# Patient Record
Sex: FEMALE | Race: WHITE | Employment: UNEMPLOYED | URBAN - METROPOLITAN AREA
[De-identification: names, ages, dates, MRNs, and addresses within clinical notes are randomized per-mention and may not be internally consistent; named-entity substitution may affect disease eponyms.]

---

## 2017-04-11 ENCOUNTER — HOSPITAL ENCOUNTER (EMERGENCY)
Age: 42
Discharge: HOME | End: 2017-04-11
Attending: FAMILY MEDICINE

## 2017-04-11 ENCOUNTER — APPOINTMENT (OUTPATIENT)
Dept: GENERAL RADIOLOGY | Age: 42
End: 2017-04-11
Attending: FAMILY MEDICINE

## 2017-04-11 VITALS
WEIGHT: 167 LBS | SYSTOLIC BLOOD PRESSURE: 169 MMHG | BODY MASS INDEX: 26.21 KG/M2 | OXYGEN SATURATION: 97 % | TEMPERATURE: 98.6 F | DIASTOLIC BLOOD PRESSURE: 87 MMHG | RESPIRATION RATE: 18 BRPM | HEART RATE: 100 BPM | HEIGHT: 67 IN

## 2017-04-11 DIAGNOSIS — J20.9 ACUTE BRONCHITIS, UNSPECIFIED ORGANISM: Primary | ICD-10-CM

## 2017-04-11 DIAGNOSIS — H66.91 ACUTE RIGHT OTITIS MEDIA: ICD-10-CM

## 2017-04-11 RX ORDER — OMEPRAZOLE 40 MG/1
40 CAPSULE, DELAYED RELEASE ORAL DAILY
COMMUNITY

## 2017-04-11 RX ORDER — PREDNISONE 10 MG/1
TABLET ORAL
Qty: 21 TAB | Refills: 0 | Status: ON HOLD | OUTPATIENT
Start: 2017-04-11 | End: 2020-09-25

## 2017-04-11 RX ORDER — AMOXICILLIN AND CLAVULANATE POTASSIUM 875; 125 MG/1; MG/1
1 TABLET, FILM COATED ORAL EVERY 12 HOURS
Qty: 20 TAB | Refills: 0 | Status: SHIPPED | OUTPATIENT
Start: 2017-04-11 | End: 2017-04-21

## 2017-04-11 RX ORDER — TIZANIDINE HYDROCHLORIDE 4 MG/1
4 CAPSULE, GELATIN COATED ORAL
COMMUNITY
End: 2020-10-02

## 2017-04-11 RX ORDER — CITALOPRAM 40 MG/1
40 TABLET, FILM COATED ORAL DAILY
Status: ON HOLD | COMMUNITY
End: 2020-09-25

## 2017-04-11 RX ORDER — ALBUTEROL SULFATE 90 UG/1
1 AEROSOL, METERED RESPIRATORY (INHALATION)
Status: ON HOLD | COMMUNITY
End: 2020-09-25

## 2017-04-11 RX ORDER — PREGABALIN 200 MG/1
200 CAPSULE ORAL 4 TIMES DAILY
COMMUNITY
End: 2020-10-02

## 2017-04-11 RX ORDER — BUSPIRONE HYDROCHLORIDE 15 MG/1
15 TABLET ORAL 3 TIMES DAILY
Status: ON HOLD | COMMUNITY
End: 2020-09-25

## 2017-04-11 RX ORDER — CODEINE PHOSPHATE AND GUAIFENESIN 10; 100 MG/5ML; MG/5ML
5 SOLUTION ORAL
Qty: 118 ML | Refills: 0 | Status: ON HOLD | OUTPATIENT
Start: 2017-04-11 | End: 2020-09-25

## 2017-04-11 RX ORDER — INSULIN LISPRO 100 [IU]/ML
INJECTION, SOLUTION INTRAVENOUS; SUBCUTANEOUS
Status: ON HOLD | COMMUNITY
End: 2020-09-25

## 2017-04-11 RX ORDER — ALBUTEROL SULFATE 90 UG/1
2 AEROSOL, METERED RESPIRATORY (INHALATION)
Qty: 1 INHALER | Refills: 0 | Status: SHIPPED | OUTPATIENT
Start: 2017-04-11

## 2017-04-11 NOTE — UC PROVIDER NOTE
Patient is a 38 y.o. female presenting with cough. The history is provided by the patient. Cough   This is a new problem. Episode onset: 2 weeks ago. The problem occurs every few minutes. The problem has been gradually worsening. The cough is productive of sputum. There has been no fever. Associated symptoms include ear congestion (bilateral), ear pain (bilateral), rhinorrhea, shortness of breath and wheezing. Pertinent negatives include no chest pain, no chills, no sweats, no headaches, no sore throat, no myalgias, no nausea and no vomiting. She has tried inhalers and cough syrup for the symptoms. The treatment provided no relief. Her past medical history does not include asthma. Past Medical History:   Diagnosis Date    Arthritis     Diabetes ([address]     Endocrine disease     hypothyroid    Gastrointestinal disorder     pancreatitis    Hypertension         Past Surgical History:   Procedure Laterality Date    HX GI      colonoscpy         Family History   Problem Relation Age of Onset    Cancer Mother      colon        Social History     Social History    Marital status: SINGLE     Spouse name: N/A    Number of children: N/A    Years of education: N/A     Occupational History    Not on file. Social History Main Topics    Smoking status: Current Some Day Smoker     Packs/day: 0.25    Smokeless tobacco: Never Used    Alcohol use Yes      Comment: quit about a month ago - drank only on the weekends    Drug use: No    Sexual activity: Yes     Partners: Male     Other Topics Concern    Not on file     Social History Narrative                ALLERGIES: Review of patient's allergies indicates no known allergies. Review of Systems   Constitutional: Negative for chills and fever. HENT: Positive for congestion, ear pain (bilateral) and rhinorrhea. Negative for sore throat. Respiratory: Positive for cough, shortness of breath and wheezing.     Cardiovascular: Negative for chest pain and palpitations. Gastrointestinal: Negative for nausea and vomiting. Musculoskeletal: Negative for myalgias. Skin: Negative for rash. Neurological: Negative for headaches. Vitals:    04/11/17 1903   BP: 169/87   Pulse: 100   Resp: 18   Temp: 98.6 °F (37 °C)   SpO2: 97%   Weight: 75.8 kg (167 lb)   Height: 5' 7\" (1.702 m)       Physical Exam   Constitutional: She appears well-developed and well-nourished. No distress. HENT:   Right Ear: External ear and ear canal normal. Tympanic membrane is erythematous and bulging. A middle ear effusion is present. Left Ear: External ear and ear canal normal. A middle ear effusion is present. Nose: Mucosal edema and rhinorrhea present. Right sinus exhibits no maxillary sinus tenderness and no frontal sinus tenderness. Left sinus exhibits no maxillary sinus tenderness and no frontal sinus tenderness. Mouth/Throat: Mucous membranes are normal. Posterior oropharyngeal erythema present. No oropharyngeal exudate, posterior oropharyngeal edema or tonsillar abscesses. Cardiovascular: Normal rate, regular rhythm and normal heart sounds. Pulmonary/Chest: Effort normal. No respiratory distress. She has wheezes in the right upper field, the right lower field, the left upper field and the left lower field. She has no rhonchi. She has no rales. Lymphadenopathy:     She has cervical adenopathy. Neurological: She is alert. Skin: She is not diaphoretic. Psychiatric: She has a normal mood and affect. Her behavior is normal. Judgment and thought content normal.   Nursing note and vitals reviewed. CXR Results  (Last 48 hours)               04/11/17 1919  XR CHEST PA LAT Final result    Impression:  IMPRESSION: Normal chest.           Narrative:  INDICATION: Cough for 2 weeks       FINDINGS: PA and lateral views of the chest demonstrate a normal   cardiomediastinal silhouette and clear lungs bilaterally. The visualized osseous   structures are unremarkable. MDM     Differential Diagnosis; Clinical Impression; Plan:     CLINICAL IMPRESSION:  Acute bronchitis, unspecified organism  (primary encounter diagnosis)  Acute right otitis media    Plan:  1. Augmentin, pred yared  2. Robitussin AC, Albuterol prn  3. F/u with pcp  Amount and/or Complexity of Data Reviewed:   Tests in the radiology section of CPT®:  Ordered and reviewed  Risk of Significant Complications, Morbidity, and/or Mortality:   Presenting problems: Moderate  Diagnostic procedures: Moderate  Management options:   Moderate  Progress:   Patient progress:  Stable      Procedures

## 2017-04-11 NOTE — DISCHARGE INSTRUCTIONS
Bronchitis: Care Instructions  Your Care Instructions    Bronchitis is inflammation of the bronchial tubes, which carry air to the lungs. The tubes swell and produce mucus, or phlegm. The mucus and inflamed bronchial tubes make you cough. You may have trouble breathing. Most cases of bronchitis are caused by viruses like those that cause colds. Antibiotics usually do not help and they may be harmful. Bronchitis usually develops rapidly and lasts about 2 to 3 weeks in otherwise healthy people. Follow-up care is a key part of your treatment and safety. Be sure to make and go to all appointments, and call your doctor if you are having problems. It's also a good idea to know your test results and keep a list of the medicines you take. How can you care for yourself at home? · Take all medicines exactly as prescribed. Call your doctor if you think you are having a problem with your medicine. · Get some extra rest.  · Take an over-the-counter pain medicine, such as acetaminophen (Tylenol), ibuprofen (Advil, Motrin), or naproxen (Aleve) to reduce fever and relieve body aches. Read and follow all instructions on the label. · Do not take two or more pain medicines at the same time unless the doctor told you to. Many pain medicines have acetaminophen, which is Tylenol. Too much acetaminophen (Tylenol) can be harmful. · Take an over-the-counter cough medicine that contains dextromethorphan to help quiet a dry, hacking cough so that you can sleep. Avoid cough medicines that have more than one active ingredient. Read and follow all instructions on the label. · Breathe moist air from a humidifier, hot shower, or sink filled with hot water. The heat and moisture will thin mucus so you can cough it out. · Do not smoke. Smoking can make bronchitis worse. If you need help quitting, talk to your doctor about stop-smoking programs and medicines. These can increase your chances of quitting for good.   When should you call for help? Call 911 anytime you think you may need emergency care. For example, call if:  · You have severe trouble breathing. Call your doctor now or seek immediate medical care if:  · You have new or worse trouble breathing. · You cough up dark brown or bloody mucus (sputum). · You have a new or higher fever. · You have a new rash. Watch closely for changes in your health, and be sure to contact your doctor if:  · You cough more deeply or more often, especially if you notice more mucus or a change in the color of your mucus. · You are not getting better as expected. Where can you learn more? Go to http://shani-juan manuel.info/. Enter H333 in the search box to learn more about \"Bronchitis: Care Instructions. \"  Current as of: May 23, 2016  Content Version: 11.2  © 4984-6713 [employer]. Care instructions adapted under license by [employer] (which disclaims liability or warranty for this information). If you have questions about a medical condition or this instruction, always ask your healthcare professional. [place] [address] any warranty or liability for your use of this information. Ear Infection (Otitis Media): Care Instructions  Your Care Instructions    An ear infection may start with a cold and affect the middle ear (otitis media). It can hurt a lot. Most ear infections clear up on their own in a couple of days. Most often you will not need antibiotics. This is because many ear infections are caused by a virus. Antibiotics don't work against a virus. Regular doses of pain medicines are the best way to reduce your fever and help you feel better. Follow-up care is a key part of your treatment and safety. Be sure to make and go to all appointments, and call your doctor if you are having problems. It's also a good idea to know your test results and keep a list of the medicines you take. How can you care for yourself at home?   · Take pain medicines exactly as directed. ¨ If the doctor gave you a prescription medicine for pain, take it as prescribed. ¨ If you are not taking a prescription pain medicine, take an over-the-counter medicine, such as acetaminophen (Tylenol), ibuprofen (Advil, Motrin), or naproxen (Aleve). Read and follow all instructions on the label. ¨ Do not take two or more pain medicines at the same time unless the doctor told you to. Many pain medicines have acetaminophen, which is Tylenol. Too much acetaminophen (Tylenol) can be harmful. · Plan to take a full dose of pain reliever before bedtime. Getting enough sleep will help you get better. · Try a warm, moist washcloth on the ear. It may help relieve pain. · If your doctor prescribed antibiotics, take them as directed. Do not stop taking them just because you feel better. You need to take the full course of antibiotics. When should you call for help? Call your doctor now or seek immediate medical care if:  · You have new or increasing ear pain. · You have new or increasing pus or blood draining from your ear. · You have a fever with a stiff neck or a severe headache. Watch closely for changes in your health, and be sure to contact your doctor if:  · You have new or worse symptoms. · You are not getting better after taking an antibiotic for 2 days. Where can you learn more? Go to http://shani-juan manuel.info/. Enter O213 in the search box to learn more about \"Ear Infection (Otitis Media): Care Instructions. \"  Current as of: July 29, 2016  Content Version: 11.2  © 1671-6653 [employer]. Care instructions adapted under license by [employer] (which disclaims liability or warranty for this information). If you have questions about a medical condition or this instruction, always ask your healthcare professional. [address] any warranty or liability for your use of this information.

## 2019-08-02 ENCOUNTER — HOSPITAL ENCOUNTER (OUTPATIENT)
Dept: LAB | Age: 44
Discharge: HOME | End: 2019-08-02

## 2019-08-02 ENCOUNTER — HOSPITAL ENCOUNTER (OUTPATIENT)
Dept: LAB | Age: 44
Discharge: HOME | End: 2019-08-02
Payer: COMMERCIAL

## 2019-08-02 LAB
GRAM STN SPEC: NORMAL
GRAM STN SPEC: NORMAL
SERVICE CMNT-IMP: NORMAL

## 2019-08-02 PROCEDURE — 87205 SMEAR GRAM STAIN: CPT

## 2019-08-02 PROCEDURE — 87102 FUNGUS ISOLATION CULTURE: CPT

## 2019-08-02 PROCEDURE — 87070 CULTURE OTHR SPECIMN AEROBIC: CPT

## 2019-08-05 LAB
BACTERIA SPEC CULT: ABNORMAL
BACTERIA SPEC CULT: ABNORMAL
SERVICE CMNT-IMP: ABNORMAL

## 2019-09-01 LAB
BACTERIA SPEC CULT: NORMAL
SERVICE CMNT-IMP: NORMAL

## 2020-09-24 ENCOUNTER — APPOINTMENT (OUTPATIENT)
Dept: VASCULAR SURGERY | Age: 45
DRG: 364 | End: 2020-09-24
Attending: FAMILY MEDICINE
Payer: COMMERCIAL

## 2020-09-24 ENCOUNTER — HOSPITAL ENCOUNTER (INPATIENT)
Age: 45
LOS: 8 days | Discharge: HOME HEALTH | DRG: 364 | End: 2020-10-02
Attending: STUDENT IN AN ORGANIZED HEALTH CARE EDUCATION/TRAINING PROGRAM | Admitting: FAMILY MEDICINE
Payer: COMMERCIAL

## 2020-09-24 DIAGNOSIS — M86.9 OSTEOMYELITIS OF RIGHT FOOT, UNSPECIFIED TYPE (HCC): ICD-10-CM

## 2020-09-24 DIAGNOSIS — I21.21 ST ELEVATION MYOCARDIAL INFARCTION INVOLVING LEFT CIRCUMFLEX CORONARY ARTERY (HCC): ICD-10-CM

## 2020-09-24 DIAGNOSIS — I21.4 NSTEMI (NON-ST ELEVATED MYOCARDIAL INFARCTION) (HCC): ICD-10-CM

## 2020-09-24 DIAGNOSIS — I46.9 PEA (PULSELESS ELECTRICAL ACTIVITY) (HCC): ICD-10-CM

## 2020-09-24 DIAGNOSIS — I21.3 ST ELEVATION (STEMI) MYOCARDIAL INFARCTION (HCC): ICD-10-CM

## 2020-09-24 DIAGNOSIS — L03.115 CELLULITIS OF RIGHT LOWER EXTREMITY: Primary | ICD-10-CM

## 2020-09-24 DIAGNOSIS — E87.20 METABOLIC ACIDOSIS: ICD-10-CM

## 2020-09-24 DIAGNOSIS — E11.65 TYPE 2 DIABETES MELLITUS WITH HYPERGLYCEMIA, WITH LONG-TERM CURRENT USE OF INSULIN (HCC): ICD-10-CM

## 2020-09-24 DIAGNOSIS — Z79.4 TYPE 2 DIABETES MELLITUS WITH HYPERGLYCEMIA, WITH LONG-TERM CURRENT USE OF INSULIN (HCC): ICD-10-CM

## 2020-09-24 LAB
ALBUMIN SERPL-MCNC: 3 G/DL (ref 3.5–5)
ALBUMIN/GLOB SERPL: 0.7 {RATIO} (ref 1.1–2.2)
ALP SERPL-CCNC: 75 U/L (ref 45–117)
ALT SERPL-CCNC: 31 U/L (ref 12–78)
ANION GAP SERPL CALC-SCNC: 7 MMOL/L (ref 5–15)
AST SERPL-CCNC: 56 U/L (ref 15–37)
BASOPHILS # BLD: 0.1 K/UL (ref 0–0.1)
BASOPHILS NFR BLD: 1 % (ref 0–1)
BILIRUB SERPL-MCNC: 0.2 MG/DL (ref 0.2–1)
BUN SERPL-MCNC: 42 MG/DL (ref 6–20)
BUN/CREAT SERPL: 20 (ref 12–20)
CALCIUM SERPL-MCNC: 9.4 MG/DL (ref 8.5–10.1)
CHLORIDE SERPL-SCNC: 98 MMOL/L (ref 97–108)
CO2 SERPL-SCNC: 27 MMOL/L (ref 21–32)
COMMENT, HOLDF: NORMAL
CREAT SERPL-MCNC: 2.06 MG/DL (ref 0.55–1.02)
DIFFERENTIAL METHOD BLD: ABNORMAL
EOSINOPHIL # BLD: 0.2 K/UL (ref 0–0.4)
EOSINOPHIL NFR BLD: 2 % (ref 0–7)
ERYTHROCYTE [DISTWIDTH] IN BLOOD BY AUTOMATED COUNT: 18 % (ref 11.5–14.5)
EST. AVERAGE GLUCOSE BLD GHB EST-MCNC: 235 MG/DL
GLOBULIN SER CALC-MCNC: 4.5 G/DL (ref 2–4)
GLUCOSE BLD STRIP.AUTO-MCNC: 330 MG/DL (ref 65–100)
GLUCOSE BLD STRIP.AUTO-MCNC: 357 MG/DL (ref 65–100)
GLUCOSE BLD STRIP.AUTO-MCNC: 360 MG/DL (ref 65–100)
GLUCOSE BLD STRIP.AUTO-MCNC: 390 MG/DL (ref 65–100)
GLUCOSE BLD STRIP.AUTO-MCNC: 426 MG/DL (ref 65–100)
GLUCOSE SERPL-MCNC: 516 MG/DL (ref 65–100)
HBA1C MFR BLD: 9.8 % (ref 4–5.6)
HCT VFR BLD AUTO: 33 % (ref 35–47)
HGB BLD-MCNC: 10.2 G/DL (ref 11.5–16)
IMM GRANULOCYTES # BLD AUTO: 0.2 K/UL (ref 0–0.04)
IMM GRANULOCYTES NFR BLD AUTO: 1 % (ref 0–0.5)
LACTATE SERPL-SCNC: 1.3 MMOL/L (ref 0.4–2)
LYMPHOCYTES # BLD: 2.6 K/UL (ref 0.8–3.5)
LYMPHOCYTES NFR BLD: 20 % (ref 12–49)
MCH RBC QN AUTO: 26.2 PG (ref 26–34)
MCHC RBC AUTO-ENTMCNC: 30.9 G/DL (ref 30–36.5)
MCV RBC AUTO: 84.6 FL (ref 80–99)
MONOCYTES # BLD: 0.8 K/UL (ref 0–1)
MONOCYTES NFR BLD: 6 % (ref 5–13)
NEUTS SEG # BLD: 9.3 K/UL (ref 1.8–8)
NEUTS SEG NFR BLD: 70 % (ref 32–75)
NRBC # BLD: 0 K/UL (ref 0–0.01)
NRBC BLD-RTO: 0 PER 100 WBC
PLATELET # BLD AUTO: 246 K/UL (ref 150–400)
PMV BLD AUTO: 11.6 FL (ref 8.9–12.9)
POTASSIUM SERPL-SCNC: 5 MMOL/L (ref 3.5–5.1)
PROT SERPL-MCNC: 7.5 G/DL (ref 6.4–8.2)
RBC # BLD AUTO: 3.9 M/UL (ref 3.8–5.2)
SAMPLES BEING HELD,HOLD: NORMAL
SERVICE CMNT-IMP: ABNORMAL
SODIUM SERPL-SCNC: 132 MMOL/L (ref 136–145)
WBC # BLD AUTO: 13.2 K/UL (ref 3.6–11)

## 2020-09-24 PROCEDURE — 65270000029 HC RM PRIVATE

## 2020-09-24 PROCEDURE — 74011636637 HC RX REV CODE- 636/637: Performed by: NURSE PRACTITIONER

## 2020-09-24 PROCEDURE — 83605 ASSAY OF LACTIC ACID: CPT

## 2020-09-24 PROCEDURE — 87040 BLOOD CULTURE FOR BACTERIA: CPT

## 2020-09-24 PROCEDURE — 80053 COMPREHEN METABOLIC PANEL: CPT

## 2020-09-24 PROCEDURE — 74011250637 HC RX REV CODE- 250/637: Performed by: FAMILY MEDICINE

## 2020-09-24 PROCEDURE — 83036 HEMOGLOBIN GLYCOSYLATED A1C: CPT

## 2020-09-24 PROCEDURE — 74011000258 HC RX REV CODE- 258: Performed by: FAMILY MEDICINE

## 2020-09-24 PROCEDURE — 36415 COLL VENOUS BLD VENIPUNCTURE: CPT

## 2020-09-24 PROCEDURE — 85025 COMPLETE CBC W/AUTO DIFF WBC: CPT

## 2020-09-24 PROCEDURE — 82962 GLUCOSE BLOOD TEST: CPT

## 2020-09-24 PROCEDURE — 74011636637 HC RX REV CODE- 636/637: Performed by: FAMILY MEDICINE

## 2020-09-24 PROCEDURE — 99283 EMERGENCY DEPT VISIT LOW MDM: CPT

## 2020-09-24 PROCEDURE — 74011250636 HC RX REV CODE- 250/636: Performed by: FAMILY MEDICINE

## 2020-09-24 PROCEDURE — 93971 EXTREMITY STUDY: CPT

## 2020-09-24 RX ORDER — METHADONE HYDROCHLORIDE 10 MG/1
160 TABLET ORAL DAILY
COMMUNITY

## 2020-09-24 RX ORDER — SODIUM CHLORIDE 0.9 % (FLUSH) 0.9 %
5-40 SYRINGE (ML) INJECTION AS NEEDED
Status: DISCONTINUED | OUTPATIENT
Start: 2020-09-24 | End: 2020-10-02 | Stop reason: HOSPADM

## 2020-09-24 RX ORDER — INSULIN LISPRO 100 [IU]/ML
4 INJECTION, SOLUTION INTRAVENOUS; SUBCUTANEOUS ONCE
Status: COMPLETED | OUTPATIENT
Start: 2020-09-24 | End: 2020-09-24

## 2020-09-24 RX ORDER — DEXTROSE MONOHYDRATE 100 MG/ML
0-250 INJECTION, SOLUTION INTRAVENOUS AS NEEDED
Status: DISCONTINUED | OUTPATIENT
Start: 2020-09-24 | End: 2020-10-02 | Stop reason: HOSPADM

## 2020-09-24 RX ORDER — ATORVASTATIN CALCIUM 20 MG/1
20 TABLET, FILM COATED ORAL DAILY
Status: DISCONTINUED | OUTPATIENT
Start: 2020-09-25 | End: 2020-09-24

## 2020-09-24 RX ORDER — INSULIN LISPRO 100 [IU]/ML
8 INJECTION, SOLUTION INTRAVENOUS; SUBCUTANEOUS ONCE
Status: COMPLETED | OUTPATIENT
Start: 2020-09-24 | End: 2020-09-24

## 2020-09-24 RX ORDER — VANCOMYCIN HYDROCHLORIDE
1250 EVERY 24 HOURS
Status: DISCONTINUED | OUTPATIENT
Start: 2020-09-25 | End: 2020-09-25

## 2020-09-24 RX ORDER — ACETAMINOPHEN 325 MG/1
650 TABLET ORAL
Status: DISCONTINUED | OUTPATIENT
Start: 2020-09-24 | End: 2020-10-02 | Stop reason: HOSPADM

## 2020-09-24 RX ORDER — CLONAZEPAM 1 MG/1
1 TABLET ORAL 3 TIMES DAILY
COMMUNITY

## 2020-09-24 RX ORDER — INSULIN LISPRO 100 [IU]/ML
6 INJECTION, SOLUTION INTRAVENOUS; SUBCUTANEOUS ONCE
Status: COMPLETED | OUTPATIENT
Start: 2020-09-24 | End: 2020-09-24

## 2020-09-24 RX ORDER — INSULIN LISPRO 100 [IU]/ML
INJECTION, SOLUTION INTRAVENOUS; SUBCUTANEOUS EVERY 6 HOURS
Status: DISCONTINUED | OUTPATIENT
Start: 2020-09-24 | End: 2020-09-27

## 2020-09-24 RX ORDER — SODIUM CHLORIDE 0.9 % (FLUSH) 0.9 %
5-40 SYRINGE (ML) INJECTION EVERY 8 HOURS
Status: DISCONTINUED | OUTPATIENT
Start: 2020-09-24 | End: 2020-10-02 | Stop reason: HOSPADM

## 2020-09-24 RX ORDER — INSULIN HUMAN 500 [IU]/ML
80 INJECTION, SOLUTION SUBCUTANEOUS
Status: ON HOLD | COMMUNITY
End: 2020-10-02 | Stop reason: SDUPTHER

## 2020-09-24 RX ORDER — FENOFIBRATE 145 MG/1
145 TABLET, COATED ORAL DAILY
Status: DISCONTINUED | OUTPATIENT
Start: 2020-09-25 | End: 2020-10-02 | Stop reason: HOSPADM

## 2020-09-24 RX ORDER — ZOLPIDEM TARTRATE 5 MG/1
5 TABLET ORAL
Status: DISCONTINUED | OUTPATIENT
Start: 2020-09-24 | End: 2020-10-02 | Stop reason: HOSPADM

## 2020-09-24 RX ORDER — CITALOPRAM 20 MG/1
40 TABLET, FILM COATED ORAL DAILY
Status: DISCONTINUED | OUTPATIENT
Start: 2020-09-25 | End: 2020-09-24

## 2020-09-24 RX ORDER — MAGNESIUM SULFATE 100 %
4 CRYSTALS MISCELLANEOUS AS NEEDED
Status: DISCONTINUED | OUTPATIENT
Start: 2020-09-24 | End: 2020-10-02 | Stop reason: HOSPADM

## 2020-09-24 RX ORDER — HEPARIN SODIUM 5000 [USP'U]/ML
5000 INJECTION, SOLUTION INTRAVENOUS; SUBCUTANEOUS EVERY 8 HOURS
Status: DISCONTINUED | OUTPATIENT
Start: 2020-09-24 | End: 2020-09-26 | Stop reason: ALTCHOICE

## 2020-09-24 RX ORDER — VANCOMYCIN 2 GRAM/500 ML IN 0.9 % SODIUM CHLORIDE INTRAVENOUS
2000 ONCE
Status: COMPLETED | OUTPATIENT
Start: 2020-09-24 | End: 2020-09-24

## 2020-09-24 RX ORDER — ALPRAZOLAM 1 MG/1
1 TABLET ORAL
Status: DISCONTINUED | OUTPATIENT
Start: 2020-09-24 | End: 2020-09-25

## 2020-09-24 RX ORDER — SERTRALINE HYDROCHLORIDE 50 MG/1
50 TABLET, FILM COATED ORAL 2 TIMES DAILY
COMMUNITY
End: 2022-08-31

## 2020-09-24 RX ORDER — SODIUM CHLORIDE 9 MG/ML
75 INJECTION, SOLUTION INTRAVENOUS CONTINUOUS
Status: DISCONTINUED | OUTPATIENT
Start: 2020-09-24 | End: 2020-09-27

## 2020-09-24 RX ORDER — PANTOPRAZOLE SODIUM 40 MG/1
40 TABLET, DELAYED RELEASE ORAL
Status: DISCONTINUED | OUTPATIENT
Start: 2020-09-25 | End: 2020-10-02 | Stop reason: HOSPADM

## 2020-09-24 RX ORDER — PREGABALIN 75 MG/1
150 CAPSULE ORAL 2 TIMES DAILY
Status: DISCONTINUED | OUTPATIENT
Start: 2020-09-24 | End: 2020-10-02 | Stop reason: HOSPADM

## 2020-09-24 RX ORDER — LISINOPRIL AND HYDROCHLOROTHIAZIDE 10; 12.5 MG/1; MG/1
1 TABLET ORAL DAILY
COMMUNITY
End: 2020-10-20

## 2020-09-24 RX ORDER — LEVOTHYROXINE SODIUM 100 UG/1
200 TABLET ORAL
Status: DISCONTINUED | OUTPATIENT
Start: 2020-09-25 | End: 2020-10-02 | Stop reason: HOSPADM

## 2020-09-24 RX ADMIN — CEFEPIME HYDROCHLORIDE 2 G: 2 INJECTION, POWDER, FOR SOLUTION INTRAVENOUS at 16:46

## 2020-09-24 RX ADMIN — INSULIN LISPRO 4 UNITS: 100 INJECTION, SOLUTION INTRAVENOUS; SUBCUTANEOUS at 23:33

## 2020-09-24 RX ADMIN — ACETAMINOPHEN 650 MG: 325 TABLET ORAL at 18:25

## 2020-09-24 RX ADMIN — INSULIN LISPRO 8 UNITS: 100 INJECTION, SOLUTION INTRAVENOUS; SUBCUTANEOUS at 14:36

## 2020-09-24 RX ADMIN — SODIUM CHLORIDE 1000 ML: 900 INJECTION, SOLUTION INTRAVENOUS at 14:09

## 2020-09-24 RX ADMIN — VANCOMYCIN HYDROCHLORIDE 2000 MG: 10 INJECTION, POWDER, LYOPHILIZED, FOR SOLUTION INTRAVENOUS at 14:36

## 2020-09-24 RX ADMIN — PREGABALIN 150 MG: 75 CAPSULE ORAL at 23:33

## 2020-09-24 RX ADMIN — INSULIN LISPRO 6 UNITS: 100 INJECTION, SOLUTION INTRAVENOUS; SUBCUTANEOUS at 22:13

## 2020-09-24 RX ADMIN — INSULIN LISPRO 4 UNITS: 100 INJECTION, SOLUTION INTRAVENOUS; SUBCUTANEOUS at 18:00

## 2020-09-24 RX ADMIN — Medication 10 ML: at 16:47

## 2020-09-24 RX ADMIN — HEPARIN SODIUM 5000 UNITS: 5000 INJECTION INTRAVENOUS; SUBCUTANEOUS at 16:45

## 2020-09-24 RX ADMIN — INSULIN LISPRO 7 UNITS: 100 INJECTION, SOLUTION INTRAVENOUS; SUBCUTANEOUS at 18:00

## 2020-09-24 RX ADMIN — INSULIN LISPRO 4 UNITS: 100 INJECTION, SOLUTION INTRAVENOUS; SUBCUTANEOUS at 19:47

## 2020-09-24 NOTE — Clinical Note
Lesion located in the Mid Cx. Balloon inserted. Balloon inflated using multiple inflations inflation technique. Lesion #1: Pressure = 20 daniel; Duration = 10 sec. Inflation 2: Pressure = 20 daniel; Duration = 11 sec. Inflation 3: Pressure = 20 daniel; Duration = 7 sec.

## 2020-09-24 NOTE — Clinical Note
Lesion located in the Mid LAD. Balloon inserted. Balloon inflated using multiple inflations inflation technique. Lesion #1: Pressure = 12 daniel; Duration = 12 sec. Inflation 2: Pressure = 12 daniel; Duration = 6 sec. Inflation 3: Pressure = 20 daniel; Duration = 16 sec.

## 2020-09-24 NOTE — Clinical Note
Lesion located in the Mid LAD. Balloon inserted. Balloon inflated using multiple inflations inflation technique. Lesion #1: Pressure = 12 daniel; Duration = 20 sec. Inflation 2: Pressure = 16 daniel; Duration = 14 sec.

## 2020-09-24 NOTE — ED TRIAGE NOTES
Arrives ambulatory with walking boot to right foot as referred by Dr. Jett Montague for admission r/t scheduled procedure tomorrow of right foot ulcer with possible amputation. Denies fevers.

## 2020-09-24 NOTE — PROGRESS NOTES
Primary Nurse Masha Kaplan, SVITLANA and Ifeanyi Omer RN performed a dual skin assessment on this patient Impairment noted- see wound doc flow sheet  Brendon score is 22      Right foot wound      Bedside and Verbal shift change report given to Ifeanyi Omer (oncoming nurse) by [place] (offgoing nurse). Report included the following information SBAR.

## 2020-09-24 NOTE — Clinical Note
Lesion located in the Mid LAD. Balloon inserted. Balloon inflated using multiple inflations inflation technique. Lesion #1: Pressure = 12 daniel; Duration = 20 sec. Inflation 2: Pressure = 16 daniel; Duration = 13 sec.

## 2020-09-24 NOTE — PROGRESS NOTES
Pharmacist Note - Vancomycin Dosing    Consult provided for this 50 y.o. female for indication of osteomyelitis of the right foot  Antibiotic regimen(s): vancomycin, zosyn  Patient on vancomycin PTA? NO     Recent Labs     20  1128   WBC 13.2*   CREA 2.06*   BUN 42*     Frequency of BMP: daily  Height: 170.2 cm  Weight: 81.6 kg  Est CrCl: ~37 ml/min  Temp (24hrs), Av.9 °F (36.6 °C), Min:97.7 °F (36.5 °C), Max:98.1 °F (36.7 °C)      Goal trough = 15 - 20 mcg/mL    Therapy will be initiated with a loading dose of 2000 mg IV x 1 to be followed by a maintenance dose of 1250 mg IV every 24 hours. Pharmacy to follow patient daily and order levels / make dose adjustments as appropriate.

## 2020-09-24 NOTE — Clinical Note
Lesion located in the Proximal Cx. Balloon inserted. Balloon inflated using multiple inflations inflation technique. Lesion #1: Pressure = 18 daniel; Duration = 18 sec. Inflation 2: Pressure = 16 daniel; Duration = 13 sec. Inflation 3: Pressure = 18 daniel; Duration = 13 sec.

## 2020-09-24 NOTE — Clinical Note
Lesion: Located in the Mid Cx. Stent inserted. Stent deployed. Single technique and multiple inflations used. First inflation pressure = 16 daniel; inflation time: 56 sec. Second inflation pressure: 20 daniel; inflation time: 7 sec.

## 2020-09-24 NOTE — Clinical Note
Lesion located in the Mid LAD. Balloon inserted. Balloon inflated using multiple inflations inflation technique. Lesion #1: Pressure = 20 daniel; Duration = 13 sec. Inflation 2: Pressure = 20 daniel; Duration = 14 sec. Inflation 3: Pressure = 20 daniel; Duration = 10 sec.

## 2020-09-24 NOTE — Clinical Note
Lesion: Located in the Mid LAD. Stent inserted. Stent deployed. Single technique used. First inflation pressure = 18 daniel; inflation time: 28 sec.

## 2020-09-24 NOTE — Clinical Note
Lesion: Located in the Proximal Cx. Stent inserted. Stent deployed. Single technique used. First inflation pressure = 20 daniel; inflation time: 60 sec.

## 2020-09-24 NOTE — Clinical Note
Lesion located in the Mid Cx. Balloon inserted. Balloon inflated using multiple inflations inflation technique. Lesion #1: Pressure = 10 daniel; Duration = 8 sec. Inflation 2: Pressure = 12 daniel; Duration = 30 sec.

## 2020-09-24 NOTE — Clinical Note
Lesion located in the Proximal Cx. Balloon inserted. Balloon inflated using multiple inflations inflation technique. Lesion #1: Pressure = 20 daniel; Duration = 14 sec. Inflation 2: Pressure = 20 daniel; Duration = 18 sec.

## 2020-09-24 NOTE — Clinical Note
Right groin and right radial prepped with ChloraPrep and draped. Wet prep solution applied at: 525. Wet prep solution dried at: 530. Wet prep elapsed drying time: 5 mins.

## 2020-09-24 NOTE — PROGRESS NOTES
TRANSFER - OUT REPORT:    Verbal report given to Rolf Damon (name) on Kassi Garcia  being transferred to [facility] (unit) for routine progression of care       Report consisted of patients Situation, Background, Assessment and   Recommendations(SBAR). Information from the following report(s) SBAR, Kardex and ED Summary was reviewed with the receiving nurse. Lines:   Peripheral IV 09/24/20 Left Antecubital (Active)   Site Assessment Clean, dry, & intact 09/24/20 1351   Phlebitis Assessment 0 09/24/20 1351   Infiltration Assessment 0 09/24/20 1351   Dressing Status Clean, dry, & intact 09/24/20 1351   Dressing Type Transparent 09/24/20 1351   Hub Color/Line Status Pink 09/24/20 1351   Action Taken Open ports on tubing capped 09/24/20 1351   Alcohol Cap Used Yes 09/24/20 1351       Peripheral IV 09/24/20 Right Arm (Active)   Site Assessment Clean, dry, & intact 09/24/20 1351   Phlebitis Assessment 0 09/24/20 1351   Infiltration Assessment 0 09/24/20 1351   Dressing Status Clean, dry, & intact 09/24/20 1351   Dressing Type Transparent 09/24/20 1351   Hub Color/Line Status Pink; Infusing 09/24/20 1351   Action Taken Open ports on tubing capped 09/24/20 1351   Alcohol Cap Used Yes 09/24/20 1351        Opportunity for questions and clarification was provided.       Patient transported with:   [employer]

## 2020-09-24 NOTE — Clinical Note
Lesion located in the Mid Cx. Balloon inserted. Balloon inflated using multiple inflations inflation technique. Lesion #1: Pressure = 16 daniel; Duration = 11 sec. Inflation 2: Pressure = 20 daniel; Duration = 10 sec. Inflation 3: Pressure = 20 daniel; Duration = 6 sec.

## 2020-09-24 NOTE — PROGRESS NOTES
POC blood glucose 426, Dr oRry Barroso paged and made aware, received orders to give 8u [id number] and re check in 1hr.

## 2020-09-24 NOTE — H&P
History & Physical    Primary Care Provider: Other, MD Romel  Source of Information: Patient     History of Presenting Illness:   Rosalee Oropeza is a 50 y.o. female who presents with right foot pain    History was probably obtained from the patient    Patient reports her symptoms started about 6 months back, she had a small area of hardened skin under her right foot, peeled the skin off, very soon developed a boil at the base of her foot, went to [facility], had an I&D done, then developed discoloration of the right big toe, had amputation done of the right big toe, and has been treated for right foot wound since then. Patient reports that recently the wound started getting worse, started having some drainage, she was seen by podiatrist, had an MRI done, was told that she has osteomyelitis and was requested to go to [facility] and get admitted for further management and evaluation. Patient denies any complaints or problems, denies any fever, chills, exposure to COVID-19 positive patients    . The patient denies any Headache, blurry vision, sore throat, trouble swallowing, trouble with speech, chest pain, SOB, cough, fever, chills, N/V/D, abd pain, urinary symptoms, constipation, recent travels, sick contacts, focal or generalized neurological symptoms,  falls, injuries, rashes, contact with COVID-19 diagnosed patients, hematemesis, melena, hemoptysis, hematuria, rashes, denies starting any new medications and denies any other concerns or problems besides as mentioned above. Review of Systems:  A comprehensive review of systems was negative except for that written in the History of Present Illness.      Past Medical History:   Diagnosis Date    Arthritis     Diabetes ([facility] [address]     Endocrine disease     hypothyroid    Gastrointestinal disorder     pancreatitis    Hypertension       Past Surgical History:   Procedure Laterality Date    HX GI      colonoscpy Prior to Admission medications    Medication Sig Start Date End Date Taking? Authorizing Provider   citalopram (CELEXA) 40 mg tablet Take 40 mg by mouth daily. Romel Howard MD   albuterol (VENTOLIN HFA) 90 mcg/actuation inhaler Take 1 Puff by inhalation every four (4) hours as needed for Wheezing. Romel Howard MD   pregabalin (LYRICA) 150 mg capsule Take 150 mg by mouth two (2) times a day. Romel Howard MD   tiZANidine (ZANAFLEX) 4 mg capsule Take 4 mg by mouth three (3) times daily. Romel Howard MD   amylase-lipase-protease (CREON) 24,000-76,000 -120,000 unit capsule Take 1 Cap by mouth three (3) times daily (with meals). Romel Howard MD   insulin lispro (HUMALOG) 100 unit/mL kwikpen by SubCUTAneous route. Romel Howard MD   busPIRone (BUSPAR) 15 mg tablet Take 15 mg by mouth three (3) times daily. Romel Howard MD   omeprazole (PRILOSEC) 40 mg capsule Take 40 mg by mouth daily. Romel Howard MD   predniSONE (STERAPRED DS) 10 mg dose pack Take as directed, with food 4/11/17   Prabha BARNETT MD   guaiFENesin-codeine (ROBITUSSIN AC) 100-10 mg/5 mL solution Take 5 mL by mouth three (3) times daily as needed for Cough. Max Daily Amount: 15 mL. 4/11/17   Ashutosh Lawrence MD   albuterol (PROVENTIL HFA, VENTOLIN HFA, PROAIR HFA) 90 mcg/actuation inhaler Take 2 Puffs by inhalation every four (4) hours as needed for Wheezing. 4/11/17   Joce Tiwari MD   fenofibrate (LOFIBRA) 160 mg tablet Take 160 mg by mouth daily. Indications: HYPERCHOLESTEROLEMIA    Romel Howard MD   ALPRAZolam (XANAX) 1 mg tablet Take 1 mg by mouth nightly as needed for Anxiety. Indications: ANXIETY    Romel Howard MD   zolpidem (AMBIEN) 5 mg tablet Take 5 mg by mouth nightly as needed for Sleep. Romel Howard MD   lisinopril (PRINIVIL, ZESTRIL) 10 mg tablet Take 10 mg by mouth daily. Indications: HYPERTENSION    Romel Howard MD   atorvastatin (LIPITOR) 20 mg tablet Take  by mouth daily.     Other, MD Romel   metFORMIN (GLUCOPHAGE) 500 mg tablet Take 500 mg by mouth two (2) times daily (with meals). Romel Howard MD   GLIPIZIDE PO Take  by mouth. Romel Howard MD   LEVOTHYROXINE SODIUM (SYNTHROID PO) Take 175 mcg by mouth. Romel Howard MD     No Known Allergies   Family History   Problem Relation Age of Onset    Cancer Mother         colon        SOCIAL HISTORY:  Patient resides:  Independently x   Assisted Living    SNF    With family care       Smoking history:   None    Former    Chronic x     Alcohol history:   None    Social x   Chronic      Ambulates:   Independently x   w/cane    w/walker    w/wc    CODE STATUS:  DNR    Full x   Other      Objective:     Physical Exam:     Visit Vitals  BP 94/63 (BP 1 Location: Left arm, BP Patient Position: At rest)   Pulse 86   Temp 98.1 °F (36.7 °C)   Resp 18   Ht 5' 7\" (1.702 m)   Wt 81.6 kg (180 lb)   SpO2 97%   BMI 28.19 kg/m²      O2 Device: Room air    General : alert x 3, awake, no acute distress, resting in bed, pleasant /female, appears to be stated age  [de-identified]: PEERL, EOMI, moist mucus membrane, TM clear  Neck: supple, no JVD, no meningeal signs  Chest: Clear to auscultation bilaterally   CVS: S1 S2 heard, Capillary refill less than 2 seconds  Abd: soft/ Non tender, non distended, BS physiological,   Ext: no clubbing, no cyanosis, right foot in a boot, large wound on the medial part of the right foot, with extending erythema to the distal one third of the right leg  Neuro/Psych: pleasant mood and affect, CN 2-12 grossly intact, decreased sensation bilateral lower extremity strength 5/5 in all extremities, DTR 1+ x 4  Skin: warm          Data Review:     Recent Days:  Recent Labs     09/24/20  1128   WBC 13.2*   HGB 10.2*   HCT 33.0*        Recent Labs     09/24/20  1128   *   K 5.0   CL 98   CO2 27   *   BUN 42*   CREA 2.06*   CA 9.4   ALB 3.0*   ALT 31     No results for input(s): PH, PCO2, PO2, HCO3, FIO2 in the last 72 hours.     24 Hour Results:  Recent Results (from the past 24 hour(s))   CBC WITH AUTOMATED DIFF    Collection Time: 09/24/20 11:28 AM   Result Value Ref Range    WBC 13.2 (H) 3.6 - 11.0 K/uL    RBC 3.90 3.80 - 5.20 M/uL    HGB 10.2 (L) 11.5 - 16.0 g/dL    HCT 33.0 (L) 35.0 - 47.0 %    MCV 84.6 80.0 - 99.0 FL    MCH 26.2 26.0 - 34.0 PG    MCHC 30.9 30.0 - 36.5 g/dL    RDW 18.0 (H) 11.5 - 14.5 %    PLATELET 687 779 - 201 K/uL    MPV 11.6 8.9 - 12.9 FL    NRBC 0.0 0  WBC    ABSOLUTE NRBC 0.00 0.00 - 0.01 K/uL    NEUTROPHILS 70 32 - 75 %    LYMPHOCYTES 20 12 - 49 %    MONOCYTES 6 5 - 13 %    EOSINOPHILS 2 0 - 7 %    BASOPHILS 1 0 - 1 %    IMMATURE GRANULOCYTES 1 (H) 0.0 - 0.5 %    ABS. NEUTROPHILS 9.3 (H) 1.8 - 8.0 K/UL    ABS. LYMPHOCYTES 2.6 0.8 - 3.5 K/UL    ABS. MONOCYTES 0.8 0.0 - 1.0 K/UL    ABS. EOSINOPHILS 0.2 0.0 - 0.4 K/UL    ABS. BASOPHILS 0.1 0.0 - 0.1 K/UL    ABS. IMM. GRANS. 0.2 (H) 0.00 - 0.04 K/UL    DF AUTOMATED     METABOLIC PANEL, COMPREHENSIVE    Collection Time: 09/24/20 11:28 AM   Result Value Ref Range    Sodium 132 (L) 136 - 145 mmol/L    Potassium 5.0 3.5 - 5.1 mmol/L    Chloride 98 97 - 108 mmol/L    CO2 27 21 - 32 mmol/L    Anion gap 7 5 - 15 mmol/L    Glucose 516 (H) 65 - 100 mg/dL    BUN 42 (H) 6 - 20 MG/DL    Creatinine 2.06 (H) 0.55 - 1.02 MG/DL    BUN/Creatinine ratio 20 12 - 20      GFR est AA 31 (L) >60 ml/min/1.73m2    GFR est non-AA 26 (L) >60 ml/min/1.73m2    Calcium 9.4 8.5 - 10.1 MG/DL    Bilirubin, total 0.2 0.2 - 1.0 MG/DL    ALT (SGPT) 31 12 - 78 U/L    AST (SGOT) 56 (H) 15 - 37 U/L    Alk.  phosphatase 75 45 - 117 U/L    Protein, total 7.5 6.4 - 8.2 g/dL    Albumin 3.0 (L) 3.5 - 5.0 g/dL    Globulin 4.5 (H) 2.0 - 4.0 g/dL    A-G Ratio 0.7 (L) 1.1 - 2.2     SAMPLES BEING HELD    Collection Time: 09/24/20 11:28 AM   Result Value Ref Range    SAMPLES BEING HELD 1RED,1BLU     COMMENT        Add-on orders for these samples will be processed based on acceptable specimen integrity and analyte stability, which may vary by analyte. LACTIC ACID    Collection Time: 09/24/20 11:28 AM   Result Value Ref Range    Lactic acid 1.3 0.4 - 2.0 MMOL/L         Imaging:   No results found.   Assessment/Plan     Osteomyelitis of the right foot: Patient will be admitted on a telemetry bed, IV antibiotics, patient for surgical intervention per podiatry likely in the morning, n.p.o. after midnight, IV hydration, blood cultures, pain control, wound care, venous duplex of the lower extremity, supportive care and close monitoring, further intervention per hospital course, reassess as needed    Diabetes mellitus type 2 with hyperglycemia: Poorly controlled, patient will be on sliding scale insulin, Accu-Cheks, diet control, close monitoring, further intervention per hospital course, reassess as needed, optimize blood glucose control and reassess as needed    Hypothyroidism: Continue home medications and continue monitor    Hypertension: Continue home medications and continue monitor    GI DVT prophylaxis: Patient will be on heparin                             Signed By: Delta Mei MD     September 24, 2020

## 2020-09-24 NOTE — ED PROVIDER NOTES
HPI patient is a 78-year-old female presenting by her doctor (Dr. Elizabeth Yanes) for admission. She has osteomyelitis of the right foot confirmed by MRI with cellulitis. He would like the hospitalist to admit and plans to do several surgical debridements/resections. Denies fever, cold symptoms, headache, neck pain, visual changes, focal weakness or rash. Denies any difficulty breathing, difficulty swallowing, SOB or chest pain. Denies any nausea, vomiting or diarrhea. Pt. Reports that she has not had any pain medications today prior to arrival.  She is walking steady with a orthopedic boot to the right foot.          Past Medical History:   Diagnosis Date    Arthritis     Diabetes ([facility] [address]     Endocrine disease     hypothyroid    Gastrointestinal disorder     pancreatitis    Hypertension        Past Surgical History:   Procedure Laterality Date    HX GI      colonoscpy         Family History:   Problem Relation Age of Onset    Cancer Mother         colon       Social History     Socioeconomic History    Marital status: SINGLE     Spouse name: Not on file    Number of children: Not on file    Years of education: Not on file    Highest education level: Not on file   Occupational History    Not on file   Social Needs    Financial resource strain: Not on file    Food insecurity     Worry: Not on file     Inability: Not on file    Transportation needs     Medical: Not on file     Non-medical: Not on file   Tobacco Use    Smoking status: Current Some Day Smoker     Packs/day: 0.25    Smokeless tobacco: Never Used   Substance and Sexual Activity    Alcohol use: Yes     Comment: quit about a month ago - drank only on the weekends    Drug use: No    Sexual activity: Yes     Partners: Male   Lifestyle    Physical activity     Days per week: Not on file     Minutes per session: Not on file    Stress: Not on file   Relationships    Social connections     Talks on phone: Not on file     Gets together: Not on file     Attends Jehovah's witness service: Not on file     Active member of club or organization: Not on file     Attends meetings of clubs or organizations: Not on file     Relationship status: Not on file    Intimate partner violence     Fear of current or ex partner: Not on file     Emotionally abused: Not on file     Physically abused: Not on file     Forced sexual activity: Not on file   Other Topics Concern    Not on file   Social History Narrative    Not on file         ALLERGIES: Patient has no known allergies. Review of Systems   Constitutional: Negative for activity change, appetite change, fever and unexpected weight change. HENT: Negative for congestion, sore throat and trouble swallowing. Respiratory: Negative for cough and shortness of breath. Gastrointestinal: Negative for abdominal pain, diarrhea and nausea. Genitourinary: Negative for dysuria. Musculoskeletal: Negative for arthralgias and myalgias. Skin: Positive for wound. Neurological: Negative for headaches. All other systems reviewed and are negative. Vitals:    09/24/20 1122   BP: 105/68   Pulse: 84   Resp: 16   Temp: 97.7 °F (36.5 °C)   SpO2: 96%   Weight: 81.6 kg (180 lb)   Height: 5' 7\" (1.702 m)            Physical Exam  Vitals signs and nursing note reviewed. Constitutional:       General: She is not in acute distress. Appearance: Normal appearance. She is not ill-appearing, toxic-appearing or diaphoretic. Comments: Female; type 2 diabetes on insulin; smoker   HENT:      Head: Normocephalic. Neck:      Musculoskeletal: Normal range of motion. Cardiovascular:      Rate and Rhythm: Normal rate and regular rhythm. Pulmonary:      Effort: Pulmonary effort is normal.      Breath sounds: Normal breath sounds. Abdominal:      General: Bowel sounds are normal.      Palpations: Abdomen is soft.    Musculoskeletal:      Comments: Right foot ulceration with extending erythema; right great toe previously amputated. Neurological:      Mental Status: She is alert. MDM       Procedures        Dr. Jose Rafael Gil (orthopedist) reports that the patient has osteomyelitis confirmed by MRI; he wants the patient admitted by the hospitalist and he will follow in consult. [facility] Consult for Admission  12:50 PM    ED Room Number: R36/R36  Patient Name and age:  Claudell Rumble 50 y.o.  female  Working Diagnosis:   1. Cellulitis of right lower extremity    2. Osteomyelitis of right foot, unspecified type ([address]        COVID-19 Suspicion:  no  Sepsis present:  no  Reassessment needed: no  Code Status:  Full Code  Readmission: no  Isolation Requirements:  no  Recommended Level of Care:  med/surg  Department:[facility] Adult ED - 21   Other:        Patient's results and plan of care have been reviewed with her. Patient has verbally conveyed her understanding and agreement of her signs, symptoms, diagnosis, treatment and prognosis and additionally agrees to be admitted.  Mary Macdonald NP

## 2020-09-24 NOTE — PROGRESS NOTES
TRANSFER - IN REPORT:    Verbal report received from Kath(name) on Mariama Daugherty  being received from ED(unit) for routine progression of care      Report consisted of patients Situation, Background, Assessment and   Recommendations(SBAR). Information from the following report(s) SBAR was reviewed with the receiving nurse. Opportunity for questions and clarification was provided. Assessment completed upon patients arrival to unit and care assumed.

## 2020-09-25 ENCOUNTER — ANESTHESIA EVENT (OUTPATIENT)
Dept: SURGERY | Age: 45
DRG: 364 | End: 2020-09-25
Payer: COMMERCIAL

## 2020-09-25 ENCOUNTER — APPOINTMENT (OUTPATIENT)
Dept: CT IMAGING | Age: 45
DRG: 364 | End: 2020-09-25
Attending: STUDENT IN AN ORGANIZED HEALTH CARE EDUCATION/TRAINING PROGRAM
Payer: COMMERCIAL

## 2020-09-25 ENCOUNTER — APPOINTMENT (OUTPATIENT)
Dept: GENERAL RADIOLOGY | Age: 45
DRG: 364 | End: 2020-09-25
Attending: NURSE PRACTITIONER
Payer: COMMERCIAL

## 2020-09-25 ENCOUNTER — APPOINTMENT (OUTPATIENT)
Dept: GENERAL RADIOLOGY | Age: 45
DRG: 364 | End: 2020-09-25
Attending: PODIATRIST
Payer: COMMERCIAL

## 2020-09-25 ENCOUNTER — ANESTHESIA (OUTPATIENT)
Dept: SURGERY | Age: 45
DRG: 364 | End: 2020-09-25
Payer: COMMERCIAL

## 2020-09-25 LAB
ALBUMIN SERPL-MCNC: 2.6 G/DL (ref 3.5–5)
ALBUMIN/GLOB SERPL: 0.5 {RATIO} (ref 1.1–2.2)
ALP SERPL-CCNC: 86 U/L (ref 45–117)
ALT SERPL-CCNC: 38 U/L (ref 12–78)
ANION GAP SERPL CALC-SCNC: 5 MMOL/L (ref 5–15)
ANION GAP SERPL CALC-SCNC: 8 MMOL/L (ref 5–15)
ARTERIAL PATENCY WRIST A: YES
AST SERPL-CCNC: 84 U/L (ref 15–37)
BASE DEFICIT BLD-SCNC: 9 MMOL/L
BASOPHILS # BLD: 0.1 K/UL (ref 0–0.1)
BASOPHILS # BLD: 0.3 K/UL (ref 0–0.1)
BASOPHILS NFR BLD: 1 % (ref 0–1)
BASOPHILS NFR BLD: 1 % (ref 0–1)
BDY SITE: ABNORMAL
BILIRUB SERPL-MCNC: 0.4 MG/DL (ref 0.2–1)
BNP SERPL-MCNC: 6033 PG/ML
BUN SERPL-MCNC: 24 MG/DL (ref 6–20)
BUN SERPL-MCNC: 36 MG/DL (ref 6–20)
BUN/CREAT SERPL: 15 (ref 12–20)
BUN/CREAT SERPL: 22 (ref 12–20)
CA-I BLD-SCNC: 1.13 MMOL/L (ref 1.12–1.32)
CALCIUM SERPL-MCNC: 8.7 MG/DL (ref 8.5–10.1)
CALCIUM SERPL-MCNC: 8.8 MG/DL (ref 8.5–10.1)
CHLORIDE SERPL-SCNC: 106 MMOL/L (ref 97–108)
CHLORIDE SERPL-SCNC: 107 MMOL/L (ref 97–108)
CO2 SERPL-SCNC: 20 MMOL/L (ref 21–32)
CO2 SERPL-SCNC: 27 MMOL/L (ref 21–32)
CREAT SERPL-MCNC: 1.55 MG/DL (ref 0.55–1.02)
CREAT SERPL-MCNC: 1.67 MG/DL (ref 0.55–1.02)
D DIMER PPP FEU-MCNC: 8.12 MG/L FEU (ref 0–0.65)
DIFFERENTIAL METHOD BLD: ABNORMAL
DIFFERENTIAL METHOD BLD: ABNORMAL
EOSINOPHIL # BLD: 0.3 K/UL (ref 0–0.4)
EOSINOPHIL # BLD: 0.6 K/UL (ref 0–0.4)
EOSINOPHIL NFR BLD: 2 % (ref 0–7)
EOSINOPHIL NFR BLD: 2 % (ref 0–7)
ERYTHROCYTE [DISTWIDTH] IN BLOOD BY AUTOMATED COUNT: 18.1 % (ref 11.5–14.5)
ERYTHROCYTE [DISTWIDTH] IN BLOOD BY AUTOMATED COUNT: 18.2 % (ref 11.5–14.5)
GAS FLOW.O2 O2 DELIVERY SYS: ABNORMAL L/MIN
GAS FLOW.O2 SETTING OXYMISER: 16 BPM
GLOBULIN SER CALC-MCNC: 4.9 G/DL (ref 2–4)
GLUCOSE BLD STRIP.AUTO-MCNC: 166 MG/DL (ref 65–100)
GLUCOSE BLD STRIP.AUTO-MCNC: 182 MG/DL (ref 65–100)
GLUCOSE BLD STRIP.AUTO-MCNC: 267 MG/DL (ref 65–100)
GLUCOSE BLD STRIP.AUTO-MCNC: 275 MG/DL (ref 65–100)
GLUCOSE BLD STRIP.AUTO-MCNC: 383 MG/DL (ref 65–100)
GLUCOSE BLD STRIP.AUTO-MCNC: 440 MG/DL (ref 65–100)
GLUCOSE SERPL-MCNC: 323 MG/DL (ref 65–100)
GLUCOSE SERPL-MCNC: 445 MG/DL (ref 65–100)
HCG UR QL: NEGATIVE
HCO3 BLD-SCNC: 19.1 MMOL/L (ref 22–26)
HCT VFR BLD AUTO: 29.5 % (ref 35–47)
HCT VFR BLD AUTO: 38.5 % (ref 35–47)
HGB BLD-MCNC: 11.4 G/DL (ref 11.5–16)
HGB BLD-MCNC: 9 G/DL (ref 11.5–16)
IMM GRANULOCYTES # BLD AUTO: 0.1 K/UL (ref 0–0.04)
IMM GRANULOCYTES # BLD AUTO: 1.1 K/UL (ref 0–0.04)
IMM GRANULOCYTES NFR BLD AUTO: 1 % (ref 0–0.5)
IMM GRANULOCYTES NFR BLD AUTO: 4 % (ref 0–0.5)
INSPIRATION.DURATION SETTING TIME VENT: 0.94 SEC
LACTATE SERPL-SCNC: 2.6 MMOL/L (ref 0.4–2)
LYMPHOCYTES # BLD: 2.3 K/UL (ref 0.8–3.5)
LYMPHOCYTES # BLD: 3.1 K/UL (ref 0.8–3.5)
LYMPHOCYTES NFR BLD: 11 % (ref 12–49)
LYMPHOCYTES NFR BLD: 21 % (ref 12–49)
MCH RBC QN AUTO: 26 PG (ref 26–34)
MCH RBC QN AUTO: 26.2 PG (ref 26–34)
MCHC RBC AUTO-ENTMCNC: 29.6 G/DL (ref 30–36.5)
MCHC RBC AUTO-ENTMCNC: 30.5 G/DL (ref 30–36.5)
MCV RBC AUTO: 85.8 FL (ref 80–99)
MCV RBC AUTO: 87.9 FL (ref 80–99)
MONOCYTES # BLD: 0.5 K/UL (ref 0–1)
MONOCYTES # BLD: 0.6 K/UL (ref 0–1)
MONOCYTES NFR BLD: 2 % (ref 5–13)
MONOCYTES NFR BLD: 5 % (ref 5–13)
NEUTS SEG # BLD: 22.2 K/UL (ref 1.8–8)
NEUTS SEG # BLD: 7.9 K/UL (ref 1.8–8)
NEUTS SEG NFR BLD: 70 % (ref 32–75)
NEUTS SEG NFR BLD: 80 % (ref 32–75)
NRBC # BLD: 0 K/UL (ref 0–0.01)
NRBC # BLD: 0 K/UL (ref 0–0.01)
NRBC BLD-RTO: 0 PER 100 WBC
NRBC BLD-RTO: 0 PER 100 WBC
O2/TOTAL GAS SETTING VFR VENT: 100 %
PCO2 BLD: 48.5 MMHG (ref 35–45)
PEEP RESPIRATORY: 8 CMH2O
PH BLD: 7.2 [PH] (ref 7.35–7.45)
PIP ISTAT,IPIP: 20
PLATELET # BLD AUTO: 218 K/UL (ref 150–400)
PLATELET # BLD AUTO: 390 K/UL (ref 150–400)
PMV BLD AUTO: 12.2 FL (ref 8.9–12.9)
PMV BLD AUTO: 12.4 FL (ref 8.9–12.9)
PO2 BLD: 263 MMHG (ref 80–100)
POTASSIUM SERPL-SCNC: 4.4 MMOL/L (ref 3.5–5.1)
POTASSIUM SERPL-SCNC: 4.8 MMOL/L (ref 3.5–5.1)
PROT SERPL-MCNC: 7.5 G/DL (ref 6.4–8.2)
RBC # BLD AUTO: 3.44 M/UL (ref 3.8–5.2)
RBC # BLD AUTO: 4.38 M/UL (ref 3.8–5.2)
RBC MORPH BLD: ABNORMAL
SAO2 % BLD: 100 % (ref 92–97)
SERVICE CMNT-IMP: ABNORMAL
SODIUM SERPL-SCNC: 135 MMOL/L (ref 136–145)
SODIUM SERPL-SCNC: 138 MMOL/L (ref 136–145)
SPECIMEN TYPE: ABNORMAL
TOTAL RESP. RATE, ITRR: 16
TROPONIN I SERPL-MCNC: 84.4 NG/ML
VENTILATION MODE VENT: ABNORMAL
WBC # BLD AUTO: 11.1 K/UL (ref 3.6–11)
WBC # BLD AUTO: 27.9 K/UL (ref 3.6–11)

## 2020-09-25 PROCEDURE — 74011250636 HC RX REV CODE- 250/636: Performed by: NURSE ANESTHETIST, CERTIFIED REGISTERED

## 2020-09-25 PROCEDURE — 31500 INSERT EMERGENCY AIRWAY: CPT

## 2020-09-25 PROCEDURE — 5A12012 PERFORMANCE OF CARDIAC OUTPUT, SINGLE, MANUAL: ICD-10-PCS | Performed by: STUDENT IN AN ORGANIZED HEALTH CARE EDUCATION/TRAINING PROGRAM

## 2020-09-25 PROCEDURE — 74011250636 HC RX REV CODE- 250/636: Performed by: STUDENT IN AN ORGANIZED HEALTH CARE EDUCATION/TRAINING PROGRAM

## 2020-09-25 PROCEDURE — 80053 COMPREHEN METABOLIC PANEL: CPT

## 2020-09-25 PROCEDURE — 74011000250 HC RX REV CODE- 250: Performed by: NURSE ANESTHETIST, CERTIFIED REGISTERED

## 2020-09-25 PROCEDURE — 74011000250 HC RX REV CODE- 250: Performed by: STUDENT IN AN ORGANIZED HEALTH CARE EDUCATION/TRAINING PROGRAM

## 2020-09-25 PROCEDURE — 77010033678 HC OXYGEN DAILY

## 2020-09-25 PROCEDURE — 88305 TISSUE EXAM BY PATHOLOGIST: CPT

## 2020-09-25 PROCEDURE — 74011250637 HC RX REV CODE- 250/637: Performed by: INTERNAL MEDICINE

## 2020-09-25 PROCEDURE — 74011636637 HC RX REV CODE- 636/637: Performed by: STUDENT IN AN ORGANIZED HEALTH CARE EDUCATION/TRAINING PROGRAM

## 2020-09-25 PROCEDURE — 87205 SMEAR GRAM STAIN: CPT

## 2020-09-25 PROCEDURE — 83880 ASSAY OF NATRIURETIC PEPTIDE: CPT

## 2020-09-25 PROCEDURE — 76010000149 HC OR TIME 1 TO 1.5 HR: Performed by: PODIATRIST

## 2020-09-25 PROCEDURE — 81025 URINE PREGNANCY TEST: CPT

## 2020-09-25 PROCEDURE — 65620000000 HC RM CCU GENERAL

## 2020-09-25 PROCEDURE — 03HY32Z INSERTION OF MONITORING DEVICE INTO UPPER ARTERY, PERCUTANEOUS APPROACH: ICD-10-PCS | Performed by: STUDENT IN AN ORGANIZED HEALTH CARE EDUCATION/TRAINING PROGRAM

## 2020-09-25 PROCEDURE — 71250 CT THORAX DX C-: CPT

## 2020-09-25 PROCEDURE — 74011000250 HC RX REV CODE- 250: Performed by: PODIATRIST

## 2020-09-25 PROCEDURE — 74011250636 HC RX REV CODE- 250/636: Performed by: NURSE PRACTITIONER

## 2020-09-25 PROCEDURE — 94762 N-INVAS EAR/PLS OXIMTRY CONT: CPT

## 2020-09-25 PROCEDURE — 0BH17EZ INSERTION OF ENDOTRACHEAL AIRWAY INTO TRACHEA, VIA NATURAL OR ARTIFICIAL OPENING: ICD-10-PCS | Performed by: STUDENT IN AN ORGANIZED HEALTH CARE EDUCATION/TRAINING PROGRAM

## 2020-09-25 PROCEDURE — 87176 TISSUE HOMOGENIZATION CULTR: CPT

## 2020-09-25 PROCEDURE — 71045 X-RAY EXAM CHEST 1 VIEW: CPT

## 2020-09-25 PROCEDURE — 82962 GLUCOSE BLOOD TEST: CPT

## 2020-09-25 PROCEDURE — 36415 COLL VENOUS BLD VENIPUNCTURE: CPT

## 2020-09-25 PROCEDURE — 88311 DECALCIFY TISSUE: CPT

## 2020-09-25 PROCEDURE — 2709999900 HC NON-CHARGEABLE SUPPLY

## 2020-09-25 PROCEDURE — 77030008462 HC STPLR SKN PROX J&J -A: Performed by: PODIATRIST

## 2020-09-25 PROCEDURE — 94640 AIRWAY INHALATION TREATMENT: CPT

## 2020-09-25 PROCEDURE — 87102 FUNGUS ISOLATION CULTURE: CPT

## 2020-09-25 PROCEDURE — 74176 CT ABD & PELVIS W/O CONTRAST: CPT

## 2020-09-25 PROCEDURE — 74011636637 HC RX REV CODE- 636/637: Performed by: FAMILY MEDICINE

## 2020-09-25 PROCEDURE — 5A1935Z RESPIRATORY VENTILATION, LESS THAN 24 CONSECUTIVE HOURS: ICD-10-PCS | Performed by: STUDENT IN AN ORGANIZED HEALTH CARE EDUCATION/TRAINING PROGRAM

## 2020-09-25 PROCEDURE — 92950 HEART/LUNG RESUSCITATION CPR: CPT

## 2020-09-25 PROCEDURE — 77030035129: Performed by: PODIATRIST

## 2020-09-25 PROCEDURE — 83605 ASSAY OF LACTIC ACID: CPT

## 2020-09-25 PROCEDURE — 0QBN0ZX EXCISION OF RIGHT METATARSAL, OPEN APPROACH, DIAGNOSTIC: ICD-10-PCS | Performed by: PODIATRIST

## 2020-09-25 PROCEDURE — 02HV33Z INSERTION OF INFUSION DEVICE INTO SUPERIOR VENA CAVA, PERCUTANEOUS APPROACH: ICD-10-PCS | Performed by: STUDENT IN AN ORGANIZED HEALTH CARE EDUCATION/TRAINING PROGRAM

## 2020-09-25 PROCEDURE — 77030031139 HC SUT VCRL2 J&J -A: Performed by: PODIATRIST

## 2020-09-25 PROCEDURE — 76060000034 HC ANESTHESIA 1.5 TO 2 HR: Performed by: PODIATRIST

## 2020-09-25 PROCEDURE — 87075 CULTR BACTERIA EXCEPT BLOOD: CPT

## 2020-09-25 PROCEDURE — 82803 BLOOD GASES ANY COMBINATION: CPT

## 2020-09-25 PROCEDURE — 74011250636 HC RX REV CODE- 250/636: Performed by: FAMILY MEDICINE

## 2020-09-25 PROCEDURE — 2709999900 HC NON-CHARGEABLE SUPPLY: Performed by: PODIATRIST

## 2020-09-25 PROCEDURE — 77030010509 HC AIRWY LMA MSK TELE -A: Performed by: ANESTHESIOLOGY

## 2020-09-25 PROCEDURE — 74011636637 HC RX REV CODE- 636/637: Performed by: NURSE PRACTITIONER

## 2020-09-25 PROCEDURE — 0QBN0ZZ EXCISION OF RIGHT METATARSAL, OPEN APPROACH: ICD-10-PCS | Performed by: PODIATRIST

## 2020-09-25 PROCEDURE — 74011250637 HC RX REV CODE- 250/637: Performed by: FAMILY MEDICINE

## 2020-09-25 PROCEDURE — 80307 DRUG TEST PRSMV CHEM ANLYZR: CPT

## 2020-09-25 PROCEDURE — 74011000258 HC RX REV CODE- 258: Performed by: FAMILY MEDICINE

## 2020-09-25 PROCEDURE — 76210000016 HC OR PH I REC 1 TO 1.5 HR: Performed by: PODIATRIST

## 2020-09-25 PROCEDURE — 85025 COMPLETE CBC W/AUTO DIFF WBC: CPT

## 2020-09-25 PROCEDURE — 94002 VENT MGMT INPAT INIT DAY: CPT

## 2020-09-25 PROCEDURE — 93005 ELECTROCARDIOGRAM TRACING: CPT

## 2020-09-25 PROCEDURE — 74011000250 HC RX REV CODE- 250

## 2020-09-25 PROCEDURE — 74011250636 HC RX REV CODE- 250/636

## 2020-09-25 PROCEDURE — 36600 WITHDRAWAL OF ARTERIAL BLOOD: CPT

## 2020-09-25 PROCEDURE — 73630 X-RAY EXAM OF FOOT: CPT

## 2020-09-25 PROCEDURE — 74011636637 HC RX REV CODE- 636/637: Performed by: INTERNAL MEDICINE

## 2020-09-25 PROCEDURE — 84484 ASSAY OF TROPONIN QUANT: CPT

## 2020-09-25 PROCEDURE — 85379 FIBRIN DEGRADATION QUANT: CPT

## 2020-09-25 PROCEDURE — 74011250636 HC RX REV CODE- 250/636: Performed by: INTERNAL MEDICINE

## 2020-09-25 PROCEDURE — 70450 CT HEAD/BRAIN W/O DYE: CPT

## 2020-09-25 RX ORDER — NALOXONE HYDROCHLORIDE 0.4 MG/ML
INJECTION, SOLUTION INTRAMUSCULAR; INTRAVENOUS; SUBCUTANEOUS
Status: COMPLETED
Start: 2020-09-25 | End: 2020-09-25

## 2020-09-25 RX ORDER — PHENYLEPHRINE HCL IN 0.9% NACL 0.4MG/10ML
SYRINGE (ML) INTRAVENOUS AS NEEDED
Status: DISCONTINUED | OUTPATIENT
Start: 2020-09-25 | End: 2020-09-25 | Stop reason: HOSPADM

## 2020-09-25 RX ORDER — MIDAZOLAM HYDROCHLORIDE 1 MG/ML
2 INJECTION, SOLUTION INTRAMUSCULAR; INTRAVENOUS ONCE
Status: COMPLETED | OUTPATIENT
Start: 2020-09-25 | End: 2020-09-25

## 2020-09-25 RX ORDER — DEXMEDETOMIDINE HYDROCHLORIDE 4 UG/ML
.2-1.4 INJECTION, SOLUTION INTRAVENOUS
Status: DISCONTINUED | OUTPATIENT
Start: 2020-09-26 | End: 2020-09-26

## 2020-09-25 RX ORDER — VANCOMYCIN HYDROCHLORIDE
1250
Status: DISCONTINUED | OUTPATIENT
Start: 2020-09-25 | End: 2020-09-27

## 2020-09-25 RX ORDER — LIDOCAINE HYDROCHLORIDE 20 MG/ML
INJECTION, SOLUTION EPIDURAL; INFILTRATION; INTRACAUDAL; PERINEURAL AS NEEDED
Status: DISCONTINUED | OUTPATIENT
Start: 2020-09-25 | End: 2020-09-25 | Stop reason: HOSPADM

## 2020-09-25 RX ORDER — POLYETHYLENE GLYCOL 3350 17 G/17G
17 POWDER, FOR SOLUTION ORAL DAILY
Status: DISCONTINUED | OUTPATIENT
Start: 2020-09-26 | End: 2020-10-02 | Stop reason: HOSPADM

## 2020-09-25 RX ORDER — METRONIDAZOLE 500 MG/100ML
500 INJECTION, SOLUTION INTRAVENOUS EVERY 12 HOURS
Status: DISCONTINUED | OUTPATIENT
Start: 2020-09-25 | End: 2020-10-02 | Stop reason: HOSPADM

## 2020-09-25 RX ORDER — FENTANYL CITRATE 50 UG/ML
INJECTION, SOLUTION INTRAMUSCULAR; INTRAVENOUS
Status: COMPLETED
Start: 2020-09-25 | End: 2020-09-25

## 2020-09-25 RX ORDER — INSULIN GLARGINE 100 [IU]/ML
40 INJECTION, SOLUTION SUBCUTANEOUS DAILY
Status: DISCONTINUED | OUTPATIENT
Start: 2020-09-25 | End: 2020-09-27

## 2020-09-25 RX ORDER — IPRATROPIUM BROMIDE AND ALBUTEROL SULFATE 2.5; .5 MG/3ML; MG/3ML
12 SOLUTION RESPIRATORY (INHALATION)
Status: COMPLETED | OUTPATIENT
Start: 2020-09-25 | End: 2020-09-25

## 2020-09-25 RX ORDER — METHADONE HYDROCHLORIDE 10 MG/1
30 TABLET ORAL ONCE
Status: COMPLETED | OUTPATIENT
Start: 2020-09-25 | End: 2020-09-25

## 2020-09-25 RX ORDER — FENTANYL CITRATE 50 UG/ML
100 INJECTION, SOLUTION INTRAMUSCULAR; INTRAVENOUS ONCE
Status: COMPLETED | OUTPATIENT
Start: 2020-09-25 | End: 2020-09-25

## 2020-09-25 RX ORDER — PROPOFOL 10 MG/ML
0-50 VIAL (ML) INTRAVENOUS
Status: DISCONTINUED | OUTPATIENT
Start: 2020-09-25 | End: 2020-09-26

## 2020-09-25 RX ORDER — FENTANYL CITRATE 50 UG/ML
INJECTION, SOLUTION INTRAMUSCULAR; INTRAVENOUS AS NEEDED
Status: DISCONTINUED | OUTPATIENT
Start: 2020-09-25 | End: 2020-09-25 | Stop reason: HOSPADM

## 2020-09-25 RX ORDER — SODIUM CHLORIDE, SODIUM LACTATE, POTASSIUM CHLORIDE, CALCIUM CHLORIDE 600; 310; 30; 20 MG/100ML; MG/100ML; MG/100ML; MG/100ML
INJECTION, SOLUTION INTRAVENOUS
Status: DISCONTINUED | OUTPATIENT
Start: 2020-09-25 | End: 2020-09-25 | Stop reason: HOSPADM

## 2020-09-25 RX ORDER — BUPIVACAINE HYDROCHLORIDE 5 MG/ML
INJECTION, SOLUTION EPIDURAL; INTRACAUDAL AS NEEDED
Status: DISCONTINUED | OUTPATIENT
Start: 2020-09-25 | End: 2020-09-25 | Stop reason: HOSPADM

## 2020-09-25 RX ORDER — DEXAMETHASONE SODIUM PHOSPHATE 4 MG/ML
INJECTION, SOLUTION INTRA-ARTICULAR; INTRALESIONAL; INTRAMUSCULAR; INTRAVENOUS; SOFT TISSUE AS NEEDED
Status: DISCONTINUED | OUTPATIENT
Start: 2020-09-25 | End: 2020-09-25 | Stop reason: HOSPADM

## 2020-09-25 RX ORDER — MIDAZOLAM HYDROCHLORIDE 1 MG/ML
INJECTION, SOLUTION INTRAMUSCULAR; INTRAVENOUS
Status: COMPLETED
Start: 2020-09-25 | End: 2020-09-25

## 2020-09-25 RX ORDER — EPINEPHRINE 0.1 MG/ML
INJECTION INTRACARDIAC; INTRAVENOUS
Status: COMPLETED | OUTPATIENT
Start: 2020-09-25 | End: 2020-09-25

## 2020-09-25 RX ORDER — CLONAZEPAM 1 MG/1
1 TABLET ORAL
Status: DISCONTINUED | OUTPATIENT
Start: 2020-09-25 | End: 2020-10-02 | Stop reason: HOSPADM

## 2020-09-25 RX ORDER — ONDANSETRON 2 MG/ML
INJECTION INTRAMUSCULAR; INTRAVENOUS AS NEEDED
Status: DISCONTINUED | OUTPATIENT
Start: 2020-09-25 | End: 2020-09-25 | Stop reason: HOSPADM

## 2020-09-25 RX ORDER — NALOXONE HYDROCHLORIDE 1 MG/ML
1 INJECTION INTRAMUSCULAR; INTRAVENOUS; SUBCUTANEOUS ONCE
Status: COMPLETED | OUTPATIENT
Start: 2020-09-25 | End: 2020-09-25

## 2020-09-25 RX ORDER — INSULIN LISPRO 100 [IU]/ML
15 INJECTION, SOLUTION INTRAVENOUS; SUBCUTANEOUS ONCE
Status: COMPLETED | OUTPATIENT
Start: 2020-09-26 | End: 2020-09-25

## 2020-09-25 RX ORDER — EPHEDRINE SULFATE/0.9% NACL/PF 50 MG/5 ML
SYRINGE (ML) INTRAVENOUS AS NEEDED
Status: DISCONTINUED | OUTPATIENT
Start: 2020-09-25 | End: 2020-09-25 | Stop reason: HOSPADM

## 2020-09-25 RX ORDER — AMOXICILLIN 250 MG
1 CAPSULE ORAL DAILY
Status: DISCONTINUED | OUTPATIENT
Start: 2020-09-26 | End: 2020-10-02 | Stop reason: HOSPADM

## 2020-09-25 RX ORDER — ALBUTEROL SULFATE 0.83 MG/ML
SOLUTION RESPIRATORY (INHALATION)
Status: COMPLETED
Start: 2020-09-25 | End: 2020-09-25

## 2020-09-25 RX ORDER — METHADONE HYDROCHLORIDE 10 MG/1
160 TABLET ORAL DAILY
Status: DISCONTINUED | OUTPATIENT
Start: 2020-09-28 | End: 2020-10-02 | Stop reason: HOSPADM

## 2020-09-25 RX ORDER — IPRATROPIUM BROMIDE AND ALBUTEROL SULFATE 2.5; .5 MG/3ML; MG/3ML
3 SOLUTION RESPIRATORY (INHALATION)
Status: DISCONTINUED | OUTPATIENT
Start: 2020-09-26 | End: 2020-09-26

## 2020-09-25 RX ORDER — ONDANSETRON 2 MG/ML
4 INJECTION INTRAMUSCULAR; INTRAVENOUS
Status: DISCONTINUED | OUTPATIENT
Start: 2020-09-25 | End: 2020-10-02 | Stop reason: HOSPADM

## 2020-09-25 RX ORDER — MIDAZOLAM HYDROCHLORIDE 1 MG/ML
INJECTION, SOLUTION INTRAMUSCULAR; INTRAVENOUS AS NEEDED
Status: DISCONTINUED | OUTPATIENT
Start: 2020-09-25 | End: 2020-09-25 | Stop reason: HOSPADM

## 2020-09-25 RX ORDER — SODIUM CHLORIDE 0.9 % (FLUSH) 0.9 %
5-40 SYRINGE (ML) INJECTION EVERY 8 HOURS
Status: DISCONTINUED | OUTPATIENT
Start: 2020-09-26 | End: 2020-10-02 | Stop reason: HOSPADM

## 2020-09-25 RX ORDER — PROPOFOL 10 MG/ML
INJECTION, EMULSION INTRAVENOUS AS NEEDED
Status: DISCONTINUED | OUTPATIENT
Start: 2020-09-25 | End: 2020-09-25 | Stop reason: HOSPADM

## 2020-09-25 RX ORDER — ALBUTEROL SULFATE 0.83 MG/ML
2.5 SOLUTION RESPIRATORY (INHALATION)
Status: COMPLETED | OUTPATIENT
Start: 2020-09-25 | End: 2020-09-25

## 2020-09-25 RX ORDER — SODIUM CHLORIDE 0.9 % (FLUSH) 0.9 %
5-40 SYRINGE (ML) INJECTION AS NEEDED
Status: DISCONTINUED | OUTPATIENT
Start: 2020-09-25 | End: 2020-10-02 | Stop reason: HOSPADM

## 2020-09-25 RX ORDER — IPRATROPIUM BROMIDE AND ALBUTEROL SULFATE 2.5; .5 MG/3ML; MG/3ML
3 SOLUTION RESPIRATORY (INHALATION)
Status: DISCONTINUED | OUTPATIENT
Start: 2020-09-25 | End: 2020-10-02 | Stop reason: HOSPADM

## 2020-09-25 RX ORDER — HYDROMORPHONE HYDROCHLORIDE 1 MG/ML
1 INJECTION, SOLUTION INTRAMUSCULAR; INTRAVENOUS; SUBCUTANEOUS
Status: DISCONTINUED | OUTPATIENT
Start: 2020-09-25 | End: 2020-09-27

## 2020-09-25 RX ORDER — INSULIN LISPRO 100 [IU]/ML
9 INJECTION, SOLUTION INTRAVENOUS; SUBCUTANEOUS ONCE
Status: COMPLETED | OUTPATIENT
Start: 2020-09-25 | End: 2020-09-25

## 2020-09-25 RX ADMIN — ALBUTEROL SULFATE 2.5 MG: 2.5 SOLUTION RESPIRATORY (INHALATION) at 20:31

## 2020-09-25 RX ADMIN — DEXAMETHASONE SODIUM PHOSPHATE 4 MG: 4 INJECTION, SOLUTION INTRAMUSCULAR; INTRAVENOUS at 17:54

## 2020-09-25 RX ADMIN — SODIUM CHLORIDE 75 ML/HR: 9 INJECTION, SOLUTION INTRAVENOUS at 20:02

## 2020-09-25 RX ADMIN — Medication 10 MG: at 17:49

## 2020-09-25 RX ADMIN — FENTANYL CITRATE 100 MCG: 50 INJECTION, SOLUTION INTRAMUSCULAR; INTRAVENOUS at 22:40

## 2020-09-25 RX ADMIN — NALOXONE HYDROCHLORIDE: 0.4 INJECTION, SOLUTION INTRAMUSCULAR; INTRAVENOUS; SUBCUTANEOUS at 20:42

## 2020-09-25 RX ADMIN — Medication 80 MCG: at 17:59

## 2020-09-25 RX ADMIN — FENTANYL CITRATE 100 MCG: 50 INJECTION, SOLUTION INTRAMUSCULAR; INTRAVENOUS at 21:46

## 2020-09-25 RX ADMIN — EPINEPHRINE 1 MG: 0.1 INJECTION INTRACARDIAC; INTRAVENOUS at 20:40

## 2020-09-25 RX ADMIN — METHADONE HYDROCHLORIDE 30 MG: 10 TABLET ORAL at 12:53

## 2020-09-25 RX ADMIN — ONDANSETRON HYDROCHLORIDE 4 MG: 2 INJECTION, SOLUTION INTRAMUSCULAR; INTRAVENOUS at 18:47

## 2020-09-25 RX ADMIN — ACETAMINOPHEN 650 MG: 325 TABLET ORAL at 19:49

## 2020-09-25 RX ADMIN — PREGABALIN 150 MG: 75 CAPSULE ORAL at 09:20

## 2020-09-25 RX ADMIN — Medication 80 MCG: at 17:47

## 2020-09-25 RX ADMIN — MIDAZOLAM HYDROCHLORIDE 2 MG: 1 INJECTION, SOLUTION INTRAMUSCULAR; INTRAVENOUS at 22:41

## 2020-09-25 RX ADMIN — Medication 120 MCG: at 17:44

## 2020-09-25 RX ADMIN — INSULIN LISPRO 15 UNITS: 100 INJECTION, SOLUTION INTRAVENOUS; SUBCUTANEOUS at 23:44

## 2020-09-25 RX ADMIN — INSULIN LISPRO 5 UNITS: 100 INJECTION, SOLUTION INTRAVENOUS; SUBCUTANEOUS at 12:52

## 2020-09-25 RX ADMIN — METRONIDAZOLE 500 MG: 500 INJECTION, SOLUTION INTRAVENOUS at 23:22

## 2020-09-25 RX ADMIN — PROPOFOL 50 MCG/KG/MIN: 10 INJECTION, EMULSION INTRAVENOUS at 21:51

## 2020-09-25 RX ADMIN — IPRATROPIUM BROMIDE AND ALBUTEROL SULFATE 3 ML: .5; 3 SOLUTION RESPIRATORY (INHALATION) at 23:11

## 2020-09-25 RX ADMIN — PROPOFOL 50 MCG/KG/MIN: 10 INJECTION, EMULSION INTRAVENOUS at 23:48

## 2020-09-25 RX ADMIN — Medication 80 MCG: at 17:45

## 2020-09-25 RX ADMIN — Medication 80 MCG: at 17:50

## 2020-09-25 RX ADMIN — FENOFIBRATE 145 MG: 145 TABLET ORAL at 09:19

## 2020-09-25 RX ADMIN — IPRATROPIUM BROMIDE AND ALBUTEROL SULFATE 12 ML: .5; 3 SOLUTION RESPIRATORY (INHALATION) at 21:17

## 2020-09-25 RX ADMIN — SODIUM CHLORIDE, POTASSIUM CHLORIDE, SODIUM LACTATE AND CALCIUM CHLORIDE: 600; 310; 30; 20 INJECTION, SOLUTION INTRAVENOUS at 17:28

## 2020-09-25 RX ADMIN — NALOXONE HYDROCHLORIDE 1 MG: 1 INJECTION PARENTERAL at 20:41

## 2020-09-25 RX ADMIN — INSULIN GLARGINE 40 UNITS: 100 INJECTION, SOLUTION SUBCUTANEOUS at 09:20

## 2020-09-25 RX ADMIN — CEFEPIME HYDROCHLORIDE 2 G: 2 INJECTION, POWDER, FOR SOLUTION INTRAVENOUS at 03:31

## 2020-09-25 RX ADMIN — BUSPIRONE HYDROCHLORIDE 15 MG: 10 TABLET ORAL at 23:18

## 2020-09-25 RX ADMIN — MIDAZOLAM 2 MG: 1 INJECTION INTRAMUSCULAR; INTRAVENOUS at 17:28

## 2020-09-25 RX ADMIN — Medication 10 ML: at 14:00

## 2020-09-25 RX ADMIN — Medication 10 MG: at 17:48

## 2020-09-25 RX ADMIN — ALBUTEROL SULFATE 2.5 MG: 0.83 SOLUTION RESPIRATORY (INHALATION) at 20:31

## 2020-09-25 RX ADMIN — LEVOTHYROXINE SODIUM 200 MCG: 0.1 TABLET ORAL at 06:36

## 2020-09-25 RX ADMIN — METRONIDAZOLE 500 MG: 500 INJECTION, SOLUTION INTRAVENOUS at 09:20

## 2020-09-25 RX ADMIN — Medication 10 ML: at 23:44

## 2020-09-25 RX ADMIN — EPINEPHRINE 1 MG: 0.1 INJECTION INTRACARDIAC; INTRAVENOUS at 20:48

## 2020-09-25 RX ADMIN — MIDAZOLAM 2 MG: 1 INJECTION INTRAMUSCULAR; INTRAVENOUS at 22:41

## 2020-09-25 RX ADMIN — Medication 120 MCG: at 17:42

## 2020-09-25 RX ADMIN — VANCOMYCIN HYDROCHLORIDE 1250 MG: 10 INJECTION, POWDER, LYOPHILIZED, FOR SOLUTION INTRAVENOUS at 10:43

## 2020-09-25 RX ADMIN — PHENYLEPHRINE HYDROCHLORIDE 60 MCG/MIN: 10 INJECTION INTRAVENOUS at 18:01

## 2020-09-25 RX ADMIN — LIDOCAINE HYDROCHLORIDE 60 MG: 20 INJECTION, SOLUTION EPIDURAL; INFILTRATION; INTRACAUDAL; PERINEURAL at 17:38

## 2020-09-25 RX ADMIN — FENTANYL CITRATE 50 MCG: 50 INJECTION, SOLUTION INTRAMUSCULAR; INTRAVENOUS at 17:38

## 2020-09-25 RX ADMIN — METHYLPREDNISOLONE SODIUM SUCCINATE 125 MG: 125 INJECTION, POWDER, FOR SOLUTION INTRAMUSCULAR; INTRAVENOUS at 23:36

## 2020-09-25 RX ADMIN — Medication 10 ML: at 21:52

## 2020-09-25 RX ADMIN — PANTOPRAZOLE SODIUM 40 MG: 40 TABLET, DELAYED RELEASE ORAL at 06:36

## 2020-09-25 RX ADMIN — CEFEPIME HYDROCHLORIDE 2 G: 2 INJECTION, POWDER, FOR SOLUTION INTRAVENOUS at 16:28

## 2020-09-25 RX ADMIN — PROPOFOL 200 MG: 10 INJECTION, EMULSION INTRAVENOUS at 17:38

## 2020-09-25 RX ADMIN — HEPARIN SODIUM 5000 UNITS: 5000 INJECTION INTRAVENOUS; SUBCUTANEOUS at 23:19

## 2020-09-25 RX ADMIN — ACETAMINOPHEN 650 MG: 325 TABLET ORAL at 09:19

## 2020-09-25 RX ADMIN — INSULIN LISPRO 9 UNITS: 100 INJECTION, SOLUTION INTRAVENOUS; SUBCUTANEOUS at 06:35

## 2020-09-25 RX ADMIN — Medication 150 MCG/HR: at 21:32

## 2020-09-25 NOTE — BRIEF OP NOTE
Brief Postoperative Note    Patient: Daryle Bertin  YOB: 1975  MRN: [id number]    Date of Procedure: 9/25/2020     Pre-Op Diagnosis: osteomyelitis right foot  Cellulitis right foot, ulcer with necrotic muscle right foot    Post-Op Diagnosis: same      Procedure(s):  DEBRIDEMENT OF BONE, RIGHT FOOT/ OPEN BONE BIOPSY RIGHT FOOT    Surgeon(s):  Jerrell Gómez DPM    Surgical Assistant: None    Anesthesia: General     Estimated Blood Loss (mL): 38NK    Complications: none    Specimens:   ID Type Source Tests Collected by Time Destination   1 : 1st metatarsal proximal margin right foot Bone Foot, Right CULTURE, AEROBIC AND ANAEROBIC, CULTURE, Isabella Horn DPM 9/25/2020 1825 Microbiology   2 : Deep wound culture right foot Bone Foot, Right CULTURE, AEROBIC AND ANAEROBIC, CULTURE, Frederick Hebert 9/25/2020 1835 Microbiology        Implants: * No implants in log *    Drains: * No LDAs found *    Findings: noted nonviable soft tissue with large soft tissue void 6cmx2.5cm, exposed remanent of the right first metatarsal base     Electronically Signed by Matilda Ansari DPM on 9/25/2020 at 7:03 PM

## 2020-09-25 NOTE — PROGRESS NOTES
Left message for Dr. Jeromy Gould RN. Will patient be having surgery today? 1518-Per Rach at Dr. Jeromy Gould office they are trying to work out getting the patient on the surgery schedule for liz.  Per Kaylie Alonso keep patient NPO at this time except for ice chips & sips of water with meds

## 2020-09-25 NOTE — PROGRESS NOTES
Clinical Pharmacy Note: Metronidazole Dosing    Please note that the metronidazole dose for Binu Merritt has been changed to 500 mg IV q12h per [facility]-approved protocol. Please contact the pharmacy with any questions.     Bartolome Timmons, [facility]

## 2020-09-25 NOTE — WOUND CARE
WOCN Note    Received consult for Right foot. Patient and Quan Osakis RN say that Dr Woo Saeed is going to take patient to surgery later today. Patient is NPO. Will defer to podiatry. Reconsult if needed.     Charles Opitz, BSN RN [facility] Inpatient Wound Care  Available on Perfect [employer]  Pager [phone]  Office [phone]

## 2020-09-25 NOTE — ANESTHESIA POSTPROCEDURE EVALUATION
Post-Anesthesia Evaluation and Assessment    Patient: Josh Crews MRN: [id number]  SSN: [id number]    YOB: 1975  Age: 50 y.o. Sex: female      I have evaluated the patient and they are stable and ready for discharge from the PACU. Cardiovascular Function/Vital Signs  Visit Vitals  BP 97/61   Pulse 91   Temp 37.6 °C (99.7 °F)   Resp 12   Ht 5' 7\" (1.702 m)   Wt 84.6 kg (186 lb 9.6 oz)   SpO2 98%   BMI 29.23 kg/m²       Patient is status post General anesthesia for Procedure(s):  DEBRIDEMENT OF BONE, RIGHT FOOT/ OPEN BONE BIOPSY RIGHT FOOT. Nausea/Vomiting: None    Postoperative hydration reviewed and adequate. Pain:  Pain Scale 1: Numeric (0 - 10) (09/25/20 1624)  Pain Intensity 1: 0 (09/25/20 1624)   Managed    Neurological Status: At baseline    Mental Status, Level of Consciousness: Alert and  oriented to person, place, and time    Pulmonary Status:   O2 Device: CO2 nasal cannula (09/25/20 1902)   Adequate oxygenation and airway patent    Complications related to anesthesia: None    Post-anesthesia assessment completed.  No concerns    Signed By: Va Arcos MD     September 25, 2020

## 2020-09-25 NOTE — PROGRESS NOTES
Bedside shift change report given to Venessa Darling (oncoming nurse) by Dakotah Rodriguez (offgoing nurse). Report included the following information SBAR, Kardex, Intake/Output, MAR and Recent Results.

## 2020-09-25 NOTE — PROGRESS NOTES
Problem: Falls - Risk of  Goal: *Absence of Falls  Description: Document Kyle Hamilton Fall Risk and appropriate interventions in the flowsheet. Outcome: Progressing Towards Goal  Note: Fall Risk Interventions:            Medication Interventions: Patient to call before getting OOB, Teach patient to arise slowly                   Problem: Patient Education: Go to Patient Education Activity  Goal: Patient/Family Education  Outcome: Progressing Towards Goal     Problem: Diabetes Self-Management  Goal: *Disease process and treatment process  Description: Define diabetes and identify own type of diabetes; list 3 options for treating diabetes. Outcome: Progressing Towards Goal  Goal: *Incorporating nutritional management into lifestyle  Description: Describe effect of type, amount and timing of food on blood glucose; list 3 methods for planning meals. Outcome: Progressing Towards Goal  Goal: *Incorporating physical activity into lifestyle  Description: State effect of exercise on blood glucose levels. Outcome: Progressing Towards Goal  Goal: *Developing strategies to promote health/change behavior  Description: Define the ABC's of diabetes; identify appropriate screenings, schedule and personal plan for screenings. Outcome: Progressing Towards Goal  Goal: *Using medications safely  Description: State effect of diabetes medications on diabetes; name diabetes medication taking, action and side effects. Outcome: Progressing Towards Goal  Goal: *Monitoring blood glucose, interpreting and using results  Description: Identify recommended blood glucose targets  and personal targets. Outcome: Progressing Towards Goal  Goal: *Prevention, detection, treatment of acute complications  Description: List symptoms of hyper- and hypoglycemia; describe how to treat low blood sugar and actions for lowering  high blood glucose level.   Outcome: Progressing Towards Goal  Goal: *Prevention, detection and treatment of chronic complications  Description: Define the natural course of diabetes and describe the relationship of blood glucose levels to long term complications of diabetes.   Outcome: Progressing Towards Goal  Goal: *Developing strategies to address psychosocial issues  Description: Describe feelings about living with diabetes; identify support needed and support network  Outcome: Progressing Towards Goal     Problem: Patient Education: Go to Patient Education Activity  Goal: Patient/Family Education  Outcome: Progressing Towards Goal     Problem: Diabetes Maintenance:Ongoing  Goal: Activity/Safety  Outcome: Progressing Towards Goal  Goal: Nutrition  Outcome: Progressing Towards Goal  Goal: Medications  Outcome: Progressing Towards Goal  Goal: Treatments/Interventsions/Procedures  Outcome: Progressing Towards Goal  Goal: *Blood Glucose 80 to 180 md/dl  Outcome: Not Progressing Towards Goal

## 2020-09-25 NOTE — PROGRESS NOTES
Sent perfect serve to Dr. Salguero More to get order for methadone. Per patient she takes 160mg of methadone at home and does not go to a clinic for the methadone. Per patient she has been taking methadone for 2 years d/t \"being addicted to pain killers\".

## 2020-09-25 NOTE — ANESTHESIA PREPROCEDURE EVALUATION
Anesthetic History   No history of anesthetic complications            Review of Systems / Medical History  Patient summary reviewed, nursing notes reviewed and pertinent labs reviewed    Pulmonary  Within defined limits                 Neuro/Psych   Within defined limits           Cardiovascular    Hypertension              Exercise tolerance: >4 METS     GI/Hepatic/Renal  Within defined limits              Endo/Other    Diabetes    Arthritis     Other Findings              Physical Exam    Airway  Mallampati: II  TM Distance: 4 - 6 cm  Neck ROM: normal range of motion   Mouth opening: Normal     Cardiovascular    Rhythm: regular  Rate: normal         Dental    Dentition: Poor dentition     Pulmonary  Breath sounds clear to auscultation               Abdominal  Abdominal exam normal       Other Findings            Anesthetic Plan    ASA: 2  Anesthesia type: general          Induction: Intravenous  Anesthetic plan and risks discussed with: Patient

## 2020-09-25 NOTE — PROGRESS NOTES
[address] [facility]  Hospitalist Group                                                                                          Hospitalist Progress Note  Deepthi Royal MD  Answering service: [phone] OR [phone] from in house phone        Date of Service:  2020  NAME:  Connie Cuevas  :  1975  MRN:  [id number]      Admission Summary:   48y/o female with pmh of T2D, presents with worsening diabetic foot infection, and concern for OM. Interval history / Subjective:   Should be going down for debridement today. Patient is on methadone at home, gets from [facility] clinic per report, has not yet been verified by pharamcy      Assessment & Plan:     Osteomyelitis and diabetic foot infection  - Podiatry consulted, to OR today for debridement  - Continue broad spectrum abx - IV vancomycin, cefepime and flagyl   - b/l LE doppler negative  - F/U blood cultures, so far negative. Type 2 diabetes with hyperglycemia - A1c 9.8%  - SSI, POCT glucose checks and hypoglycemia protocol  - Glargine 40U daily, with up titration  - Likely will need mealtime as well   - Bizarre home regimen with 3 times a day short acting 80U  - Hold home metformin  - Continue pregabalin    Opioid dependence - reported on methadone 160mg daily  - Pharmacy to verify dosing. Give 30mg today     Depression/anxiety - buspar    Hypothyroid - home levothyroxins    Code status: FULL  DVT prophylaxis: heparin     Care Plan discussed with: Patient/Family  Anticipated Disposition: Home w/Family  Anticipated Discharge: Greater than 48 hours     Hospital Problems  Never Reviewed          Codes Class Noted POA    Osteomyelitis of foot ([facility] [address] ICD-10-CM: M86.9  ICD-9-CM: 730.27  2020 Unknown                Review of Systems:   A comprehensive review of systems was negative except for that written in the HPI. Vital Signs:    Last 24hrs VS reviewed since prior progress note.  Most recent are:  Visit Vitals  /61 (BP 1 Location: Right arm)   Pulse 72   Temp 98.1 °F (36.7 °C)   Resp 15   Ht 5' 7\" (1.702 m)   Wt 84.6 kg (186 lb 9.6 oz)   SpO2 94%   BMI 29.23 kg/m²         Intake/Output Summary (Last 24 hours) at 9/25/2020 1555  Last data filed at 9/24/2020 1600  Gross per 24 hour   Intake 320 ml   Output --   Net 320 ml        Physical Examination:             Constitutional:  No acute distress, cooperative, pleasant    ENT:  Oral mucosa moist, oropharynx benign. Resp:  CTA bilaterally. No wheezing/rhonchi/rales. No accessory muscle use   CV:  Regular rhythm, normal rate, no murmurs, gallops, rubs    GI:  Soft, non distended, non tender. normoactive bowel sounds, no hepatosplenomegaly     Musculoskeletal:  No edema, warm, 2+ pulses throughout, LLE bandaged, no surrounding erythema or warmth    Neurologic:  Moves all extremities. AAOx3, CN II-XII reviewed     Skin:  Good turgor, no rashes or ulcers       Data Review:    Review and/or order of clinical lab test  Review and/or order of tests in the radiology section of CPT  Review and/or order of tests in the medicine section of CPT      Labs:     Recent Labs     09/25/20  0333 09/24/20  1128   WBC 11.1* 13.2*   HGB 9.0* 10.2*   HCT 29.5* 33.0*    246     Recent Labs     09/25/20  0333 09/24/20  1128    132*   K 4.4 5.0    98   CO2 27 27   BUN 36* 42*   CREA 1.67* 2.06*   * 516*   CA 8.7 9.4     Recent Labs     09/24/20  1128   ALT 31   AP 75   TBILI 0.2   TP 7.5   ALB 3.0*   GLOB 4.5*     No results for input(s): INR, PTP, APTT, INREXT in the last 72 hours. No results for input(s): FE, TIBC, PSAT, FERR in the last 72 hours. No results found for: FOL, RBCF   No results for input(s): PH, PCO2, PO2 in the last 72 hours. No results for input(s): CPK, CKNDX, TROIQ in the last 72 hours.     No lab exists for component: CPKMB  No results found for: CHOL, CHOLX, CHLST, CHOLV, HDL, HDLP, LDL, LDLC, DLDLP, TGLX, TRIGL, TRIGP, CHHD, CHHDX  Lab Results Component Value Date/Time    Glucose (POC) 275 (H) 09/25/2020 11:47 AM    Glucose (POC) 383 (H) 09/25/2020 05:14 AM    Glucose (POC) 330 (H) 09/24/2020 11:13 PM    Glucose (POC) 357 (H) 09/24/2020 08:51 PM    Glucose (POC) 390 (H) 09/24/2020 06:56 PM     No results found for: COLOR, APPRN, SPGRU, REFSG, SNOJA, PROTU, GLUCU, KETU, BILU, UROU, YEE, LEUKU, GLUKE, EPSU, BACTU, WBCU, RBCU, CASTS, UCRY      Medications Reviewed:     Current Facility-Administered Medications   Medication Dose Route Frequency    insulin glargine (LANTUS) injection 40 Units  40 Units SubCUTAneous DAILY    metroNIDAZOLE (FLAGYL) IVPB premix 500 mg  500 mg IntraVENous Q12H    vancomycin (VANCOCIN) 1250 mg in  ml infusion  1,250 mg IntraVENous Q18H    lipase-protease-amylase (CREON 24,000) capsule 1 Cap  1 Cap Oral TID WITH MEALS    busPIRone (BUSPAR) tablet 15 mg  15 mg Oral TID    fenofibrate nanocrystallized (TRICOR) tablet 145 mg  145 mg Oral DAILY    levothyroxine (SYNTHROID) tablet 200 mcg  200 mcg Oral ACB    pantoprazole (PROTONIX) tablet 40 mg  40 mg Oral ACB    pregabalin (LYRICA) capsule 150 mg  150 mg Oral BID    zolpidem (AMBIEN) tablet 5 mg  5 mg Oral QHS PRN    sodium chloride (NS) flush 5-40 mL  5-40 mL IntraVENous Q8H    sodium chloride (NS) flush 5-40 mL  5-40 mL IntraVENous PRN    0.9% sodium chloride infusion  75 mL/hr IntraVENous CONTINUOUS    acetaminophen (TYLENOL) tablet 650 mg  650 mg Oral Q4H PRN    heparin (porcine) injection 5,000 Units  5,000 Units SubCUTAneous Q8H    glucose chewable tablet 16 g  4 Tab Oral PRN    glucagon (GLUCAGEN) injection 1 mg  1 mg IntraMUSCular PRN    dextrose 10% infusion 0-250 mL  0-250 mL IntraVENous PRN    insulin lispro (HUMALOG) injection   SubCUTAneous Q6H    Vancomycin pharmacy to dose   Other Rx Dosing/Monitoring    cefepime (MAXIPIME) 2 g in 0.9% sodium chloride (MBP/ADV) 100 mL  2 g IntraVENous Q12H ______________________________________________________________________  EXPECTED LENGTH OF STAY: - - -  ACTUAL LENGTH OF STAY:          1                 Purvi Evans MD

## 2020-09-25 NOTE — PERIOP NOTES
Patient: Zach Lopez MRN: [id number]  SSN: [id number]   YOB: 1975  Age: 50 y.o. Sex: female     Patient is status post Procedure(s):  DEBRIDEMENT OF BONE, RIGHT FOOT/ OPEN BONE BIOPSY RIGHT FOOT. Surgeon(s) and Role:     Hola Barlow DPM - Primary    Local/Dose/Irrigation:  See [facility]                  Peripheral IV 09/24/20 Left Antecubital (Active)   Site Assessment Clean, dry, & intact 09/25/20 1624   Phlebitis Assessment 0 09/25/20 1624   Infiltration Assessment 0 09/25/20 1624   Dressing Status Clean, dry, & intact 09/25/20 1624   Dressing Type Transparent 09/25/20 1624   Hub Color/Line Status Pink;Capped 09/25/20 1624   Action Taken Open ports on tubing capped 09/25/20 1624   Alcohol Cap Used Yes 09/25/20 1624       Peripheral IV 09/24/20 Right Arm (Active)   Site Assessment Clean, dry, & intact 09/25/20 1624   Phlebitis Assessment 0 09/25/20 1624   Infiltration Assessment 0 09/25/20 1624   Dressing Status Clean, dry, & intact 09/25/20 1624   Dressing Type Transparent 09/25/20 1624   Hub Color/Line Status Pink; Infusing 09/25/20 1624   Action Taken Open ports on tubing capped 09/25/20 1624   Alcohol Cap Used Yes 09/25/20 1624                           Dressing/Packing:  Wound Foot Anterior;Right 09/24/20-Dressing Type: Gauze wrap (manuel); Elastic bandage (09/25/20 0878)  Wound Foot Right-Dressing Type: 4 x 4;ABD pad;Elastic bandage;Staples(kerlix, betadine, vessel loop) (09/25/20 8488)

## 2020-09-25 NOTE — PROGRESS NOTES
Admission Medication Reconciliation:    Information obtained from:  Patient/Medication list  RxQuery data available¹:  YES    Comments/Recommendations: Updated PTA meds/reviewed patient's allergies. 1)  Patient was able to provide a decent review of there medications. - Confirmed Patient uses U-500 pen - she sets dial to 80 units TID with meals.      - Of note patient supposed to be on low dose Creon however ran out quite some time ago. Patient cannot further clarify.      - Unable to confirm methadone dose as clinic is currently closed. 2)  Medication changes (since last review): Added  - None     Adjusted  - Atorvastatin dose now 40 mg QHS vs 20 mg QHS  - Metformin now 1000 mg BID vs 500 mg BID  - Tizanidine dose is 4 mg BID PRN vs. 4 mg TID    Removed  - Albuterol HFA (duplicate)  - Alprazolam  - Buspirone  - Celexa  - Robitussin AC  - Lispro U-100 pen  - Lisinopril   - Prednisone taper pack  - Zolpidem    3)  Confirmed NKDA status    4)  Attending made aware of findings. ¹RxQuery pharmacy benefit data reflects medications filled and processed through the patient's insurance, however   this data does NOT capture whether the medication was picked up or is currently being taken by the patient. Allergies:  Patient has no known allergies. Significant PMH/Disease States:   Past Medical History:   Diagnosis Date    Arthritis     Diabetes ([facility] [address]     Endocrine disease     hypothyroid    Gastrointestinal disorder     pancreatitis    Hypertension      Chief Complaint for this Admission:  No chief complaint on file. Prior to Admission Medications:   Prior to Admission Medications   Prescriptions Last Dose Informant Taking? GLIPIZIDE PO 9/23/2020 at Unknown time  Yes   Sig: Take 10 mg by mouth daily (with breakfast). LEVOTHYROXINE SODIUM (SYNTHROID PO) 9/23/2020 at Unknown time  Yes   Sig: Take 200 mcg by mouth Daily (before breakfast).    albuterol (PROVENTIL HFA, VENTOLIN HFA, PROAIR HFA) 90 mcg/actuation inhaler   Yes   Sig: Take 2 Puffs by inhalation every four (4) hours as needed for Wheezing. amylase-lipase-protease (CREON) 24,000-76,000 -120,000 unit capsule  at [address] [place] [address]  Yes   Sig: Take 1 Cap by mouth three (3) times daily (with meals). atorvastatin (Lipitor) 40 mg tablet   Yes   Sig: Take 40 mg by mouth nightly. clonazePAM (KlonoPIN) 1 mg tablet 2020 at Unknown time  Yes   Sig: Take 1 mg by mouth three (3) times daily. fenofibrate (LOFIBRA) 160 mg tablet 2020 at Unknown time  Yes   Sig: Take 160 mg by mouth daily. Indications: HYPERCHOLESTEROLEMIA   insulin U-500 CONCENTRATED regular (HumuLIN R U-500, Conc, Kwikpen) 500 unit/mL (3 mL) inpn subQ pen 2020 at Unknown time  Yes   Si Units by SubCUTAneous route three (3) times daily (with meals). lisinopril-hydroCHLOROthiazide (PRINZIDE, ZESTORETIC) 10-12.5 mg per tablet 2020 at Unknown time  Yes   Sig: Take 1 Tab by mouth daily. metFORMIN (GLUCOPHAGE) 500 mg tablet 2020 at Unknown time  Yes   Sig: Take 1,000 mg by mouth two (2) times daily (with meals). methadone (DOLOPHINE) 10 mg tablet 2020 at Unknown time  Yes   Sig: Take 160 mg by mouth daily. omeprazole (PRILOSEC) 40 mg capsule   Yes   Sig: Take 40 mg by mouth daily. pregabalin (Lyrica) 200 mg capsule 2020 at Unknown time  Yes   Sig: Take 200 mg by mouth four (4) times daily. sertraline (ZOLOFT) 50 mg tablet 2020 at Unknown time  Yes   Sig: Take 50 mg by mouth two (2) times a day. tiZANidine (ZANAFLEX) 4 mg capsule   Yes   Sig: Take 4 mg by mouth two (2) times daily as needed (muscle spasms). Facility-Administered Medications: None       Please contact the main inpatient pharmacy with any questions or concerns at [phone] and we will direct you to the clinical pharmacist covering this patient's care while in-house.    Antonio Berry, BRUNAD

## 2020-09-25 NOTE — PROGRESS NOTES
Day #2 of Vancomycin  Indication:  Osteomyelitis of Rt foot  Current regimen:  1250 mg IV Q 24hrs  Abx regimen:  Vancomycin, cefepime, and Flagyl  ID Following ?: NO  Concomitant nephrotoxic drugs (requires more frequent monitoring): None  Frequency of BMP?: Daily through   Recent Labs     20  0333 20  1128   WBC 11.1* 13.2*   CREA 1.67* 2.06*   BUN 36* 42*   Est CrCl: 45-50 (46.5) ml/min; UO: -- ml/kg/hr (undocumented)  Temp (24hrs), Av.1 °F (36.7 °C), Min:97.7 °F (36.5 °C), Max:98.6 °F (37 °C)    Cultures:    Blood - NGTD - pending    Goal trough = 15 - 20 mcg/mL    Recent trough history (date/time/level/dose/action taken):  None thus far    Plan: Slight improvement in SCr this morning; will adjust maintenance regimen. Change to 1250 mg IV Q 18hrs for a predicted therapeutic trough . Pharmacy will continue to monitor this patient daily for changes in clinical status and renal function.     Martell Albright, PharmD  Clinical Pharmacist, Orthopedics and Med/Surg () [address] [facility] [phone]

## 2020-09-26 ENCOUNTER — APPOINTMENT (OUTPATIENT)
Dept: GENERAL RADIOLOGY | Age: 45
DRG: 364 | End: 2020-09-26
Attending: STUDENT IN AN ORGANIZED HEALTH CARE EDUCATION/TRAINING PROGRAM
Payer: COMMERCIAL

## 2020-09-26 LAB
ALBUMIN SERPL-MCNC: 2.6 G/DL (ref 3.5–5)
ALBUMIN/GLOB SERPL: 0.6 {RATIO} (ref 1.1–2.2)
ALP SERPL-CCNC: 78 U/L (ref 45–117)
ALT SERPL-CCNC: 42 U/L (ref 12–78)
AMPHET UR QL SCN: NEGATIVE
ANION GAP SERPL CALC-SCNC: 10 MMOL/L (ref 5–15)
APTT PPP: 23.5 SEC (ref 22.1–32)
ARTERIAL PATENCY WRIST A: ABNORMAL
ARTERIAL PATENCY WRIST A: YES
AST SERPL-CCNC: 115 U/L (ref 15–37)
BARBITURATES UR QL SCN: NEGATIVE
BASE DEFICIT BLD-SCNC: 10 MMOL/L
BASE DEFICIT BLD-SCNC: 5 MMOL/L
BASOPHILS # BLD: 0 K/UL (ref 0–0.1)
BASOPHILS NFR BLD: 0 % (ref 0–1)
BDY SITE: ABNORMAL
BDY SITE: ABNORMAL
BENZODIAZ UR QL: POSITIVE
BILIRUB SERPL-MCNC: 0.4 MG/DL (ref 0.2–1)
BNP SERPL-MCNC: 8972 PG/ML
BUN SERPL-MCNC: 23 MG/DL (ref 6–20)
BUN/CREAT SERPL: 14 (ref 12–20)
CA-I BLD-SCNC: 1.12 MMOL/L (ref 1.12–1.32)
CA-I BLD-SCNC: 1.17 MMOL/L (ref 1.12–1.32)
CALCIUM SERPL-MCNC: 8.6 MG/DL (ref 8.5–10.1)
CANNABINOIDS UR QL SCN: NEGATIVE
CHLORIDE SERPL-SCNC: 105 MMOL/L (ref 97–108)
CO2 SERPL-SCNC: 22 MMOL/L (ref 21–32)
COCAINE UR QL SCN: NEGATIVE
CREAT SERPL-MCNC: 1.67 MG/DL (ref 0.55–1.02)
DATE LAST DOSE: ABNORMAL
DIFFERENTIAL METHOD BLD: ABNORMAL
DRUG SCRN COMMENT,DRGCM: ABNORMAL
EOSINOPHIL # BLD: 0 K/UL (ref 0–0.4)
EOSINOPHIL NFR BLD: 0 % (ref 0–7)
ERYTHROCYTE [DISTWIDTH] IN BLOOD BY AUTOMATED COUNT: 18.2 % (ref 11.5–14.5)
GAS FLOW.O2 O2 DELIVERY SYS: ABNORMAL L/MIN
GAS FLOW.O2 O2 DELIVERY SYS: ABNORMAL L/MIN
GLOBULIN SER CALC-MCNC: 4.7 G/DL (ref 2–4)
GLUCOSE BLD STRIP.AUTO-MCNC: 253 MG/DL (ref 65–100)
GLUCOSE BLD STRIP.AUTO-MCNC: 459 MG/DL (ref 65–100)
GLUCOSE SERPL-MCNC: 423 MG/DL (ref 65–100)
HCO3 BLD-SCNC: 17.6 MMOL/L (ref 22–26)
HCO3 BLD-SCNC: 21.4 MMOL/L (ref 22–26)
HCT VFR BLD AUTO: 35.1 % (ref 35–47)
HGB BLD-MCNC: 10.8 G/DL (ref 11.5–16)
IMM GRANULOCYTES # BLD AUTO: 0.6 K/UL (ref 0–0.04)
IMM GRANULOCYTES NFR BLD AUTO: 2 % (ref 0–0.5)
LACTATE SERPL-SCNC: 2.4 MMOL/L (ref 0.4–2)
LYMPHOCYTES # BLD: 0.6 K/UL (ref 0.8–3.5)
LYMPHOCYTES NFR BLD: 2 % (ref 12–49)
MCH RBC QN AUTO: 26.5 PG (ref 26–34)
MCHC RBC AUTO-ENTMCNC: 30.8 G/DL (ref 30–36.5)
MCV RBC AUTO: 86 FL (ref 80–99)
METHADONE UR QL: POSITIVE
MONOCYTES # BLD: 0.6 K/UL (ref 0–1)
MONOCYTES NFR BLD: 2 % (ref 5–13)
NEUTS SEG # BLD: 29.7 K/UL (ref 1.8–8)
NEUTS SEG NFR BLD: 94 % (ref 32–75)
NRBC # BLD: 0 K/UL (ref 0–0.01)
NRBC BLD-RTO: 0 PER 100 WBC
O2/TOTAL GAS SETTING VFR VENT: 50 %
O2/TOTAL GAS SETTING VFR VENT: 50 %
OPIATES UR QL: NEGATIVE
PCO2 BLD: 41.5 MMHG (ref 35–45)
PCO2 BLD: 42.2 MMHG (ref 35–45)
PCP UR QL: NEGATIVE
PEEP RESPIRATORY: 5 CMH2O
PEEP RESPIRATORY: 5 CMH2O
PH BLD: 7.24 [PH] (ref 7.35–7.45)
PH BLD: 7.31 [PH] (ref 7.35–7.45)
PLATELET # BLD AUTO: 339 K/UL (ref 150–400)
PLATELET COMMENTS,PCOM: ABNORMAL
PMV BLD AUTO: 12.4 FL (ref 8.9–12.9)
PO2 BLD: 78 MMHG (ref 80–100)
PO2 BLD: 81 MMHG (ref 80–100)
POTASSIUM SERPL-SCNC: 4.4 MMOL/L (ref 3.5–5.1)
PRESSURE SUPPORT SETTING VENT: 8 CMH2O
PRESSURE SUPPORT SETTING VENT: 8 CMH2O
PROT SERPL-MCNC: 7.3 G/DL (ref 6.4–8.2)
RBC # BLD AUTO: 4.08 M/UL (ref 3.8–5.2)
RBC MORPH BLD: ABNORMAL
REPORTED DOSE,DOSE: ABNORMAL UNITS
REPORTED DOSE/TIME,TMG: ABNORMAL
SAO2 % BLD: 93 % (ref 92–97)
SAO2 % BLD: 95 % (ref 92–97)
SERVICE CMNT-IMP: ABNORMAL
SERVICE CMNT-IMP: ABNORMAL
SODIUM SERPL-SCNC: 137 MMOL/L (ref 136–145)
SPECIMEN TYPE: ABNORMAL
SPECIMEN TYPE: ABNORMAL
THERAPEUTIC RANGE,PTTT: NORMAL SECS (ref 58–77)
TOTAL RESP. RATE, ITRR: 10
TOTAL RESP. RATE, ITRR: 15
TROPONIN I SERPL-MCNC: >100 NG/ML
VANCOMYCIN TROUGH SERPL-MCNC: 14.3 UG/ML (ref 5–10)
VENTILATION MODE VENT: ABNORMAL
VENTILATION MODE VENT: ABNORMAL
WBC # BLD AUTO: 31.5 K/UL (ref 3.6–11)

## 2020-09-26 PROCEDURE — 93005 ELECTROCARDIOGRAM TRACING: CPT

## 2020-09-26 PROCEDURE — 77030013715 HC INFL SYS MRTM -B: Performed by: STUDENT IN AN ORGANIZED HEALTH CARE EDUCATION/TRAINING PROGRAM

## 2020-09-26 PROCEDURE — C1894 INTRO/SHEATH, NON-LASER: HCPCS | Performed by: STUDENT IN AN ORGANIZED HEALTH CARE EDUCATION/TRAINING PROGRAM

## 2020-09-26 PROCEDURE — 77030039046 HC PAD DEFIB RADIOTRNSPNT CNMD -B: Performed by: STUDENT IN AN ORGANIZED HEALTH CARE EDUCATION/TRAINING PROGRAM

## 2020-09-26 PROCEDURE — 92928 PRQ TCAT PLMT NTRAC ST 1 LES: CPT | Performed by: STUDENT IN AN ORGANIZED HEALTH CARE EDUCATION/TRAINING PROGRAM

## 2020-09-26 PROCEDURE — 74011250636 HC RX REV CODE- 250/636: Performed by: PODIATRIST

## 2020-09-26 PROCEDURE — 77030012468 HC VLV BLEEDBK CNTRL ABBT -B: Performed by: STUDENT IN AN ORGANIZED HEALTH CARE EDUCATION/TRAINING PROGRAM

## 2020-09-26 PROCEDURE — 74011000636 HC RX REV CODE- 636: Performed by: STUDENT IN AN ORGANIZED HEALTH CARE EDUCATION/TRAINING PROGRAM

## 2020-09-26 PROCEDURE — 65620000000 HC RM CCU GENERAL

## 2020-09-26 PROCEDURE — 74011250636 HC RX REV CODE- 250/636: Performed by: STUDENT IN AN ORGANIZED HEALTH CARE EDUCATION/TRAINING PROGRAM

## 2020-09-26 PROCEDURE — 80202 ASSAY OF VANCOMYCIN: CPT

## 2020-09-26 PROCEDURE — 85025 COMPLETE CBC W/AUTO DIFF WBC: CPT

## 2020-09-26 PROCEDURE — 36415 COLL VENOUS BLD VENIPUNCTURE: CPT

## 2020-09-26 PROCEDURE — 77030002996 HC SUT SLK J&J -A

## 2020-09-26 PROCEDURE — C1887 CATHETER, GUIDING: HCPCS | Performed by: STUDENT IN AN ORGANIZED HEALTH CARE EDUCATION/TRAINING PROGRAM

## 2020-09-26 PROCEDURE — 74011636637 HC RX REV CODE- 636/637: Performed by: INTERNAL MEDICINE

## 2020-09-26 PROCEDURE — 99223 1ST HOSP IP/OBS HIGH 75: CPT | Performed by: STUDENT IN AN ORGANIZED HEALTH CARE EDUCATION/TRAINING PROGRAM

## 2020-09-26 PROCEDURE — 83880 ASSAY OF NATRIURETIC PEPTIDE: CPT

## 2020-09-26 PROCEDURE — 77030013140 HC MSK NEB VYRM -A

## 2020-09-26 PROCEDURE — C1769 GUIDE WIRE: HCPCS | Performed by: STUDENT IN AN ORGANIZED HEALTH CARE EDUCATION/TRAINING PROGRAM

## 2020-09-26 PROCEDURE — 77030019569 HC BND COMPR RAD TERU -B: Performed by: STUDENT IN AN ORGANIZED HEALTH CARE EDUCATION/TRAINING PROGRAM

## 2020-09-26 PROCEDURE — 94640 AIRWAY INHALATION TREATMENT: CPT

## 2020-09-26 PROCEDURE — 74011250637 HC RX REV CODE- 250/637: Performed by: FAMILY MEDICINE

## 2020-09-26 PROCEDURE — 74011000250 HC RX REV CODE- 250: Performed by: STUDENT IN AN ORGANIZED HEALTH CARE EDUCATION/TRAINING PROGRAM

## 2020-09-26 PROCEDURE — 74011000258 HC RX REV CODE- 258: Performed by: FAMILY MEDICINE

## 2020-09-26 PROCEDURE — 36600 WITHDRAWAL OF ARTERIAL BLOOD: CPT

## 2020-09-26 PROCEDURE — 74011250637 HC RX REV CODE- 250/637: Performed by: INTERNAL MEDICINE

## 2020-09-26 PROCEDURE — 74011250636 HC RX REV CODE- 250/636: Performed by: INTERNAL MEDICINE

## 2020-09-26 PROCEDURE — 93458 L HRT ARTERY/VENTRICLE ANGIO: CPT | Performed by: STUDENT IN AN ORGANIZED HEALTH CARE EDUCATION/TRAINING PROGRAM

## 2020-09-26 PROCEDURE — 80053 COMPREHEN METABOLIC PANEL: CPT

## 2020-09-26 PROCEDURE — 71045 X-RAY EXAM CHEST 1 VIEW: CPT

## 2020-09-26 PROCEDURE — 92979 ENDOLUMINL IVUS OCT C EA: CPT | Performed by: STUDENT IN AN ORGANIZED HEALTH CARE EDUCATION/TRAINING PROGRAM

## 2020-09-26 PROCEDURE — C1874 STENT, COATED/COV W/DEL SYS: HCPCS | Performed by: STUDENT IN AN ORGANIZED HEALTH CARE EDUCATION/TRAINING PROGRAM

## 2020-09-26 PROCEDURE — 74011636637 HC RX REV CODE- 636/637: Performed by: FAMILY MEDICINE

## 2020-09-26 PROCEDURE — 74011250637 HC RX REV CODE- 250/637: Performed by: STUDENT IN AN ORGANIZED HEALTH CARE EDUCATION/TRAINING PROGRAM

## 2020-09-26 PROCEDURE — 77030004532 HC CATH ANGI DX IMP BSC -A: Performed by: STUDENT IN AN ORGANIZED HEALTH CARE EDUCATION/TRAINING PROGRAM

## 2020-09-26 PROCEDURE — B241ZZ3 ULTRASONOGRAPHY OF MULTIPLE CORONARY ARTERIES, INTRAVASCULAR: ICD-10-PCS | Performed by: STUDENT IN AN ORGANIZED HEALTH CARE EDUCATION/TRAINING PROGRAM

## 2020-09-26 PROCEDURE — 027137Z DILATION OF CORONARY ARTERY, TWO ARTERIES WITH FOUR OR MORE DRUG-ELUTING INTRALUMINAL DEVICES, PERCUTANEOUS APPROACH: ICD-10-PCS | Performed by: STUDENT IN AN ORGANIZED HEALTH CARE EDUCATION/TRAINING PROGRAM

## 2020-09-26 PROCEDURE — 4A023N7 MEASUREMENT OF CARDIAC SAMPLING AND PRESSURE, LEFT HEART, PERCUTANEOUS APPROACH: ICD-10-PCS | Performed by: STUDENT IN AN ORGANIZED HEALTH CARE EDUCATION/TRAINING PROGRAM

## 2020-09-26 PROCEDURE — 74011250636 HC RX REV CODE- 250/636: Performed by: FAMILY MEDICINE

## 2020-09-26 PROCEDURE — C1725 CATH, TRANSLUMIN NON-LASER: HCPCS | Performed by: STUDENT IN AN ORGANIZED HEALTH CARE EDUCATION/TRAINING PROGRAM

## 2020-09-26 PROCEDURE — 85347 COAGULATION TIME ACTIVATED: CPT

## 2020-09-26 PROCEDURE — 94003 VENT MGMT INPAT SUBQ DAY: CPT

## 2020-09-26 PROCEDURE — 77030013797 HC KT TRNSDUC PRSSR EDWD -A: Performed by: STUDENT IN AN ORGANIZED HEALTH CARE EDUCATION/TRAINING PROGRAM

## 2020-09-26 PROCEDURE — 92978 ENDOLUMINL IVUS OCT C 1ST: CPT | Performed by: STUDENT IN AN ORGANIZED HEALTH CARE EDUCATION/TRAINING PROGRAM

## 2020-09-26 PROCEDURE — 85730 THROMBOPLASTIN TIME PARTIAL: CPT

## 2020-09-26 PROCEDURE — 82803 BLOOD GASES ANY COMBINATION: CPT

## 2020-09-26 PROCEDURE — 74011250636 HC RX REV CODE- 250/636: Performed by: SPECIALIST

## 2020-09-26 PROCEDURE — 92941 PRQ TRLML REVSC TOT OCCL AMI: CPT | Performed by: STUDENT IN AN ORGANIZED HEALTH CARE EDUCATION/TRAINING PROGRAM

## 2020-09-26 PROCEDURE — 74011250636 HC RX REV CODE- 250/636: Performed by: NURSE PRACTITIONER

## 2020-09-26 PROCEDURE — 2709999900 HC NON-CHARGEABLE SUPPLY

## 2020-09-26 PROCEDURE — 74011250637 HC RX REV CODE- 250/637: Performed by: SPECIALIST

## 2020-09-26 PROCEDURE — 84484 ASSAY OF TROPONIN QUANT: CPT

## 2020-09-26 PROCEDURE — B2151ZZ FLUOROSCOPY OF LEFT HEART USING LOW OSMOLAR CONTRAST: ICD-10-PCS | Performed by: STUDENT IN AN ORGANIZED HEALTH CARE EDUCATION/TRAINING PROGRAM

## 2020-09-26 PROCEDURE — B2111ZZ FLUOROSCOPY OF MULTIPLE CORONARY ARTERIES USING LOW OSMOLAR CONTRAST: ICD-10-PCS | Performed by: STUDENT IN AN ORGANIZED HEALTH CARE EDUCATION/TRAINING PROGRAM

## 2020-09-26 PROCEDURE — 82962 GLUCOSE BLOOD TEST: CPT

## 2020-09-26 PROCEDURE — 92929 PR PRQ TRLUML CORONARY STENT W/ANGIO ADDL ART/BRNCH: CPT | Performed by: STUDENT IN AN ORGANIZED HEALTH CARE EDUCATION/TRAINING PROGRAM

## 2020-09-26 PROCEDURE — C1753 CATH, INTRAVAS ULTRASOUND: HCPCS | Performed by: STUDENT IN AN ORGANIZED HEALTH CARE EDUCATION/TRAINING PROGRAM

## 2020-09-26 PROCEDURE — 83605 ASSAY OF LACTIC ACID: CPT

## 2020-09-26 DEVICE — XIENCE SIERRA™ EVEROLIMUS ELUTING CORONARY STENT SYSTEM 3.50 MM X 38 MM / RAPID-EXCHANGE
Type: IMPLANTABLE DEVICE | Status: FUNCTIONAL
Brand: XIENCE SIERRA™

## 2020-09-26 DEVICE — XIENCE SIERRA™ EVEROLIMUS ELUTING CORONARY STENT SYSTEM 3.00 MM X 18 MM / RAPID-EXCHANGE
Type: IMPLANTABLE DEVICE | Status: FUNCTIONAL
Brand: XIENCE SIERRA™

## 2020-09-26 DEVICE — XIENCE SIERRA™ EVEROLIMUS ELUTING CORONARY STENT SYSTEM 3.50 MM X 12 MM / RAPID-EXCHANGE
Type: IMPLANTABLE DEVICE | Status: FUNCTIONAL
Brand: XIENCE SIERRA™

## 2020-09-26 DEVICE — XIENCE SIERRA™ EVEROLIMUS ELUTING CORONARY STENT SYSTEM 2.50 MM X 28 MM / RAPID-EXCHANGE
Type: IMPLANTABLE DEVICE | Status: FUNCTIONAL
Brand: XIENCE SIERRA™

## 2020-09-26 RX ORDER — SODIUM CHLORIDE 0.9 % (FLUSH) 0.9 %
5-40 SYRINGE (ML) INJECTION EVERY 8 HOURS
Status: DISCONTINUED | OUTPATIENT
Start: 2020-09-26 | End: 2020-10-02 | Stop reason: HOSPADM

## 2020-09-26 RX ORDER — HEPARIN SODIUM 200 [USP'U]/100ML
INJECTION, SOLUTION INTRAVENOUS
Status: COMPLETED | OUTPATIENT
Start: 2020-09-26 | End: 2020-09-26

## 2020-09-26 RX ORDER — NITROGLYCERIN 400 UG/1
1 SPRAY ORAL
Status: DISCONTINUED | OUTPATIENT
Start: 2020-09-26 | End: 2020-10-01 | Stop reason: CLARIF

## 2020-09-26 RX ORDER — VERAPAMIL HYDROCHLORIDE 2.5 MG/ML
INJECTION, SOLUTION INTRAVENOUS AS NEEDED
Status: DISCONTINUED | OUTPATIENT
Start: 2020-09-26 | End: 2020-09-26 | Stop reason: HOSPADM

## 2020-09-26 RX ORDER — FUROSEMIDE 10 MG/ML
80 INJECTION INTRAMUSCULAR; INTRAVENOUS ONCE
Status: COMPLETED | OUTPATIENT
Start: 2020-09-26 | End: 2020-09-26

## 2020-09-26 RX ORDER — ATORVASTATIN CALCIUM 40 MG/1
80 TABLET, FILM COATED ORAL DAILY
Status: DISCONTINUED | OUTPATIENT
Start: 2020-09-26 | End: 2020-10-02 | Stop reason: HOSPADM

## 2020-09-26 RX ORDER — LIDOCAINE HYDROCHLORIDE 10 MG/ML
INJECTION INFILTRATION; PERINEURAL AS NEEDED
Status: DISCONTINUED | OUTPATIENT
Start: 2020-09-26 | End: 2020-09-26 | Stop reason: HOSPADM

## 2020-09-26 RX ORDER — HEPARIN SODIUM 1000 [USP'U]/ML
INJECTION, SOLUTION INTRAVENOUS; SUBCUTANEOUS AS NEEDED
Status: DISCONTINUED | OUTPATIENT
Start: 2020-09-26 | End: 2020-09-26 | Stop reason: HOSPADM

## 2020-09-26 RX ORDER — DOBUTAMINE HYDROCHLORIDE 200 MG/100ML
3 INJECTION INTRAVENOUS CONTINUOUS
Status: DISCONTINUED | OUTPATIENT
Start: 2020-09-26 | End: 2020-09-26

## 2020-09-26 RX ORDER — SODIUM CHLORIDE 0.9 % (FLUSH) 0.9 %
5-40 SYRINGE (ML) INJECTION AS NEEDED
Status: DISCONTINUED | OUTPATIENT
Start: 2020-09-26 | End: 2020-10-02 | Stop reason: HOSPADM

## 2020-09-26 RX ORDER — ATORVASTATIN CALCIUM 20 MG/1
20 TABLET, FILM COATED ORAL DAILY
Status: DISCONTINUED | OUTPATIENT
Start: 2020-09-26 | End: 2020-09-26

## 2020-09-26 RX ORDER — HEPARIN SODIUM 10000 [USP'U]/100ML
11-25 INJECTION, SOLUTION INTRAVENOUS
Status: DISCONTINUED | OUTPATIENT
Start: 2020-09-26 | End: 2020-09-28

## 2020-09-26 RX ORDER — TRAZODONE HYDROCHLORIDE 50 MG/1
50 TABLET ORAL
Status: DISCONTINUED | OUTPATIENT
Start: 2020-09-27 | End: 2020-10-02 | Stop reason: HOSPADM

## 2020-09-26 RX ORDER — GUAIFENESIN 100 MG/5ML
81 LIQUID (ML) ORAL DAILY
Status: DISCONTINUED | OUTPATIENT
Start: 2020-09-26 | End: 2020-10-02 | Stop reason: HOSPADM

## 2020-09-26 RX ORDER — ASPIRIN 325 MG
TABLET ORAL AS NEEDED
Status: DISCONTINUED | OUTPATIENT
Start: 2020-09-26 | End: 2020-09-26 | Stop reason: HOSPADM

## 2020-09-26 RX ADMIN — PANCRELIPASE 1 CAPSULE: 24000; 76000; 120000 CAPSULE, DELAYED RELEASE PELLETS ORAL at 17:19

## 2020-09-26 RX ADMIN — VANCOMYCIN HYDROCHLORIDE 1250 MG: 10 INJECTION, POWDER, LYOPHILIZED, FOR SOLUTION INTRAVENOUS at 22:59

## 2020-09-26 RX ADMIN — FUROSEMIDE 80 MG: 10 INJECTION, SOLUTION INTRAMUSCULAR; INTRAVENOUS at 01:11

## 2020-09-26 RX ADMIN — PREGABALIN 150 MG: 75 CAPSULE ORAL at 08:24

## 2020-09-26 RX ADMIN — TICAGRELOR 90 MG: 90 TABLET ORAL at 20:29

## 2020-09-26 RX ADMIN — IPRATROPIUM BROMIDE AND ALBUTEROL SULFATE 3 ML: .5; 3 SOLUTION RESPIRATORY (INHALATION) at 03:50

## 2020-09-26 RX ADMIN — IPRATROPIUM BROMIDE AND ALBUTEROL SULFATE 3 ML: .5; 3 SOLUTION RESPIRATORY (INHALATION) at 09:01

## 2020-09-26 RX ADMIN — CEFEPIME HYDROCHLORIDE 2 G: 2 INJECTION, POWDER, FOR SOLUTION INTRAVENOUS at 15:06

## 2020-09-26 RX ADMIN — INSULIN LISPRO 5 UNITS: 100 INJECTION, SOLUTION INTRAVENOUS; SUBCUTANEOUS at 17:19

## 2020-09-26 RX ADMIN — LEVOTHYROXINE SODIUM 200 MCG: 0.1 TABLET ORAL at 08:24

## 2020-09-26 RX ADMIN — ATORVASTATIN CALCIUM 80 MG: 40 TABLET, FILM COATED ORAL at 08:25

## 2020-09-26 RX ADMIN — Medication 10 ML: at 23:09

## 2020-09-26 RX ADMIN — DOCUSATE SODIUM 50MG AND SENNOSIDES 8.6MG 1 TABLET: 8.6; 5 TABLET, FILM COATED ORAL at 08:28

## 2020-09-26 RX ADMIN — Medication 200 MCG/HR: at 05:08

## 2020-09-26 RX ADMIN — ASPIRIN 81 MG CHEWABLE TABLET 81 MG: 81 TABLET CHEWABLE at 08:25

## 2020-09-26 RX ADMIN — PHENYLEPHRINE HYDROCHLORIDE 30 MCG/MIN: 10 INJECTION INTRAVENOUS at 01:30

## 2020-09-26 RX ADMIN — METRONIDAZOLE 500 MG: 500 INJECTION, SOLUTION INTRAVENOUS at 10:59

## 2020-09-26 RX ADMIN — PANTOPRAZOLE SODIUM 40 MG: 40 TABLET, DELAYED RELEASE ORAL at 08:25

## 2020-09-26 RX ADMIN — ACETAMINOPHEN 650 MG: 325 TABLET ORAL at 17:21

## 2020-09-26 RX ADMIN — INSULIN GLARGINE 40 UNITS: 100 INJECTION, SOLUTION SUBCUTANEOUS at 08:24

## 2020-09-26 RX ADMIN — Medication 10 ML: at 13:37

## 2020-09-26 RX ADMIN — Medication 10 ML: at 13:38

## 2020-09-26 RX ADMIN — FENOFIBRATE 145 MG: 145 TABLET ORAL at 08:25

## 2020-09-26 RX ADMIN — INSULIN LISPRO 14 UNITS: 100 INJECTION, SOLUTION INTRAVENOUS; SUBCUTANEOUS at 12:14

## 2020-09-26 RX ADMIN — BUSPIRONE HYDROCHLORIDE 15 MG: 10 TABLET ORAL at 08:24

## 2020-09-26 RX ADMIN — HYDROMORPHONE HYDROCHLORIDE 1 MG: 1 INJECTION, SOLUTION INTRAMUSCULAR; INTRAVENOUS; SUBCUTANEOUS at 20:33

## 2020-09-26 RX ADMIN — PANCRELIPASE 1 CAPSULE: 24000; 76000; 120000 CAPSULE, DELAYED RELEASE PELLETS ORAL at 11:00

## 2020-09-26 RX ADMIN — HYDROMORPHONE HYDROCHLORIDE 1 MG: 1 INJECTION, SOLUTION INTRAMUSCULAR; INTRAVENOUS; SUBCUTANEOUS at 15:11

## 2020-09-26 RX ADMIN — HEPARIN SODIUM AND DEXTROSE 11 UNITS/KG/HR: 10000; 5 INJECTION INTRAVENOUS at 01:19

## 2020-09-26 RX ADMIN — DOBUTAMINE HYDROCHLORIDE 3 MCG/KG/MIN: 200 INJECTION INTRAVENOUS at 04:24

## 2020-09-26 RX ADMIN — POLYETHYLENE GLYCOL 3350 17 G: 17 POWDER, FOR SOLUTION ORAL at 08:28

## 2020-09-26 RX ADMIN — DEXMEDETOMIDINE HYDROCHLORIDE 0.4 MCG/KG/HR: 400 INJECTION INTRAVENOUS at 08:28

## 2020-09-26 RX ADMIN — METRONIDAZOLE 500 MG: 500 INJECTION, SOLUTION INTRAVENOUS at 22:59

## 2020-09-26 RX ADMIN — CLONAZEPAM 1 MG: 1 TABLET ORAL at 22:58

## 2020-09-26 RX ADMIN — PROPOFOL 30 MCG/KG/MIN: 10 INJECTION, EMULSION INTRAVENOUS at 05:00

## 2020-09-26 RX ADMIN — DEXMEDETOMIDINE HYDROCHLORIDE 0.4 MCG/KG/HR: 400 INJECTION INTRAVENOUS at 00:55

## 2020-09-26 RX ADMIN — PREGABALIN 150 MG: 75 CAPSULE ORAL at 17:19

## 2020-09-26 RX ADMIN — Medication 10 ML: at 08:29

## 2020-09-26 RX ADMIN — VANCOMYCIN HYDROCHLORIDE 1250 MG: 10 INJECTION, POWDER, LYOPHILIZED, FOR SOLUTION INTRAVENOUS at 05:00

## 2020-09-26 RX ADMIN — CEFEPIME HYDROCHLORIDE 2 G: 2 INJECTION, POWDER, FOR SOLUTION INTRAVENOUS at 04:26

## 2020-09-26 NOTE — PROGRESS NOTES
TRANSFER - OUT REPORT:    Verbal report given to SVITLANA Espinosa on Arleth Thomas being transferred to CCU for routine progression of care       Report consisted of patients Situation, Background, Assessment and   Recommendations(SBAR). Information from the following report(s) SBAR, Procedure Summary and MAR was reviewed with the receiving nurse. Opportunity for questions and clarification was provided.

## 2020-09-26 NOTE — PROGRESS NOTES
[facility]    ICU TEAM History and Physical    Name: Rosalee Oropeza   : 1975   MRN: [id number]   Date: 2020      Assessment:     ICU Problems:  - Hypoxic Respiratory Failure  - S/P cardiac arrest  - CAD - S/p JAELYN to Circ & LAD  - Right foot osteomyelitis  - Opiate dependence  - Diabetes Mellitus    ICU Comprehensive Plan of Care:     Plans for this Shift:     1. Hypoxic Respiratory Failure  a. S/p Cardiac arrest, bradycardiac arrest into PEA  b. LE Duplex negative for DVT yesterday (20)  c. Sedation wean as able for neuro exam  d. Patient purposely moving all 4 extremities in CCU. Holding on TTM  e. WTE  2. CAD  a. S/p JAELYN to Circ & LAD on 2020  b. DAPT/Statin  c. Cardiology following  3. Osteomyelitis  a. Podiatry following  b. F/u wound cultures  c. Vanc, Flagyl  4. Diabetes Mellitus  a. Lantus 40u daily  b. Humalog SSI q6h  5. History of pancreatitis  a. NPO for now  b. OGT to intermittent Low wall suction  c. Creon per home regimen  6. SBP Goal of: > 90 mmHg, MAP Goal of: > 65 mmHg  7. Phenylephrine - For above SBP/MAP goals  a. Hold home antihypertensives  8. IVFs: MIVF  9. Transfusion Trigger (Hgb): <7 g/dL  10. Respiratory Goals:  a. Chlorhexidine   b. Optimize PEEP/Ventilation/Oxygenation  c. Goal Tidal Volume 6 cc/kg based on IBW  d. Aim for lung protective ventilation  e. Head of bed > 30 degrees  11. Pulmonary toilet: Duo-Nebs   12. SpO2 Goal: > 92%  13. Keep K>4; Mg>2   14. PT/OT: PT consulted and on board and OT consulted and on board   15. Discussed Plan of Care/Code Status: Full Code  16. Appreciate Consultants Input  17. Discussed Care Plan with Bedside RN  18. Documentation of Current Medications  19.  Rest of Plan Below:    F - Feeding:  No   A - Analgesia: Fentanyl  S - Sedation: Propofol and Fentanyl  T - DVT Prophylaxis: SCD's or Sequential Compression Device and Heparin   H - Head of Bed: > 30 Degrees  U - Ulcer Prophylaxis: Protonix (pantoprazole)   G - Glycemic Control: Insulin  S - Spontaneous Breathing Trial: Yes  B - Bowel Regimen: MiraLax and Docusate (Colace)  I - Indwelling Catheter:   Tubes: ETT and Orogastric Tube  Lines: Peripheral IV  Drains: Painter Catheter  D - De-escalation of Antibiotics: vancomycin, flagyl    Subjective:   Progress Note: 9/26/2020      Reason for ICU Admission: hypoxic respiratory failure, Right foot osteomyelitis, cellulitis    HPI: 51 y/o F with Diabetic neuropathy c/b Right foot osteomyelitis following several week course and numerous I+Ds. She underwent debridement today in the OR - course was uneverntful. Patient noted to be alert and at her baseline prior to leaving the pacu. On arrival to the floor a rapid response was called and this was followed shortly by a code blue to her room. Reportedly had respiratory distress and refused to comply with wearing NRB. She became progressively more bradycardic and progressed to PEA at which point ACLS was initiated. She had several rounds of compressions, one dose of epinephrine, and one dose of narcan. ROSC was achieved immediately following narcan administration at which time vertical nystagmus was observed and her breathing pattern was irregular and shallow. She was intubated uneventfully at the bedside and transferred to the ICU. In terms of underlying cause for her arrest, there is a possibility of this being opiate overdose, but the timing of administration per [facility] is not consistent with this. It may be possible that opiates were supplied by a third party - will discuss this patient's partner when able. In the interim, we will send a UDS to investigate other medication causes for AMS. Patient has history of home methadone use, she was noted to be bradypnec on induction of anesthesia in the OR prior to her case this afternoon. Interestingly she did not receive any opiates for analgesia following her operation.  She was given a dose of 30mg methadone earlier today (far lower than the recorded dose of 160mg every day in medications tab). Following narcan administration and intubation, patient with diaphoresis, tachycardia, HTN, and nystagmus with marked mydriasis -- possibility of narcan induced opiate withdrawal. Currently on fentanyl gtt. She has LBBB of unclear chronicity. Will follow up cardiac biomarkers and involve cardiology as able. ECHO in am.   DVT duplex yesterday lowers my suspicion for PE. No large PTX on bedside CXR. Given GFR, Radiologist and I elected to not use IV contrast for her scans. POD:  Day of Surgery    S/P:   Procedure(s):  DEBRIDEMENT OF BONE, RIGHT FOOT/ OPEN BONE BIOPSY RIGHT FOOT    Active Problem List:     Problem List  Never Reviewed          Codes Class    Osteomyelitis of foot (HCC) ICD-10-CM: M86.9  ICD-9-CM: 730.27               Past Medical History:      has a past medical history of Arthritis, Diabetes ([facility] [address] Endocrine disease, Gastrointestinal disorder, and Hypertension. Past Surgical History:      has a past surgical history that includes hx gi. Home Medications:     Prior to Admission medications    Medication Sig Start Date End Date Taking? Authorizing Provider   insulin U-500 CONCENTRATED regular (HumuLIN R U-500, Conc, Kwikpen) 500 unit/mL (3 mL) inpn subQ pen 80 Units by SubCUTAneous route three (3) times daily (with meals). Yes Provider, Historical   lisinopril-hydroCHLOROthiazide (PRINZIDE, ZESTORETIC) 10-12.5 mg per tablet Take 1 Tab by mouth daily. Yes Provider, Historical   sertraline (ZOLOFT) 50 mg tablet Take 50 mg by mouth two (2) times a day. Yes Provider, Historical   methadone (DOLOPHINE) 10 mg tablet Take 160 mg by mouth daily. Yes Provider, Historical   clonazePAM (KlonoPIN) 1 mg tablet Take 1 mg by mouth three (3) times daily. Yes Provider, Historical   pregabalin (Lyrica) 200 mg capsule Take 200 mg by mouth four (4) times daily.    Yes Other, MD Romel   tiZANidine (ZANAFLEX) 4 mg capsule Take 4 mg by mouth two (2) times daily as needed (muscle spasms). Yes Anita, MD Romel   amylase-lipase-protease (CREON) 24,000-76,000 -120,000 unit capsule Take 1 Cap by mouth three (3) times daily (with meals). Yes Romel Howard MD   omeprazole (PRILOSEC) 40 mg capsule Take 40 mg by mouth daily. Yes Romel Howard MD   albuterol (PROVENTIL HFA, VENTOLIN HFA, PROAIR HFA) 90 mcg/actuation inhaler Take 2 Puffs by inhalation every four (4) hours as needed for Wheezing. 17  Yes Ashutosh Lawrence MD   fenofibrate (LOFIBRA) 160 mg tablet Take 160 mg by mouth daily. Indications: HYPERCHOLESTEROLEMIA   Yes Romel Howard MD   atorvastatin (Lipitor) 40 mg tablet Take 40 mg by mouth nightly. Yes Romel Howard MD   metFORMIN (GLUCOPHAGE) 500 mg tablet Take 1,000 mg by mouth two (2) times daily (with meals). Yes Romel Howard MD   GLIPIZIDE PO Take 10 mg by mouth daily (with breakfast). Yes Romel Howard MD   LEVOTHYROXINE SODIUM (SYNTHROID PO) Take 200 mcg by mouth Daily (before breakfast). Yes Romel Howard MD       Allergies/Social/Family History:     No Known Allergies   Social History     Tobacco Use    Smoking status: Current Some Day Smoker     Packs/day: 0.25    Smokeless tobacco: Never Used   Substance Use Topics    Alcohol use: Yes     Comment: quit about a month ago - drank only on the weekends      Family History   Problem Relation Age of Onset    Cancer Mother         colon       Review of Systems:     Review of systems not obtained due to patient factors.     Objective:   Vital Signs:  Visit Vitals  /66 (BP 1 Location: Left arm, BP Patient Position: At rest;Supine)   Pulse 73   Temp 97.8 °F (36.6 °C)   Resp 16   Ht 5' 7\" (1.702 m)   Wt 83.5 kg (184 lb 1.4 oz)   SpO2 91%   BMI 28.83 kg/m²    O2 Flow Rate (L/min): 2 l/min O2 Device: Room air Temp (24hrs), Av.8 °F (37.1 °C), Min:97.8 °F (36.6 °C), Max:100.6 °F (38.1 °C)           Intake/Output:     Intake/Output Summary (Last 24 hours) at 2020 1235  Last data filed at 9/26/2020 1200  Gross per 24 hour   Intake 3103.03 ml   Output 4210 ml   Net -1106.97 ml       Physical Exam:  General:  Sedated and on the ventilator. No acute distress. Eyes:  Sclera anicteric. Pupils equal, round, reactive to light. Mouth/Throat: Orotracheal tube in place. Neck: Supple. Lungs:   Wheezing bilaterally, good effort. Cardiovascular:  Tachycardiac, no murmur. Abdomen:   Soft, non-tender, bowel sounds normal, non-distended. Extremities: No cyanosis or edema. Skin: No acute rash or lesions. Lymph Nodes: Cervical and supraclavicular normal.   Musculoskeletal:  No swelling or deformity. Lines/Devices:  Intact, no erythema, drainage, or tenderness. Psychiatric: Sedated and appears comfortable on ventilator. LABS AND  DATA: Personally reviewed  Recent Labs     09/26/20 0404 09/25/20 2145   WBC 31.5* 27.9*   HGB 10.8* 11.4*   HCT 35.1 38.5    390     Recent Labs     09/26/20 0404 09/25/20 2145    135*   K 4.4 4.8    107   CO2 22 20*   BUN 23* 24*   CREA 1.67* 1.55*   * 445*   CA 8.6 8.8     Recent Labs     09/26/20 0404 09/25/20 2145   AP 78 86   TP 7.3 7.5   ALB 2.6* 2.6*   GLOB 4.7* 4.9*       Ventilator Settings:  Mode Rate Tidal Volume Pressure FiO2 PEEP   Spontaneous   480 ml  8 cm H2O 50 % 5 cm H20     Peak airway pressure: 13 cm H2O    Minute ventilation: 7.43 l/min        MEDS: Reviewed    Chest X-Ray:  CXR Results  (Last 48 hours)               09/26/20 0511  XR CHEST PORT Final result    Impression:  IMPRESSION:    No pneumothorax following left IJ catheter placement. Decreased pulmonary edema. Narrative:  EXAM:  CR chest portable       INDICATION: Pulmonary edema. COMPARISON: 9/25/2020 at 2052 hours. TECHNIQUE: Portable AP semiupright chest view at 0334 hours       FINDINGS: The endotracheal tube terminates above the kaylynn. A left IJ catheter   terminates in profile with the SVC.  The enteric tube terminates in the stomach. The cardiomediastinal contours are stable. Diffuse interstitial opacities are   decreased. There is no pleural effusion or pneumothorax. The bones and upper   abdomen are stable. 09/25/20 2105  XR CHEST PORT Final result    Impression:  IMPRESSION: Moderate interstitial pulmonary edema. Narrative:  EXAM: XR CHEST PORT       INDICATION: SOB       COMPARISON: 4/11/2017 radiographs       FINDINGS: A portable AP radiographs of the chest on 2 images were obtained at   2052 and 2053 hours. There is interval demonstration of an endotracheal tube   terminating at the thoracic inlet. There is pulmonary venous congestion with   moderate interstitial pulmonary edema. No consolidation, pneumothorax or pleural   effusion is shown. Cardiac size is within normal limits. Mediastinal and hilar   contours are obscured. The bones and soft tissues are grossly within normal   limits. ECHO:  Bedside TTE: no large pericardial effusion, RV appears normal caliber with normal function. LV with no neli WMAs. Multidisciplinary Rounds Completed:  No    ABCDEF Bundle/Checklist Completed:  Yes    SPECIAL EQUIPMENT  None    DISPOSITION  Stay in ICU    CRITICAL CARE CONSULTANT NOTE  I had a face to face encounter with the patient, reviewed and interpreted patient data including clinical events, labs, images, vital signs, I/O's, and examined patient. I have discussed the case and the plan and management of the patient's care with the consulting services, the bedside nurses and the respiratory therapist.      NOTE OF PERSONAL INVOLVEMENT IN CARE   This patient has a high probability of imminent, clinically significant deterioration, which requires the highest level of preparedness to intervene urgently.  I participated in the decision-making and personally managed or directed the management of the following life and organ supporting interventions that required my frequent assessment to treat or prevent imminent deterioration. I personally spent 90 minutes of critical care time. This is time spent at this critically ill patient's bedside actively involved in patient care as well as the coordination of care and discussions with the patient's family. This does not include any procedural time which has been billed separately.     Sandra Cowan,   Anesthesiologist/Intensivist     [facility]

## 2020-09-26 NOTE — PROGRESS NOTES
09/26/20 1032   Patient Observations   Pulse (Heart Rate) 77   Resp Rate 17   O2 Sat (%) 98 %   Ventilator Measurements   Resp Rate Observed 17   Weaning Parameters   Spontaneous Breathing Trial Complete Yes   Resp Rate Observed 17   Ve 12.1      RSBI 24   Carlson Agitation Sedation Scale (RASS) -1   Vent Method/Mode   Ventilation Method Conventional   Ventilator Mode Spontaneous

## 2020-09-26 NOTE — CONSULTS
Beverly Black DO  [facility] [address] [place] [address], [address] [facility], [id number] [place] [address] [place]                                       Office [phone],ETS [phone]  Office [phone],NUT [phone]      Date of  Admission: 9/24/2020 12:02 PM  PCP- Other, MD Romel    Claudell Rumble is a 50 y.o. female admitted for Osteomyelitis of foot ([facility] [address] [M86.9]. Consult requested by Xiang Lorenzana MD    Assessment/Plan      1. Bradycardic, PEA cardiac arrest  2. Pulmonary edema, hypoxic respiratory failure  3. Acute myocardial infarction, appears to be subacute presentation. Acute myocardial infarction likely began on Thursday 9/24/2020 based on laboratory data. 4.Shock, likely mixed etiology. POCUS shows relatively preserved LV function. 5.  Metabolic acidosis  6. DEEPTI on CKD  7. Insulin-dependent diabetes mellitus  8. Osteomyelitis    Acute coronary syndrome likely began prior to hospital presentation on Thursday 9/24/2020, based on initial troponin of 87 after cardiac arrest.  Pulmonary edema and subsequent hypoxic respiratory failure likely was exacerbated in the perioperative period resulting in PEA cardiac arrest.  Heparin was initiated. Emergent cardiac catheterization was initially deferred due to nstemi presentation with stable hemodynamics. Due to progressive hypotension through the evening we move forth with more urgent cardiac catheterization. Risk and benefit of cardiac catheterization/PCI was described in detail to patient's sister Karely Abrams is next-of-kin. (risk of vascular access complication with potential surgical intervention for management, stroke, myocardial infarction, emergent open heart surgery and death). Patient's sister wishes to proceed with coronary angiography with possible intervention.        - Urgent cardiac catheterization  - Dobutamine was initiated on the assumption flash pulmonary edema was cardiac in etiology. After seeing POCUS showing preserved LV function dobutamine may not be necessary, will make decision based on invasive hemodynamics  - Likely initiate DAPT based on cardiac catheterization results  - Full bedside echocardiogram is pending.  - Redose IV diuretic therapy as needed to maintain goal negative ~200cc/hr  -Titrate goal-directed medical therapy as tolerated    Patient will be signed out to Dr. Ce Gaitan for continuity of care         I appreciate the opportunity to be involved in Ms. Srinivasan. See below note for details. Please do not hesitate to contact us with questions or concerns. Hernandez Schaeffer DO      Subjective:  Nicole Elizondo is a 50 y.o. female admitted to the hospital initially for increasing right foot pain treatment of osteomyelitis. Patient is intubated and sedated and unable to provide any medical history. Collateral information was obtained from bedside nurses who discussed case with patient's fiancé earlier in the evening. Apparently prior to presenting for her right foot osteomyelitis she was complaining for several days of shortness of breath and chest discomfort. She underwent debridement last evening with podiatric surgery for treatment of her osteomyelitis. Last evening she subsequently started complaining of acute onset of shortness of breath subsequently developed bradycardic PEA arrest.  After short rounds of CPR ROSC was obtained. Patient was subsequently intubated EKG post ROSC sinus tachycardia with widened QRS with ST changes. Laboratory data at that time showed severe metabolic acidosis along with an elevated troponin of 87. Subsequent EKG showed sinus rhythm with ST depression in V2 to V5. Heparin drip was initiated along with Lasix 80 mg IV. She had robust urine output to Lasix therapy. Throughout the evening her blood pressure deteriorated requiring vasopressor support.   Pocus showed relatively preserved LV function with wall motion abnormalities within the inferior and inferior lateral wall. Past Medical History:   Diagnosis Date    Arthritis     Diabetes ([address]     Endocrine disease     hypothyroid    Gastrointestinal disorder     pancreatitis    Hypertension       Past Surgical History:   Procedure Laterality Date    HX GI      colonoscpy     No Known Allergies  Family History   Problem Relation Age of Onset    Cancer Mother         colon      Social History     Tobacco Use    Smoking status: Current Some Day Smoker     Packs/day: 0.25    Smokeless tobacco: Never Used   Substance Use Topics    Alcohol use: Yes     Comment: quit about a month ago - drank only on the weekends    Drug use: No          Medications:  Medications Prior to Admission   Medication Sig    insulin U-500 CONCENTRATED regular (HumuLIN R U-500, Conc, Kwikpen) 500 unit/mL (3 mL) inpn subQ pen 80 Units by SubCUTAneous route three (3) times daily (with meals).  lisinopril-hydroCHLOROthiazide (PRINZIDE, ZESTORETIC) 10-12.5 mg per tablet Take 1 Tab by mouth daily.  sertraline (ZOLOFT) 50 mg tablet Take 50 mg by mouth two (2) times a day.  methadone (DOLOPHINE) 10 mg tablet Take 160 mg by mouth daily.  clonazePAM (KlonoPIN) 1 mg tablet Take 1 mg by mouth three (3) times daily.  pregabalin (Lyrica) 200 mg capsule Take 200 mg by mouth four (4) times daily.  tiZANidine (ZANAFLEX) 4 mg capsule Take 4 mg by mouth two (2) times daily as needed (muscle spasms).  amylase-lipase-protease (CREON) 24,000-76,000 -120,000 unit capsule Take 1 Cap by mouth three (3) times daily (with meals).  omeprazole (PRILOSEC) 40 mg capsule Take 40 mg by mouth daily.  albuterol (PROVENTIL HFA, VENTOLIN HFA, PROAIR HFA) 90 mcg/actuation inhaler Take 2 Puffs by inhalation every four (4) hours as needed for Wheezing.  fenofibrate (LOFIBRA) 160 mg tablet Take 160 mg by mouth daily. Indications: HYPERCHOLESTEROLEMIA    atorvastatin (Lipitor) 40 mg tablet Take 40 mg by mouth nightly.     metFORMIN (GLUCOPHAGE) 500 mg tablet Take 1,000 mg by mouth two (2) times daily (with meals).  GLIPIZIDE PO Take 10 mg by mouth daily (with breakfast).  LEVOTHYROXINE SODIUM (SYNTHROID PO) Take 200 mcg by mouth Daily (before breakfast).      Current Facility-Administered Medications   Medication Dose Route Frequency    aspirin chewable tablet 81 mg  81 mg Oral DAILY    atorvastatin (LIPITOR) tablet 20 mg  20 mg Oral DAILY    heparin 25,000 units in D5W 250 ml infusion  11-25 Units/kg/hr IntraVENous TITRATE    PHENYLephrine (ARELI-SYNEPHRINE) 30 mg in 0.9% sodium chloride 250 mL infusion   mcg/min IntraVENous TITRATE    DOBUTamine (DOBUTREX) 500 mg/250 mL (2,000 mcg/mL) infusion  3 mcg/kg/min IntraVENous CONTINUOUS    insulin glargine (LANTUS) injection 40 Units  40 Units SubCUTAneous DAILY    metroNIDAZOLE (FLAGYL) IVPB premix 500 mg  500 mg IntraVENous Q12H    vancomycin (VANCOCIN) 1250 mg in  ml infusion  1,250 mg IntraVENous Q18H    albuterol-ipratropium (DUO-NEB) 2.5 MG-0.5 MG/3 ML  3 mL Nebulization Q6H PRN    methadone (DOLOPHINE) tablet 160 mg  160 mg Oral DAILY    clonazePAM (KlonoPIN) tablet 1 mg  1 mg Oral TID PRN    sodium chloride (NS) flush 5-40 mL  5-40 mL IntraVENous Q8H    sodium chloride (NS) flush 5-40 mL  5-40 mL IntraVENous PRN    HYDROmorphone (PF) (DILAUDID) injection 1 mg  1 mg IntraVENous Q4H PRN    ondansetron (ZOFRAN) injection 4 mg  4 mg IntraVENous Q4H PRN    methylPREDNISolone (PF) (Solu-MEDROL) 125 mg/2 mL injection        propofol (DIPRIVAN) 10 mg/mL infusion  0-50 mcg/kg/min IntraVENous TITRATE    propofol (DIPRIVAN) 10 mg/mL infusion  0-50 mcg/kg/min IntraVENous TITRATE    albuterol-ipratropium (DUO-NEB) 2.5 MG-0.5 MG/3 ML  3 mL Nebulization Q4H RT    fentaNYL (PF) 1,500 mcg/30 mL (50 mcg/mL) infusion  0-200 mcg/hr IntraVENous TITRATE    polyethylene glycol (MIRALAX) packet 17 g  17 g Oral DAILY    senna-docusate (PERICOLACE) 8.6-50 mg per tablet 1 Tab  1 Tab Oral DAILY    dexmedeTOMidine in 0.9 % NaCl (PRECEDEX) 400 mcg/100 mL (4 mcg/mL) infusion soln  0.2-1.4 mcg/kg/hr IntraVENous TITRATE    lipase-protease-amylase (CREON 24,000) capsule 1 Cap  1 Cap Oral TID WITH MEALS    busPIRone (BUSPAR) tablet 15 mg  15 mg Oral TID    fenofibrate nanocrystallized (TRICOR) tablet 145 mg  145 mg Oral DAILY    levothyroxine (SYNTHROID) tablet 200 mcg  200 mcg Oral ACB    pantoprazole (PROTONIX) tablet 40 mg  40 mg Oral ACB    pregabalin (LYRICA) capsule 150 mg  150 mg Oral BID    zolpidem (AMBIEN) tablet 5 mg  5 mg Oral QHS PRN    sodium chloride (NS) flush 5-40 mL  5-40 mL IntraVENous Q8H    sodium chloride (NS) flush 5-40 mL  5-40 mL IntraVENous PRN    0.9% sodium chloride infusion  75 mL/hr IntraVENous CONTINUOUS    acetaminophen (TYLENOL) tablet 650 mg  650 mg Oral Q4H PRN    glucose chewable tablet 16 g  4 Tab Oral PRN    glucagon (GLUCAGEN) injection 1 mg  1 mg IntraMUSCular PRN    dextrose 10% infusion 0-250 mL  0-250 mL IntraVENous PRN    insulin lispro (HUMALOG) injection   SubCUTAneous Q6H    Vancomycin pharmacy to dose   Other Rx Dosing/Monitoring    cefepime (MAXIPIME) 2 g in 0.9% sodium chloride (MBP/ADV) 100 mL  2 g IntraVENous Q12H         Review of Systems:  Unable to obtain      Physical Exam:  Visit Vitals  BP 99/66   Pulse 74   Temp 97.8 °F (36.6 °C)   Resp 12   Ht 5' 7\" (1.702 m)   Wt 184 lb 1.4 oz (83.5 kg)   SpO2 97%   BMI 28.83 kg/m²           Gen: Intubated, sedated, no acute distress  HEENT:  Pink conjunctivae, marilynn-oral cyanosis  Neck: Supple,No JVD, No Carotid Bruit  Resp: No accessory muscle use, Clear breath sounds, No rales or rhonchi  Card: Normal Rate,Regular Rythm,Normal S1, S2, No murmurs, rubs or gallop. No thrills.    Abd:  Soft, non-tender, non-distended, normoactive bowel sounds are present   MSK: No cyanosis or clubbing  Skin: Right foot wrapped in ACE bandage  Neuro:   Moving all extremities, opens eyes to name  Psych:  Unable to obtain  LE: No edema  Vascular:Distal Pulses 2+ in left LLE        EC/25 1022 post-arrest  Sinus tachycardia, widened QRS with      0044  Sinus rhythm, ST depression V2-5 with prolonged QT interval    LABS:    Lab Results   Component Value Date/Time    WBC 31.5 (H) 2020 04:04 AM    HGB 10.8 (L) 2020 04:04 AM    HCT 35.1 2020 04:04 AM    PLATELET 044  04:04 AM    MCV 86.0 2020 04:04 AM     Lab Results   Component Value Date/Time    Sodium 135 (L) 2020 09:45 PM    Potassium 4.8 2020 09:45 PM    Chloride 107 2020 09:45 PM    CO2 20 (L) 2020 09:45 PM    Anion gap 8 2020 09:45 PM    Glucose 445 (H) 2020 09:45 PM    BUN 24 (H) 2020 09:45 PM    Creatinine 1.55 (H) 2020 09:45 PM    BUN/Creatinine ratio 15 2020 09:45 PM    GFR est AA 43 (L) 2020 09:45 PM    GFR est non-AA 36 (L) 2020 09:45 PM    Calcium 8.8 2020 09:45 PM     Lab Results   Component Value Date/Time    Troponin-I, Qt. 84.40 (H) 2020 09:45 PM     Lab Results   Component Value Date/Time    aPTT 23.5 2020 12:18 AM     No results found for: INR, PTMR, PTP, PT1, PT2, INREXT        Ritika Kendall DO

## 2020-09-26 NOTE — PROGRESS NOTES
Critical Care Documentation    Name: Hima Pyle  YOB: 1975  MRN: [id number]  Admission Date: 9/24/2020 12:02 PM    Date of service: 9/25/2020    Active Diagnoses:    Acute hypoxic respiratory failure  PEA arrest    Hospital Problems  Never Reviewed          Codes Class Noted POA    Osteomyelitis of foot [facility]) ICD-10-CM: M86.9  ICD-9-CM: 730.27  9/24/2020 Unknown              Chief Complaint: Shortness of breath  46y/o female with pmh of T2D, presents with worsening diabetic foot infection, and concern for OM. Underwent surgical intervention today. Procedure was uneventful. Once transferred from PACU to floor patient became short of breath and hypoxic. Given a dose of albuterol but respiratory status worsened. She became unresponsive and lost pulses. CODE BLUE was called, CPR was initiated and she was intubated by intensivits. She received 1 round of epinephrine and ROSC was quickly achieved. During code she received 2 mg of Narcan becoming more responsive. Patient transferred to ICU for further management.  at bedside aware of events. Medications Administered:   Epinephrine x1  Narcan 2mg      Time Spent:     I personally spent 10 minutes in providing critical care time.     Job Haas MD  9/25/2020  9:42 PM

## 2020-09-26 NOTE — PROGRESS NOTES
Physical Therapy  Received consult and order acknowledged, chart reviewed, note pt had urgent cardiac catherization earlier this morning, pt currently intubated, RN reports pt being weaned today, will defer at this time, will continue to follow up when pt appropriate for evaluation  Amy Gean Meckel PT

## 2020-09-26 NOTE — PROGRESS NOTES
Cardiac Cath Lab Procedure Area Arrival Note:    Mathieu Cerna arrived to Cardiac Cath Lab, Procedure Area. Patient identifiers verified with NAME and DATE OF BIRTH. Procedure verified with patient. Consent forms verified. Allergies verified. Patient informed of procedure and plan of care. Questions answered with review. Patient voiced understanding of procedure and plan of care. Patient on cardiac monitor, non-invasive blood pressure, SPO2 monitor. Pt intubated and sedated. Please review gtts in [facility]. Patient medicated during procedure with orders obtained and verified by Dr. Rae Starr. Refer to patients Cardiac Cath Lab PROCEDURE REPORT for vital signs, assessment, status, and response during procedure, printed at end of case. Printed report on chart or scanned into chart.

## 2020-09-26 NOTE — H&P
[facility]    ICU TEAM History and Physical    Name: Nicole Elizondo   : 1975   MRN: [id number]   Date: 2020      Assessment:     ICU Problems:  - Hypoxic Respiratory Failure  - S/P cardiac arrest  - Right foot osteomyelitis  - Opiate dependence  - Diabetes Mellitus    ICU Comprehensive Plan of Care:     Plans for this Shift:     1. Hypoxic Respiratory Failure  a. S/p Cardiac arrest, bradycardiac arrest into PEA  b. CT head/chest/abd/pel  c. Pulmonary edema on CXR  d. LE Duplex negative for DVT yesterday (20)  e. ECG with LBBB - no previous records  i. Will consult cardiology if labs suggest myocardial ischemic event  f. TTE in am  g. Sedation wean as able for neuro exam  h. Patient purposely moving all 4 extremities in CCU. Holding on TTM. 2. Osteomyelitis  a. Podiatry following  b. F/u wound cultures  c. Vanc, flagyl  3. Diabetes Mellitus  a. Lantus 40u daily  b. Humalog SSI q6h  4. History of pancreatitis  a. NPO for now  b. OGT to intermittent Low wall suction  c. Creon per home regimen  5. SBP Goal of: > 90 mmHg, MAP Goal of: > 65 mmHg  6. Phenylephrine - For above SBP/MAP goals  a. Hold home antihypertensives  7. IVFs: MIVF  8. Transfusion Trigger (Hgb): <7 g/dL  9. Respiratory Goals:  a. Chlorhexidine   b. Optimize PEEP/Ventilation/Oxygenation  c. Goal Tidal Volume 6 cc/kg based on IBW  d. Aim for lung protective ventilation  e. Head of bed > 30 degrees  10. Pulmonary toilet: Duo-Nebs   11. SpO2 Goal: > 92%  12. Keep K>4; Mg>2   13. PT/OT: PT consulted and on board and OT consulted and on board   14. Discussed Plan of Care/Code Status: Full Code  15. Appreciate Consultants Input  16. Discussed Care Plan with Bedside RN  17. Documentation of Current Medications  18.  Rest of Plan Below:    F - Feeding:  No   A - Analgesia: Fentanyl  S - Sedation: Propofol and Fentanyl  T - DVT Prophylaxis: SCD's or Sequential Compression Device and Heparin   H - Head of Bed: > 30 Degrees  U - Ulcer Prophylaxis: Protonix (pantoprazole)   G - Glycemic Control: Insulin  S - Spontaneous Breathing Trial: Yes  B - Bowel Regimen: MiraLax and Docusate (Colace)  I - Indwelling Catheter:   Tubes: ETT and Orogastric Tube  Lines: Peripheral IV  Drains: Painter Catheter  D - De-escalation of Antibiotics: vancomycin, flagyl    Subjective:   Progress Note: 9/25/2020      Reason for ICU Admission: hypoxic respiratory failure, Right foot osteomyelitis, cellulitis    HPI: 49 y/o F with Diabetic neuropathy c/b Right foot osteomyelitis following several week course and numerous I+Ds. She underwent debridement today in the OR - course was uneverntful. Patient noted to be alert and at her baseline prior to leaving the pacu. On arrival to the floor a rapid response was called and this was followed shortly by a code blue to her room. Reportedly had respiratory distress and refused to comply with wearing NRB. She became progressively more bradycardic and progressed to PEA at which point ACLS was initiated. She had several rounds of compressions, one dose of epinephrine, and one dose of narcan. ROSC was achieved immediately following narcan administration at which time vertical nystagmus was observed and her breathing pattern was irregular and shallow. She was intubated uneventfully at the bedside and transferred to the ICU. In terms of underlying cause for her arrest, there is a possibility of this being opiate overdose, but the timing of administration per [facility] is not consistent with this. It may be possible that opiates were supplied by a third party - will discuss this patient's partner when able. In the interim, we will send a UDS to investigate other medication causes for AMS. Patient has history of home methadone use, she was noted to be bradypnec on induction of anesthesia in the OR prior to her case this afternoon. Interestingly she did not receive any opiates for analgesia following her operation.  She was given a dose of 30mg methadone earlier today (far lower than the recorded dose of 160mg every day in medications tab). Following narcan administration and intubation, patient with diaphoresis, tachycardia, HTN, and nystagmus with marked mydriasis -- possibility of narcan induced opiate withdrawal. Currently on fentanyl gtt. She has LBBB of unclear chronicity. Will follow up cardiac biomarkers and involve cardiology as able. ECHO in am.   DVT duplex yesterday lowers my suspicion for PE. No large PTX on bedside CXR. Given GFR, Radiologist and I elected to not use IV contrast for her scans. POD:  Day of Surgery    S/P:   Procedure(s):  DEBRIDEMENT OF BONE, RIGHT FOOT/ OPEN BONE BIOPSY RIGHT FOOT    Active Problem List:     Problem List  Never Reviewed          Codes Class    Osteomyelitis of foot (HCC) ICD-10-CM: M86.9  ICD-9-CM: 730.27               Past Medical History:      has a past medical history of Arthritis, Diabetes ([facility] [address] Endocrine disease, Gastrointestinal disorder, and Hypertension. Past Surgical History:      has a past surgical history that includes hx gi. Home Medications:     Prior to Admission medications    Medication Sig Start Date End Date Taking? Authorizing Provider   insulin U-500 CONCENTRATED regular (HumuLIN R U-500, Conc, Kwikpen) 500 unit/mL (3 mL) inpn subQ pen 80 Units by SubCUTAneous route three (3) times daily (with meals). Yes Provider, Historical   lisinopril-hydroCHLOROthiazide (PRINZIDE, ZESTORETIC) 10-12.5 mg per tablet Take 1 Tab by mouth daily. Yes Provider, Historical   sertraline (ZOLOFT) 50 mg tablet Take 50 mg by mouth two (2) times a day. Yes Provider, Historical   methadone (DOLOPHINE) 10 mg tablet Take 160 mg by mouth daily. Yes Provider, Historical   clonazePAM (KlonoPIN) 1 mg tablet Take 1 mg by mouth three (3) times daily. Yes Provider, Historical   pregabalin (Lyrica) 200 mg capsule Take 200 mg by mouth four (4) times daily.    Yes Other, MD Romel tiZANidine (ZANAFLEX) 4 mg capsule Take 4 mg by mouth two (2) times daily as needed (muscle spasms). Yes Anita, MD Romel   amylase-lipase-protease (CREON) 24,000-76,000 -120,000 unit capsule Take 1 Cap by mouth three (3) times daily (with meals). Yes Romel Howard MD   omeprazole (PRILOSEC) 40 mg capsule Take 40 mg by mouth daily. Yes Romel Howard MD   albuterol (PROVENTIL HFA, VENTOLIN HFA, PROAIR HFA) 90 mcg/actuation inhaler Take 2 Puffs by inhalation every four (4) hours as needed for Wheezing. 17  Yes Ashutosh Lawrence MD   fenofibrate (LOFIBRA) 160 mg tablet Take 160 mg by mouth daily. Indications: HYPERCHOLESTEROLEMIA   Yes Romel Howard MD   atorvastatin (Lipitor) 40 mg tablet Take 40 mg by mouth nightly. Yes Romel Howard MD   metFORMIN (GLUCOPHAGE) 500 mg tablet Take 1,000 mg by mouth two (2) times daily (with meals). Yes Romel Howard MD   GLIPIZIDE PO Take 10 mg by mouth daily (with breakfast). Yes Romel Howard MD   LEVOTHYROXINE SODIUM (SYNTHROID PO) Take 200 mcg by mouth Daily (before breakfast). Yes Anita, MD Romel       Allergies/Social/Family History:     No Known Allergies   Social History     Tobacco Use    Smoking status: Current Some Day Smoker     Packs/day: 0.25    Smokeless tobacco: Never Used   Substance Use Topics    Alcohol use: Yes     Comment: quit about a month ago - drank only on the weekends      Family History   Problem Relation Age of Onset    Cancer Mother         colon       Review of Systems:     Review of systems not obtained due to patient factors.     Objective:   Vital Signs:  Visit Vitals  /74   Pulse (!) 123   Temp 97.8 °F (36.6 °C)   Resp 27   Ht 5' 7\" (1.702 m)   Wt 83.5 kg (184 lb 1.4 oz)   SpO2 98%   BMI 28.83 kg/m²    O2 Flow Rate (L/min): 2 l/min O2 Device: Endotracheal tube, Ventilator Temp (24hrs), Av.1 °F (37.3 °C), Min:97.8 °F (36.6 °C), Max:100.6 °F (38.1 °C)           Intake/Output:     Intake/Output Summary (Last 24 hours) at 9/25/2020 2243  Last data filed at 9/25/2020 2115  Gross per 24 hour   Intake 1641.25 ml   Output 10 ml   Net 1631.25 ml       Physical Exam:  General:  Sedated and on the ventilator. No acute distress. Eyes:  Sclera anicteric. Pupils equal, round, reactive to light. Mouth/Throat: Orotracheal tube in place. Neck: Supple. Lungs:   Wheezing bilaterally, good effort. Cardiovascular:  Tachycardiac, no murmur. Abdomen:   Soft, non-tender, bowel sounds normal, non-distended. Extremities: No cyanosis or edema. Skin: No acute rash or lesions. Lymph Nodes: Cervical and supraclavicular normal.   Musculoskeletal:  No swelling or deformity. Lines/Devices:  Intact, no erythema, drainage, or tenderness. Psychiatric: Sedated and appears comfortable on ventilator. LABS AND  DATA: Personally reviewed  Recent Labs     09/25/20  2145 09/25/20  0333   WBC 27.9* 11.1*   HGB 11.4* 9.0*   HCT 38.5 29.5*    218     Recent Labs     09/25/20  0333 09/24/20  1128    132*   K 4.4 5.0    98   CO2 27 27   BUN 36* 42*   CREA 1.67* 2.06*   * 516*   CA 8.7 9.4     Recent Labs     09/24/20  1128   AP 75   TP 7.5   ALB 3.0*   GLOB 4.5*       Ventilator Settings:  Mode Rate Tidal Volume Pressure FiO2 PEEP            100 %       Peak airway pressure:      Minute ventilation:          MEDS: Reviewed    Chest X-Ray:  CXR Results  (Last 48 hours)               09/25/20 2105  XR CHEST PORT Final result    Impression:  IMPRESSION: Moderate interstitial pulmonary edema. Narrative:  EXAM: XR CHEST PORT       INDICATION: SOB       COMPARISON: 4/11/2017 radiographs       FINDINGS: A portable AP radiographs of the chest on 2 images were obtained at   2052 and 2053 hours. There is interval demonstration of an endotracheal tube   terminating at the thoracic inlet. There is pulmonary venous congestion with   moderate interstitial pulmonary edema.  No consolidation, pneumothorax or pleural   effusion is shown. Cardiac size is within normal limits. Mediastinal and hilar   contours are obscured. The bones and soft tissues are grossly within normal   limits. ECHO:  Bedside TTE: no large pericardial effusion, RV appears normal caliber with normal function. LV with no neli WMAs. Multidisciplinary Rounds Completed:  No    ABCDEF Bundle/Checklist Completed:  Yes    SPECIAL EQUIPMENT  None    DISPOSITION  Stay in ICU    CRITICAL CARE CONSULTANT NOTE  I had a face to face encounter with the patient, reviewed and interpreted patient data including clinical events, labs, images, vital signs, I/O's, and examined patient. I have discussed the case and the plan and management of the patient's care with the consulting services, the bedside nurses and the respiratory therapist.      NOTE OF PERSONAL INVOLVEMENT IN CARE   This patient has a high probability of imminent, clinically significant deterioration, which requires the highest level of preparedness to intervene urgently. I participated in the decision-making and personally managed or directed the management of the following life and organ supporting interventions that required my frequent assessment to treat or prevent imminent deterioration. I personally spent 85 minutes of critical care time. This is time spent at this critically ill patient's bedside actively involved in patient care as well as the coordination of care and discussions with the patient's family. This does not include any procedural time which has been billed separately.     Hemal Raya MD  Anesthesiologist/Intensivist     [facility]

## 2020-09-26 NOTE — PROCEDURES
BRIEF PROCEDURE NOTE    Date of Procedure: 9/26/2020   Preoperative Diagnosis: Acute myocardial infarction  Postoperative Diagnosis: Acute myocardial infarction  Procedure: Left heart cath, coronary angiography, drug-eluting stent to circumflex, drug-eluting stent to LAD, NAYELI circumflex, NAYELI LAD  Interventional Cardiologist: Eliu Nweell DO  Assistant: None  Anesthesia: local + IV moderate sedation   I administered moderate sedation throughout this procedure. An independent trained observer pushed medications at my direction, and monitored the patients level of consciousness and physiological status throughout. Estimated Blood Loss: Minimal    Access: Right radial artery, 6F  Catheters:  Left coronary: JL 3.5, 5F  Right coronary: JR4, 5F    Findings:   L Main: Large caliber vessel, mild distal left main plaque on NAYELI  LAD: Large caliber vessel, supplies small D1 and moderate sized D2, severe diffuse disease up to 70-80% through proximal and mid LAD. LCx: Large caliber vessel, proximal circumflex occlusion. After revascularization there was evidence of a moderate OM1 and moderate OM 2  RCA: Large-caliber vessel, diffuse mild disease through RCA, moderate size RP AV within proximal 70% stenosis, moderate sized PDA with proximal diffuse 50% stenosis    LVEDP:  15 mmhg    PCI:    EBU 3.5 guide  Heparin    Circumflex  Attempted to wire with kelly blue, unable to pass wire distally. Successfully wired the vessel with whisper wire. Dilated circumflex with 2.5 compliant balloon at 16 daniel  Proximal into mid circumflex stented with 3.0 x 18 mm xience Catherine JAELYN deployed at high daniel. Postdilated with a 4.0 NC balloon at 16 to 20 daniel  Post intervention distal to the stent there was a long tubular diffuse 70% stenosis. On NAYELI there was evidence of heavy plaque burden and calcium. No evidence of distal edge dissection. However, after PCI of LAD there was evidence of no flow into the distal marginals.   Circumflex was rewired with kelly blue. Mid circumflex into obtuse marginal was stented with a 2.5 x 23 mm Xience Catherine JAELYN deployed at 16 daniel. Stent was postdilated with 3.5 NC balloon at 16 to 20 daniel. LAD  Wired with kelly blue  Dilated initially with 2.5 mm compliant balloon and subsequently with 3.0 NC balloon  [address] were deployed. Distal stent deployed first, 3.5 x 38 mm Xience [address] JAELYN deployed at 16 daniel. Overlapping proximally 3.5 x 12 mm Xience [address] deployed at 20 daniel. Proximal aspect of LAD was postdilated with 4.0 noncompliant balloon and mid LAD with 3.5 mm noncompliant balloon. At the conclusion of the study there was CATY-3 flow within the distal LAD and distal circumflex. Initial LAD with CATY-3 flow. Initial circumflex with CATY 0 flow      Specimens Removed: None    Implants: Xience Catherine JAELYN x4    Closure Device: radial TR band    See full cath note. Complications: none      Findings:  1. Subacute occlusion of proximal circumflex with angiographic evidence of multivessel coronary artery disease  2. Successful stenting of proximal circumflex into obtuse marginal  3. Successful stenting of proximal LAD into mid LAD  4. Mildly elevated LVEDP    Plan:    Continue DAPT therapy with aspirin and Brilinta. Patient received crushed 325 mg of aspirin and 180 mg of crushed Brilinta in the cardiac catheterization laboratory. We will add high-dose statin therapy.   Add goal-directed medical therapy is medically tolerated      Lana Hamlin, DO Hilario Sims,   [facility] [address], [address]                                              Office [phone],[facility] [phone]

## 2020-09-26 NOTE — PROGRESS NOTES
TRANSFER - IN REPORT:    Verbal report received from John Perez RN(name) on Anthony Meth  being received from PACU(unit) for routine post - op      Report consisted of patients Situation, Background, Assessment and   Recommendations(SBAR). Information from the following report(s) SBAR, OR Summary, Procedure Summary, Intake/Output, MAR and Recent Results was reviewed with the receiving nurse. Opportunity for questions and clarification was provided. Assessment completed upon patients arrival to unit and care assumed.

## 2020-09-26 NOTE — OP NOTES
[facility]  OPERATIVE REPORT    Name:  Tata Terrell  MR#:  [id number]  :  1975  ACCOUNT #:  [de-identified]  DATE OF SERVICE:  2020      PREOPERATIVE DIAGNOSES:  1.  Osteomyelitis, right foot. 2.  Cellulitis, right foot. 3.  Ulceration with exposed bone necrosis, right foot. POSTOPERATIVE DIAGNOSES:  1.  Osteomyelitis, right foot. 2.  Cellulitis, right foot. 3.  Ulceration with exposed bone necrosis, right foot. PROCEDURES PERFORMED:  1. Incision and drainage with removal of all nonviable soft tissue multilevel complex, right foot. 2.  Bone debridement, right foot. 3.  Open bone biopsies, right foot. SURGEON:  Edvin Sheehan DPM    ASSISTANT:  None. ANESTHESIA:  General with local.    COMPLICATIONS:  None. SPECIMENS REMOVED:  Pathology,  1. Deep wound culture, right foot, aerobe, anaerobe, fungal.  2.  Right first metatarsal remnant. 3.  First metatarsal proximal margin bone culture and specimen. 4.  Second metatarsal bone permanent specimen. IMPLANTS:  none    ESTIMATED BLOOD LOSS:  Less than 10 mL. HEMOSTASIS:  Correct anatomic dissection along with Esmarch. INJECTABLES:  20 mL of 0.5% Marcaine plain. MATERIALS USED:  Included a vessel loop with stapler. FINDINGS:  Noted significant nonviable soft tissue with ulceration, soft tissue void measuring approximately 6 cm x 2.5 cm in size, exposed remnant of the first metatarsal from a previous amputation with the nonviable bone envelope. INDICATIONS FOR THE PROCEDURE:  The patient is a 25-year-old female who was admitted to the hospital with infection to the right foot. She had previously undergone MRI advanced imaging with noted osteomyelitis. For this reason, she has elected to undergo surgical intervention at this time. She was made aware of all risks and complications of the procedure, they acknowledged these risks and signed the consent form which was placed in the patient's chart. N.p.o. status was confirmed. The patient was cleared for the procedure. PROCEDURE IN DETAIL:  The patient was brought back to the operating room, placed in the supine position. They were placed under general anesthesia and following this, they were scrubbed, prepped and draped using normal aseptic technique. Once completed, we then injected approximately 20 mL of 0.5% Marcaine plain into the affected area. Once it took effect, we then directed our attention to the noted large ulceration to the medial aspect of the patient's right foot where a previous partial amputation of the first ray was completed. Noted to the ulceration, there was nonviable soft tissue with exposed remaining remnant of the first metatarsal.  Utilizing a marking pen, we marked out the ulceration in its entirety. We then utilized a 15 blade after applying small compression with Esmarch to the ankle. We completed the incision down to the level of bone. We then debulked, debrided, and removed the ulceration in its entirety. This was removed from the surgical field. Live fluoroscopy was then utilized to identify location of the remaining remnant of the first metatarsal base. At this time, we then took a hemostat. This was placed in multiple directions and multiple planes being sure to decompress any purulent pockets that were identified. At this time, we then utilized a sagittal saw to complete the osteotomy resection of the area of concern for osteomyelitis with the remaining remnant of the first metatarsal base. This was also removed from the surgical field, labeled accordingly and sent for further pathologic examination. Following completion of this, we then irrigated the wound bed with copious amounts of normal saline utilizing a pulse lavage in standard technique.     Once completed, we then utilized a clean rongeur to perform an open bone biopsy of the proximal margin of the remaining first metatarsal.  This was labeled accordingly and sent for further pathologic examination. Following this, we utilized a Jamshidi needle, under live fluoroscopy, we identified the location of the second metatarsal shaft and once identified with C-arm, we introduced the Jamshidi needle to allow for completion of an open bone biopsy. This was also labeled accordingly and sent for further pathologic examination. Finally, the area was then once again irrigated with normal saline. Any remaining nonviable tissue was debulked, debrided and removed. We then packed the wound open with a Betadine-soaked gauze followed by utilizing a Dermabond retention suture technique with a vessel loop and staples. Good capillary refill time was noted to the remaining digits on the patient's right foot. The patient tolerated the anesthesia and procedure well, was taken to the postanesthesia care unit where they will remain until they are stable enough to return to their hospital floor. They were given in-depth instructions regarding nonweightbearing status to the right lower extremity. This will be part of a staged procedure. The patient may require further return to the OR for debridement based on clinical evaluation as well as the surgical pathology once clean margins are gained, we will likely return for either transmetatarsal amputation with Achilles tendon lengthening or potentially delayed primary closure to the right foot.         JULI [id number]  D:  09/25/2020 19:15  T:  09/25/2020 22:22  JOB #:  [id number]

## 2020-09-26 NOTE — PROGRESS NOTES
[facility]  [address] [facility], [address] [place] [address]  [phone]    Foot and Ankle Surgery - Progress Note                                                   Assessment/Plan:  Osteomyelitis right foot  Cellulitis right foot  Ulcer with necrotic bone right foot  Smoker  PVD  DM/neuropathy    Pt is S/P incision and drainage with removal of all nonviable soft tissue right foot, bone debridement right foot, open bone biopsies right foot DOS: 09-    Pt seen and evaluated intubated in ICU. The patient left the operating room extubated and in good condition, upon return to her hospital floor, the patient went into respiratory arrest leading to cardiac arrest, she underwent CPR and was emergently intubated and transferred to the ICU. Her EKG showed left BBB     She was sedated over night intubated at this time.  Currently the plan is to attempt to extubate this AM. The patient is currently requiring tristin and is not overbreathing the vent tube     The patient underwent 4 stents in the cardiac cath lab this am. Now resting comfortably,      Labs/imaging reviewed  Regarding right foot awaiting surgical pathology for clean margins    Elevate and offload right lower extremity, nonweightbearing at this time,     Foot and ankle to follow     Objective:  Vitals:   Patient Vitals for the past 12 hrs:   BP Temp Pulse Resp SpO2   09/26/20 1000 -- -- 68 17 96 %   09/26/20 0930 -- -- 68 24 100 %   09/26/20 0915 -- -- 69 25 100 %   09/26/20 0901 -- -- 69 16 99 %   09/26/20 0900 -- -- 69 20 99 %   09/26/20 0845 -- -- 70 14 99 %   09/26/20 0830 -- -- 73 20 99 %   09/26/20 0815 -- -- 72 10 98 %   09/26/20 0800 -- 97.9 °F (36.6 °C) 72 11 97 %   09/26/20 0755 -- -- 73 10 98 %   09/26/20 0743 123/66 -- 72 16 98 %   09/26/20 0526 130/72 -- 85 12 --   09/26/20 0400 99/66 98.5 °F (36.9 °C) 74 12 97 %   09/26/20 0350 -- -- 74 11 96 %   09/26/20 0300 93/66 -- 76 12 97 %   09/26/20 0200 (!) 79/52 -- 76 11 95 %   09/26/20 0100 (!) 79/49 -- 94 11 94 %   09/26/20 0043 -- -- 97 15 95 %   09/26/20 0000 (!) 85/56 98.9 °F (37.2 °C) 93 20 97 %   09/25/20 2312 -- -- -- -- 100 %   09/25/20 2308 -- -- 100 17 100 %   09/25/20 2300 (!) 80/55 -- (!) 102 18 100 %       Dermatological:  Dressing to the right foot clean dry and intact, remaining toes of normal color and texture    Vascular:   DP/PT pulses decreased +1/4 Bilateral lower extremity. CFT<5 seconds to all digits. Pedal hair growth absent    Neurological:   Epicritic and protective threshold is deminished to B/L LE, Pt is unable to detect a 5.07 monofilament wire on 5/5 random tested spots B/L LE.     MSK:  Deferred     Imaging:  Xray 3 views right foot postop  IMPRESSION  IMPRESSION: Postoperative image of first ray amputation. CT chest/abdomen/pelvis  IMPRESSION  IMPRESSION:   1. Diffuse interlobular septal thickening and patchy groundglass attenuation in  the lungs may represent pulmonary edema or atypical/viral infection.     2. Bilateral dependent lung airspace opacities may represent atelectasis,  infection, or aspiration.     3. No acute abnormality in the abdomen or pelvis.     Labs:  Recent Labs     09/26/20  0404   WBC 31.5*   CREA 1.67*   BUN 23*   HGB 10.8*   HCT 35.1      K 4.4      CO2 22   GLU [place] RUTH Ho [facility]  9/26/2020  [facility] [address]  [address] [facility]

## 2020-09-26 NOTE — PROGRESS NOTES
0730 Bedside and Verbal shift change report given to SVITLANA Steward (oncoming nurse) by Noemy Vazquez (offgoing nurse). Report included the following information SBAR, Kardex, Intake/Output and MAR.   1000 3cc air released from TR band. [place] Integrado 53 air released from TR band  1020 Patient placed on SBT, following commands. 1030 3cc air released from TR band  1045 all air released from TR band. 4x4 and tegaderm applied. 1105 Patient extubated to RA. Following commands. Passed STAND. Up in chair.    1800 Patient voided 700mL

## 2020-09-26 NOTE — PROGRESS NOTES
Problem: Falls - Risk of  Goal: *Absence of Falls  Description: Document Adwoa Strickland Fall Risk and appropriate interventions in the flowsheet. Outcome: Progressing Towards Goal  Note: Fall Risk Interventions:            Medication Interventions: Evaluate medications/consider consulting pharmacy, Patient to call before getting OOB, Teach patient to arise slowly         History of Falls Interventions: Consult care management for discharge planning, Door open when patient unattended, Evaluate medications/consider consulting pharmacy, Investigate reason for fall, Room close to nurse's station         Problem: Diabetes Self-Management  Goal: *Monitoring blood glucose, interpreting and using results  Description: Identify recommended blood glucose targets  and personal targets.   Outcome: Progressing Towards Goal  Note: Assess blood sugars as ordered  Administer insulin as ordered

## 2020-09-26 NOTE — PROGRESS NOTES
Spiritual Care Assessment/Progress Note  [facility]      NAME: Karmen Subramanian      MRN: [id number]  AGE: 50 y.o. SEX: female  Jehovah's witness Affiliation: No Episcopal   Language: English     9/25/2020     Total Time (in minutes): 68     Spiritual Assessment begun in [facility] 4 CORONARY CARE through conversation with:         []Patient        [] Family    [x] Friend(s)        Reason for Consult: Code Blue/99     Spiritual beliefs: (Please include comment if needed)     [] Identifies with a lizzette tradition:         [] Supported by a lizzette community:            [] Claims no spiritual orientation:           [] Seeking spiritual identity:                [] Adheres to an individual form of spirituality:           [x] Not able to assess:                           Identified resources for coping:      [] Prayer                               [] Music                  [] Guided Imagery     [] Family/friends                 [] Pet visits     [] Devotional reading                         [x] Unknown     [] Other                                      Interventions offered during this visit: (See comments for more details)    Patient Interventions: Initial visit     Family/Friend(s):  Affirmation of emotions/emotional suffering, Catharsis/review of pertinent events in supportive environment, Iconic (affirming the presence of God/Higher Power), Initial Assessment, Normalization of emotional/spiritual concerns, Prayer (assurance of)     Plan of Care:     [] Support spiritual and/or cultural needs    [] Support AMD and/or advance care planning process      [] Support grieving process   [] Coordinate Rites and/or Rituals    [] Coordination with community clergy   [] No spiritual needs identified at this time   [] Detailed Plan of Care below (See Comments)  [] Make referral to Music Therapy  [] Make referral to Pet Therapy     [] Make referral to Addiction services  [] Make referral to [facility]  [] Make referral to Spiritual Care Partner  [] No future visits requested        [x] Follow up visits as needed     Comments:  responded to Code Blue and to page from staff indicating that family member was present.  arrived after pt had been transferred to CCU. Consulted with staff. Pt's significant other, Uday Burciaga, had momentarily stepped outside of the hospital.  Jasiel Armas offered support to Uday Burciaga when he returned. He reflected on events of tonight and shared his concerns for patient. He had some family members meet him outside the hospital to help support him. Uday Burciaga hopes to see patient when she returns to CCU from testing. He plans to wait outside or in CCU waiting area. His cell phone number is [phone]. He shared that they have been together for seven years. Pt has a [de-identified] year old son who is staying with pt's sister at this time. Pt's sister has spoken to staff and is aware of pt's transfer to CCU. Josseline Gardner of ongoing  availability and assured him of prayers for pt.  consulted with staff prior to leaving the CCU.     Jasiel Summers

## 2020-09-26 NOTE — PROCEDURES
[facility]      Procedure Note - Central Venous Access:   Performed by Mary Maromlejo MD    Obtained informed Consent. Immediately prior to the procedure, the patient was reevaluated and found suitable for the planned procedure and any planned medications. Immediately prior to the procedure a time out was called to verify the correct patient, procedure, equipment, staff, and marking as appropriate. Central line Bundle:  Full sterile barrier precautions used. 7-Step Sterility Protocol followed. (cap, mask sterile gown, sterile gloves, large sterile sheet, hand hygiene, 2% chlorhexidine for cutaneous antisepsis)  5 mL 1% Lidocaine placed at insertion site. Patient positioned in Trendelenburg?yes   The site was prepped with ChloraPrep. Using Seldinger technique a Quad lumen was placed in the Left, Internal Jugular Vein via direct cannulation with 1 number of attempts for Monitoring, Blood Drawing and IV Access. Ultrasound Guidance was utilized. There was good dark, non-pulsatile blood return in all ports. Femoral Site? no. If Yes, reason femoral site was chosen:   Catheter secured. Biopatch in place? yes. Sterile Bio-occlusive dressing placed. The following complications were encountered: None. A follow-up chest x-ray was ordered post procedure. The procedure was tolerated well.       Mary Marmolejo MD  Critical Care Medicine  [facility]

## 2020-09-26 NOTE — PROCEDURES
[facility]      Procedure Note - Arterial Access:   Performed by Matias Jade MD.    Immediately prior to the procedure, the patient was reevaluated and found suitable for the planned procedure and any planned medications. Immediately prior to the procedure a time out was called to verify the correct patient, procedure, equipment, staff, and marking as appropriate. Central line Bundle:  Full sterile barrier precautions used. 5 mL 1% Lidocaine placed at insertion site. Insertion Date: 9/26/2020 Time:0230   Procedure Location:  CCU. Condition: Emergency. Consent:  YES. Method: Seldinger technique. Site Prep: ChloraPrep. Procedure: Arterial Catheter Insertion in Left, Radial Artery   Catheter inserted into a new site. Number of Attempts:  1   Indication: Monitoring and Blood Drawing. There was bright red, pulsatile blood return. Femoral Site? no. If Yes, reason femoral site was chosen:   Catheter secured. Biopatch in place? yes. Sterile Bio-occlusive dressing placed. Complication None. The procedure was tolerated well.     Matias Jade MD   Critical Care Medicine  [facility]

## 2020-09-26 NOTE — PROGRESS NOTES
Bedside and Verbal shift change report including post op report from PACU given to Holland Alfonso [address]  (oncoming nurse) by Brandon Delarosa RN (offgoing nurse). Report included the following information SBAR, OR Summary, Procedure Summary, Intake/Output, MAR and Recent Results.

## 2020-09-26 NOTE — PERIOP NOTES
TRANSFER - OUT REPORT:    Verbal report given to [address] [place] [address] Drive RN(name) on Álvaro Age  being transferred to [address] [place] [address](unit) for routine post - op       Report consisted of patients Situation, Background, Assessment and   Recommendations(SBAR). Time Pre op antibiotic given:N  Anesthesia Stop time: N  Painter Present on Transfer to floor:N  Order for Painter on Chart:N  Discharge Prescriptions with Chart:N    Information from the following report(s) SBAR was reviewed with the receiving nurse. Opportunity for questions and clarification was provided. Is the patient on 02? YES       L/Min 2       Other     Is the patient on a monitor? NO    Is the nurse transporting with the patient? NO    Surgical Waiting Area notified of patient's transfer from PACU? NO      The following personal items collected during your admission accompanied patient upon transfer:   Dental Appliance: Dental Appliances: None  Vision: Visual Aid: None  Hearing Aid:    Jewelry: Jewelry: None  Clothing: Clothing: With patient  Other Valuables:  Other Valuables: With patient  Valuables sent to safe:

## 2020-09-26 NOTE — PROGRESS NOTES
Patient arrived from PACU short of breath, with a blueish tint to her lips and O2 saturation of 74%; rapid response called. While receiving EKG, patient became unresponsive. Code blue called. CPR began at 2037    As reflected in [facility]:    Epi given at 2040 and 2048    Albuterol treatment given at 2031 and 2117    Narcan given at 2041 and 2042    Patient transferred to CCU.  Report given to CCU nurse Francisca

## 2020-09-26 NOTE — PROGRESS NOTES
Day #3 of Vancomycin  Indication:  Osteomyelitis of Rt foot  Current regimen:  1250 mg IV Q 18hrs  Abx regimen:  Vancomycin, cefepime, and Flagyl  ID Following ?: NO  Concomitant nephrotoxic drugs (requires more frequent monitoring): None (previously pressors, loop diuretics, and contrast x1 on )  Frequency of BMP?: Daily through   Recent Labs     20  0404 20  2145 20  0333   WBC 31.5* 27.9* 11.1*   CREA 1.67* 1.55* 1.67*   BUN 23* 24* 36*   Est CrCl: 45-50 (46.5) ml/min; UO: -- ml/kg/hr (undocumented)  Temp (24hrs), Av.8 °F (37.1 °C), Min:97.8 °F (36.6 °C), Max:100.6 °F (38.1 °C)    Cultures:    Blood - NGTD - pending   wound (rt ft) - NGTD   rt ft bone - NGTD    Goal trough = 15 - 20 mcg/mL    Recent trough history (date/time/level/dose/action taken):  None thus far    Plan: Renal function worsened again today. Will draw level tonight prior to dose at 2200. Pharmacy will continue to monitor this patient daily for changes in clinical status and renal function.

## 2020-09-26 NOTE — PROCEDURES
S[facility]      Procedure Note - Intubation:   Performed by Darrius Steward MD .     Immediately prior to the procedure, the patient was reevaluated and found suitable for the planned procedure and any planned medications. Procedure was emergent, following code blue and PEA arrest. Patient with agonal breathing following ROSC and SpO2 in the 70s. Medications given were etomidate and succinylcholine. A number 7.5 cuffed   ETT was placed to 24 cm at the teeth. Placement was evaluated by noting bilateral, symmetric breath sounds, good end-tidal CO2 detector color change  and chest x-ray visualization. Attempts required: 1. Grade 1 view with Mac 3 blade  Complications: none. RSI was used. .  The procedure was tolerated well.

## 2020-09-26 NOTE — PROGRESS NOTES
2240 TRANSFER - IN REPORT:    Verbal report received from Jaqueline Velásquez, [facility] (name) on Michelle Pringle  being received from  (unit) for urgent transfer      Report consisted of patients Situation, Background, Assessment and   Recommendations(SBAR). Information from the following report(s) SBAR, Kardex and Procedure Summary was reviewed with the receiving nurse. Opportunity for questions and clarification was provided. Assessment completed upon patients arrival to unit and care assumed. OGT and aponte placed per order. Pt restless and moving all extremities attempting to pull at ETT. Primary Nurse Noah Esquivel and Janell RN performed a dual skin assessment on this patient Impairment noted- see wound doc flow sheet    Current Bed:     [address]    Brendon score is 20      2210 Pt down to CT.  0005 Pt restless and following commands. Pt placed on SBT per Dr. Horace Srivastava, intensivist.  Ramos Denis ABG drawn pH 7.24,  pCO2 41.5, pO2 78, HCO3 17.6. Dr. Horace Srivastava made aware. Orders received to resume sedation and keep the pt ventilated. 0100 BP 79/49 (56). Orders received for Lenda Mandril Dr. Dominga Muñoz, cardiology paged for troponin of 80. Orders received for echo, aspirin 81mg, heparin gtt and Lipitor 20mg. 8813 Dr. Marcus Mast [employer], cardiology on the phone. Orders received for dobutamine 3mcg, ABG and lactic.  0425 Dr. Marcus Hameed on the phone, orders received for cath lab. Pt's sister called for phone consent. CHG bath given. 0500 Cath lab RN at the bedside to take pt down to cath lab.  0720 TRANSFER - IN REPORT:    Verbal report received from CLARIBEL JORGE [facility], [facility] (name) on Michelle Pringle  being received from cath lab (unit) for routine post - op      Report consisted of patients Situation, Background, Assessment and   Recommendations(SBAR). Information from the following report(s) Procedure Summary, Intake/Output, Recent Results and Cardiac Rhythm nsr was reviewed with the receiving nurse.     Opportunity for questions and clarification was provided. Assessment completed upon patients arrival to unit and care assumed. 0730 Bedside and Verbal shift change report given to Wesley Adame RN (oncoming nurse) by Bekah Carnes RN (offgoing nurse). Report included the following information SBAR, Kardex, ED Summary, OR Summary, Procedure Summary, Intake/Output, MAR, Recent Results and Cardiac Rhythm NSR.

## 2020-09-26 NOTE — PROGRESS NOTES
[address] [facility] Adult  Hospitalist Group     ICU Transfer/Accept Summary     This patient is being transferred INTO THE ICU  DATE OF TRANSFER: 9/25/2020       PATIENT ID: Daryle Bertin  MRN: [id number]   YOB: 1975    PRIMARY CARE PROVIDER: Duc Rudolph MD   DATE OF ADMISSION: 9/24/2020 12:02 PM    ATTENDING PHYSICIAN: Juarez Flores MD  CONSULTATIONS:   IP CONSULT TO CARDIOLOGY    PROCEDURES/SURGERIES:   Procedure(s):  DEBRIDEMENT OF BONE, RIGHT FOOT/ OPEN BONE BIOPSY RIGHT FOOT    REASON FOR ADMISSION: <principal problem not specified>     HOSPITAL PROBLEM LIST:  Patient Active Problem List   Diagnosis Code    [facility] M86.9         Brief HPI and Hospital Course:      Daryle Bertin is a 51 y/o F with past medical history that includes T2DM, hypothyroidism, hypertension, anxiety/depression and pancreatitis admitted on 9/24/2020 for osteomyelitis in her right foot after having surgical intervention in another facility. She had debridement of the bone and a biopsy here earlier today. Upon returning to her room from PACU at apx 8pm,  she had an acute onset of shortness of breath that was not relieved with albuterol and Solu-Medrol. She complained of \"not being able to get the air out\". She very quickly decompensated and had a respiratory arrest followed by a brief period of PEA. A CODE BLUE was called, CPR was initiated and she was intubated. She received 1 round of epinephrine and ROSC was quickly achieved. She also received 2 mg of Narcan which improved her respiratory status somewhat. The anesthesiologist that managed her care for the foot debridement was present for the code. He noted that no opiates were used for analgesia, she received none afterwards and he also noted that patient became severely bradypnic on induction of anesthesia in the OR prior to her case. Patient takes 30 mg of methadone daily for opioid dependence.       Assessment and Plan:    Hypoxic respiratory failure, s/p bradycardic arrest  Management per critical care team    Osteomyelitis right foot   S/p debridement today, Ortho foot and ankle following  On vancomycin    T2DM  Currently on insulin/sliding scale    PMH pancreatitis, hypothyroid, anxiety/depression  Continue home meds    On methadone. Opioid dependence  Takes 160 mg daily per med rec, got only 30 mg at noon today per [facility]                   PHYSICAL EXAMINATION:  Visit Vitals  /74   Pulse 100   Temp 97.8 °F (36.6 °C)   Resp 17   Ht 5' 7\" (1.702 m)   Wt 83.5 kg (184 lb 1.4 oz)   SpO2 100%   BMI 28.83 kg/m²       General:           Sedated, on ventilator  HEENT:           Atraumatic, MMM            Neck:               Supple, symmetrical,  thyroid: non tender  Lungs:             Clear to auscultation bilaterally. No Wheezing or Rhonchi. No rales. Heart:              Regular  rhythm,  No  murmur   No edema  Abdomen:        Softly distended. Bowel sounds normal  Extremities:     No cyanosis. No clubbing. Postop surgical wrap RLE ankle. Skin:                No jaundice, no rashes, pale. Psych:             Not anxious or agitated. Neurologic:       Sedated, nonpurposeful movement all 4 extremities     CODE STATUS:  x Full Code    DNR    Partial    Comfort Care       IMPENDING DETERIORATION -Airway, Respiratory and Cardiovascular    ASSOCIATED RISK FACTORS - Hypoxia, Dysrhythmia and CNS Decompensation    MANAGEMENT- Bedside Assessment, Supervision of Care and Transfer    INTERPRETATION -  Blood Pressure and Cardiac Output Measures     INTERVENTIONS - hemodynamic mngmt and vent mngmt    CASE REVIEW - Hospitalist, Medical Sub-Specialist, Nursing and Family    TREATMENT RESPONSE -Stable    PERFORMED BY - Self, Physician and Name:Dr. Petar Palencia;  Dr. Angie Ware. This patient is unstable and critically ill.  Due to a high probability of clinically significant, life threatening deterioration, the patient required my highest level of preparedness to intervene emergently and I personally spent   35 minutes of critical care time directly and personally managing the patient, and was immediately available to the patient. This critical care time included obtaining a history; examining the patient; pulse oximetry; ordering and review of labs/studies; arranging urgent treatment with development of a management plan; evaluation of patient's response to treatment; frequent reassessment; and, discussions with other providers and/or family. This critical care time was performed to assess and manage the high probability of imminent, life-threatening deterioration that could result in multi-organ failure and death.        Cinthya Chase, MPH, MSN, RN, NP-C  [phone] or via Perfect Serve           Signed:   Cinthya Chase NP  Date of Service:  9/25/2020  10:51 PM

## 2020-09-27 ENCOUNTER — APPOINTMENT (OUTPATIENT)
Dept: NON INVASIVE DIAGNOSTICS | Age: 45
DRG: 364 | End: 2020-09-27
Attending: STUDENT IN AN ORGANIZED HEALTH CARE EDUCATION/TRAINING PROGRAM
Payer: COMMERCIAL

## 2020-09-27 LAB
ALBUMIN SERPL-MCNC: 2.4 G/DL (ref 3.5–5)
ALBUMIN/GLOB SERPL: 0.6 {RATIO} (ref 1.1–2.2)
ALP SERPL-CCNC: 69 U/L (ref 45–117)
ALT SERPL-CCNC: 44 U/L (ref 12–78)
ANION GAP SERPL CALC-SCNC: 8 MMOL/L (ref 5–15)
AST SERPL-CCNC: 106 U/L (ref 15–37)
ATRIAL RATE: 106 BPM
ATRIAL RATE: 135 BPM
ATRIAL RATE: 72 BPM
ATRIAL RATE: 96 BPM
BACTERIA SPEC CULT: ABNORMAL
BACTERIA SPEC CULT: NORMAL
BASOPHILS # BLD: 0 K/UL (ref 0–0.1)
BASOPHILS NFR BLD: 0 % (ref 0–1)
BILIRUB SERPL-MCNC: 0.3 MG/DL (ref 0.2–1)
BNP SERPL-MCNC: ABNORMAL PG/ML
BUN SERPL-MCNC: 22 MG/DL (ref 6–20)
BUN/CREAT SERPL: 17 (ref 12–20)
CALCIUM SERPL-MCNC: 8.6 MG/DL (ref 8.5–10.1)
CALCULATED P AXIS, ECG09: 56 DEGREES
CALCULATED P AXIS, ECG09: 58 DEGREES
CALCULATED P AXIS, ECG09: 58 DEGREES
CALCULATED R AXIS, ECG10: 19 DEGREES
CALCULATED R AXIS, ECG10: 30 DEGREES
CALCULATED R AXIS, ECG10: 43 DEGREES
CALCULATED R AXIS, ECG10: 46 DEGREES
CALCULATED T AXIS, ECG11: 58 DEGREES
CALCULATED T AXIS, ECG11: 61 DEGREES
CALCULATED T AXIS, ECG11: 72 DEGREES
CALCULATED T AXIS, ECG11: 88 DEGREES
CHLORIDE SERPL-SCNC: 107 MMOL/L (ref 97–108)
CO2 SERPL-SCNC: 24 MMOL/L (ref 21–32)
CREAT SERPL-MCNC: 1.29 MG/DL (ref 0.55–1.02)
DIAGNOSIS, 93000: NORMAL
DIFFERENTIAL METHOD BLD: ABNORMAL
ECHO AO ROOT DIAM: 3.14 CM
ECHO AV AREA PEAK VELOCITY: 1.86 CM2
ECHO AV AREA PEAK VELOCITY: 1.86 CM2
ECHO AV AREA VTI: 1.85 CM2
ECHO AV AREA VTI: 1.85 CM2
ECHO AV CUSP MM: 1.07 CM
ECHO AV MEAN GRADIENT: 7.78 MMHG
ECHO AV PEAK GRADIENT: 14.37 MMHG
ECHO AV PEAK VELOCITY: 189.53 CM/S
ECHO AV VTI: 38.33 CM
ECHO LA AREA 4C: 20.43 CM2
ECHO LA MAJOR AXIS: 4.56 CM
ECHO LA MINOR AXIS: 2.33 CM
ECHO LA TO AORTIC ROOT RATIO: 1.32
ECHO LA VOL 2C: 60.09 ML (ref 22–52)
ECHO LA VOL 4C: 55.58 ML (ref 22–52)
ECHO LA VOL BP: 60.04 ML (ref 22–52)
ECHO LA VOL/BSA BIPLANE: 30.68 ML/M2 (ref 16–28)
ECHO LA VOLUME INDEX A2C: 30.71 ML/M2 (ref 16–28)
ECHO LA VOLUME INDEX A4C: 28.4 ML/M2 (ref 16–28)
ECHO LV E' LATERAL VELOCITY: 8.48 CM/S
ECHO LV E' SEPTAL VELOCITY: 6.88 CM/S
ECHO LV INTERNAL DIMENSION DIASTOLIC: 4.71 CM (ref 3.9–5.3)
ECHO LV INTERNAL DIMENSION SYSTOLIC: 3.23 CM
ECHO LV IVSD: 1.18 CM (ref 0.6–0.9)
ECHO LV MASS 2D: 199 G (ref 67–162)
ECHO LV MASS INDEX 2D: 101.7 G/M2 (ref 43–95)
ECHO LV POSTERIOR WALL DIASTOLIC: 1.11 CM (ref 0.6–0.9)
ECHO LVOT DIAM: 1.89 CM
ECHO LVOT PEAK GRADIENT: 6.36 MMHG
ECHO LVOT PEAK VELOCITY: 126.05 CM/S
ECHO LVOT SV: 70.7 ML
ECHO LVOT VTI: 25.3 CM
ECHO MV A VELOCITY: 75.6 CM/S
ECHO MV AREA PHT: 4.66 CM2
ECHO MV E DECELERATION TIME (DT): 0.16 S
ECHO MV E VELOCITY: 130.22 CM/S
ECHO MV E/A RATIO: 1.72
ECHO MV E/E' LATERAL: 15.36
ECHO MV E/E' RATIO (AVERAGED): 17.14
ECHO MV E/E' SEPTAL: 18.93
ECHO MV PRESSURE HALF TIME (PHT): 0.05 S
ECHO PV MAX VELOCITY: 103.96 CM/S
ECHO PV PEAK INSTANTANEOUS GRADIENT SYSTOLIC: 4.32 MMHG
ECHO RV INTERNAL DIMENSION: 3.11 CM
ECHO RV TAPSE: 1.83 CM (ref 1.5–2)
ECHO TV REGURGITANT MAX VELOCITY: 286.07 CM/S
ECHO TV REGURGITANT MAX VELOCITY: 289.04 CM/S
ECHO TV REGURGITANT MAX VELOCITY: 293.33 CM/S
ECHO TV REGURGITANT MAX VELOCITY: 298.35 CM/S
ECHO TV REGURGITANT MAX VELOCITY: 298.35 CM/S
ECHO TV REGURGITANT PEAK GRADIENT: 32.73 MMHG
ECHO TV REGURGITANT PEAK GRADIENT: 33.42 MMHG
ECHO TV REGURGITANT PEAK GRADIENT: 34.42 MMHG
ECHO TV REGURGITANT PEAK GRADIENT: 35.6 MMHG
ECHO TV REGURGITANT PEAK GRADIENT: 35.6 MMHG
EOSINOPHIL # BLD: 0 K/UL (ref 0–0.4)
EOSINOPHIL NFR BLD: 0 % (ref 0–7)
ERYTHROCYTE [DISTWIDTH] IN BLOOD BY AUTOMATED COUNT: 18.2 % (ref 11.5–14.5)
GLOBULIN SER CALC-MCNC: 4.2 G/DL (ref 2–4)
GLUCOSE BLD STRIP.AUTO-MCNC: 274 MG/DL (ref 65–100)
GLUCOSE BLD STRIP.AUTO-MCNC: 282 MG/DL (ref 65–100)
GLUCOSE BLD STRIP.AUTO-MCNC: 285 MG/DL (ref 65–100)
GLUCOSE BLD STRIP.AUTO-MCNC: 294 MG/DL (ref 65–100)
GLUCOSE BLD STRIP.AUTO-MCNC: 345 MG/DL (ref 65–100)
GLUCOSE SERPL-MCNC: 285 MG/DL (ref 65–100)
GRAM STN SPEC: ABNORMAL
HCT VFR BLD AUTO: 29.1 % (ref 35–47)
HGB BLD-MCNC: 9.1 G/DL (ref 11.5–16)
IMM GRANULOCYTES # BLD AUTO: 0.2 K/UL (ref 0–0.04)
IMM GRANULOCYTES NFR BLD AUTO: 1 % (ref 0–0.5)
LACTATE SERPL-SCNC: 0.9 MMOL/L (ref 0.4–2)
LYMPHOCYTES # BLD: 1.4 K/UL (ref 0.8–3.5)
LYMPHOCYTES NFR BLD: 7 % (ref 12–49)
MCH RBC QN AUTO: 26.1 PG (ref 26–34)
MCHC RBC AUTO-ENTMCNC: 31.3 G/DL (ref 30–36.5)
MCV RBC AUTO: 83.4 FL (ref 80–99)
MONOCYTES # BLD: 0.9 K/UL (ref 0–1)
MONOCYTES NFR BLD: 5 % (ref 5–13)
NEUTS SEG # BLD: 15.9 K/UL (ref 1.8–8)
NEUTS SEG NFR BLD: 87 % (ref 32–75)
NRBC # BLD: 0.02 K/UL (ref 0–0.01)
NRBC BLD-RTO: 0.1 PER 100 WBC
P-R INTERVAL, ECG05: 142 MS
P-R INTERVAL, ECG05: 144 MS
P-R INTERVAL, ECG05: 154 MS
P-R INTERVAL, ECG05: 164 MS
PLATELET # BLD AUTO: 220 K/UL (ref 150–400)
PMV BLD AUTO: 11.9 FL (ref 8.9–12.9)
POTASSIUM SERPL-SCNC: 3.6 MMOL/L (ref 3.5–5.1)
PROCALCITONIN SERPL-MCNC: 12.12 NG/ML
PROT SERPL-MCNC: 6.6 G/DL (ref 6.4–8.2)
Q-T INTERVAL, ECG07: 326 MS
Q-T INTERVAL, ECG07: 360 MS
Q-T INTERVAL, ECG07: 394 MS
Q-T INTERVAL, ECG07: 450 MS
QRS DURATION, ECG06: 102 MS
QRS DURATION, ECG06: 106 MS
QRS DURATION, ECG06: 114 MS
QRS DURATION, ECG06: 98 MS
QTC CALCULATION (BEZET), ECG08: 478 MS
QTC CALCULATION (BEZET), ECG08: 489 MS
QTC CALCULATION (BEZET), ECG08: 492 MS
QTC CALCULATION (BEZET), ECG08: 497 MS
RBC # BLD AUTO: 3.49 M/UL (ref 3.8–5.2)
SERVICE CMNT-IMP: ABNORMAL
SERVICE CMNT-IMP: NORMAL
SODIUM SERPL-SCNC: 139 MMOL/L (ref 136–145)
TROPONIN I SERPL-MCNC: 98.2 NG/ML
TROPONIN I SERPL-MCNC: >100 NG/ML
VENTRICULAR RATE, ECG03: 106 BPM
VENTRICULAR RATE, ECG03: 135 BPM
VENTRICULAR RATE, ECG03: 72 BPM
VENTRICULAR RATE, ECG03: 96 BPM
WBC # BLD AUTO: 18.4 K/UL (ref 3.6–11)

## 2020-09-27 PROCEDURE — 74011250637 HC RX REV CODE- 250/637: Performed by: FAMILY MEDICINE

## 2020-09-27 PROCEDURE — 83605 ASSAY OF LACTIC ACID: CPT

## 2020-09-27 PROCEDURE — 83880 ASSAY OF NATRIURETIC PEPTIDE: CPT

## 2020-09-27 PROCEDURE — 74011250636 HC RX REV CODE- 250/636: Performed by: ANESTHESIOLOGY

## 2020-09-27 PROCEDURE — 74011636637 HC RX REV CODE- 636/637: Performed by: STUDENT IN AN ORGANIZED HEALTH CARE EDUCATION/TRAINING PROGRAM

## 2020-09-27 PROCEDURE — 77030013797 HC KT TRNSDUC PRSSR EDWD -A

## 2020-09-27 PROCEDURE — 80053 COMPREHEN METABOLIC PANEL: CPT

## 2020-09-27 PROCEDURE — 74011636637 HC RX REV CODE- 636/637: Performed by: FAMILY MEDICINE

## 2020-09-27 PROCEDURE — 77030005402 HC CATH RAD ART LN KT TELE -B

## 2020-09-27 PROCEDURE — 74011250636 HC RX REV CODE- 250/636: Performed by: SPECIALIST

## 2020-09-27 PROCEDURE — 74011250637 HC RX REV CODE- 250/637: Performed by: SPECIALIST

## 2020-09-27 PROCEDURE — 85025 COMPLETE CBC W/AUTO DIFF WBC: CPT

## 2020-09-27 PROCEDURE — 94640 AIRWAY INHALATION TREATMENT: CPT

## 2020-09-27 PROCEDURE — 65620000000 HC RM CCU GENERAL

## 2020-09-27 PROCEDURE — 74011000250 HC RX REV CODE- 250: Performed by: INTERNAL MEDICINE

## 2020-09-27 PROCEDURE — 84145 PROCALCITONIN (PCT): CPT

## 2020-09-27 PROCEDURE — 74011250637 HC RX REV CODE- 250/637: Performed by: STUDENT IN AN ORGANIZED HEALTH CARE EDUCATION/TRAINING PROGRAM

## 2020-09-27 PROCEDURE — 74011250637 HC RX REV CODE- 250/637: Performed by: INTERNAL MEDICINE

## 2020-09-27 PROCEDURE — 93306 TTE W/DOPPLER COMPLETE: CPT | Performed by: SPECIALIST

## 2020-09-27 PROCEDURE — 74011250636 HC RX REV CODE- 250/636: Performed by: PODIATRIST

## 2020-09-27 PROCEDURE — 82962 GLUCOSE BLOOD TEST: CPT

## 2020-09-27 PROCEDURE — 74011250636 HC RX REV CODE- 250/636: Performed by: FAMILY MEDICINE

## 2020-09-27 PROCEDURE — 74011250636 HC RX REV CODE- 250/636: Performed by: INTERNAL MEDICINE

## 2020-09-27 PROCEDURE — 93306 TTE W/DOPPLER COMPLETE: CPT

## 2020-09-27 PROCEDURE — 74011000258 HC RX REV CODE- 258: Performed by: FAMILY MEDICINE

## 2020-09-27 PROCEDURE — 84484 ASSAY OF TROPONIN QUANT: CPT

## 2020-09-27 PROCEDURE — 36415 COLL VENOUS BLD VENIPUNCTURE: CPT

## 2020-09-27 PROCEDURE — 99233 SBSQ HOSP IP/OBS HIGH 50: CPT | Performed by: SPECIALIST

## 2020-09-27 PROCEDURE — 87040 BLOOD CULTURE FOR BACTERIA: CPT

## 2020-09-27 RX ORDER — CARVEDILOL 3.12 MG/1
3.12 TABLET ORAL 2 TIMES DAILY WITH MEALS
Status: DISCONTINUED | OUTPATIENT
Start: 2020-09-27 | End: 2020-09-28

## 2020-09-27 RX ORDER — VANCOMYCIN HYDROCHLORIDE
1250
Status: DISCONTINUED | OUTPATIENT
Start: 2020-09-27 | End: 2020-09-29 | Stop reason: DRUGHIGH

## 2020-09-27 RX ORDER — INSULIN GLARGINE 100 [IU]/ML
50 INJECTION, SOLUTION SUBCUTANEOUS DAILY
Status: DISCONTINUED | OUTPATIENT
Start: 2020-09-27 | End: 2020-09-27

## 2020-09-27 RX ORDER — HYDROMORPHONE HYDROCHLORIDE 1 MG/ML
2 INJECTION, SOLUTION INTRAMUSCULAR; INTRAVENOUS; SUBCUTANEOUS
Status: DISCONTINUED | OUTPATIENT
Start: 2020-09-27 | End: 2020-10-02 | Stop reason: HOSPADM

## 2020-09-27 RX ORDER — INSULIN LISPRO 100 [IU]/ML
INJECTION, SOLUTION INTRAVENOUS; SUBCUTANEOUS
Status: DISCONTINUED | OUTPATIENT
Start: 2020-09-27 | End: 2020-10-02 | Stop reason: HOSPADM

## 2020-09-27 RX ORDER — INSULIN GLARGINE 100 [IU]/ML
60 INJECTION, SOLUTION SUBCUTANEOUS DAILY
Status: DISCONTINUED | OUTPATIENT
Start: 2020-09-28 | End: 2020-10-02 | Stop reason: HOSPADM

## 2020-09-27 RX ORDER — FUROSEMIDE 10 MG/ML
40 INJECTION INTRAMUSCULAR; INTRAVENOUS ONCE
Status: COMPLETED | OUTPATIENT
Start: 2020-09-27 | End: 2020-09-27

## 2020-09-27 RX ADMIN — PHENYLEPHRINE HYDROCHLORIDE 25 MCG/MIN: 10 INJECTION INTRAVENOUS at 22:49

## 2020-09-27 RX ADMIN — METRONIDAZOLE 500 MG: 500 INJECTION, SOLUTION INTRAVENOUS at 11:44

## 2020-09-27 RX ADMIN — CEFEPIME HYDROCHLORIDE 2 G: 2 INJECTION, POWDER, FOR SOLUTION INTRAVENOUS at 04:20

## 2020-09-27 RX ADMIN — LEVOTHYROXINE SODIUM 200 MCG: 0.1 TABLET ORAL at 07:30

## 2020-09-27 RX ADMIN — TICAGRELOR 90 MG: 90 TABLET ORAL at 20:03

## 2020-09-27 RX ADMIN — DOCUSATE SODIUM 50MG AND SENNOSIDES 8.6MG 1 TABLET: 8.6; 5 TABLET, FILM COATED ORAL at 09:15

## 2020-09-27 RX ADMIN — FUROSEMIDE 40 MG: 10 INJECTION, SOLUTION INTRAMUSCULAR; INTRAVENOUS at 12:30

## 2020-09-27 RX ADMIN — BUSPIRONE HYDROCHLORIDE 15 MG: 10 TABLET ORAL at 09:15

## 2020-09-27 RX ADMIN — PANCRELIPASE 1 CAPSULE: 24000; 76000; 120000 CAPSULE, DELAYED RELEASE PELLETS ORAL at 12:27

## 2020-09-27 RX ADMIN — TRAZODONE HYDROCHLORIDE 50 MG: 50 TABLET ORAL at 00:31

## 2020-09-27 RX ADMIN — Medication 10 ML: at 14:00

## 2020-09-27 RX ADMIN — PREGABALIN 150 MG: 75 CAPSULE ORAL at 17:42

## 2020-09-27 RX ADMIN — INSULIN LISPRO 7 UNITS: 100 INJECTION, SOLUTION INTRAVENOUS; SUBCUTANEOUS at 16:58

## 2020-09-27 RX ADMIN — HYDROMORPHONE HYDROCHLORIDE 2 MG: 1 INJECTION, SOLUTION INTRAMUSCULAR; INTRAVENOUS; SUBCUTANEOUS at 18:58

## 2020-09-27 RX ADMIN — INSULIN LISPRO 7 UNITS: 100 INJECTION, SOLUTION INTRAVENOUS; SUBCUTANEOUS at 09:48

## 2020-09-27 RX ADMIN — ACETAMINOPHEN 650 MG: 325 TABLET ORAL at 02:38

## 2020-09-27 RX ADMIN — IPRATROPIUM BROMIDE AND ALBUTEROL SULFATE 3 ML: .5; 3 SOLUTION RESPIRATORY (INHALATION) at 00:39

## 2020-09-27 RX ADMIN — Medication 10 ML: at 22:44

## 2020-09-27 RX ADMIN — PANTOPRAZOLE SODIUM 40 MG: 40 TABLET, DELAYED RELEASE ORAL at 07:30

## 2020-09-27 RX ADMIN — CEFEPIME HYDROCHLORIDE 2 G: 2 INJECTION, POWDER, FOR SOLUTION INTRAVENOUS at 16:46

## 2020-09-27 RX ADMIN — CLONAZEPAM 1 MG: 1 TABLET ORAL at 09:37

## 2020-09-27 RX ADMIN — ASPIRIN 81 MG CHEWABLE TABLET 81 MG: 81 TABLET CHEWABLE at 09:15

## 2020-09-27 RX ADMIN — INSULIN GLARGINE 50 UNITS: 100 INJECTION, SOLUTION SUBCUTANEOUS at 09:16

## 2020-09-27 RX ADMIN — INSULIN LISPRO 4 UNITS: 100 INJECTION, SOLUTION INTRAVENOUS; SUBCUTANEOUS at 23:08

## 2020-09-27 RX ADMIN — PANCRELIPASE 1 CAPSULE: 24000; 76000; 120000 CAPSULE, DELAYED RELEASE PELLETS ORAL at 09:14

## 2020-09-27 RX ADMIN — INSULIN LISPRO 7 UNITS: 100 INJECTION, SOLUTION INTRAVENOUS; SUBCUTANEOUS at 00:31

## 2020-09-27 RX ADMIN — VANCOMYCIN HYDROCHLORIDE 1250 MG: 10 INJECTION, POWDER, LYOPHILIZED, FOR SOLUTION INTRAVENOUS at 15:15

## 2020-09-27 RX ADMIN — TICAGRELOR 90 MG: 90 TABLET ORAL at 07:33

## 2020-09-27 RX ADMIN — HYDROMORPHONE HYDROCHLORIDE 1 MG: 1 INJECTION, SOLUTION INTRAMUSCULAR; INTRAVENOUS; SUBCUTANEOUS at 02:39

## 2020-09-27 RX ADMIN — PANCRELIPASE 1 CAPSULE: 24000; 76000; 120000 CAPSULE, DELAYED RELEASE PELLETS ORAL at 17:11

## 2020-09-27 RX ADMIN — HYDROMORPHONE HYDROCHLORIDE 1 MG: 1 INJECTION, SOLUTION INTRAMUSCULAR; INTRAVENOUS; SUBCUTANEOUS at 09:37

## 2020-09-27 RX ADMIN — PHENYLEPHRINE HYDROCHLORIDE 30 MCG/MIN: 10 INJECTION INTRAVENOUS at 12:19

## 2020-09-27 RX ADMIN — FENOFIBRATE 145 MG: 145 TABLET ORAL at 09:15

## 2020-09-27 RX ADMIN — PREGABALIN 150 MG: 75 CAPSULE ORAL at 09:15

## 2020-09-27 RX ADMIN — METRONIDAZOLE 500 MG: 500 INJECTION, SOLUTION INTRAVENOUS at 23:09

## 2020-09-27 RX ADMIN — ATORVASTATIN CALCIUM 80 MG: 40 TABLET, FILM COATED ORAL at 09:15

## 2020-09-27 RX ADMIN — TRAZODONE HYDROCHLORIDE 50 MG: 50 TABLET ORAL at 23:08

## 2020-09-27 RX ADMIN — INSULIN LISPRO 7 UNITS: 100 INJECTION, SOLUTION INTRAVENOUS; SUBCUTANEOUS at 12:21

## 2020-09-27 NOTE — PROGRESS NOTES
9/27/2020   Reji Cartagena MD  [facility]    . Belia Williamson Belia White is a 50 y.o. female   Spiked temp to 101.7  Acute SOB at 2250 h  C/o chest pain mid sternal unlike pain that she came in with and lasted just few minutes  More prominent, had more shortness of breath ,positional felt better sitting up , orthopnea last night  Has urgent cath early Saturday AM with Dr Clint Koehler after presenting with infected right foot and surgery on Friday 09/24/20   CARDIAC PROCEDURE 09/26/2020 9/26/2020    Narrative · Subacute occlusion of proximal circumflex with angiographic evidence of   multivessel coronary artery disease  · Successful stenting of proximal circumflex into obtuse marginal  · Successful stenting of proximal LAD into mid LAD  · Mildly elevated LVEDP     Procedures  Left heart catheterization with coronary angiography  Drug-eluting stent x2 vessels (LAD and circumflex)  NAYELI x2 vessels (LAD and circumflex)    Findings:   L Main: Large caliber vessel, mild distal left main plaque on NAYELI  LAD: Large caliber vessel, supplies small D1 and moderate sized D2, severe   diffuse disease up to 70-80% through proximal and mid LAD. LCx: Large caliber vessel, proximal circumflex occlusion. After   revascularization there was evidence of a moderate OM1 and moderate OM 2  RCA: Large-caliber vessel, diffuse mild disease through RCA, moderate size   RP AV within proximal 70% stenosis, moderate sized PDA with proximal   diffuse 50% stenosis    LVEDP:  15 mmhg    PCI:    EBU 3.5 guide  Heparin    Circumflex  Attempted to wire with kelly blue, unable to pass wire distally. Successfully wired the vessel with whisper wire. Dilated circumflex with 2.5 compliant balloon at 16 daniel  Proximal into mid circumflex stented with 3.0 x 18 mm xience Catherine JAELYN   deployed at high daniel.   Postdilated with a 4.0 NC balloon at 16 to 20 daniel  Post intervention distal to the stent there was a long tubular diffuse 70%   stenosis. On NAYELI there was evidence of heavy plaque burden and calcium. No evidence of distal edge dissection. However, after PCI of LAD there was evidence of no flow into the distal   marginals. Circumflex was rewired with kelly blue. Mid circumflex into obtuse marginal was stented with a 2.5 x 23 mm Xience   Catherine JAELYN deployed at 16 daniel. Stent was postdilated with 3.5 NC balloon   at 16 to 20 daniel. LAD  Wired with kelly blue  Dilated initially with 2.5 mm compliant balloon and subsequently with 3.0   NC balloon  [address] were deployed. Distal stent deployed first,   3.5 x 38 mm Xience [address] JAELYN deployed at 16 daniel. Overlapping proximally   3.5 x 12 mm Xience [address] deployed at 20 daniel. Proximal aspect of LAD was   postdilated with 4.0 noncompliant balloon and mid LAD with 3.5 mm   noncompliant balloon. Jailed small D1 with near inez 3 flow      At the conclusion of the study there was INEZ-3 flow within the distal LAD   and distal circumflex. Initial LAD with INEZ-3 flow. Initial circumflex   with INEZ 0 flow         Signed by: Stewart Mitchell,       Discussion/Plans/Recs  1. Large Evolving MI , had [place] for several days prior to admit, thought it was GERD  Troponin peak to >100  Post op arrest and changes in EKG  Cath stent to Left Circ and LAD  CP today is atypical but had orthopnea  Echo pending, suspect CHF and ischemic CMY but wait for echo  Complex multivessel CAD  Bedside POCUS yesterday with grossly normal fx  Was intially hypotensive with cardiogenic shock  On ASA Brilinta,   Lipids on high potency statin as appropriate for secondary prevention.    Lab Results   Component Value Date/Time    Troponin-I, Qt. >100.00 (H) 09/27/2020 02:43 AM      2,. Cardiogenic shock, has PND, echo pending  Start Coreg if bp tolerates  Now at risk for sepsis with fever, high HR and active infection so hold off on meds that could lower BP  Add one dose of lasix daily  Lab Results   Component Value Date/Time    NT pro-BNP 14,852 (H) 09/27/2020 02:43 AM    NT pro-BNP 8,972 (H) 09/26/2020 04:04 AM    NT pro-BNP 6,033 (H) 09/25/2020 09:45 PM      3. HTN PTA  Patient Vitals for the past 12 hrs:   Temp Pulse Resp BP SpO2   09/27/20 1120 -- -- -- (!) 102/58 --   09/27/20 1000 -- 85 18 -- 95 %   09/27/20 0900 -- 86 16 (!) 102/58 95 %   09/27/20 0800 98.2 °F (36.8 °C) 99 18 (!) 105/53 97 %   09/27/20 0700 -- 98 18 99/63 97 %   09/27/20 0600 99.4 °F (37.4 °C) (!) 104 21 111/61 96 %   09/27/20 0500 -- (!) 104 16 (!) 102/56 96 %   09/27/20 0400 -- (!) 107 19 110/62 93 %   09/27/20 0300 (!) 101.7 °F (38.7 °C) (!) 112 23 117/61 96 %   09/27/20 0200 -- (!) 114 25 121/70 91 %   09/27/20 0100 -- (!) 113 25 138/64 95 %   09/27/20 0039 -- -- -- -- 96 %   09/27/20 0000 98.1 °F (36.7 °C) (!) 112 -- 133/82 --      4. Right foot cellulitis/osteo no Vanc, cefepime and Flagykl  S/P incision and drainage with removal of all nonviable soft tissue right foot, bone debridement right foot, open bone biopsies right foot DOS: 09-  5. DM2 with morbidobesity  cardiovascular disease risk factors  Lab Results   Component Value Date/Time    Hemoglobin A1c 9.8 (H) 09/24/2020 11:28 AM   6. DEEPTI   Creat peak 1.67 now  Lab Results   Component Value Date/Time    Creatinine 1.29 (H) 09/27/2020 02:43 AM      Non CV co morbids:  Anemia at   Lab Results   Component Value Date/Time    HGB 9.1 (L) 09/27/2020 02:43 AM         Cardiac Studies/Hx:  No specialty comments available. Past Medical History:   Diagnosis Date    Arthritis     Diabetes ([facility] [address]     Endocrine disease     hypothyroid    Gastrointestinal disorder     pancreatitis    Hypertension       ROS-pertinents  negative except as above  The pertinent portions of the medical history,physician and nursing notes, meds,vitals , labs and Ins/Outs,are reviewed in the electronic record.   Results for orders placed or performed during the hospital encounter of 09/24/20 EKG, 12 LEAD, INITIAL   Result Value Ref Range    Ventricular Rate 106 BPM    Atrial Rate 106 BPM    P-R Interval 142 ms    QRS Duration 98 ms    Q-T Interval 360 ms    QTC Calculation (Bezet) 478 ms    Calculated P Axis 58 degrees    Calculated R Axis 46 degrees    Calculated T Axis 58 degrees    Diagnosis       Sinus tachycardia  Possible Left atrial enlargement  Possible Lateral infarct (cited on or before 26-SEP-2020)  When compared with ECG of 26-SEP-2020 07:58,  No significant change was found                Objective:    Physical Exam:   BP (!) 102/58   Pulse 85   Temp 98.2 °F (36.8 °C)   Resp 18   Ht 5' 7\" (1.702 m)   Wt 185 lb 6.5 oz (84.1 kg)   SpO2 95%   BMI 29.04 kg/m²    General:  alert, cooperative, no distress, appears stated age, moderately obese   ENT, Neck:  no jvd   Chest Wall: inspection normal - no chest wall deformities or tenderness, tachypnea   Lung: clear to auscultation bilaterally   Heart:  normal rate and regular rhythm, no gallops noted, systolic murmur 1/6 at 2nd left intercostal space and at apex, no JVD   Abdomen: nondistended   Extremities: edema 1+  on left     Patient Vitals for the past 12 hrs:   Temp Pulse Resp BP SpO2   09/27/20 1000 -- 85 18 -- 95 %   09/27/20 0900 -- 86 16 (!) 102/58 95 %   09/27/20 0800 98.2 °F (36.8 °C) 99 18 (!) 105/53 97 %   09/27/20 0700 -- 98 18 99/63 97 %   09/27/20 0600 99.4 °F (37.4 °C) (!) 104 21 111/61 96 %   09/27/20 0500 -- (!) 104 16 (!) 102/56 96 %   09/27/20 0400 -- (!) 107 19 110/62 93 %   09/27/20 0300 (!) 101.7 °F (38.7 °C) (!) 112 23 117/61 96 %   09/27/20 0200 -- (!) 114 25 121/70 91 %   09/27/20 0100 -- (!) 113 25 138/64 95 %   09/27/20 0039 -- -- -- -- 96 %   09/27/20 0000 98.1 °F (36.7 °C) (!) 112 -- 133/82 --      Lab Results   Component Value Date/Time    WBC 18.4 (H) 09/27/2020 02:43 AM    HGB 9.1 (L) 09/27/2020 02:43 AM    HCT 29.1 (L) 09/27/2020 02:43 AM    PLATELET 666 00/81/8900 02:43 AM    MCV 83.4 09/27/2020 02:43 AM Lab Results   Component Value Date/Time    Sodium 139 09/27/2020 02:43 AM    Potassium 3.6 09/27/2020 02:43 AM    Chloride 107 09/27/2020 02:43 AM    CO2 24 09/27/2020 02:43 AM    Anion gap 8 09/27/2020 02:43 AM    Glucose 285 (H) 09/27/2020 02:43 AM    BUN 22 (H) 09/27/2020 02:43 AM    Creatinine 1.29 (H) 09/27/2020 02:43 AM    BUN/Creatinine ratio 17 09/27/2020 02:43 AM    GFR est AA 54 (L) 09/27/2020 02:43 AM    GFR est non-AA 44 (L) 09/27/2020 02:43 AM    Calcium 8.6 09/27/2020 02:43 AM     Lab Results   Component Value Date/Time    Troponin-I, Qt. >100.00 (H) 09/27/2020 02:43 AM     Lab Results   Component Value Date/Time    NT pro-BNP 14,852 (H) 09/27/2020 02:43 AM    NT pro-BNP 8,972 (H) 09/26/2020 04:04 AM    NT pro-BNP 6,033 (H) 09/25/2020 09:45 PM       reports that she has been smoking. She has been smoking about 0.25 packs per day. She has never used smokeless tobacco. She reports current alcohol use. She reports that she does not use drugs. family history includes Cancer in her mother.    Last 24hr Input/Output:    Intake/Output Summary (Last 24 hours) at 9/27/2020 1114  Last data filed at 9/27/2020 0800  Gross per 24 hour   Intake 335.88 ml   Output 1425 ml   Net -1089.12 ml        Data Review:   Recent Results (from the past 24 hour(s))   GLUCOSE, POC    Collection Time: 09/26/20 12:07 PM   Result Value Ref Range    Glucose (POC) 459 (H) 65 - 100 mg/dL    Performed by [facility], POC    Collection Time: 09/26/20  5:13 PM   Result Value Ref Range    Glucose (POC) 253 (H) 65 - 100 mg/dL    Performed by [facility], TROUGH    Collection Time: 09/26/20 11:11 PM   Result Value Ref Range    Vancomycin,trough 14.3 (H) 5.0 - 10.0 ug/mL    Reported dose date NOT PROVIDED      Reported dose time: NOT PROVIDED      Reported dose: NOT PROVIDED UNITS   EKG, 12 LEAD, INITIAL    Collection Time: 09/26/20 11:45 PM   Result Value Ref Range    Ventricular Rate 106 BPM    Atrial Rate 106 BPM    P-R Interval 142 ms    QRS Duration 98 ms    Q-T Interval 360 ms    QTC Calculation (Bezet) 478 ms    Calculated P Axis 58 degrees    Calculated R Axis 46 degrees    Calculated T Axis 58 degrees    Diagnosis       Sinus tachycardia  Possible Left atrial enlargement  Possible Lateral infarct (cited on or before 26-SEP-2020)  When compared with ECG of 26-SEP-2020 07:58,  No significant change was found     GLUCOSE, POC    Collection Time: 09/27/20 12:24 AM   Result Value Ref Range    Glucose (POC) 345 (H) 65 - 100 mg/dL    Performed by Lo Hagan    CBC WITH AUTOMATED DIFF    Collection Time: 09/27/20  2:43 AM   Result Value Ref Range    WBC 18.4 (H) 3.6 - 11.0 K/uL    RBC 3.49 (L) 3.80 - 5.20 M/uL    HGB 9.1 (L) 11.5 - 16.0 g/dL    HCT 29.1 (L) 35.0 - 47.0 %    MCV 83.4 80.0 - 99.0 FL    MCH 26.1 26.0 - 34.0 PG    MCHC 31.3 30.0 - 36.5 g/dL    RDW 18.2 (H) 11.5 - 14.5 %    PLATELET 979 368 - 870 K/uL    MPV 11.9 8.9 - 12.9 FL    NRBC 0.1 (H) 0  WBC    ABSOLUTE NRBC 0.02 (H) 0.00 - 0.01 K/uL    NEUTROPHILS 87 (H) 32 - 75 %    LYMPHOCYTES 7 (L) 12 - 49 %    MONOCYTES 5 5 - 13 %    EOSINOPHILS 0 0 - 7 %    BASOPHILS 0 0 - 1 %    IMMATURE GRANULOCYTES 1 (H) 0.0 - 0.5 %    ABS. NEUTROPHILS 15.9 (H) 1.8 - 8.0 K/UL    ABS. LYMPHOCYTES 1.4 0.8 - 3.5 K/UL    ABS. MONOCYTES 0.9 0.0 - 1.0 K/UL    ABS. EOSINOPHILS 0.0 0.0 - 0.4 K/UL    ABS. BASOPHILS 0.0 0.0 - 0.1 K/UL    ABS. IMM.  GRANS. 0.2 (H) 0.00 - 0.04 K/UL    DF AUTOMATED     METABOLIC PANEL, COMPREHENSIVE    Collection Time: 09/27/20  2:43 AM   Result Value Ref Range    Sodium 139 136 - 145 mmol/L    Potassium 3.6 3.5 - 5.1 mmol/L    Chloride 107 97 - 108 mmol/L    CO2 24 21 - 32 mmol/L    Anion gap 8 5 - 15 mmol/L    Glucose 285 (H) 65 - 100 mg/dL    BUN 22 (H) 6 - 20 MG/DL    Creatinine 1.29 (H) 0.55 - 1.02 MG/DL    BUN/Creatinine ratio 17 12 - 20      GFR est AA 54 (L) >60 ml/min/1.73m2    GFR est non-AA 44 (L) >60 ml/min/1.73m2    Calcium 8.6 8.5 - 10.1 MG/DL    Bilirubin, total 0.3 0.2 - 1.0 MG/DL    ALT (SGPT) 44 12 - 78 U/L    AST (SGOT) 106 (H) 15 - 37 U/L    Alk.  phosphatase 69 45 - 117 U/L    Protein, total 6.6 6.4 - 8.2 g/dL    Albumin 2.4 (L) 3.5 - 5.0 g/dL    Globulin 4.2 (H) 2.0 - 4.0 g/dL    A-G Ratio 0.6 (L) 1.1 - 2.2     NT-PRO BNP    Collection Time: 09/27/20  2:43 AM   Result Value Ref Range    NT pro-BNP 14,852 (H) <125 PG/ML   TROPONIN I    Collection Time: 09/27/20  2:43 AM   Result Value Ref Range    Troponin-I, Qt. >100.00 (H) <0.05 ng/mL   LACTIC ACID    Collection Time: 09/27/20  3:53 AM   Result Value Ref Range    Lactic acid 0.9 0.4 - 2.0 MMOL/L   GLUCOSE, POC    Collection Time: 09/27/20  9:36 AM   Result Value Ref Range    Glucose (POC) 282 (H) 65 - 100 mg/dL    Performed by [facility] History     Socioeconomic History    Marital status: SINGLE     Spouse name: Not on file    Number of children: Not on file    Years of education: Not on file    Highest education level: Not on file   Occupational History    Not on file   Social Needs    Financial resource strain: Not on file    Food insecurity     Worry: Not on file     Inability: Not on file    Transportation needs     Medical: Not on file     Non-medical: Not on file   Tobacco Use    Smoking status: Current Some Day Smoker     Packs/day: 0.25    Smokeless tobacco: Never Used   Substance and Sexual Activity    Alcohol use: Yes     Comment: quit about a month ago - drank only on the weekends    Drug use: No    Sexual activity: Yes     Partners: Male   Lifestyle    Physical activity     Days per week: Not on file     Minutes per session: Not on file    Stress: Not on file   Relationships    Social connections     Talks on phone: Not on file     Gets together: Not on file     Attends Che[employer] service: Not on file     Active member of club or organization: Not on file     Attends meetings of clubs or organizations: Not on file     Relationship status: Not on file    Intimate partner violence     Fear of current or ex partner: Not on file     Emotionally abused: Not on file     Physically abused: Not on file     Forced sexual activity: Not on file   Other Topics Concern    Not on file   Social History Narrative    Not on file      Family History   Problem Relation Age of Onset    Cancer Mother         colon      Madelyn Child MD 9/27/2020

## 2020-09-27 NOTE — PROGRESS NOTES
Pharmacist Note - Vancomycin Dosing  Therapy day 3  Indication: Osteomyelitis of the right foot  Current regimen: 1250mg IV [id number]    A Trough Level resulted at 14.3 mcg/mL which was obtained ~18 hrs post-dose. This represents a \"true\" trough based on the patient's known kinetics. Goal trough: 15 - 20 mcg/mL       Plan: Continue current regimen. While the current trough is slightly sub-therapeutic, the markers of renal function have worsened today. Therefore, will leave the current regimen as future troughs would be expected to be higher (and within the goal range). However, if renal function significantly improves, will need to adjust the current maintenance regimen. Pharmacy will continue to monitor this patient daily for changes in clinical status and renal function.

## 2020-09-27 NOTE — PROGRESS NOTES
Day #4 of Vancomycin  Indication:  Osteomyelitis of Rt foot  Current regimen:  1250 mg IV Q 18hrs  Abx regimen:  Vancomycin, cefepime, and Flagyl  ID Following ?: NO  Concomitant nephrotoxic drugs (requires more frequent monitoring): None (previously pressors, loop diuretics, and contrast x1 on )  Frequency of BMP?: Daily through   Recent Labs     20  0243 20  0404 20  2145   WBC 18.4* 31.5* 27.9*   CREA 1.29* 1.67* 1.55*   BUN 22* 23* 24*   Est CrCl: 60 ml/min; UO: >1.6 ml/kg/hr  Temp (24hrs), Av.8 °F (37.1 °C), Min:97.8 °F (36.6 °C), Max:101.7 °F (38.7 °C)    Cultures:    Blood - NGTD   wound (rt ft) - few diphtheroids - final   rt ft bone - scant diphtheroids - final   Blood - pending    Goal trough = 15 - 20 mcg/mL    Recent trough history (date/time/level/dose/action taken):   @ 2311 = 14.3 mcg/mL (~18 hours post-dose on 1250mg Q18h),     Plan: WBCs trending down and renal function improved today. Given change in renal function, febrile in last 24 hours, and that level last night was slightly below goal, will change dosing interval to q16h for anticipated trough of 17.13 mcg/mL. Pharmacy will continue to monitor this patient daily for changes in clinical status and renal function.     Ana Villarreal, PharmD, BCPP, [facility] Specialist, [facility]

## 2020-09-27 NOTE — PROGRESS NOTES
0000: Bedside shift report received from Francisca SHANE. Pt still complaining of SOB. RT paged for breathing treatment. 0300: Pt complaining of pain in R foot. Pt flushed - recheck temp 101.7. Tylenol given. Paulino Loco MD notified. Orders received for blood cultures and lactic. AM labs drawn. 0730: Bedside and Verbal shift change report given to [address] [place] [address]   (oncoming nurse) by Katlin Ferrera (offgoing nurse). Report included the following information SBAR, Kardex, Procedure Summary, Intake/Output, MAR, Accordion and Recent Results.

## 2020-09-27 NOTE — PROGRESS NOTES
0730 Bedside and Verbal shift change report given to NANCY PARDORN (oncoming nurse) by Santosh Pennington, RN (offgoing nurse). Report included the following information SBAR, Kardex, ED Summary, OR Summary, Procedure Summary, Intake/Output, MAR, Accordion, Recent Results, Med Rec Status, Cardiac Rhythm SINUS RHYTHM WITH BBB and Alarm Parameters . 1030 Orders for Q6 troponin and increased IV dilaudid to 2mg, per Dr. Richard Whitman. 1109 This RN spoke with pharmacist regarding methadone. Pharmacist stating that they probably will not be able to get the methadone in until Monday. 1219 Phenylephrine started at 30mcg/min. Titrated to keep MAP >65.   2000 Bedside and Verbal shift change report given to RN (oncoming nurse) by Adams PARDO RN (offgoing nurse). Report included the following information SBAR, Kardex, ED Summary, OR Summary, Procedure Summary, Intake/Output, MAR, Accordion, Recent Results, Med Rec Status, Cardiac Rhythm SINUS RHYTHM WITH BBB and Alarm Parameters . Shift summery: Pt's troponin's have been >100, Dr. Richard Whitman aware. Pt received a one time dose of lasix, per Dr. Dari Fountain. Pt has voided 2,800 mL throughout shift. Phenylephrine at 50mcg/min. KVO running at 5 mL/hr. Dilaudid given x 2 doses.

## 2020-09-27 NOTE — PROGRESS NOTES
[facility]    ICU TEAM History and Physical    Name: Hima Pyle   : 1975   MRN: [id number]   Date: 2020      Assessment:     ICU Problems:  - Hypoxic Respiratory Failure (secondary to pulmonary edema; resolved)  - S/P cardiac arrest  - CAD - S/p JAELYN to Circ & LAD  - Right foot osteomyelitis  - Opiate dependence  - Diabetes Mellitus    ICU Comprehensive Plan of Care:     Plans for this Shift:     1. CAD/Elevated Nt-BNP  a. S/p JAELYN to Circ & LAD on 2020  b. DAPT/Statin/Coreg  c. Low Dose Lasix  d. May need Phenylephrine (balance diuresis vs GDMT for CAD/JAELYN)  e. Cardiology following - Appreciate Vee/Kiran input  2. Osteomyelitis  a. Podiatry following  b. F/u wound cultures  c. Vanc, Flagyl  3. Diabetes Mellitus  a. Lantus 40u daily  b. Humalog SSI q6h  c. Consult Diabetes Management  4. SBP Goal of: > 90 mmHg, MAP Goal of: > 65 mmHg  5. Phenylephrine - For above SBP/MAP goals  a. Hold home antihypertensives  6. Stop IVFs  7. Transfusion Trigger (Hgb): <7 g/dL  8. Pulmonary toilet: Duo-Nebs   9. SpO2 Goal: > 92%  10. Keep K>4; Mg>2   11. PT/OT: PT consulted and on board and OT consulted and on board   12. Discussed Plan of Care/Code Status: Full Code  13. Appreciate Consultants Input  14. Discussed Care Plan with Bedside RN  15. Documentation of Current Medications  16.  Rest of Plan Below:    F - Feeding:  No   A - Analgesia: Fentanyl  S - Sedation: Propofol and Fentanyl  T - DVT Prophylaxis: SCD's or Sequential Compression Device and Heparin   H - Head of Bed: > 30 Degrees  U - Ulcer Prophylaxis: Protonix (pantoprazole)   G - Glycemic Control: Insulin  S - Spontaneous Breathing Trial: Yes  B - Bowel Regimen: MiraLax and Docusate (Colace)  I - Indwelling Catheter:   Tubes: ETT and Orogastric Tube  Lines: Peripheral IV  Drains: Painter Catheter  D - De-escalation of Antibiotics: vancomycin, flagyl    Subjective:   Progress Note: 2020      Reason for ICU Admission: hypoxic respiratory failure, Right foot osteomyelitis, cellulitis    HPI: 49 y/o F with Diabetic neuropathy c/b Right foot osteomyelitis following several week course and numerous I+Ds. She underwent debridement today in the OR - course was uneverntful. Patient noted to be alert and at her baseline prior to leaving the pacu. On arrival to the floor a rapid response was called and this was followed shortly by a code blue to her room. Reportedly had respiratory distress and refused to comply with wearing NRB. She became progressively more bradycardic and progressed to PEA at which point ACLS was initiated. She had several rounds of compressions, one dose of epinephrine, and one dose of narcan. ROSC was achieved immediately following narcan administration at which time vertical nystagmus was observed and her breathing pattern was irregular and shallow. She was intubated uneventfully at the bedside and transferred to the ICU. In terms of underlying cause for her arrest, there is a possibility of this being opiate overdose, but the timing of administration per [facility] is not consistent with this. It may be possible that opiates were supplied by a third party - will discuss this patient's partner when able. In the interim, we will send a UDS to investigate other medication causes for AMS. Patient has history of home methadone use, she was noted to be bradypnec on induction of anesthesia in the OR prior to her case this afternoon. Interestingly she did not receive any opiates for analgesia following her operation. She was given a dose of 30mg methadone earlier today (far lower than the recorded dose of 160mg every day in medications tab). Following narcan administration and intubation, patient with diaphoresis, tachycardia, HTN, and nystagmus with marked mydriasis -- possibility of narcan induced opiate withdrawal. Currently on fentanyl gtt. She has LBBB of unclear chronicity.  Will follow up cardiac biomarkers and involve cardiology as able. ECHO in am.   DVT duplex yesterday lowers my suspicion for PE. No large PTX on bedside CXR. Given GFR, Radiologist and I elected to not use IV contrast for her scans. POD:  Day of Surgery    S/P:   Procedure(s):  DEBRIDEMENT OF BONE, RIGHT FOOT/ OPEN BONE BIOPSY RIGHT FOOT    Active Problem List:     Problem List  Never Reviewed          Codes Class    Osteomyelitis of foot (HCC) ICD-10-CM: M86.9  ICD-9-CM: 730.27               Past Medical History:      has a past medical history of Arthritis, Diabetes ([facility] [address] Endocrine disease, Gastrointestinal disorder, and Hypertension. Past Surgical History:      has a past surgical history that includes hx gi. Home Medications:     Prior to Admission medications    Medication Sig Start Date End Date Taking? Authorizing Provider   insulin U-500 CONCENTRATED regular (HumuLIN R U-500, Conc, Kwikpen) 500 unit/mL (3 mL) inpn subQ pen 80 Units by SubCUTAneous route three (3) times daily (with meals). Yes Provider, Historical   lisinopril-hydroCHLOROthiazide (PRINZIDE, ZESTORETIC) 10-12.5 mg per tablet Take 1 Tab by mouth daily. Yes Provider, Historical   sertraline (ZOLOFT) 50 mg tablet Take 50 mg by mouth two (2) times a day. Yes Provider, Historical   methadone (DOLOPHINE) 10 mg tablet Take 160 mg by mouth daily. Yes Provider, Historical   clonazePAM (KlonoPIN) 1 mg tablet Take 1 mg by mouth three (3) times daily. Yes Provider, Historical   pregabalin (Lyrica) 200 mg capsule Take 200 mg by mouth four (4) times daily. Yes Other, MD Romel   tiZANidine (ZANAFLEX) 4 mg capsule Take 4 mg by mouth two (2) times daily as needed (muscle spasms). Yes Other, MD Romel   amylase-lipase-protease (CREON) 24,000-76,000 -120,000 unit capsule Take 1 Cap by mouth three (3) times daily (with meals). Yes Other, MD Romel   omeprazole (PRILOSEC) 40 mg capsule Take 40 mg by mouth daily.    Yes Other, MD Romel   albuterol (PROVENTIL HFA, VENTOLIN HFA, PROAIR HFA) 90 mcg/actuation inhaler Take 2 Puffs by inhalation every four (4) hours as needed for Wheezing. 17  Yes Ashutosh Lawrence MD   fenofibrate (LOFIBRA) 160 mg tablet Take 160 mg by mouth daily. Indications: HYPERCHOLESTEROLEMIA   Yes Romel Howard MD   atorvastatin (Lipitor) 40 mg tablet Take 40 mg by mouth nightly. Yes Romel Howard MD   metFORMIN (GLUCOPHAGE) 500 mg tablet Take 1,000 mg by mouth two (2) times daily (with meals). Yes Romel Howard MD   GLIPIZIDE PO Take 10 mg by mouth daily (with breakfast). Yes Anita, MD Romel   LEVOTHYROXINE SODIUM (SYNTHROID PO) Take 200 mcg by mouth Daily (before breakfast). Yes Anita, MD Romel       Allergies/Social/Family History:     No Known Allergies   Social History     Tobacco Use    Smoking status: Current Some Day Smoker     Packs/day: 0.25    Smokeless tobacco: Never Used   Substance Use Topics    Alcohol use: Yes     Comment: quit about a month ago - drank only on the weekends      Family History   Problem Relation Age of Onset    Cancer Mother         colon       Review of Systems:     Review of systems not obtained due to patient factors. Objective:   Vital Signs:  Visit Vitals  BP (!) 77/45   Pulse 71   Temp 98.2 °F (36.8 °C)   Resp 16   Ht 5' 7\" (1.702 m)   Wt 84 kg (185 lb 3 oz)   SpO2 98%   BMI 29.00 kg/m²    O2 Flow Rate (L/min): 3 l/min O2 Device: Nasal cannula Temp (24hrs), Av °F (37.2 °C), Min:98 °F (36.7 °C), Max:101.7 °F (38.7 °C)           Intake/Output:     Intake/Output Summary (Last 24 hours) at 2020 1217  Last data filed at 2020 0900  Gross per 24 hour   Intake 231 ml   Output 1750 ml   Net -1519 ml       Physical Exam:  General:  Sedated and on the ventilator. No acute distress. Eyes:  Sclera anicteric. Pupils equal, round, reactive to light. Mouth/Throat: Orotracheal tube in place. Neck: Supple. Lungs:   Wheezing bilaterally, good effort. Cardiovascular:  Tachycardiac, no murmur.    Abdomen:   Soft, non-tender, bowel sounds normal, non-distended. Extremities: No cyanosis or edema. Skin: No acute rash or lesions. Lymph Nodes: Cervical and supraclavicular normal.   Musculoskeletal:  No swelling or deformity. Lines/Devices:  Intact, no erythema, drainage, or tenderness. Psychiatric: Sedated and appears comfortable on ventilator. LABS AND  DATA: Personally reviewed  Recent Labs     09/27/20 0243 09/26/20  0404   WBC 18.4* 31.5*   HGB 9.1* 10.8*   HCT 29.1* 35.1    339     Recent Labs     09/27/20 0243 09/26/20  0404    137   K 3.6 4.4    105   CO2 24 22   BUN 22* 23*   CREA 1.29* 1.67*   * 423*   CA 8.6 8.6     Recent Labs     09/27/20 0243 09/26/20  0404   AP 69 78   TP 6.6 7.3   ALB 2.4* 2.6*   GLOB 4.2* 4.7*       Ventilator Settings:  Mode Rate Tidal Volume Pressure FiO2 PEEP   Spontaneous   480 ml  8 cm H2O 50 % 5 cm H20     Peak airway pressure: 13 cm H2O    Minute ventilation: 7.43 l/min        MEDS: Reviewed    Chest X-Ray:  CXR Results  (Last 48 hours)               09/26/20 2327  XR CHEST PORT Final result    Impression:  IMPRESSION:       Decreased pulmonary edema more likely than pneumonia. Narrative:  EXAM: XR CHEST PORT       INDICATION: Shortness of breath. COMPARISON: Portable chest on 9/26/2020 and 4/11/2017. TECHNIQUE: Upright portable chest AP view       FINDINGS: Endotracheal tube has been removed. Left jugular central line is   unchanged and in good position. Cardiac monitoring wires overlie the thorax. The   cardiomediastinal and hilar contours are within normal limits. The pulmonary   vasculature is within normal limits. Bilateral pulmonary opacities are decreased since yesterday and primarily   perihilar. No pneumothorax. The visualized bones and upper abdomen are   age-appropriate. 09/26/20 0511  XR CHEST PORT Final result    Impression:  IMPRESSION:    No pneumothorax following left IJ catheter placement. Decreased pulmonary edema. Narrative:  EXAM:  CR chest portable       INDICATION: Pulmonary edema. COMPARISON: 9/25/2020 at 2052 hours. TECHNIQUE: Portable AP semiupright chest view at 0334 hours       FINDINGS: The endotracheal tube terminates above the kaylynn. A left IJ catheter   terminates in profile with the SVC. The enteric tube terminates in the stomach. The cardiomediastinal contours are stable. Diffuse interstitial opacities are   decreased. There is no pleural effusion or pneumothorax. The bones and upper   abdomen are stable. 09/25/20 2105  XR CHEST PORT Final result    Impression:  IMPRESSION: Moderate interstitial pulmonary edema. Narrative:  EXAM: XR CHEST PORT       INDICATION: SOB       COMPARISON: 4/11/2017 radiographs       FINDINGS: A portable AP radiographs of the chest on 2 images were obtained at   2052 and 2053 hours. There is interval demonstration of an endotracheal tube   terminating at the thoracic inlet. There is pulmonary venous congestion with   moderate interstitial pulmonary edema. No consolidation, pneumothorax or pleural   effusion is shown. Cardiac size is within normal limits. Mediastinal and hilar   contours are obscured. The bones and soft tissues are grossly within normal   limits. ECHO:  Bedside TTE: no large pericardial effusion, RV appears normal caliber with normal function. LV with no neli WMAs. Multidisciplinary Rounds Completed:  No    ABCDEF Bundle/Checklist Completed:  Yes    SPECIAL EQUIPMENT  None    DISPOSITION  Stay in ICU    CRITICAL CARE CONSULTANT NOTE  I had a face to face encounter with the patient, reviewed and interpreted patient data including clinical events, labs, images, vital signs, I/O's, and examined patient.   I have discussed the case and the plan and management of the patient's care with the consulting services, the bedside nurses and the respiratory therapist.      NOTE OF PERSONAL INVOLVEMENT IN CARE   This patient has a high probability of imminent, clinically significant deterioration, which requires the highest level of preparedness to intervene urgently. I participated in the decision-making and personally managed or directed the management of the following life and organ supporting interventions that required my frequent assessment to treat or prevent imminent deterioration. I personally spent 30 minutes of critical care time. This is time spent at this critically ill patient's bedside actively involved in patient care as well as the coordination of care and discussions with the patient's family. This does not include any procedural time which has been billed separately.     David Castaneda,   Anesthesiologist/Intensivist     [facility]

## 2020-09-27 NOTE — PROGRESS NOTES
1930 Received verbal bedside report from Susan, [address] [facility]. Assumed care of the pt.     1950 Arterial line removed per Dr. Ena Harmon, intensivist.   2250 Pt complaining of being SOB. States she is not sure if this is a panic attack and it feels different from what she experienced last night. HOB elevated, 2L o2 placed on pt. Dr. Ena Harmon notified. PRN clonazepam given. Orders received for CXR, EKG and sublingual nitro. 0000 Bedside and Verbal shift change report given to Keren Clemons RN (oncoming nurse) by Estevan Craig RN (offgoing nurse). Report included the following information SBAR, Kardex, Procedure Summary, Intake/Output, MAR and Cardiac Rhythm NSR.

## 2020-09-27 NOTE — PROGRESS NOTES
Physical Therapist   Chart reviewed in preparation for therapy evaluation. Arrived to patient supine in bed, RN at bedside. Patient declined therapy activity stating she is tired, didn't get much sleep last night. Therapy will defer evaluation today per patient request.  Will check back tomorrow as able/ appropriate. Thank you.

## 2020-09-28 ENCOUNTER — TRANSCRIBE ORDER (OUTPATIENT)
Dept: CARDIAC REHAB | Age: 45
End: 2020-09-28

## 2020-09-28 DIAGNOSIS — I21.4 ACUTE MYOCARDIAL INFARCTION, SUBENDOCARDIAL INFARCTION, INITIAL EPISODE OF CARE (HCC): Primary | ICD-10-CM

## 2020-09-28 DIAGNOSIS — Z95.5 STENTED CORONARY ARTERY: ICD-10-CM

## 2020-09-28 LAB
ACT BLD: 202 SECS (ref 79–138)
ACT BLD: 224 SECS (ref 79–138)
ACT BLD: 224 SECS (ref 79–138)
ACT BLD: 235 SECS (ref 79–138)
ACT BLD: 246 SECS (ref 79–138)
ACT BLD: 263 SECS (ref 79–138)
ALBUMIN SERPL-MCNC: 2.2 G/DL (ref 3.5–5)
ALBUMIN/GLOB SERPL: 0.5 {RATIO} (ref 1.1–2.2)
ALP SERPL-CCNC: 60 U/L (ref 45–117)
ALT SERPL-CCNC: 33 U/L (ref 12–78)
ANION GAP SERPL CALC-SCNC: 5 MMOL/L (ref 5–15)
AST SERPL-CCNC: 46 U/L (ref 15–37)
BASOPHILS # BLD: 0.1 K/UL (ref 0–0.1)
BASOPHILS NFR BLD: 1 % (ref 0–1)
BILIRUB SERPL-MCNC: 0.3 MG/DL (ref 0.2–1)
BNP SERPL-MCNC: ABNORMAL PG/ML
BUN SERPL-MCNC: 19 MG/DL (ref 6–20)
BUN/CREAT SERPL: 20 (ref 12–20)
CALCIUM SERPL-MCNC: 8.8 MG/DL (ref 8.5–10.1)
CHLORIDE SERPL-SCNC: 109 MMOL/L (ref 97–108)
CO2 SERPL-SCNC: 28 MMOL/L (ref 21–32)
CREAT SERPL-MCNC: 0.97 MG/DL (ref 0.55–1.02)
DIFFERENTIAL METHOD BLD: ABNORMAL
EOSINOPHIL # BLD: 0.4 K/UL (ref 0–0.4)
EOSINOPHIL NFR BLD: 3 % (ref 0–7)
ERYTHROCYTE [DISTWIDTH] IN BLOOD BY AUTOMATED COUNT: 18.8 % (ref 11.5–14.5)
GLOBULIN SER CALC-MCNC: 4.4 G/DL (ref 2–4)
GLUCOSE BLD STRIP.AUTO-MCNC: 192 MG/DL (ref 65–100)
GLUCOSE BLD STRIP.AUTO-MCNC: 258 MG/DL (ref 65–100)
GLUCOSE BLD STRIP.AUTO-MCNC: 292 MG/DL (ref 65–100)
GLUCOSE BLD STRIP.AUTO-MCNC: 333 MG/DL (ref 65–100)
GLUCOSE SERPL-MCNC: 165 MG/DL (ref 65–100)
HCT VFR BLD AUTO: 27.8 % (ref 35–47)
HGB BLD-MCNC: 8.4 G/DL (ref 11.5–16)
IMM GRANULOCYTES # BLD AUTO: 0.1 K/UL (ref 0–0.04)
IMM GRANULOCYTES NFR BLD AUTO: 1 % (ref 0–0.5)
LYMPHOCYTES # BLD: 2.3 K/UL (ref 0.8–3.5)
LYMPHOCYTES NFR BLD: 19 % (ref 12–49)
MAGNESIUM SERPL-MCNC: 2.4 MG/DL (ref 1.6–2.4)
MCH RBC QN AUTO: 26.2 PG (ref 26–34)
MCHC RBC AUTO-ENTMCNC: 30.2 G/DL (ref 30–36.5)
MCV RBC AUTO: 86.6 FL (ref 80–99)
MONOCYTES # BLD: 1 K/UL (ref 0–1)
MONOCYTES NFR BLD: 8 % (ref 5–13)
NEUTS SEG # BLD: 8.3 K/UL (ref 1.8–8)
NEUTS SEG NFR BLD: 68 % (ref 32–75)
NRBC # BLD: 0.03 K/UL (ref 0–0.01)
NRBC BLD-RTO: 0.2 PER 100 WBC
PLATELET # BLD AUTO: 276 K/UL (ref 150–400)
PMV BLD AUTO: 11.8 FL (ref 8.9–12.9)
POTASSIUM SERPL-SCNC: 3.3 MMOL/L (ref 3.5–5.1)
PROCALCITONIN SERPL-MCNC: 6.16 NG/ML
PROT SERPL-MCNC: 6.6 G/DL (ref 6.4–8.2)
RBC # BLD AUTO: 3.21 M/UL (ref 3.8–5.2)
SERVICE CMNT-IMP: ABNORMAL
SODIUM SERPL-SCNC: 142 MMOL/L (ref 136–145)
WBC # BLD AUTO: 12.2 K/UL (ref 3.6–11)

## 2020-09-28 PROCEDURE — 82962 GLUCOSE BLOOD TEST: CPT

## 2020-09-28 PROCEDURE — 74011250637 HC RX REV CODE- 250/637: Performed by: STUDENT IN AN ORGANIZED HEALTH CARE EDUCATION/TRAINING PROGRAM

## 2020-09-28 PROCEDURE — 74011250636 HC RX REV CODE- 250/636: Performed by: INTERNAL MEDICINE

## 2020-09-28 PROCEDURE — 74011000258 HC RX REV CODE- 258: Performed by: FAMILY MEDICINE

## 2020-09-28 PROCEDURE — 74011250637 HC RX REV CODE- 250/637: Performed by: NURSE PRACTITIONER

## 2020-09-28 PROCEDURE — 99233 SBSQ HOSP IP/OBS HIGH 50: CPT | Performed by: CLINICAL NURSE SPECIALIST

## 2020-09-28 PROCEDURE — 83880 ASSAY OF NATRIURETIC PEPTIDE: CPT

## 2020-09-28 PROCEDURE — 74011250636 HC RX REV CODE- 250/636: Performed by: FAMILY MEDICINE

## 2020-09-28 PROCEDURE — 99233 SBSQ HOSP IP/OBS HIGH 50: CPT | Performed by: INTERNAL MEDICINE

## 2020-09-28 PROCEDURE — 74011250637 HC RX REV CODE- 250/637: Performed by: INTERNAL MEDICINE

## 2020-09-28 PROCEDURE — 83735 ASSAY OF MAGNESIUM: CPT

## 2020-09-28 PROCEDURE — 74011636637 HC RX REV CODE- 636/637: Performed by: STUDENT IN AN ORGANIZED HEALTH CARE EDUCATION/TRAINING PROGRAM

## 2020-09-28 PROCEDURE — 84145 PROCALCITONIN (PCT): CPT

## 2020-09-28 PROCEDURE — 80053 COMPREHEN METABOLIC PANEL: CPT

## 2020-09-28 PROCEDURE — 36415 COLL VENOUS BLD VENIPUNCTURE: CPT

## 2020-09-28 PROCEDURE — 74011250637 HC RX REV CODE- 250/637: Performed by: FAMILY MEDICINE

## 2020-09-28 PROCEDURE — 74011250636 HC RX REV CODE- 250/636: Performed by: ANESTHESIOLOGY

## 2020-09-28 PROCEDURE — 74011250637 HC RX REV CODE- 250/637: Performed by: SPECIALIST

## 2020-09-28 PROCEDURE — 65620000000 HC RM CCU GENERAL

## 2020-09-28 PROCEDURE — 85025 COMPLETE CBC W/AUTO DIFF WBC: CPT

## 2020-09-28 PROCEDURE — 97161 PT EVAL LOW COMPLEX 20 MIN: CPT

## 2020-09-28 PROCEDURE — 99291 CRITICAL CARE FIRST HOUR: CPT | Performed by: INTERNAL MEDICINE

## 2020-09-28 PROCEDURE — 97530 THERAPEUTIC ACTIVITIES: CPT

## 2020-09-28 RX ORDER — POTASSIUM CHLORIDE 750 MG/1
20 TABLET, FILM COATED, EXTENDED RELEASE ORAL
Status: COMPLETED | OUTPATIENT
Start: 2020-09-28 | End: 2020-09-28

## 2020-09-28 RX ORDER — SODIUM CHLORIDE 9 MG/ML
125 INJECTION, SOLUTION INTRAVENOUS CONTINUOUS
Status: DISPENSED | OUTPATIENT
Start: 2020-09-28 | End: 2020-09-28

## 2020-09-28 RX ADMIN — PANCRELIPASE 1 CAPSULE: 24000; 76000; 120000 CAPSULE, DELAYED RELEASE PELLETS ORAL at 11:14

## 2020-09-28 RX ADMIN — INSULIN LISPRO 3 UNITS: 100 INJECTION, SOLUTION INTRAVENOUS; SUBCUTANEOUS at 08:14

## 2020-09-28 RX ADMIN — CARVEDILOL 3.12 MG: 3.12 TABLET, FILM COATED ORAL at 08:20

## 2020-09-28 RX ADMIN — INSULIN GLARGINE 60 UNITS: 100 INJECTION, SOLUTION SUBCUTANEOUS at 08:14

## 2020-09-28 RX ADMIN — INSULIN LISPRO 10 UNITS: 100 INJECTION, SOLUTION INTRAVENOUS; SUBCUTANEOUS at 11:19

## 2020-09-28 RX ADMIN — HYDROMORPHONE HYDROCHLORIDE 2 MG: 1 INJECTION, SOLUTION INTRAMUSCULAR; INTRAVENOUS; SUBCUTANEOUS at 16:08

## 2020-09-28 RX ADMIN — POTASSIUM CHLORIDE 20 MEQ: 750 TABLET, FILM COATED, EXTENDED RELEASE ORAL at 10:11

## 2020-09-28 RX ADMIN — CEFEPIME HYDROCHLORIDE 2 G: 2 INJECTION, POWDER, FOR SOLUTION INTRAVENOUS at 04:28

## 2020-09-28 RX ADMIN — PREGABALIN 150 MG: 75 CAPSULE ORAL at 08:19

## 2020-09-28 RX ADMIN — METRONIDAZOLE 500 MG: 500 INJECTION, SOLUTION INTRAVENOUS at 23:30

## 2020-09-28 RX ADMIN — Medication 10 ML: at 22:07

## 2020-09-28 RX ADMIN — PANCRELIPASE 1 CAPSULE: 24000; 76000; 120000 CAPSULE, DELAYED RELEASE PELLETS ORAL at 08:21

## 2020-09-28 RX ADMIN — PANTOPRAZOLE SODIUM 40 MG: 40 TABLET, DELAYED RELEASE ORAL at 06:47

## 2020-09-28 RX ADMIN — CLONAZEPAM 1 MG: 1 TABLET ORAL at 22:07

## 2020-09-28 RX ADMIN — CEFEPIME HYDROCHLORIDE 2 G: 2 INJECTION, POWDER, FOR SOLUTION INTRAVENOUS at 22:07

## 2020-09-28 RX ADMIN — METRONIDAZOLE 500 MG: 500 INJECTION, SOLUTION INTRAVENOUS at 11:09

## 2020-09-28 RX ADMIN — INSULIN LISPRO 7 UNITS: 100 INJECTION, SOLUTION INTRAVENOUS; SUBCUTANEOUS at 16:08

## 2020-09-28 RX ADMIN — TRAZODONE HYDROCHLORIDE 50 MG: 50 TABLET ORAL at 22:06

## 2020-09-28 RX ADMIN — ASPIRIN 81 MG CHEWABLE TABLET 81 MG: 81 TABLET CHEWABLE at 08:20

## 2020-09-28 RX ADMIN — VANCOMYCIN HYDROCHLORIDE 1250 MG: 10 INJECTION, POWDER, LYOPHILIZED, FOR SOLUTION INTRAVENOUS at 08:11

## 2020-09-28 RX ADMIN — BUSPIRONE HYDROCHLORIDE 15 MG: 10 TABLET ORAL at 22:06

## 2020-09-28 RX ADMIN — ATORVASTATIN CALCIUM 80 MG: 40 TABLET, FILM COATED ORAL at 08:20

## 2020-09-28 RX ADMIN — FENOFIBRATE 145 MG: 145 TABLET ORAL at 08:19

## 2020-09-28 RX ADMIN — LEVOTHYROXINE SODIUM 200 MCG: 0.1 TABLET ORAL at 06:47

## 2020-09-28 RX ADMIN — HYDROMORPHONE HYDROCHLORIDE 2 MG: 1 INJECTION, SOLUTION INTRAMUSCULAR; INTRAVENOUS; SUBCUTANEOUS at 08:15

## 2020-09-28 RX ADMIN — PREGABALIN 150 MG: 75 CAPSULE ORAL at 18:01

## 2020-09-28 RX ADMIN — SODIUM CHLORIDE 125 ML/HR: 9 INJECTION, SOLUTION INTRAVENOUS at 09:20

## 2020-09-28 RX ADMIN — CEFEPIME HYDROCHLORIDE 2 G: 2 INJECTION, POWDER, FOR SOLUTION INTRAVENOUS at 12:03

## 2020-09-28 RX ADMIN — PANCRELIPASE 1 CAPSULE: 24000; 76000; 120000 CAPSULE, DELAYED RELEASE PELLETS ORAL at 16:05

## 2020-09-28 RX ADMIN — Medication 10 ML: at 12:07

## 2020-09-28 RX ADMIN — TICAGRELOR 90 MG: 90 TABLET ORAL at 18:01

## 2020-09-28 RX ADMIN — HYDROMORPHONE HYDROCHLORIDE 2 MG: 1 INJECTION, SOLUTION INTRAMUSCULAR; INTRAVENOUS; SUBCUTANEOUS at 20:43

## 2020-09-28 RX ADMIN — ACETAMINOPHEN 650 MG: 325 TABLET ORAL at 19:13

## 2020-09-28 RX ADMIN — INSULIN LISPRO 4 UNITS: 100 INJECTION, SOLUTION INTRAVENOUS; SUBCUTANEOUS at 22:14

## 2020-09-28 RX ADMIN — TICAGRELOR 90 MG: 90 TABLET ORAL at 06:47

## 2020-09-28 RX ADMIN — METHADONE HYDROCHLORIDE 160 MG: 10 TABLET ORAL at 11:09

## 2020-09-28 NOTE — ROUTINE PROCESS
0730 Bedside and Verbal shift change report given to Cat RN (oncoming nurse) by Vasile Harrell RN (offgoing nurse). Report included the following information SBAR.     0815 PRN dilaudid given    Jennifer Mendoza MD at bedside. Ordered to stop frank gtt and to add normal saline at 125 for 1L. 0930 Jude NP at bedside. Orders received. 0945 Wound care at bedside. 1012 Frank restarted. Jude NP aware    1400 Pt up to chair with PT    1600 PRN dilaudid given    1930 Bedside and Verbal shift change report given to Cindy (oncoming nurse) by Cat RN (offgoing nurse). Report included the following information SBAR.

## 2020-09-28 NOTE — PROGRESS NOTES
Physical Therapy  9/28/2020    Chart reviewed. Patient cleared by RN for PT evaluation. Note she refused x2 over the weekend. Today, she refused OOB or any activity with PT and stated \"I've been moving. \" Explained her new NWB status and how that changes all mobility, therefore encouraging her to work with PT. Reported she was fatigued from \"so many people coming in,\" and refused at this time. Set a time to return at [facility] today for mobility so patient is prepared and agreeable to work with PT. F/u later. Thank you.     Sandie Shahid, PT, DPT

## 2020-09-28 NOTE — PROGRESS NOTES
Problem: Mobility Impaired (Adult and Pediatric)  Goal: *Acute Goals and Plan of Care (Insert Text)  Description: FUNCTIONAL STATUS PRIOR TO ADMISSION: Patient was modified independent using a rolling walker for functional mobility. Had recently stopped using RW within her home because it was inconvenient essentially. Was using RW due to recent R great toe amputation. HOME SUPPORT PRIOR TO ADMISSION: The patient lived with her fiance and teenage son. Physical Therapy Goals  Initiated 9/28/2020  1. Patient will move from supine to sit and sit to supine , scoot up and down, and roll side to side in bed with independence within 7 day(s). 2.  Patient will transfer from bed to chair and chair to bed with modified independence using the least restrictive device and while compliant with NWB to R LEwithin 7 day(s). 3.  Patient will perform sit to stand with modified independence while compliant with NWB to R LE within 7 day(s). 4.  Patient will ambulate with supervision/set-up for 60 feet with the least restrictive device while compliant with NWB to R LE within 7 day(s). 5.  Patient will ascend/descend 15 stairs with left handrail(s) with minimal assistance/contact guard assist while compliant with NWB R LE within 7 day(s). Outcome: Progressing Towards Goal   PHYSICAL THERAPY EVALUATION  Patient: Dedrick Daily (53 y.o. female)  Date: 9/28/2020  Primary Diagnosis: Osteomyelitis of foot ([facility] [address] [M86.9]  Procedure(s) (LRB):  LEFT HEART CATH (N/A)  CORONARY ANGIOGRAPHY (N/A)  Percutaneous Coronary Intervention (N/A)  Insert Stent Ranjit Coronary (N/A)  Intravascular Ultrasound (N/A) 2 Days Post-Op   Precautions:   Fall, NWB(R LE)    ASSESSMENT  Based on the objective data described below, the patient presents with NWB status on R LE, generalized weakness, hypotension (although asymptomatic), decreased endurance (recent large MI), impaired balance, and impulsivity with mobility.  Patient able to mobilize with CGA-min A with max verbal cues for compliance with NWB (initially putting R heel down). Educated on importance of complete compliance with NWB (entire foot!) as directed by MD. Resting BP 80's/50's and patient asymptomatic. Following transfer to chair, BP 86/40 and again asymptomatic. Left with LEs elevated in recliner chair and RN aware. Current Level of Function Impacting Discharge (mobility/balance): mod I-min A    Functional Outcome Measure: The patient scored 6/28 on the Tinetti outcome measure which is indicative of high fall risk. Other factors to consider for discharge: NWB R, recent R foot surgery and questionable compliance with WB status following? Patient will benefit from skilled therapy intervention to address the above noted impairments. PLAN :  Recommendations and Planned Interventions: bed mobility training, transfer training, gait training, therapeutic exercises, neuromuscular re-education, orthotic/prosthetic training, edema management/control, patient and family training/education, and therapeutic activities      Frequency/Duration: Patient will be followed by physical therapy:  5 times a week to address goals. Recommendation for discharge: (in order for the patient to meet his/her long term goals)  To be determined: pending acute PT progress, hopeful for home with HHPT pending ability to mobilize household distances and climb stairs safely    This discharge recommendation:  Has been made in collaboration with the attending provider and/or case management    IF patient discharges home will need the following DME: to be determined (TBD)         SUBJECTIVE:   Patient stated I wasn't using the walker much. It was just hard to maneuver in my house.     OBJECTIVE DATA SUMMARY:   HISTORY:    Past Medical History:   Diagnosis Date    Arthritis     Diabetes ([facility] [address]     Endocrine disease     hypothyroid    Gastrointestinal disorder     pancreatitis    Hypertension      Past Surgical History:   Procedure Laterality Date    HX GI      colonoscpy       Personal factors and/or comorbidities impacting plan of care: PMH    Home Situation  Home Environment: Private residence  # Steps to Enter: 3  Rails to Enter: Yes  Hand Rails : Left  One/Two Story Residence: Two story  # of Interior Steps: 13  Interior Rails: Left  Lift Chair Available: No  Living Alone: No  Support Systems: Child(sunita), Spouse/Significant Other/Partner  Patient Expects to be Discharged to[de-identified] Private residence  Current DME Used/Available at Home: Walker, rolling, Commode, bedside(knee scooter)    EXAMINATION/PRESENTATION/DECISION MAKING:   Critical Behavior:  Neurologic State: Alert  Orientation Level: Oriented X4  Cognition: Appropriate decision making, Appropriate for age attention/concentration, Appropriate safety awareness, Follows commands, No command following  Hearing: Auditory  Auditory Impairment: None  Range Of Motion:  AROM: Within functional limits  Strength:    Strength: Within functional limits  Tone & Sensation:   Tone: Normal  Sensation: Impaired(hx neuropathy)  Coordination:  Coordination: Within functional limits  Functional Mobility:  Bed Mobility:  Supine to Sit: Modified independent  Scooting: Modified independent  Transfers:  Sit to Stand: Contact guard assistance  Stand to Sit: Contact guard assistance  Stand Pivot Transfers: Minimum assistance     Balance:   Sitting: Intact  Standing: Impaired; With support  Standing - Static: Fair  Standing - Dynamic : Fair  Ambulation/Gait Training:  Distance (ft): 3 Feet (ft)  Assistive Device: Gait belt;Walker, rolling  Ambulation - Level of Assistance: Minimal assistance  Gait Abnormalities: Decreased step clearance  Right Side Weight Bearing: Non-weight bearing  Base of Support: Shift to left      Functional Measure:  Tinetti test:    Sitting Balance: 1  Arises: 1  Attempts to Rise: 2  Immediate Standing Balance: 1  Standing Balance: 1  Nudged: 0  Eyes Closed: 0  Turn 360 Degrees - Continuous/Discontinuous: 0  Turn 360 Degrees - Steady/Unsteady: 0  Sitting Down: 0  Balance Score: 6 Balance total score  Indication of Gait: 0  R Step Length/Height: 0  L Step Length/Height: 0  R Foot Clearance: 0  L Foot Clearance: 0  Step Symmetry: 0  Step Continuity: 0  Path: 0  Trunk: 0  Walking Time: 0  Gait Score: 0 Gait total score  Total Score: 6/28 Overall total score         Tinetti Tool Score Risk of Falls  <19 = High Fall Risk  19-24 = Moderate Fall Risk  25-28 = Low Fall Risk  Tinetti ME. Performance-Oriented Assessment of Mobility Problems in Elderly Patients. [facility] 66; [id number]. (Scoring Description: PT Bulletin Feb. 10, 1993)    Older adults: Poly Ramírez et al, 2009; n = [address] [employer] elderly evaluated with ABC, JACKIE, ADL, and IADL)  · Mean JACKIE score for males aged 69-68 years = 26.21(3.40)  · Mean JACKIE score for females age 69-68 years = 25.16(4.30)  · Mean JACKIE score for males over 80 years = 23.29(6.02)  · Mean JACKIE score for females over 80 years = 17.20(8.32)            Physical Therapy Evaluation Charge Determination   History Examination Presentation Decision-Making   HIGH Complexity :3+ comorbidities / personal factors will impact the outcome/ POC  HIGH Complexity : 4+ Standardized tests and measures addressing body structure, function, activity limitation and / or participation in recreation  LOW Complexity : Stable, uncomplicated  Other outcome measures Tinetti 6/28  HIGH       Based on the above components, the patient evaluation is determined to be of the following complexity level: LOW     Pain Rating:  Denied pain    Activity Tolerance:   Fair and BP low and patient asymptomatic  Please refer to the flowsheet for vital signs taken during this treatment.     After treatment patient left in no apparent distress:   Sitting in chair, Heels elevated for pressure relief, and Call bell within reach    COMMUNICATION/EDUCATION:   The patients plan of care was discussed with: Sarath nurse.     Fall prevention education was provided and the patient/caregiver indicated understanding., Patient/family have participated as able in goal setting and plan of care. , and Patient/family agree to work toward stated goals and plan of care.     Thank you for this referral.  Davis Martinez, PT, DPT   Time Calculation: 18 mins

## 2020-09-28 NOTE — PROGRESS NOTES
1930 Received verbal bedside report from Radha Wagoner, [place]. Assumed care of the pt. Shift summary: Pt had an uneventful night. Was able to decrease Frank but pt still remains on a low dose at this time. 0730 Bedside and Verbal shift change report given to Arianna RN (oncoming nurse) by Naty Tatum RN (offgoing nurse). Report included the following information SBAR, Kardex, ED Summary, Procedure Summary, Intake/Output, MAR, Recent Results and Cardiac Rhythm NSR.

## 2020-09-28 NOTE — PROGRESS NOTES
[facility]  [address] [facility], [address] [place] [address]  [phone]    Foot and Ankle Surgery - Progress Note                                                   Assessment/Plan:  Osteomyelitis right foot  Cellulitis right foot  Ulcer with necrotic bone right foot  Smoker  Chronic methadone user   PVD  DM/neuropathy     Pt is S/P incision and drainage with removal of all nonviable soft tissue right foot, bone debridement right foot, open bone biopsies right foot DOS: 09-     Pt seen and evaluated extubated and on tristin in CCU. Severe cardiac pathology, Currently awaiting echo     Labs/imaging reviewed  Regarding right foot awaiting surgical pathology for clean margins, will discuss case with cardiology and determine game plan for delayed primary closure      Elevate and offload right lower extremity, nonweightbearing at this time,      Foot and ankle to follow      SUBJECTIVE:   Pt seen at bedside resting in NAD,  at bedside, no new pedal complaints, pain currently controlled    Objective:  Vitals:   Patient Vitals for the past 12 hrs:   BP Temp Pulse Resp SpO2 Weight   09/28/20 0600 (!) 93/58 -- 76 16 99 % --   09/28/20 0500 104/69 -- 80 15 97 % --   09/28/20 0400 109/72 98.4 °F (36.9 °C) 75 16 98 % 79.5 kg (175 lb 4.3 oz)   09/28/20 0300 99/65 -- 76 21 -- --   09/28/20 0200 (!) 93/56 -- 79 19 -- --   09/28/20 0100 (!) 94/58 -- 71 20 98 % --   09/28/20 0000 92/60 97.8 °F (36.6 °C) 77 15 97 % --   09/27/20 2300 112/75 -- 72 15 99 % --   09/27/20 2200 116/71 -- 69 13 97 % --   09/27/20 2100 116/76 -- 73 19 98 % --   09/27/20 2000 114/74 98 °F (36.7 °C) 68 15 96 % --       Dermatological:  Dressing to the right foot clean dry and intact, remaining toes of normal color and texture     Vascular:   DP/PT pulses decreased +1/4 Bilateral lower extremity. CFT<5 seconds to all digits.  Pedal hair growth absent     Neurological:   Epicritic and protective threshold is deminished to B/L LE, Pt is unable to detect a 5.07 monofilament wire on 5/5 random tested spots B/L LE.      MSK:  Deferred      Imaging:  Xray 3 views right foot postop  IMPRESSION  IMPRESSION: Postoperative image of first ray amputation.     CT chest/abdomen/pelvis  IMPRESSION  IMPRESSION:   1. Diffuse interlobular septal thickening and patchy groundglass attenuation in  the lungs may represent pulmonary edema or atypical/viral infection.     2. Bilateral dependent lung airspace opacities may represent atelectasis,  infection, or aspiration.     3. No acute abnormality in the abdomen or pelvis.     Labs:  Recent Labs     09/28/20  0439   WBC 12.2*   CREA 0.97   BUN 19   HGB 8.4*   HCT 27.8*      K 3.3*   *   CO2 28   *         Frederick Cabrera  9/28/2020  [facility] [address]  [address] [facility]

## 2020-09-28 NOTE — PROGRESS NOTES
[facility]    ICU TEAM History and Physical    Name: Zach Lopez   : 1975   MRN: [id number]   Date: 2020      Assessment:     ICU Problems:  - Hypoxic Respiratory Failure (secondary to pulmonary edema; resolved)  - S/P cardiac arrest  - CAD - S/p JAELYN to Circ & LAD  - Right foot osteomyelitis  - Opiate dependence  - Diabetes Mellitus    ICU Comprehensive Plan of Care:     Plans for this Shift:     1. CAD  a. S/p JAELYN to Circ & LAD on 2020  b. DAPT/Statin/Coreg  c. Start back IVF  d. Cardiology following - Appreciate Rosendo/Vee/Kiran input  2. Osteomyelitis  a. Podiatry following  b. F/u wound cultures  c. Vanc, Flagyl  3. Diabetes Mellitus  a. Lantus 40u daily  b. Humalog SSI q6h  c. Consult Diabetes Management  4. SBP Goal of: > 90 mmHg, MAP Goal of: > 65 mmHg  5. Phenylephrine - For above SBP/MAP goals  a. Hold home antihypertensives  6. Methadone 160 mg PO daily (takes chronically at home)  7. Transfusion Trigger (Hgb): <7 g/dL  8. Pulmonary toilet: Duo-Nebs   9. SpO2 Goal: > 92%  10. Keep K>4; Mg>2   11. PT/OT: PT consulted and on board and OT consulted and on board   12. Discussed Plan of Care/Code Status: Full Code  13. Appreciate Consultants Input  14. Discussed Care Plan with Bedside RN  15. Documentation of Current Medications  16.  Rest of Plan Below:    F - Feeding:  No   A - Analgesia: Fentanyl  S - Sedation: Propofol and Fentanyl  T - DVT Prophylaxis: SCD's or Sequential Compression Device and Heparin   H - Head of Bed: > 30 Degrees  U - Ulcer Prophylaxis: Protonix (pantoprazole)   G - Glycemic Control: Insulin  S - Spontaneous Breathing Trial: Yes  B - Bowel Regimen: MiraLax and Docusate (Colace)  I - Indwelling Catheter:   Tubes: ETT and Orogastric Tube  Lines: Peripheral IV  Drains: Painter Catheter  D - De-escalation of Antibiotics: vancomycin, flagyl    Subjective:   Progress Note: 2020      Reason for ICU Admission: hypoxic respiratory failure, Right foot osteomyelitis, cellulitis    HPI: 49 y/o F with Diabetic neuropathy c/b Right foot osteomyelitis following several week course and numerous I+Ds. She underwent debridement today in the OR - course was uneverntful. Patient noted to be alert and at her baseline prior to leaving the pacu. On arrival to the floor a rapid response was called and this was followed shortly by a code blue to her room. Reportedly had respiratory distress and refused to comply with wearing NRB. She became progressively more bradycardic and progressed to PEA at which point ACLS was initiated. She had several rounds of compressions, one dose of epinephrine, and one dose of narcan. ROSC was achieved immediately following narcan administration at which time vertical nystagmus was observed and her breathing pattern was irregular and shallow. She was intubated uneventfully at the bedside and transferred to the ICU. In terms of underlying cause for her arrest, there is a possibility of this being opiate overdose, but the timing of administration per [facility] is not consistent with this. It may be possible that opiates were supplied by a third party - will discuss this patient's partner when able. In the interim, we will send a UDS to investigate other medication causes for AMS. Patient has history of home methadone use, she was noted to be bradypnec on induction of anesthesia in the OR prior to her case this afternoon. Interestingly she did not receive any opiates for analgesia following her operation. She was given a dose of 30mg methadone earlier today (far lower than the recorded dose of 160mg every day in medications tab). Following narcan administration and intubation, patient with diaphoresis, tachycardia, HTN, and nystagmus with marked mydriasis -- possibility of narcan induced opiate withdrawal. Currently on fentanyl gtt. She has LBBB of unclear chronicity. Will follow up cardiac biomarkers and involve cardiology as able.  ECHO in am. DVT duplex yesterday lowers my suspicion for PE. No large PTX on bedside CXR. Given GFR, Radiologist and I elected to not use IV contrast for her scans. POD:  Day of Surgery    S/P:   Procedure(s):  DEBRIDEMENT OF BONE, RIGHT FOOT/ OPEN BONE BIOPSY RIGHT FOOT    Active Problem List:     Problem List  Never Reviewed          Codes Class    Osteomyelitis of foot (HCC) ICD-10-CM: M86.9  ICD-9-CM: 730.27               Past Medical History:      has a past medical history of Arthritis, Diabetes ([facility] [address] Endocrine disease, Gastrointestinal disorder, and Hypertension. Past Surgical History:      has a past surgical history that includes hx gi. Home Medications:     Prior to Admission medications    Medication Sig Start Date End Date Taking? Authorizing Provider   insulin U-500 CONCENTRATED regular (HumuLIN R U-500, Conc, Kwikpen) 500 unit/mL (3 mL) inpn subQ pen 80 Units by SubCUTAneous route three (3) times daily (with meals). Yes Provider, Historical   lisinopril-hydroCHLOROthiazide (PRINZIDE, ZESTORETIC) 10-12.5 mg per tablet Take 1 Tab by mouth daily. Yes Provider, Historical   sertraline (ZOLOFT) 50 mg tablet Take 50 mg by mouth two (2) times a day. Yes Provider, Historical   methadone (DOLOPHINE) 10 mg tablet Take 160 mg by mouth daily. Yes Provider, Historical   clonazePAM (KlonoPIN) 1 mg tablet Take 1 mg by mouth three (3) times daily. Yes Provider, Historical   pregabalin (Lyrica) 200 mg capsule Take 200 mg by mouth four (4) times daily. Yes Other, MD Romel   tiZANidine (ZANAFLEX) 4 mg capsule Take 4 mg by mouth two (2) times daily as needed (muscle spasms). Yes Other, MD Romel   amylase-lipase-protease (CREON) 24,000-76,000 -120,000 unit capsule Take 1 Cap by mouth three (3) times daily (with meals). Yes Other, MD Romel   omeprazole (PRILOSEC) 40 mg capsule Take 40 mg by mouth daily.    Yes Other, MD Romel   albuterol (PROVENTIL HFA, VENTOLIN HFA, PROAIR HFA) 90 mcg/actuation inhaler Take 2 Puffs by inhalation every four (4) hours as needed for Wheezing. 17  Yes Ashutosh Lawrence MD   fenofibrate (LOFIBRA) 160 mg tablet Take 160 mg by mouth daily. Indications: HYPERCHOLESTEROLEMIA   Yes Romel Howard MD   atorvastatin (Lipitor) 40 mg tablet Take 40 mg by mouth nightly. Yes Romel Howard MD   metFORMIN (GLUCOPHAGE) 500 mg tablet Take 1,000 mg by mouth two (2) times daily (with meals). Yes Anita, MD Romel   GLIPIZIDE PO Take 10 mg by mouth daily (with breakfast). Yes Other, MD Romel   LEVOTHYROXINE SODIUM (SYNTHROID PO) Take 200 mcg by mouth Daily (before breakfast). Yes Anita, MD Romel       Allergies/Social/Family History:     No Known Allergies   Social History     Tobacco Use    Smoking status: Current Some Day Smoker     Packs/day: 0.25    Smokeless tobacco: Never Used   Substance Use Topics    Alcohol use: Yes     Comment: quit about a month ago - drank only on the weekends      Family History   Problem Relation Age of Onset    Cancer Mother         colon       Review of Systems:     Review of systems not obtained due to patient factors. Objective:   Vital Signs:  Visit Vitals  /69   Pulse 75   Temp 98 °F (36.7 °C)   Resp 17   Ht 5' 7\" (1.702 m)   Wt 79.5 kg (175 lb 4.3 oz)   SpO2 93%   BMI 27.45 kg/m²    O2 Flow Rate (L/min): 2 l/min O2 Device: Nasal cannula Temp (24hrs), Av.2 °F (36.8 °C), Min:97.8 °F (36.6 °C), Max:98.4 °F (36.9 °C)           Intake/Output:     Intake/Output Summary (Last 24 hours) at 2020 0954  Last data filed at 2020 0900  Gross per 24 hour   Intake 2227.54 ml   Output 3850 ml   Net -1622.46 ml       Physical Exam:  General:  Sedated and on the ventilator. No acute distress. Eyes:  Sclera anicteric. Pupils equal, round, reactive to light. Mouth/Throat: Orotracheal tube in place. Neck: Supple. Lungs:   Wheezing bilaterally, good effort. Cardiovascular:  Tachycardiac, no murmur.    Abdomen:   Soft, non-tender, bowel sounds normal, non-distended. Extremities: No cyanosis or edema. Skin: No acute rash or lesions. Lymph Nodes: Cervical and supraclavicular normal.   Musculoskeletal:  No swelling or deformity. Lines/Devices:  Intact, no erythema, drainage, or tenderness. Psychiatric: Sedated and appears comfortable on ventilator. LABS AND  DATA: Personally reviewed  Recent Labs     09/28/20 0439 09/27/20  0243   WBC 12.2* 18.4*   HGB 8.4* 9.1*   HCT 27.8* 29.1*    220     Recent Labs     09/28/20 0439 09/27/20  0243    139   K 3.3* 3.6   * 107   CO2 28 24   BUN 19 22*   CREA 0.97 1.29*   * 285*   CA 8.8 8.6     Recent Labs     09/28/20 0439 09/27/20  0243   AP 60 69   TP 6.6 6.6   ALB 2.2* 2.4*   GLOB 4.4* 4.2*       Ventilator Settings:  Mode Rate Tidal Volume Pressure FiO2 PEEP   Spontaneous   480 ml  8 cm H2O 50 % 5 cm H20     Peak airway pressure: 13 cm H2O    Minute ventilation: 7.43 l/min        MEDS: Reviewed    Chest X-Ray:  CXR Results  (Last 48 hours)               09/26/20 2327  XR CHEST PORT Final result    Impression:  IMPRESSION:       Decreased pulmonary edema more likely than pneumonia. Narrative:  EXAM: XR CHEST PORT       INDICATION: Shortness of breath. COMPARISON: Portable chest on 9/26/2020 and 4/11/2017. TECHNIQUE: Upright portable chest AP view       FINDINGS: Endotracheal tube has been removed. Left jugular central line is   unchanged and in good position. Cardiac monitoring wires overlie the thorax. The   cardiomediastinal and hilar contours are within normal limits. The pulmonary   vasculature is within normal limits. Bilateral pulmonary opacities are decreased since yesterday and primarily   perihilar. No pneumothorax. The visualized bones and upper abdomen are   age-appropriate. ECHO:  Bedside TTE: no large pericardial effusion, RV appears normal caliber with normal function. LV with no neli WMAs.      Multidisciplinary Rounds Completed:  No    ABCDEF Bundle/Checklist Completed:  Yes    SPECIAL EQUIPMENT  None    DISPOSITION  Stay in ICU    CRITICAL CARE CONSULTANT NOTE  I had a face to face encounter with the patient, reviewed and interpreted patient data including clinical events, labs, images, vital signs, I/O's, and examined patient. I have discussed the case and the plan and management of the patient's care with the consulting services, the bedside nurses and the respiratory therapist.      NOTE OF PERSONAL INVOLVEMENT IN CARE   This patient has a high probability of imminent, clinically significant deterioration, which requires the highest level of preparedness to intervene urgently. I participated in the decision-making and personally managed or directed the management of the following life and organ supporting interventions that required my frequent assessment to treat or prevent imminent deterioration. I personally spent 35 minutes of critical care time. This is time spent at this critically ill patient's bedside actively involved in patient care as well as the coordination of care and discussions with the patient's family. This does not include any procedural time which has been billed separately.     Kobi Avendaño,   Anesthesiologist/Intensivist     [facility]

## 2020-09-28 NOTE — CONSULTS
FANNY GUERRERO  CLINICAL NURSE SPECIALIST CONSULT  [facility]ES [facility]    INITIAL NOTE    Presentation   Romeo Dietz is a 50 y.o. female who presented for evaluation of right foot wound. She developed a wound to her right great toe about 6 months ago. She was seen and managed at [facility] [place] and now s/o toe amputation. She had poor wound heeling for which she followed up with podiatry who diagnosed her with osteomyelitis and was sent to [facility] for management. HX: Arthritis, Diabetes, Hypothyroid, Pancreatitis     DX: Osteomyelitis of the right foot with cellulitis    TX: PCI, IV antibiotics with wound debridement     Current clinical course has been complicated by acute hypoxic respiratory failure s/o pulmonary edema and cardiac arrest; CAD. Diabetes: Patient has a 16 year history of known Type 2 diabetes, treated with metformin, glipizide and humalog PTA. Admitting A1C 9.8% (up from 7% per patient report 6 mos ago)     Consulted by Provider for advanced diabetes nursing assessment and care, specifically related to   [x] Inpatient management strategy      Diabetes-related medical history  Acute complications: Hyperglycemia without acidosis   Neurological complications  Peripheral neuropathy  Microvascular disease: None  Macrovascular disease  CAD and Foot wounds  Other associated conditions: Hypothyroidism     Diabetes medication history  Drug class Currently in use Discontinued Never used   Biguanide Metformin 100mg BID     DDP-4 inhibitor       Sulfonylurea Glipizide  Once daily      Thiazolidinedione      GLP-1 RA      SGLT-2 inhibitors      Basal insulin      Fixed Dose  Combinations      Bolus insulin Humalog  80 units with meals       Subjective   My A1C was about 7% 6 months ago.       Patient does not work  Lives at home with her fiance    Patient reports the following home diabetes self-care practices:  Eating pattern  [x] Breakfast Skip  [x] Lunch  3-4 days a week may have a ham sandwich with chips  [x] Dinner  Vegetables, salad, pasta  [x] Bedtime None  [x] Snacks None  [x] Beverages Ice chips  Physical activity pattern  [x] Aerobic exercise Limited  Monitoring pattern  1-2 times a day: 100-400  Taking medications pattern  [x] In-consistent administration: may get metformin and glipizide 1/2 days each week, reports compliance with mealtime insulin  [x] Affordable    Social determinants of health impacting diabetes self-management practices   Struggling with anxiety and/or depression and Concerned that you need to know more about how to stay healthy with diabetes    Objective   Physical exam  General Alert, oriented and in acute distress/ill-appearing. Conversant and cooperative. Vital Signs   Visit Vitals  /69   Pulse 75   Temp 98 °F (36.7 °C)   Resp 17   Ht 5' 7\" (1.702 m)   Wt 79.5 kg (175 lb 4.3 oz)   SpO2 93%   BMI 27.45 kg/m²     Skin  Warm and dry. No acanthosis noted along neckline. No lipohypertrophy or lipoatrophy noted at injection sites   Heart   Regular rate and rhythm. No murmurs, rubs or gallops  Lungs  Clear to auscultation without rales or rhonchi  Extremities No foot wounds    Diabetic foot exam:    Left Foot     Visual Exam: callous - quarter size old blister under midfoot.   Healed callous under ball of foot   Pulse DP: 2+ (normal)   Filament test: 0/6   Vibratory sensation: diminished  Right Foot   Deferred due to surgical dressing    Laboratory  Lab Results   Component Value Date/Time    Hemoglobin A1c 9.8 (H) 09/24/2020 11:28 AM     No results found for: LDL, LDLC, DLDLP  Lab Results   Component Value Date/Time    Creatinine 0.97 09/28/2020 04:39 AM     Lab Results   Component Value Date/Time    Sodium 142 09/28/2020 04:39 AM    Potassium 3.3 (L) 09/28/2020 04:39 AM    Chloride 109 (H) 09/28/2020 04:39 AM    CO2 28 09/28/2020 04:39 AM    Anion gap 5 09/28/2020 04:39 AM    Glucose 165 (H) 09/28/2020 04:39 AM    BUN 19 09/28/2020 04:39 AM    Creatinine 0.97 09/28/2020 04:39 AM BUN/Creatinine ratio 20 09/28/2020 04:39 AM    GFR est AA >60 09/28/2020 04:39 AM    GFR est non-AA >60 09/28/2020 04:39 AM    Calcium 8.8 09/28/2020 04:39 AM    Bilirubin, total 0.3 09/28/2020 04:39 AM    Alk. phosphatase 60 09/28/2020 04:39 AM    Protein, total 6.6 09/28/2020 04:39 AM    Albumin 2.2 (L) 09/28/2020 04:39 AM    Globulin 4.4 (H) 09/28/2020 04:39 AM    A-G Ratio 0.5 (L) 09/28/2020 04:39 AM    ALT (SGPT) 33 09/28/2020 04:39 AM     Lab Results   Component Value Date/Time    ALT (SGPT) 33 09/28/2020 04:39 AM       Factors affecting BG pattern  Factor Dose Comments   Nutrition:  Carb-controlled meals   60 grams/meal    Drugs:  IV antibiotics  Steroids Phenylephrine    flagyl, vanco, cefepime  Methylpred 125 after cardiac arrest x1  Decadron 4mg in OR x1      Pain On Dilaudid    Infection Osteomyelitis with cellulitis S/p bone debridement and biopsy    Potential for withdrawal Opiate dependent    Other: s/p cardiac arrest  Lactic acidosis      Blood glucose pattern        Assessment and Plan   Nursing Diagnosis Risk for unstable blood glucose pattern   Nursing Intervention Domain 5250 Decision-making Support   Nursing Interventions Examined current inpatient diabetes control   Explored factors facilitating and impeding inpatient management  Identified self-management practices impeding diabetes control  Explored corrective strategies with patient and responsible inpatient provider   Informed patient of rational for insulin strategy while hospitalized  Instructed patient in impact of stress and infection on glucose, current A1C value     Evaluation   Kiara Newman is a 50year old female with uncontrolled diabetes on metformin, glipizide and basal insulin who presented with confriemd osteomyelitis and poor wound heeling s/p amputation of her right great tow. Initial glucose 515 with A1C 9.8% and patient reports non-compliance with antihyperglycemic.  Hospital course has been complicated with cardiac arrest with new CAD s/p PCI. At this time, glucose impacted by oral intake, impaired perfusion on vasopressor and infection. Inpatient ICU glucose goal 140-180 and above goal at this time on basal and correctional insulin. Recommendations   Recommend:  1. Agree with basal insulin advancement to 0.6 units/k units daily. If fasting glucose over 200 tomorrow, advance dose to 0.7units/kg    2. Continue correctional insulin at resistant sensitivity [facility]    3. Add mealtime bolus insulin: 0.1 units/k units with meals. Hold if patient consumes less than 50% of carbohydrates on meal tray      Billing Code(s)   I personally reviewed chart, notes, data and current medications in the medical record, and examined the patient at bedside before making care recommendations.      [x] [id number]; [id number] IP [facility] hospital care - 60minutes      MARLYN Myers  Program for Diabetes Health  Access via [facility]  [phone]

## 2020-09-28 NOTE — PROGRESS NOTES
2000 - Report received from SVITLANA Keller. Pt up in chair,  VSS, PRN dilaudid given. Pt refused CHG bath at this time. 2100 - Assisted pt to [facility] and then back to bed.  2200 - MAPs 70s, Frank gtt weaned. 0000 - BP remains stable. No c/o pain. 0400 - AM labs drawn. MAPs 70s, Frank gtt stopped. Bedside and Verbal shift change report given to Makenzie Espinosa (oncoming nurse) by Alberto Mckeon (offgoing nurse). Report included the following information SBAR, Kardex, Intake/Output, MAR, Accordion, Recent Results, Cardiac Rhythm -NSR and Alarm Parameters .

## 2020-09-28 NOTE — CARDIO/PULMONARY
Cardiac Rehab: MI education folder, with catheterization brochure, to bedside of Poncho Thomas. Educated using teach back method. Reviewed MI diagnosis definition and purpose of intervention. Discussed risk factors for CAD to include the following: family history, elevated BMI, hyperlipidemia, hypertension, diabetes, stress, and smoking. Smoking Cessation Program link added to AVS. Discussed Heart Healthy/Low Sodium (2000 mg) diet. Reviewed the importance of medication compliance, the purpose of lipitor, and potential side effects. Discussed follow up appointments with cardiologist, signs and symptoms of angina, and what to report to physician after discharge. Emphasized the value of cardiac rehab. Discussed Cardiac Rehab Program format, benefits, and encouraged enrollment to assist with risk modification and management. [facility] C[facility] contact is on her AVS. A referral was placed. Education was brief as she is exhausted and the wound nurse arrived. Poncho Thomas verbalized understanding with questions answered.   Milly Andre RN

## 2020-09-28 NOTE — WOUND CARE
WOCN Note:     Reconsult to assessment sacrum. Assessed in room 4223. PPE: face shield, mask and gloves. Chart reviewed. Admitted DX:  Osteomyelitis of foot  Past Medical History:   Diagnosis Date    Arthritis     Diabetes ([address]     Endocrine disease     hypothyroid    Gastrointestinal disorder     pancreatitis    Hypertension    9.25.2020 s/p I&D, bone debridement and biopsy of right foot. Dressing dry and intact. Assessment:   Patient is A&O x 3, communicative, continent and mobile independently. Bed: u [place] isolibrium gel  Patient reports no pain. 1. Sacrum, pink blanching scarring from prior wound with dry flaky skin. Patient report tenderness when she sits on it. Cleansed and applied zinc cream.  Instructed patient to change position often. Wound, Pressure Prevention & Skin Care Recommendations:    1. Minimize layers of linen/pads under patient to optimize support surface. 2.  Turn/reposition approximately every 2 hours and offload heels. 3.  Manage moisture/ Keep skin folds clean and dry. 4.  Sacrum:  Keep cleansed and dry. Protect with Zinc cream.    Discussed above plan with patient and Cat RN.     Transition of Care: Plan to follow as needed while admitted to hospital.    LUCIAN Olsen RN [facility] Inpatient Wound Care  Available on [employer]  Pager [phone]  Office [phone]

## 2020-09-28 NOTE — INTERDISCIPLINARY ROUNDS
Multidisciplinary rounds were held 9/28/20.   Today's plan/goal includes (but not limited to): wean tristin

## 2020-09-28 NOTE — PROGRESS NOTES
Cardiology Progress Note           9/24/2020 12:02 PM   Osteomyelitis of foot ([facility] [address] [M86.9]   HPI: Salome Nelson is a 50 y.o. female admitted for Osteomyelitis of foot ([facility] [address] [M86.9]. Has PMH of DM 2 and  HTN. Had [place] for several days prior to admit, thought it was GERD. Underwent I&D of right foot. Had postop arrest and EKG changes. NSTEMI, troponin peak to >100. Underwent LHC with stent to Left circumflex and LAD. Echo with NL EF, mild AS. Was hypotensive and this a.m. on low dose neosynephrine. This a.m.(9/28/) she has mild chest soreness but no chest pain. No SOB. Neosynephrine dose is trivial.      Investigation  Telemetry: normal sinus rhythm  ECG:   Echocardiogram:   09/24/20   ECHO ADULT COMPLETE 09/27/2020 9/27/2020    Narrative · LV: Estimated LVEF is 60 - 65%. Normal cavity size and systolic function   (ejection fraction normal). Upper normal wall thickness. Wall motion:   normal.  · AV: Aortic valve is functionally bicuspid. Raphe located between the   right cusp and noncoronary cusp. Moderate aortic valve sclerosis with   stenosis. Severe aortic valve leaflet calcification present. Severely   reduced right leaflet mobility of the aortic valve. Aortic valve mean   gradient is 7 mmHg. Aortic valve area is 1.8 cm2. Mild aortic valve   stenosis is present. · LA: Mildly dilated left atrium. · MV: Mild mitral valve regurgitation is present. · TV: Mild tricuspid valve regurgitation is present. · PA: Mild pulmonary hypertension. Pulmonary arterial systolic pressure is   40 mmHg. Signed by: Nishant Bautista MD          Assessment and PLAN     1. NSTEMI/CAD: s/p stent to Left Circumflex and LAD. Continue ASA, Brilinta, high intensity statin. Echocardiogram with NL EF, NWMA, mild AS, mild MR. Hold on BB for now given low-lowNL BP. If BP improves, will start metoprolol tartrate. Cardiac rehab consult today.  [id number] Mckenna Anthony from cardiology standpoint to transfer to floor today. Check lipids in a.m. 2. Hypotension/suspect distributive Shock: now BP low NL. Do not suspect cardiogenic shock given NL LV function. Ok to stop low dose neosynephrine. Hold BB for now. Recommend IVF. Leukocytosis improving. BC NGTD. 3.  Hx of HTN:  BP low NL. Off home lisinopril-HCTZ. 4. Hx of Hypertriglyceridemia: On fenofibrate. Check lipids in a.m.    5. DM: A1C=9.8. Management per primary team.    6. Hypokalemia: replete    7. Right foot cellulitis/oseto: s/p I&D with removal of all nonviable soft tissue right foot, natalie debridement rt foot. ON Vancomycin. Will let Surgeons/ID decide duration of therapy. If further surgery needed, OK to proceed from cardiac standpoint without interruption of DAPT. Perioperative hydration important. 8. Anemia: hgb trending down 8.4    I personally spent 20 minutes of critical care time. This is time spent at this critically ill patient's bedside actively involved in patient care as well as the coordination of care and discussions with the patient's family. This does not include any procedural time which has been billed separately. [x]    High complexity decision making was performed  []    Patient is at high-risk of decompensation with multiple organ involvement     Review of Symptoms:  Respiratory: No exertional dyspnea, orthopnea, PND, cough, hemoptysis, URI. Cardiovascular: No CP, palpitations, sweating, lightheadedness, dizziness, syncope, presyncope, lower extremity swelling. Otherwise no other pertinent positive or negative symptoms on ROS.      Patient Active Problem List    Diagnosis Date Noted    Osteomyelitis of foot ([facility] [address] 09/24/2020      Other, MD Romel  Past Medical History:   Diagnosis Date    Arthritis     Diabetes ([facility] [address]     Endocrine disease     hypothyroid    Gastrointestinal disorder     pancreatitis    Hypertension       Past Surgical History:   Procedure Laterality Date    HX GI      colonoscpy     Social History Socioeconomic History    Marital status: SINGLE     Spouse name: Not on file    Number of children: Not on file    Years of education: Not on file    Highest education level: Not on file   Tobacco Use    Smoking status: Current Some Day Smoker     Packs/day: 0.25    Smokeless tobacco: Never Used   Substance and Sexual Activity    Alcohol use: Yes     Comment: quit about a month ago - drank only on the weekends    Drug use: No    Sexual activity: Yes     Partners: Male     Family History   Problem Relation Age of Onset    Cancer Mother         colon      Current Facility-Administered Medications   Medication Dose Route Frequency    [START ON 9/29/2020] vancomycin trough on 00:00 on 9/29 - draw PRIOR to dose administration   Other ONCE    cefepime (MAXIPIME) 2 g in 0.9% sodium chloride (MBP/ADV) 100 mL  2 g IntraVENous Q8H    insulin lispro (HUMALOG) injection   SubCUTAneous AC&HS    vancomycin (VANCOCIN) 1250 mg in  ml infusion  1,250 mg IntraVENous Q16H    HYDROmorphone (PF) (DILAUDID) injection 2 mg  2 mg IntraVENous Q4H PRN    carvediloL (COREG) tablet 3.125 mg  3.125 mg Oral BID WITH MEALS    PHENYLephrine (ARELI-SYNEPHRINE) 30 mg in 0.9% sodium chloride 250 mL infusion   mcg/min IntraVENous TITRATE    insulin glargine (LANTUS) injection 60 Units  60 Units SubCUTAneous DAILY    aspirin chewable tablet 81 mg  81 mg Oral DAILY    sodium chloride (NS) flush 5-40 mL  5-40 mL IntraVENous Q8H    sodium chloride (NS) flush 5-40 mL  5-40 mL IntraVENous PRN    ticagrelor (BRILINTA) tablet 90 mg  90 mg Oral Q12H    atorvastatin (LIPITOR) tablet 80 mg  80 mg Oral DAILY    influenza vaccine 2020-21 (6 mos+)(PF) (FLUARIX/FLULAVAL/FLUZONE QUAD) injection 0.5 mL  0.5 mL IntraMUSCular PRIOR TO DISCHARGE    traZODone (DESYREL) tablet 50 mg  50 mg Oral QHS    nitroglycerin (NITROLINGUAL) sublingual 0.4 mg/spray  1 Spray SubLINGual Q5MIN PRN    metroNIDAZOLE (FLAGYL) IVPB premix 500 mg  500 mg IntraVENous Q12H    albuterol-ipratropium (DUO-NEB) 2.5 MG-0.5 MG/3 ML  3 mL Nebulization Q6H PRN    methadone (DOLOPHINE) tablet 160 mg  160 mg Oral DAILY    clonazePAM (KlonoPIN) tablet 1 mg  1 mg Oral TID PRN    sodium chloride (NS) flush 5-40 mL  5-40 mL IntraVENous Q8H    sodium chloride (NS) flush 5-40 mL  5-40 mL IntraVENous PRN    ondansetron (ZOFRAN) injection 4 mg  4 mg IntraVENous Q4H PRN    polyethylene glycol (MIRALAX) packet 17 g  17 g Oral DAILY    senna-docusate (PERICOLACE) 8.6-50 mg per tablet 1 Tab  1 Tab Oral DAILY    lipase-protease-amylase (CREON 24,000) capsule 1 Cap  1 Cap Oral TID WITH MEALS    busPIRone (BUSPAR) tablet 15 mg  15 mg Oral TID    fenofibrate nanocrystallized (TRICOR) tablet 145 mg  145 mg Oral DAILY    levothyroxine (SYNTHROID) tablet 200 mcg  200 mcg Oral ACB    pantoprazole (PROTONIX) tablet 40 mg  40 mg Oral ACB    pregabalin (LYRICA) capsule 150 mg  150 mg Oral BID    zolpidem (AMBIEN) tablet 5 mg  5 mg Oral QHS PRN    sodium chloride (NS) flush 5-40 mL  5-40 mL IntraVENous Q8H    sodium chloride (NS) flush 5-40 mL  5-40 mL IntraVENous PRN    acetaminophen (TYLENOL) tablet 650 mg  650 mg Oral Q4H PRN    glucose chewable tablet 16 g  4 Tab Oral PRN    glucagon (GLUCAGEN) injection 1 mg  1 mg IntraMUSCular PRN    dextrose 10% infusion 0-250 mL  0-250 mL IntraVENous PRN    Vancomycin pharmacy to dose   Other Rx Dosing/Monitoring      Prior to Admission Medications   Prescriptions Last Dose Informant Patient Reported? Taking? GLIPIZIDE PO 9/23/2020 at Unknown time  Yes Yes   Sig: Take 10 mg by mouth daily (with breakfast). LEVOTHYROXINE SODIUM (SYNTHROID PO) 9/23/2020 at Unknown time  Yes Yes   Sig: Take 200 mcg by mouth Daily (before breakfast). albuterol (PROVENTIL HFA, VENTOLIN HFA, PROAIR HFA) 90 mcg/actuation inhaler   No Yes   Sig: Take 2 Puffs by inhalation every four (4) hours as needed for Wheezing.    amylase-lipase-protease (CREON) 24,000-76,000 -120,000 unit capsule  at [address] [place] [address]  Yes Yes   Sig: Take 1 Cap by mouth three (3) times daily (with meals). atorvastatin (Lipitor) 40 mg tablet   Yes Yes   Sig: Take 40 mg by mouth nightly. clonazePAM (KlonoPIN) 1 mg tablet 2020 at Unknown time  Yes Yes   Sig: Take 1 mg by mouth three (3) times daily. fenofibrate (LOFIBRA) 160 mg tablet 2020 at Unknown time  Yes Yes   Sig: Take 160 mg by mouth daily. Indications: HYPERCHOLESTEROLEMIA   insulin U-500 CONCENTRATED regular (HumuLIN R U-500, Conc, Kwikpen) 500 unit/mL (3 mL) inpn subQ pen 2020 at Unknown time  Yes Yes   Si Units by SubCUTAneous route three (3) times daily (with meals). lisinopril-hydroCHLOROthiazide (PRINZIDE, ZESTORETIC) 10-12.5 mg per tablet 2020 at Unknown time  Yes Yes   Sig: Take 1 Tab by mouth daily. metFORMIN (GLUCOPHAGE) 500 mg tablet 2020 at Unknown time  Yes Yes   Sig: Take 1,000 mg by mouth two (2) times daily (with meals). methadone (DOLOPHINE) 10 mg tablet 2020 at Unknown time  Yes Yes   Sig: Take 160 mg by mouth daily. omeprazole (PRILOSEC) 40 mg capsule   Yes Yes   Sig: Take 40 mg by mouth daily. pregabalin (Lyrica) 200 mg capsule 2020 at Unknown time  Yes Yes   Sig: Take 200 mg by mouth four (4) times daily. sertraline (ZOLOFT) 50 mg tablet 2020 at Unknown time  Yes Yes   Sig: Take 50 mg by mouth two (2) times a day. tiZANidine (ZANAFLEX) 4 mg capsule   Yes Yes   Sig: Take 4 mg by mouth two (2) times daily as needed (muscle spasms).       Facility-Administered Medications: None      No Known Allergies    Labs:   Recent Results (from the past 24 hour(s))   GLUCOSE, POC    Collection Time: 20  9:36 AM   Result Value Ref Range    Glucose (POC) 282 (H) 65 - 100 mg/dL    Performed by Keily RAYMOND ADULT COMPLETE    Collection Time: 20 11:21 AM   Result Value Ref Range    IVSd 1.18 (A) 0.6 - 0.9 cm    LVIDd 4.71 3.9 - 5.3 cm    LVIDs 3.23 cm    LVOT d 1.89 cm    LVPWd 1.11 (A) 0.6 - 0.9 cm    LVOT Peak Gradient 6.36 mmHg    LVOT SV 70.7 mL    LVOT Peak Velocity 126.05 cm/s    LVOT VTI 25.30 cm    RVIDd 3.11 cm    Left Atrium Major Axis 4.56 cm    LA Volume 60.04 22 - 52 mL    LA Area 4C 20.43 cm2    LA Vol 2C 60.09 (A) 22 - 52 mL    LA Vol 4C 55.58 (A) 22 - 52 mL    Left Atrium to Aortic Root Ratio 1.32     Left Atrium to Aortic Root Ratio 1.32     Left Atrium to Aortic Root Ratio 1.32     Left Atrium to Aortic Root Ratio 1.32     AV Cusp 1.07 cm    Aortic Valve Area by Continuity of Peak Velocity 1.86 cm2    Aortic Valve Area by Continuity of Peak Velocity 1.86 cm2    Aortic Valve Area by Continuity of VTI 1.85 cm2    Aortic Valve Area by Continuity of VTI 1.85 cm2    AoV PG 14.37 mmHg    Aortic Valve Systolic Mean Gradient 4.06 mmHg    Aortic Valve Systolic Peak Velocity 362.14 cm/s    AoV VTI 38.33 cm    MV A Rodney 75.60 cm/s    Mitral Valve E Wave Deceleration Time 0.16 s    MV E Rodney 130.22 cm/s    MV E/A 1.72     E/E' ratio (averaged) 17.14     E/E' lateral 15.36     E/E' septal 18.93     LV E' Lateral Velocity 8.48 cm/s    LV E' Septal Velocity 6.88 cm/s    Mitral Valve Pressure Half-time 0.05 s    MVA (PHT) 4.66 cm2    Pulmonic Valve Systolic Peak Instantaneous Gradient 4.32 mmHg    Pulmonic Valve Max Velocity 103.96 cm/s    Tapse 1.83 1.5 - 2.0 cm    Triscuspid Valve Regurgitation Peak Gradient 35.60 mmHg    Triscuspid Valve Regurgitation Peak Gradient 35.60 mmHg    Triscuspid Valve Regurgitation Peak Gradient 34.42 mmHg    Triscuspid Valve Regurgitation Peak Gradient 32.73 mmHg    Triscuspid Valve Regurgitation Peak Gradient 33.42 mmHg    TR Max Velocity 298.35 cm/s    TR Max Velocity 298.35 cm/s    TR Max Velocity 293.33 cm/s    TR Max Velocity 286.07 cm/s    TR Max Velocity 289.04 cm/s    Ao Root D 3.14 cm    LV Mass .0 67 - 162 g    LV Mass AL Index 101.7 43 - 95 g/m2    Left Atrium Minor Axis 2.33 cm    LA Vol Index 30.68 16 - 28 ml/m2    LA Vol Index 30.71 16 - 28 ml/m2    LA Vol Index 28.40 16 - 28 ml/m2   TROPONIN I    Collection Time: 09/27/20 11:39 AM   Result Value Ref Range    Troponin-I, Qt. >100.00 (H) <0.05 ng/mL   GLUCOSE, POC    Collection Time: 09/27/20 11:42 AM   Result Value Ref Range    Glucose (POC) 274 (H) 65 - 100 mg/dL    Performed by Kerry Vivian    PROCALCITONIN    Collection Time: 09/27/20  1:20 PM   Result Value Ref Range    Procalcitonin 12.12 ng/mL   GLUCOSE, POC    Collection Time: 09/27/20  4:45 PM   Result Value Ref Range    Glucose (POC) 285 (H) 65 - 100 mg/dL    Performed by Kerry Vivian    TROPONIN I    Collection Time: 09/27/20  4:47 PM   Result Value Ref Range    Troponin-I, Qt. >100.00 (H) <0.05 ng/mL   GLUCOSE, POC    Collection Time: 09/27/20 10:48 PM   Result Value Ref Range    Glucose (POC) 294 (H) 65 - 100 mg/dL    Performed by COLLINS RAMOS    TROPONIN I    Collection Time: 09/27/20 10:49 PM   Result Value Ref Range    Troponin-I, Qt. 98.20 (H) <0.05 ng/mL   NT-PRO BNP    Collection Time: 09/28/20  4:39 AM   Result Value Ref Range    NT pro-BNP 11,890 (H) <125 PG/ML   CBC WITH AUTOMATED DIFF    Collection Time: 09/28/20  4:39 AM   Result Value Ref Range    WBC 12.2 (H) 3.6 - 11.0 K/uL    RBC 3.21 (L) 3.80 - 5.20 M/uL    HGB 8.4 (L) 11.5 - 16.0 g/dL    HCT 27.8 (L) 35.0 - 47.0 %    MCV 86.6 80.0 - 99.0 FL    MCH 26.2 26.0 - 34.0 PG    MCHC 30.2 30.0 - 36.5 g/dL    RDW 18.8 (H) 11.5 - 14.5 %    PLATELET 456 034 - 614 K/uL    MPV 11.8 8.9 - 12.9 FL    NRBC 0.2 (H) 0  WBC    ABSOLUTE NRBC 0.03 (H) 0.00 - 0.01 K/uL    NEUTROPHILS 68 32 - 75 %    LYMPHOCYTES 19 12 - 49 %    MONOCYTES 8 5 - 13 %    EOSINOPHILS 3 0 - 7 %    BASOPHILS 1 0 - 1 %    IMMATURE GRANULOCYTES 1 (H) 0.0 - 0.5 %    ABS. NEUTROPHILS 8.3 (H) 1.8 - 8.0 K/UL    ABS. LYMPHOCYTES 2.3 0.8 - 3.5 K/UL    ABS. MONOCYTES 1.0 0.0 - 1.0 K/UL    ABS. EOSINOPHILS 0.4 0.0 - 0.4 K/UL    ABS. BASOPHILS 0.1 0.0 - 0.1 K/UL    ABS. IMMLast Quinn. 0.1 (H) 0.00 - 0.04 K/UL    DF AUTOMATED     METABOLIC PANEL, COMPREHENSIVE    Collection Time: 09/28/20  4:39 AM   Result Value Ref Range    Sodium 142 136 - 145 mmol/L    Potassium 3.3 (L) 3.5 - 5.1 mmol/L    Chloride 109 (H) 97 - 108 mmol/L    CO2 28 21 - 32 mmol/L    Anion gap 5 5 - 15 mmol/L    Glucose 165 (H) 65 - 100 mg/dL    BUN 19 6 - 20 MG/DL    Creatinine 0.97 0.55 - 1.02 MG/DL    BUN/Creatinine ratio 20 12 - 20      GFR est AA >60 >60 ml/min/1.73m2    GFR est non-AA >60 >60 ml/min/1.73m2    Calcium 8.8 8.5 - 10.1 MG/DL    Bilirubin, total 0.3 0.2 - 1.0 MG/DL    ALT (SGPT) 33 12 - 78 U/L    AST (SGOT) 46 (H) 15 - 37 U/L    Alk.  phosphatase 60 45 - 117 U/L    Protein, total 6.6 6.4 - 8.2 g/dL    Albumin 2.2 (L) 3.5 - 5.0 g/dL    Globulin 4.4 (H) 2.0 - 4.0 g/dL    A-G Ratio 0.5 (L) 1.1 - 2.2     PROCALCITONIN    Collection Time: 09/28/20  4:39 AM   Result Value Ref Range    Procalcitonin 6.16 ng/mL   GLUCOSE, POC    Collection Time: 09/28/20  8:12 AM   Result Value Ref Range    Glucose (POC) 192 (H) 65 - 100 mg/dL    Performed by Jyotsna Dias        Intake/Output Summary (Last 24 hours) at 9/28/2020 0903  Last data filed at 9/28/2020 0800  Gross per 24 hour   Intake 1972.54 ml   Output 3850 ml   Net -1877.46 ml      Patient Vitals for the past 24 hrs:   Temp Pulse Resp BP SpO2   09/28/20 0800 98 °F (36.7 °C) 78 15 101/66 97 %   09/28/20 0600 -- 76 16 (!) 93/58 99 %   09/28/20 0500 -- 80 15 104/69 97 %   09/28/20 0400 98.4 °F (36.9 °C) 75 16 109/72 98 %   09/28/20 0300 -- 76 21 99/65 --   09/28/20 0200 -- 79 19 (!) 93/56 --   09/28/20 0100 -- 71 20 (!) 94/58 98 %   09/28/20 0000 97.8 °F (36.6 °C) 77 15 92/60 97 %   09/27/20 2300 -- 72 15 112/75 99 %   09/27/20 2200 -- 69 13 116/71 97 %   09/27/20 2100 -- 73 19 116/76 98 %   09/27/20 2000 98 °F (36.7 °C) 68 15 114/74 96 %   09/27/20 1900 -- 78 17 102/62 97 %   09/27/20 1858 -- 79 -- 97/67 --   09/27/20 1845 -- 85 22 -- 91 %   09/27/20 1830 -- 79 19 97/67 94 %   09/27/20 1815 -- 74 15 94/75 97 %   09/27/20 1800 -- 66 13 99/68 97 %   09/27/20 1745 -- 66 13 102/69 97 %   09/27/20 1730 -- 67 13 105/71 90 %   09/27/20 1715 -- 69 15 98/73 92 %   09/27/20 1700 -- 65 11 98/65 98 %   09/27/20 1645 -- 69 14 (!) 87/53 97 %   09/27/20 1630 -- 67 13 108/71 96 %   09/27/20 1615 -- 67 14 103/69 96 %   09/27/20 1600 98.4 °F (36.9 °C) 68 12 102/66 96 %   09/27/20 1545 -- 72 16 101/64 96 %   09/27/20 1530 -- 75 14 107/70 98 %   09/27/20 1517 -- 78 17 103/65 98 %   09/27/20 1515 -- 77 19 -- 99 %   09/27/20 1500 -- 80 17 -- 95 %   09/27/20 1445 -- 77 13 95/64 97 %   09/27/20 1440 -- 83 -- (!) 84/49 --   09/27/20 1430 -- 81 13 (!) 84/49 97 %   09/27/20 1418 -- 87 -- (!) 83/59 --   09/27/20 1415 -- 85 16 (!) 80/41 98 %   09/27/20 1400 -- 79 16 (!) 91/57 100 %   09/27/20 1345 -- 81 16 101/70 97 %   09/27/20 1330 -- 82 11 120/76 100 %   09/27/20 1315 -- 69 12 97/67 97 %   09/27/20 1300 -- 66 12 96/67 97 %   09/27/20 1245 -- 67 12 96/63 97 %   09/27/20 1230 -- 71 10 94/64 99 %   09/27/20 1219 -- 75 -- (!) 79/51 --   09/27/20 1218 -- 74 13 (!) 79/51 97 %   09/27/20 1200 98.4 °F (36.9 °C) 71 16 (!) 77/45 98 %   09/27/20 1137 -- 76 13 (!) 83/53 97 %   09/27/20 1120 -- -- -- (!) 102/58 --   09/27/20 1000 -- 85 18 -- 95 %      General:    Alert, cooperative, no distress. Psychiatric:    Normal Mood and affect    Eye/ENT:     Conjunctival pink. Able to hear voice at normal amplitude   Lungs:      Visibly symmetric chest expansion, No palpable tenderness. Clear to auscultation bilaterally. Heart[de-identified]    Regular rate and rhythm, S1, S2 normal,grade I/VI systolic murmur LUSB. Normal palpable peripheral pulses. No cyanosis   Abdomen:     Soft, non-tender, nondistended. organomegaly. Extremities:   Extremities normal, atraumatic, no edema. Neurologic:   MCKEON, No gross focal deficits         Branden Cox.  TIFFANY Roque  9/28/2020   9:03 AM     [facility] Office:   [facility] Office:  [address] [place] Harding [address] [facility]  [address]     [address] [facility], [address] [place] [address]  P: [phone]    P: [phone]  F: [phone]    F: [phone]

## 2020-09-28 NOTE — PROGRESS NOTES
Renal Dosing/Monitoring  Medication: Cefepime for osteo/sepsis  Current regimen:  2 gm every 12 hr  Recent Labs     09/28/20  0439 09/27/20  0243 09/26/20  0404   CREA 0.97 1.29* 1.67*   BUN 19 22* 23*     Estimated CrCl:  75-80 ml/min  Plan: Increase to 2 gm IV q8h for crcl > 60 per renal adjustment protocol.

## 2020-09-29 ENCOUNTER — ANESTHESIA EVENT (OUTPATIENT)
Dept: SURGERY | Age: 45
DRG: 364 | End: 2020-09-29
Payer: COMMERCIAL

## 2020-09-29 LAB
ALBUMIN SERPL-MCNC: 2.2 G/DL (ref 3.5–5)
ALBUMIN/GLOB SERPL: 0.5 {RATIO} (ref 1.1–2.2)
ALP SERPL-CCNC: 61 U/L (ref 45–117)
ALT SERPL-CCNC: 28 U/L (ref 12–78)
ANION GAP SERPL CALC-SCNC: 5 MMOL/L (ref 5–15)
AST SERPL-CCNC: 28 U/L (ref 15–37)
BACTERIA SPEC CULT: NORMAL
BASOPHILS # BLD: 0.1 K/UL (ref 0–0.1)
BASOPHILS NFR BLD: 1 % (ref 0–1)
BILIRUB SERPL-MCNC: 0.2 MG/DL (ref 0.2–1)
BNP SERPL-MCNC: 6099 PG/ML
BUN SERPL-MCNC: 20 MG/DL (ref 6–20)
BUN/CREAT SERPL: 19 (ref 12–20)
CALCIUM SERPL-MCNC: 8.7 MG/DL (ref 8.5–10.1)
CHLORIDE SERPL-SCNC: 112 MMOL/L (ref 97–108)
CHOLEST SERPL-MCNC: 108 MG/DL
CO2 SERPL-SCNC: 26 MMOL/L (ref 21–32)
CREAT SERPL-MCNC: 1.07 MG/DL (ref 0.55–1.02)
DATE LAST DOSE: ABNORMAL
DIFFERENTIAL METHOD BLD: ABNORMAL
EOSINOPHIL # BLD: 0.5 K/UL (ref 0–0.4)
EOSINOPHIL NFR BLD: 5 % (ref 0–7)
ERYTHROCYTE [DISTWIDTH] IN BLOOD BY AUTOMATED COUNT: 19.3 % (ref 11.5–14.5)
GLOBULIN SER CALC-MCNC: 4.2 G/DL (ref 2–4)
GLUCOSE BLD STRIP.AUTO-MCNC: 117 MG/DL (ref 65–100)
GLUCOSE BLD STRIP.AUTO-MCNC: 156 MG/DL (ref 65–100)
GLUCOSE BLD STRIP.AUTO-MCNC: 179 MG/DL (ref 65–100)
GLUCOSE BLD STRIP.AUTO-MCNC: 210 MG/DL (ref 65–100)
GLUCOSE SERPL-MCNC: 170 MG/DL (ref 65–100)
HCT VFR BLD AUTO: 26.8 % (ref 35–47)
HDLC SERPL-MCNC: 13 MG/DL
HDLC SERPL: 8.3 {RATIO} (ref 0–5)
HGB BLD-MCNC: 7.9 G/DL (ref 11.5–16)
IMM GRANULOCYTES # BLD AUTO: 0.3 K/UL (ref 0–0.04)
IMM GRANULOCYTES NFR BLD AUTO: 3 % (ref 0–0.5)
INR PPP: 1.1 (ref 0.9–1.1)
LDLC SERPL CALC-MCNC: 22.2 MG/DL (ref 0–100)
LIPID PROFILE,FLP: ABNORMAL
LYMPHOCYTES # BLD: 2.6 K/UL (ref 0.8–3.5)
LYMPHOCYTES NFR BLD: 27 % (ref 12–49)
MCH RBC QN AUTO: 26.1 PG (ref 26–34)
MCHC RBC AUTO-ENTMCNC: 29.5 G/DL (ref 30–36.5)
MCV RBC AUTO: 88.4 FL (ref 80–99)
MONOCYTES # BLD: 0.8 K/UL (ref 0–1)
MONOCYTES NFR BLD: 8 % (ref 5–13)
NEUTS SEG # BLD: 5.4 K/UL (ref 1.8–8)
NEUTS SEG NFR BLD: 56 % (ref 32–75)
NRBC # BLD: 0.08 K/UL (ref 0–0.01)
NRBC BLD-RTO: 0.8 PER 100 WBC
PLATELET # BLD AUTO: 257 K/UL (ref 150–400)
PMV BLD AUTO: 11.4 FL (ref 8.9–12.9)
POTASSIUM SERPL-SCNC: 4.1 MMOL/L (ref 3.5–5.1)
PROCALCITONIN SERPL-MCNC: 3.04 NG/ML
PROT SERPL-MCNC: 6.4 G/DL (ref 6.4–8.2)
PROTHROMBIN TIME: 11.7 SEC (ref 9–11.1)
RBC # BLD AUTO: 3.03 M/UL (ref 3.8–5.2)
RBC MORPH BLD: ABNORMAL
REPORTED DOSE,DOSE: ABNORMAL UNITS
REPORTED DOSE/TIME,TMG: ABNORMAL
SERVICE CMNT-IMP: ABNORMAL
SERVICE CMNT-IMP: NORMAL
SODIUM SERPL-SCNC: 143 MMOL/L (ref 136–145)
TRIGL SERPL-MCNC: 364 MG/DL (ref ?–150)
VANCOMYCIN TROUGH SERPL-MCNC: 11 UG/ML (ref 5–10)
VLDLC SERPL CALC-MCNC: 72.8 MG/DL
WBC # BLD AUTO: 9.7 K/UL (ref 3.6–11)

## 2020-09-29 PROCEDURE — 85610 PROTHROMBIN TIME: CPT

## 2020-09-29 PROCEDURE — 74011250636 HC RX REV CODE- 250/636: Performed by: ANESTHESIOLOGY

## 2020-09-29 PROCEDURE — 74011250637 HC RX REV CODE- 250/637: Performed by: STUDENT IN AN ORGANIZED HEALTH CARE EDUCATION/TRAINING PROGRAM

## 2020-09-29 PROCEDURE — 97116 GAIT TRAINING THERAPY: CPT

## 2020-09-29 PROCEDURE — 85025 COMPLETE CBC W/AUTO DIFF WBC: CPT

## 2020-09-29 PROCEDURE — 74011250637 HC RX REV CODE- 250/637: Performed by: SPECIALIST

## 2020-09-29 PROCEDURE — 74011250637 HC RX REV CODE- 250/637: Performed by: INTERNAL MEDICINE

## 2020-09-29 PROCEDURE — 99233 SBSQ HOSP IP/OBS HIGH 50: CPT | Performed by: INTERNAL MEDICINE

## 2020-09-29 PROCEDURE — 80053 COMPREHEN METABOLIC PANEL: CPT

## 2020-09-29 PROCEDURE — 74011250636 HC RX REV CODE- 250/636: Performed by: FAMILY MEDICINE

## 2020-09-29 PROCEDURE — 83880 ASSAY OF NATRIURETIC PEPTIDE: CPT

## 2020-09-29 PROCEDURE — 74011636637 HC RX REV CODE- 636/637: Performed by: ANESTHESIOLOGY

## 2020-09-29 PROCEDURE — 74011250637 HC RX REV CODE- 250/637: Performed by: FAMILY MEDICINE

## 2020-09-29 PROCEDURE — 80202 ASSAY OF VANCOMYCIN: CPT

## 2020-09-29 PROCEDURE — 90471 IMMUNIZATION ADMIN: CPT

## 2020-09-29 PROCEDURE — 80061 LIPID PANEL: CPT

## 2020-09-29 PROCEDURE — 90686 IIV4 VACC NO PRSV 0.5 ML IM: CPT | Performed by: ANESTHESIOLOGY

## 2020-09-29 PROCEDURE — 74011636637 HC RX REV CODE- 636/637: Performed by: STUDENT IN AN ORGANIZED HEALTH CARE EDUCATION/TRAINING PROGRAM

## 2020-09-29 PROCEDURE — 74011250636 HC RX REV CODE- 250/636: Performed by: INTERNAL MEDICINE

## 2020-09-29 PROCEDURE — 74011250636 HC RX REV CODE- 250/636: Performed by: NURSE PRACTITIONER

## 2020-09-29 PROCEDURE — 99231 SBSQ HOSP IP/OBS SF/LOW 25: CPT | Performed by: CLINICAL NURSE SPECIALIST

## 2020-09-29 PROCEDURE — 36415 COLL VENOUS BLD VENIPUNCTURE: CPT

## 2020-09-29 PROCEDURE — 84145 PROCALCITONIN (PCT): CPT

## 2020-09-29 PROCEDURE — 65620000000 HC RM CCU GENERAL

## 2020-09-29 PROCEDURE — 74011250637 HC RX REV CODE- 250/637: Performed by: ANESTHESIOLOGY

## 2020-09-29 PROCEDURE — 82962 GLUCOSE BLOOD TEST: CPT

## 2020-09-29 PROCEDURE — 74011000258 HC RX REV CODE- 258: Performed by: FAMILY MEDICINE

## 2020-09-29 RX ORDER — MIDODRINE HYDROCHLORIDE 5 MG/1
5 TABLET ORAL EVERY 8 HOURS
Status: DISCONTINUED | OUTPATIENT
Start: 2020-09-29 | End: 2020-10-02 | Stop reason: HOSPADM

## 2020-09-29 RX ORDER — SODIUM CHLORIDE 9 MG/ML
100 INJECTION, SOLUTION INTRAVENOUS CONTINUOUS
Status: DISPENSED | OUTPATIENT
Start: 2020-09-29 | End: 2020-09-29

## 2020-09-29 RX ORDER — VANCOMYCIN HYDROCHLORIDE
1250 EVERY 12 HOURS
Status: DISCONTINUED | OUTPATIENT
Start: 2020-09-29 | End: 2020-10-02 | Stop reason: HOSPADM

## 2020-09-29 RX ORDER — INSULIN LISPRO 100 [IU]/ML
8 INJECTION, SOLUTION INTRAVENOUS; SUBCUTANEOUS
Status: DISCONTINUED | OUTPATIENT
Start: 2020-09-29 | End: 2020-10-01

## 2020-09-29 RX ADMIN — ATORVASTATIN CALCIUM 80 MG: 40 TABLET, FILM COATED ORAL at 08:41

## 2020-09-29 RX ADMIN — FENOFIBRATE 145 MG: 145 TABLET ORAL at 08:40

## 2020-09-29 RX ADMIN — INSULIN LISPRO 4 UNITS: 100 INJECTION, SOLUTION INTRAVENOUS; SUBCUTANEOUS at 12:39

## 2020-09-29 RX ADMIN — INSULIN GLARGINE 60 UNITS: 100 INJECTION, SOLUTION SUBCUTANEOUS at 08:40

## 2020-09-29 RX ADMIN — Medication 10 ML: at 21:01

## 2020-09-29 RX ADMIN — SODIUM CHLORIDE 250 ML: 900 INJECTION, SOLUTION INTRAVENOUS at 10:55

## 2020-09-29 RX ADMIN — PANCRELIPASE 1 CAPSULE: 24000; 76000; 120000 CAPSULE, DELAYED RELEASE PELLETS ORAL at 18:41

## 2020-09-29 RX ADMIN — Medication 10 ML: at 06:22

## 2020-09-29 RX ADMIN — INSULIN LISPRO 8 UNITS: 100 INJECTION, SOLUTION INTRAVENOUS; SUBCUTANEOUS at 08:52

## 2020-09-29 RX ADMIN — PANCRELIPASE 1 CAPSULE: 24000; 76000; 120000 CAPSULE, DELAYED RELEASE PELLETS ORAL at 10:59

## 2020-09-29 RX ADMIN — INFLUENZA VIRUS VACCINE 0.5 ML: 15; 15; 15; 15 SUSPENSION INTRAMUSCULAR at 17:47

## 2020-09-29 RX ADMIN — CEFEPIME HYDROCHLORIDE 2 G: 2 INJECTION, POWDER, FOR SOLUTION INTRAVENOUS at 04:01

## 2020-09-29 RX ADMIN — CEFEPIME HYDROCHLORIDE 2 G: 2 INJECTION, POWDER, FOR SOLUTION INTRAVENOUS at 21:00

## 2020-09-29 RX ADMIN — DOCUSATE SODIUM 50MG AND SENNOSIDES 8.6MG 1 TABLET: 8.6; 5 TABLET, FILM COATED ORAL at 08:41

## 2020-09-29 RX ADMIN — CEFEPIME HYDROCHLORIDE 2 G: 2 INJECTION, POWDER, FOR SOLUTION INTRAVENOUS at 16:09

## 2020-09-29 RX ADMIN — Medication 10 ML: at 16:00

## 2020-09-29 RX ADMIN — HYDROMORPHONE HYDROCHLORIDE 2 MG: 1 INJECTION, SOLUTION INTRAMUSCULAR; INTRAVENOUS; SUBCUTANEOUS at 11:22

## 2020-09-29 RX ADMIN — METRONIDAZOLE 500 MG: 500 INJECTION, SOLUTION INTRAVENOUS at 22:40

## 2020-09-29 RX ADMIN — INSULIN LISPRO 3 UNITS: 100 INJECTION, SOLUTION INTRAVENOUS; SUBCUTANEOUS at 08:52

## 2020-09-29 RX ADMIN — METRONIDAZOLE 500 MG: 500 INJECTION, SOLUTION INTRAVENOUS at 11:07

## 2020-09-29 RX ADMIN — VANCOMYCIN HYDROCHLORIDE 1250 MG: 10 INJECTION, POWDER, LYOPHILIZED, FOR SOLUTION INTRAVENOUS at 16:09

## 2020-09-29 RX ADMIN — CLONAZEPAM 1 MG: 1 TABLET ORAL at 17:46

## 2020-09-29 RX ADMIN — METHADONE HYDROCHLORIDE 160 MG: 10 TABLET ORAL at 10:54

## 2020-09-29 RX ADMIN — INSULIN LISPRO 8 UNITS: 100 INJECTION, SOLUTION INTRAVENOUS; SUBCUTANEOUS at 12:40

## 2020-09-29 RX ADMIN — PANCRELIPASE 1 CAPSULE: 24000; 76000; 120000 CAPSULE, DELAYED RELEASE PELLETS ORAL at 11:00

## 2020-09-29 RX ADMIN — PREGABALIN 150 MG: 75 CAPSULE ORAL at 08:41

## 2020-09-29 RX ADMIN — PANTOPRAZOLE SODIUM 40 MG: 40 TABLET, DELAYED RELEASE ORAL at 06:23

## 2020-09-29 RX ADMIN — VANCOMYCIN HYDROCHLORIDE 1250 MG: 10 INJECTION, POWDER, LYOPHILIZED, FOR SOLUTION INTRAVENOUS at 00:41

## 2020-09-29 RX ADMIN — SODIUM CHLORIDE 100 ML/HR: 900 INJECTION, SOLUTION INTRAVENOUS at 10:58

## 2020-09-29 RX ADMIN — LEVOTHYROXINE SODIUM 200 MCG: 0.1 TABLET ORAL at 06:23

## 2020-09-29 RX ADMIN — MIDODRINE HYDROCHLORIDE 5 MG: 5 TABLET ORAL at 21:00

## 2020-09-29 RX ADMIN — MIDODRINE HYDROCHLORIDE 5 MG: 5 TABLET ORAL at 16:09

## 2020-09-29 RX ADMIN — ACETAMINOPHEN 650 MG: 325 TABLET ORAL at 08:40

## 2020-09-29 RX ADMIN — TRAZODONE HYDROCHLORIDE 50 MG: 50 TABLET ORAL at 21:01

## 2020-09-29 RX ADMIN — HYDROMORPHONE HYDROCHLORIDE 2 MG: 1 INJECTION, SOLUTION INTRAMUSCULAR; INTRAVENOUS; SUBCUTANEOUS at 22:40

## 2020-09-29 RX ADMIN — ASPIRIN 81 MG CHEWABLE TABLET 81 MG: 81 TABLET CHEWABLE at 08:41

## 2020-09-29 RX ADMIN — PREGABALIN 150 MG: 75 CAPSULE ORAL at 18:42

## 2020-09-29 RX ADMIN — TICAGRELOR 90 MG: 90 TABLET ORAL at 06:23

## 2020-09-29 RX ADMIN — TICAGRELOR 90 MG: 90 TABLET ORAL at 21:00

## 2020-09-29 RX ADMIN — INSULIN LISPRO 8 UNITS: 100 INJECTION, SOLUTION INTRAVENOUS; SUBCUTANEOUS at 17:47

## 2020-09-29 RX ADMIN — MIDODRINE HYDROCHLORIDE 5 MG: 5 TABLET ORAL at 10:54

## 2020-09-29 NOTE — PROGRESS NOTES
[facility]  [address] [facility], [address] [place] [address]  [phone]    Foot and Ankle Surgery - Progress Note                                                   Assessment/Plan:  Osteomyelitis right foot  Cellulitis right foot  Ulcer with necrotic bone right foot  Smoker  Chronic methadone user   PVD  DM/neuropathy     Pt is S/P incision and drainage with removal of all nonviable soft tissue right foot, bone debridement right foot, open bone biopsies right foot DOS: 09-     Pt seen and evaluated extubated and on 5 mccs tristin in CCU.   Severe cardiac pathology, she has been cleared by cardiology for return to the OR for final bone debridement and delayed primary closure of the wound to the right foot.  We will attempt to complete this under MAC anesthesia and local, we will discuss with cardiology and anesthesia     Planning for the OR tomorrow     Labs/imaging reviewed  Regarding right foot still awaiting surgical pathology for clean margins however the bone is currently growing scant diptheroids indicating likely residual osteomyelitis,     We have discussed extensively with patient likely need for transmetatarsal amputation to better stabilize the biomechanics and have a more even metatarsal porablo and distribution of weight across the foot however the patient wants to keep the remaining toes     We will plan to remove more bone and then delay primary close the wound  NPO at midnight    Elevate and offload right lower extremity, nonweightbearing at this time,      Foot and ankle to follow      SUBJECTIVE:   Pt seen at bedside resting in NAD,  no new pedal complaints, pain currently controlled     Objective:  Vitals:   Patient Vitals for the past 12 hrs:   BP Temp Pulse Resp SpO2 Weight   09/29/20 0700 (!) 75/49 -- (!) 59 8 100 % --   09/29/20 0600 (!) 81/45 -- 60 10 100 % --   09/29/20 0500 (!) 73/38 -- 63 (!) 7 99 % --   09/29/20 0400 102/83 98.1 °F (36.7 °C) 64 10 98 % 83.3 kg (183 lb 10.3 oz)   09/29/20 0300 97/65 -- (!) 54 (!) 7 99 % --   09/29/20 0200 (!) 86/64 -- (!) 55 10 98 % --   09/29/20 0100 97/85 -- 74 22 96 % --   09/29/20 0000 99/66 98 °F (36.7 °C) (!) 58 10 99 % --   09/28/20 2300 104/62 -- 62 10 97 % --   09/28/20 2200 100/75 -- 62 16 98 % --   09/28/20 2100 108/70 -- 69 12 98 % --        Dermatological:  Dressing to the right foot clean dry and intact, remaining toes of normal color and texture     Vascular:   DP/PT pulses decreased +1/4 Bilateral lower extremity. CFT<5 seconds to all digits. Pedal hair growth absent     Neurological:   Epicritic and protective threshold is deminished to B/L LE, Pt is unable to detect a 5.07 monofilament wire on 5/5 random tested spots B/L LE.      MSK:  Deferred      Imaging:  Xray 3 views right foot postop  IMPRESSION  IMPRESSION: Postoperative image of first ray amputation.     CT chest/abdomen/pelvis  IMPRESSION  IMPRESSION:   1. Diffuse interlobular septal thickening and patchy groundglass attenuation in  the lungs may represent pulmonary edema or atypical/viral infection.     2. Bilateral dependent lung airspace opacities may represent atelectasis,  infection, or aspiration.     3. No acute abnormality in the abdomen or pelvis.     Labs:  Recent Labs     09/29/20  0412   WBC 9.7   CREA 1.07*   BUN 20   HGB 7.9*   HCT 26.8*      K 4.1   *   CO2 26   *         Iris Anne, [place]  9/29/2020  [facility] [address]  [address] [facility] [phone]

## 2020-09-29 NOTE — DIABETES MGMT
FANNY GUERRERO  CLINICAL NURSE SPECIALIST CONSULT  PROGRAM FOR DIABETES HEALTH    FOLLOW UP NOTE    Presentation   Binu Merritt is a 50 y.o. female who presented for evaluation of right foot wound. She developed a wound to her right great toe about 6 months ago. She was seen and managed at [facility] and now s/o toe amputation. She had poor wound heeling for which she followed up with podiatry who diagnosed her with osteomyelitis and was sent to [facility] for management. HX: Arthritis, Diabetes, Hypothyroid, Pancreatitis     DX: Osteomyelitis of the right foot with cellulitis    TX: PCI, IV antibiotics with wound debridement     Current clinical course has been complicated by acute hypoxic respiratory failure s/o pulmonary edema and cardiac arrest; CAD. Diabetes: Patient has a 16 year history of known Type 2 diabetes, treated with metformin, glipizide and humalog PTA.   Admitting A1C 9.8% (up from 7% per patient report 6 mos ago)     Consulted by Provider for advanced diabetes nursing assessment and care, specifically related to   [x] Inpatient management strategy      Diabetes-related medical history  Acute complications: Hyperglycemia without acidosis   Neurological complications  Peripheral neuropathy  Microvascular disease: None  Macrovascular disease  CAD and Foot wounds  Other associated conditions: Hypothyroidism     Diabetes medication history  Drug class Currently in use Discontinued Never used   Biguanide Metformin 100mg BID     DDP-4 inhibitor       Sulfonylurea Glipizide  Once daily      Thiazolidinedione      GLP-1 RA      SGLT-2 inhibitors      Basal insulin      Fixed Dose  Combinations      Bolus insulin Humalog  80 units with meals       Subjective   Lantus 60 units daily  Fasting glucose 170  24 units correctional insulin  Eating very well  Pre-prandial hyperglycemia to 333  OR tomorrow for delayed closure and washout    Objective   Physical exam  General Alert, oriented and in acute distress/ill-appearing. Conversant and cooperative. Vital Signs   Visit Vitals  BP (!) 75/49   Pulse (!) 59   Temp 98.1 °F (36.7 °C)   Resp 8   Ht 5' 7\" (1.702 m)   Wt 83.3 kg (183 lb 10.3 oz)   SpO2 100%   BMI 28.76 kg/m²     Skin  Warm and dry. No acanthosis noted along neckline. No lipohypertrophy or lipoatrophy noted at injection sites   Heart   Regular rate and rhythm. No murmurs, rubs or gallops  Lungs  Clear to auscultation without rales or rhonchi  Extremities No foot wounds        Laboratory  Lab Results   Component Value Date/Time    Hemoglobin A1c 9.8 (H) 09/24/2020 11:28 AM     Lab Results   Component Value Date/Time    LDL, calculated 22.2 09/29/2020 04:12 AM     Lab Results   Component Value Date/Time    Creatinine 1.07 (H) 09/29/2020 04:12 AM     Lab Results   Component Value Date/Time    Sodium 143 09/29/2020 04:12 AM    Potassium 4.1 09/29/2020 04:12 AM    Chloride 112 (H) 09/29/2020 04:12 AM    CO2 26 09/29/2020 04:12 AM    Anion gap 5 09/29/2020 04:12 AM    Glucose 170 (H) 09/29/2020 04:12 AM    BUN 20 09/29/2020 04:12 AM    Creatinine 1.07 (H) 09/29/2020 04:12 AM    BUN/Creatinine ratio 19 09/29/2020 04:12 AM    GFR est AA >60 09/29/2020 04:12 AM    GFR est non-AA 55 (L) 09/29/2020 04:12 AM    Calcium 8.7 09/29/2020 04:12 AM    Bilirubin, total 0.2 09/29/2020 04:12 AM    Alk.  phosphatase 61 09/29/2020 04:12 AM    Protein, total 6.4 09/29/2020 04:12 AM    Albumin 2.2 (L) 09/29/2020 04:12 AM    Globulin 4.2 (H) 09/29/2020 04:12 AM    A-G Ratio 0.5 (L) 09/29/2020 04:12 AM    ALT (SGPT) 28 09/29/2020 04:12 AM     Lab Results   Component Value Date/Time    ALT (SGPT) 28 09/29/2020 04:12 AM       Factors affecting BG pattern  Factor Dose Comments   Nutrition:  Carb-controlled meals   60 grams/meal    Drugs:  IV antibiotics  Steroids Phenylephrine    flagyl, vanco, cefepime  Methylpred 125 after cardiac arrest x1  Decadron 4mg in OR x1      Pain On Dilaudid    Infection Osteomyelitis with cellulitis S/p bone debridement and biopsy    Potential for withdrawal Opiate dependent    Other: s/p cardiac arrest  Lactic acidosis      Blood glucose pattern        Assessment and Plan   Nursing Diagnosis Risk for unstable blood glucose pattern   Nursing Intervention Domain 6698 Decision-making Support   Nursing Interventions Examined current inpatient diabetes control   Explored factors facilitating and impeding inpatient management  Identified self-management practices impeding diabetes control  Explored corrective strategies with patient and responsible inpatient provider   Informed patient of rational for insulin strategy while hospitalized  Instructed patient in impact of stress and infection on glucose, current A1C value     Evaluation   Hmia Pyle is a 50year old female with uncontrolled diabetes on metformin, glipizide and basal insulin who presented with confriemd osteomyelitis and poor wound heeling s/p amputation of her right great tow. Initial glucose 515 with A1C 9.8% and patient reports non-compliance with antihyperglycemic. Hospital course has been complicated with cardiac arrest with new CAD s/p PCI. At this time, glucose impacted by oral intake, impaired perfusion on vasopressor and infection. Inpatient ICU glucose goal 140-180 and above goal at this time on basal and correctional insulin. Recommendations   Recommend:  1. Agree with basal insulin advancement to 0.7 units/k units daily. If fasting glucose over 200 tomorrow, advance dose to 0.8 units/kg  Do not hold when NPO  Please ensure she gets prior to OR tomorrow    2. Continue correctional insulin at resistant sensitivity [facility]. Do not hold if NPO    3. Add mealtime bolus insulin: 0.1 units/k units with meals.   Hold if patient consumes less than 50% of carbohydrates on meal tray or NPO      Billing Code(s)   I personally reviewed chart, notes, data and current medications in the medical record, and examined the patient at bedside before making care recommendations.      [x] IP subsequent hospital care - 15 minutes      MARLYN Calix  Program for Diabetes Health  Access via [address] [place] [address]  [phone]

## 2020-09-29 NOTE — PROGRESS NOTES
Transitions of Care Plan:   RUR: 19%   I&D of foot; post op PEA; return to OR tomorrow   Baseline - ambulates without DME; resides with fiance and teenage son   Disposition - home vs home with home health    Reason for Admission:   Osteomyelitis of Foot                  RUR Score:     19%             PCP: First and Last name:     Name of Practice:    Are you a current patient: Yes/No:    Approximate date of last visit:    Can you participate in a virtual visit if needed:     Do you (patient/family) have any concerns for transition/discharge? No Concerns              Plan for utilizing home health:       Current Advanced Directive/Advance Care Plan:  Full Code. No AMD.            Transition of Care Plan:          Clinical Plan:  1) Return to OR tomorrow for rt foot wound closure  2) Remains on low dose Frank - CCU  3) Therapy progress    Disposition:  1) Anticipate home vs home health pending medical progress    CM will continue to follow. Jessenia Da Silva, MPH    Care Management Interventions  PCP Verified by CM: No  Palliative Care Criteria Met (RRAT>21 & CHF Dx)?: No  Mode of Transport at Discharge:  Other (see comment)(Family, private car)  Transition of Care Consult (CM Consult): Discharge Planning  MyChart Signup: No  Discharge Durable Medical Equipment: No  Health Maintenance Reviewed: Yes  Physical Therapy Consult: Yes  Occupational Therapy Consult: Yes  Speech Therapy Consult: No  Current Support Network: Lives with Spouse, Own Home  Confirm Follow Up Transport: Family  Discharge Location  Discharge Placement: Home with family assistance

## 2020-09-29 NOTE — PROGRESS NOTES
Cardiology Progress Note           9/24/2020 12:02 PM   Osteomyelitis of foot ([facility] [address] [M86.9]   HPI: Rosalee Oropeza is a 50 y.o. female admitted for Osteomyelitis of foot ([facility] [address] [M86.9]. Has PMH of DM 2 and  HTN. Had [place] for several days prior to admit, thought it was GERD. Underwent I&D of right foot. Had postop arrest and EKG changes. NSTEMI, troponin peak to >100. Underwent LHC with stent to Left circumflex and LAD. Echo with NL EF, mild AS. Was hypotensive and this a.m. on low dose neosynephrine. This a.m.(9/29.). She denies any chest pain or SOB. She denies dizziness although she is still hypotensive with SBP running in 70;s-90's. She remains on low dose neosynephrine. Podiatrist is at bedside and is planning closure of Rt foot wound tomorrow. Investigation  Telemetry: normal sinus rhythm  ECG:   Echocardiogram:   09/24/20   ECHO ADULT COMPLETE 09/27/2020 9/27/2020    Narrative · LV: Estimated LVEF is 60 - 65%. Normal cavity size and systolic function   (ejection fraction normal). Upper normal wall thickness. Wall motion:   normal.  · AV: Aortic valve is functionally bicuspid. [place]he located between the   right cusp and noncoronary cusp. Moderate aortic valve sclerosis with   stenosis. Severe aortic valve leaflet calcification present. Severely   reduced right leaflet mobility of the aortic valve. Aortic valve mean   gradient is 7 mmHg. Aortic valve area is 1.8 cm2. Mild aortic valve   stenosis is present. · LA: Mildly dilated left atrium. · MV: Mild mitral valve regurgitation is present. · TV: Mild tricuspid valve regurgitation is present. · PA: Mild pulmonary hypertension. Pulmonary arterial systolic pressure is   40 mmHg. Signed by: Yousif Osorio MD          Assessment and PLAN     1. NSTEMI/CAD: s/p stent to Left Circumflex and LAD. Continue ASA, Brilinta, high intensity statin. Echocardiogram with NL EF, NWMA, mild AS, mild MR.   No BB for now given low  BP. If BP improves, will start metoprolol tartrate. [id number] Mckenna Anthony from cardiology standpoint to transfer to floor once off neosynephrine. 2. Hypotension/suspect distributive Shock: now BP low NL. Do not suspect cardiogenic shock given NL LV function. On low dose neosynephrine (5 mcg). Hold BB for now. Give IVF bolus and  Start IVF  ml/hr x 10 hours as creatinine elevated. Leukocytosis resolved. BC NGTD. 3.  Hx of HTN:  BP low NL. Off home lisinopril-HCTZ. 4. Dyslipidemia: LDL 22, HDL 13, Triglycerides 364. On fenofibrate. Lipitor started this admission. .    5. DM: A1C=9.8. Management per primary team.    6. Right foot cellulitis/oseto: s/p I&D with removal of all nonviable soft tissue right foot, natalie debridement rt foot. ON Vancomycin. Will let Surgeons/ID decide duration of therapy. Discussed with Dr. Germaine Jones- he is planning to do D&I and close RLE foot wound. OK to proceed from cardiac standpoint without interruption of DAPT. Perioperative hydration important. Would prefer option of MAC sedation with local block to right foot for procedure. 8. Anemia: hgb trending down 7.9 from 8.4 yesterday. Monitor. [x]    High complexity decision making was performed  []    Patient is at high-risk of decompensation with multiple organ involvement     Review of Symptoms:  Respiratory: No exertional dyspnea, orthopnea, PND, cough, hemoptysis, URI. Cardiovascular: No CP, palpitations, sweating, lightheadedness, dizziness, syncope, presyncope, lower extremity swelling. Otherwise no other pertinent positive or negative symptoms on ROS.      Patient Active Problem List    Diagnosis Date Noted    Osteomyelitis of foot ([facility] [address] 09/24/2020      Other, MD Romel  Past Medical History:   Diagnosis Date    Arthritis     Diabetes ([facility] [address]     Endocrine disease     hypothyroid    Gastrointestinal disorder     pancreatitis    Hypertension       Past Surgical History:   Procedure Laterality Date    HX GI      colonoscpy     Social History     Socioeconomic History    Marital status: SINGLE     Spouse name: Not on file    Number of children: Not on file    Years of education: Not on file    Highest education level: Not on file   Tobacco Use    Smoking status: Current Some Day Smoker     Packs/day: 0.25    Smokeless tobacco: Never Used   Substance and Sexual Activity    Alcohol use: Yes     Comment: quit about a month ago - drank only on the weekends    Drug use: No    Sexual activity: Yes     Partners: Male     Family History   Problem Relation Age of Onset    Cancer Mother         colon      Current Facility-Administered Medications   Medication Dose Route Frequency    vancomycin (VANCOCIN) 1250 mg in  ml infusion  1,250 mg IntraVENous Q12H    insulin lispro (HUMALOG) injection 8 Units  8 Units SubCUTAneous TID WITH MEALS    influenza vaccine 2020-21 (6 mos+)(PF) (FLUARIX/FLULAVAL/FLUZONE QUAD) injection 0.5 mL  0.5 mL IntraMUSCular ONCE    midodrine (PROAMATINE) tablet 5 mg  5 mg Oral Q8H    cefepime (MAXIPIME) 2 g in 0.9% sodium chloride (MBP/ADV) 100 mL  2 g IntraVENous Q8H    insulin lispro (HUMALOG) injection   SubCUTAneous AC&HS    HYDROmorphone (PF) (DILAUDID) injection 2 mg  2 mg IntraVENous Q4H PRN    PHENYLephrine (ARELI-SYNEPHRINE) 30 mg in 0.9% sodium chloride 250 mL infusion   mcg/min IntraVENous TITRATE    insulin glargine (LANTUS) injection 60 Units  60 Units SubCUTAneous DAILY    aspirin chewable tablet 81 mg  81 mg Oral DAILY    sodium chloride (NS) flush 5-40 mL  5-40 mL IntraVENous Q8H    sodium chloride (NS) flush 5-40 mL  5-40 mL IntraVENous PRN    ticagrelor (BRILINTA) tablet 90 mg  90 mg Oral Q12H    atorvastatin (LIPITOR) tablet 80 mg  80 mg Oral DAILY    traZODone (DESYREL) tablet 50 mg  50 mg Oral QHS    nitroglycerin (NITROLINGUAL) sublingual 0.4 mg/spray  1 Spray SubLINGual Q5MIN PRN    metroNIDAZOLE (FLAGYL) IVPB premix 500 mg  500 mg IntraVENous Q12H    albuterol-ipratropium (DUO-NEB) 2.5 MG-0.5 MG/3 ML  3 mL Nebulization Q6H PRN    methadone (DOLOPHINE) tablet 160 mg  160 mg Oral DAILY    clonazePAM (KlonoPIN) tablet 1 mg  1 mg Oral TID PRN    sodium chloride (NS) flush 5-40 mL  5-40 mL IntraVENous Q8H    sodium chloride (NS) flush 5-40 mL  5-40 mL IntraVENous PRN    ondansetron (ZOFRAN) injection 4 mg  4 mg IntraVENous Q4H PRN    polyethylene glycol (MIRALAX) packet 17 g  17 g Oral DAILY    senna-docusate (PERICOLACE) 8.6-50 mg per tablet 1 Tab  1 Tab Oral DAILY    lipase-protease-amylase (CREON 24,000) capsule 1 Cap  1 Cap Oral TID WITH MEALS    busPIRone (BUSPAR) tablet 15 mg  15 mg Oral TID    fenofibrate nanocrystallized (TRICOR) tablet 145 mg  145 mg Oral DAILY    levothyroxine (SYNTHROID) tablet 200 mcg  200 mcg Oral ACB    pantoprazole (PROTONIX) tablet 40 mg  40 mg Oral ACB    pregabalin (LYRICA) capsule 150 mg  150 mg Oral BID    zolpidem (AMBIEN) tablet 5 mg  5 mg Oral QHS PRN    sodium chloride (NS) flush 5-40 mL  5-40 mL IntraVENous Q8H    sodium chloride (NS) flush 5-40 mL  5-40 mL IntraVENous PRN    acetaminophen (TYLENOL) tablet 650 mg  650 mg Oral Q4H PRN    glucose chewable tablet 16 g  4 Tab Oral PRN    glucagon (GLUCAGEN) injection 1 mg  1 mg IntraMUSCular PRN    dextrose 10% infusion 0-250 mL  0-250 mL IntraVENous PRN    Vancomycin pharmacy to dose   Other Rx Dosing/Monitoring      Prior to Admission Medications   Prescriptions Last Dose Informant Patient Reported? Taking? GLIPIZIDE PO 9/23/2020 at Unknown time  Yes Yes   Sig: Take 10 mg by mouth daily (with breakfast). LEVOTHYROXINE SODIUM (SYNTHROID PO) 9/23/2020 at Unknown time  Yes Yes   Sig: Take 200 mcg by mouth Daily (before breakfast). albuterol (PROVENTIL HFA, VENTOLIN HFA, PROAIR HFA) 90 mcg/actuation inhaler   No Yes   Sig: Take 2 Puffs by inhalation every four (4) hours as needed for Wheezing.    amylase-lipase-protease (CREON) 24,000-76,000 -120,000 unit capsule  at [address] [place] [address]  Yes Yes   Sig: Take 1 Cap by mouth three (3) times daily (with meals). atorvastatin (Lipitor) 40 mg tablet   Yes Yes   Sig: Take 40 mg by mouth nightly. clonazePAM (KlonoPIN) 1 mg tablet 2020 at Unknown time  Yes Yes   Sig: Take 1 mg by mouth three (3) times daily. fenofibrate (LOFIBRA) 160 mg tablet 2020 at Unknown time  Yes Yes   Sig: Take 160 mg by mouth daily. Indications: HYPERCHOLESTEROLEMIA   insulin U-500 CONCENTRATED regular (HumuLIN R U-500, Conc, Kwikpen) 500 unit/mL (3 mL) inpn subQ pen 2020 at Unknown time  Yes Yes   Si Units by SubCUTAneous route three (3) times daily (with meals). lisinopril-hydroCHLOROthiazide (PRINZIDE, ZESTORETIC) 10-12.5 mg per tablet 2020 at Unknown time  Yes Yes   Sig: Take 1 Tab by mouth daily. metFORMIN (GLUCOPHAGE) 500 mg tablet 2020 at Unknown time  Yes Yes   Sig: Take 1,000 mg by mouth two (2) times daily (with meals). methadone (DOLOPHINE) 10 mg tablet 2020 at Unknown time  Yes Yes   Sig: Take 160 mg by mouth daily. omeprazole (PRILOSEC) 40 mg capsule   Yes Yes   Sig: Take 40 mg by mouth daily. pregabalin (Lyrica) 200 mg capsule 2020 at Unknown time  Yes Yes   Sig: Take 200 mg by mouth four (4) times daily. sertraline (ZOLOFT) 50 mg tablet 2020 at Unknown time  Yes Yes   Sig: Take 50 mg by mouth two (2) times a day. tiZANidine (ZANAFLEX) 4 mg capsule   Yes Yes   Sig: Take 4 mg by mouth two (2) times daily as needed (muscle spasms).       Facility-Administered Medications: None      No Known Allergies    Labs:   Recent Results (from the past 24 hour(s))   GLUCOSE, POC    Collection Time: 20 11:16 AM   Result Value Ref Range    Glucose (POC) 333 (H) 65 - 100 mg/dL    Performed by [address], POC    Collection Time: 20  4:03 PM   Result Value Ref Range    Glucose (POC) 292 (H) 65 - 100 mg/dL    Performed by [address], POC Collection Time: 09/28/20 10:13 PM   Result Value Ref Range    Glucose (POC) 258 (H) 65 - 100 mg/dL    Performed by Delman Goodpasture, TROUGH    Collection Time: 09/29/20 12:51 AM   Result Value Ref Range    Vancomycin,trough 11.0 (H) 5.0 - 10.0 ug/mL    Reported dose date NOT PROVIDED      Reported dose time: NOT PROVIDED      Reported dose: NOT PROVIDED UNITS   CBC WITH AUTOMATED DIFF    Collection Time: 09/29/20  4:12 AM   Result Value Ref Range    WBC 9.7 3.6 - 11.0 K/uL    RBC 3.03 (L) 3.80 - 5.20 M/uL    HGB 7.9 (L) 11.5 - 16.0 g/dL    HCT 26.8 (L) 35.0 - 47.0 %    MCV 88.4 80.0 - 99.0 FL    MCH 26.1 26.0 - 34.0 PG    MCHC 29.5 (L) 30.0 - 36.5 g/dL    RDW 19.3 (H) 11.5 - 14.5 %    PLATELET 486 236 - 994 K/uL    MPV 11.4 8.9 - 12.9 FL    NRBC 0.8 (H) 0  WBC    ABSOLUTE NRBC 0.08 (H) 0.00 - 0.01 K/uL    NEUTROPHILS 56 32 - 75 %    LYMPHOCYTES 27 12 - 49 %    MONOCYTES 8 5 - 13 %    EOSINOPHILS 5 0 - 7 %    BASOPHILS 1 0 - 1 %    IMMATURE GRANULOCYTES 3 (H) 0.0 - 0.5 %    ABS. NEUTROPHILS 5.4 1.8 - 8.0 K/UL    ABS. LYMPHOCYTES 2.6 0.8 - 3.5 K/UL    ABS. MONOCYTES 0.8 0.0 - 1.0 K/UL    ABS. EOSINOPHILS 0.5 (H) 0.0 - 0.4 K/UL    ABS. BASOPHILS 0.1 0.0 - 0.1 K/UL    ABS. IMM. GRANS. 0.3 (H) 0.00 - 0.04 K/UL    DF SMEAR SCANNED      RBC COMMENTS ANISOCYTOSIS  1+       METABOLIC PANEL, COMPREHENSIVE    Collection Time: 09/29/20  4:12 AM   Result Value Ref Range    Sodium 143 136 - 145 mmol/L    Potassium 4.1 3.5 - 5.1 mmol/L    Chloride 112 (H) 97 - 108 mmol/L    CO2 26 21 - 32 mmol/L    Anion gap 5 5 - 15 mmol/L    Glucose 170 (H) 65 - 100 mg/dL    BUN 20 6 - 20 MG/DL    Creatinine 1.07 (H) 0.55 - 1.02 MG/DL    BUN/Creatinine ratio 19 12 - 20      GFR est AA >60 >60 ml/min/1.73m2    GFR est non-AA 55 (L) >60 ml/min/1.73m2    Calcium 8.7 8.5 - 10.1 MG/DL    Bilirubin, total 0.2 0.2 - 1.0 MG/DL    ALT (SGPT) 28 12 - 78 U/L    AST (SGOT) 28 15 - 37 U/L    Alk.  phosphatase 61 45 - 117 U/L    Protein, total 6.4 6.4 - 8.2 g/dL    Albumin 2.2 (L) 3.5 - 5.0 g/dL    Globulin 4.2 (H) 2.0 - 4.0 g/dL    A-G Ratio 0.5 (L) 1.1 - 2.2     PROCALCITONIN    Collection Time: 09/29/20  4:12 AM   Result Value Ref Range    Procalcitonin 3.04 ng/mL   NT-PRO BNP    Collection Time: 09/29/20  4:12 AM   Result Value Ref Range    NT pro-BNP 6,099 (H) <125 PG/ML   LIPID PANEL    Collection Time: 09/29/20  4:12 AM   Result Value Ref Range    LIPID PROFILE          Cholesterol, total 108 <200 MG/DL    Triglyceride 364 (H) <150 MG/DL    HDL Cholesterol 13 MG/DL    LDL, calculated 22.2 0 - 100 MG/DL    VLDL, calculated 72.8 MG/DL    CHOL/HDL Ratio 8.3 (H) 0.0 - 5.0     GLUCOSE, POC    Collection Time: 09/29/20  8:38 AM   Result Value Ref Range    Glucose (POC) 179 (H) 65 - 100 mg/dL    Performed by Anatoliy Wang        Intake/Output Summary (Last 24 hours) at 9/29/2020 0947  Last data filed at 9/29/2020 0700  Gross per 24 hour   Intake 1472.91 ml   Output 1450 ml   Net 22.91 ml      Patient Vitals for the past 24 hrs:   Temp Pulse Resp BP SpO2   09/29/20 0700 -- (!) 59 8 (!) 75/49 100 %   09/29/20 0600 -- 60 10 (!) 81/45 100 %   09/29/20 0500 -- 63 (!) 7 (!) 73/38 99 %   09/29/20 0400 98.1 °F (36.7 °C) 64 10 102/83 98 %   09/29/20 0300 -- (!) 54 (!) 7 97/65 99 %   09/29/20 0200 -- (!) 55 10 (!) 86/64 98 %   09/29/20 0100 -- 74 22 97/85 96 %   09/29/20 0000 98 °F (36.7 °C) (!) 58 10 99/66 99 %   09/28/20 2300 -- 62 10 104/62 97 %   09/28/20 2200 -- 62 16 100/75 98 %   09/28/20 2100 -- 69 12 108/70 98 %   09/28/20 2000 97.7 °F (36.5 °C) 61 9 96/60 96 %   09/28/20 1900 -- 65 14 (!) 87/57 90 %   09/28/20 1800 -- 62 14 (!) 94/58 98 %   09/28/20 1700 -- 61 14 (!) 94/58 97 %   09/28/20 1600 97.5 °F (36.4 °C) 61 20 98/72 98 %   09/28/20 1500 -- (!) 58 8 (!) 86/55 98 %   09/28/20 1400 -- 60 16 (!) 86/57 96 %   09/28/20 1300 -- 73 20 133/62 94 %   09/28/20 1200 98.6 °F (37 °C) 63 12 (!) 86/52 96 %   09/28/20 1100 -- 62 11 (!) 81/46 96 %   09/28/20 1000 -- 74 14 (!) 73/44 94 %      General:    Alert, cooperative, no distress. Psychiatric:    Normal Mood and affect    Eye/ENT:     Conjunctival pink. Able to hear voice at normal amplitude   Lungs:      Visibly symmetric chest expansion, No palpable tenderness. Clear to auscultation bilaterally. Heart[de-identified]    Regular rate and rhythm, S1, S2 normal,no murmur noted. Normal palpable peripheral pulses. No cyanosis   Abdomen:     Soft, non-tender, nondistended. organomegaly. Extremities:   Extremities normal, atraumatic, no edema. RLE foot in ace wrap. Neurologic:   MCKEON, No gross focal deficits         Faisal Roque NP  9/29/2020   9:03 AM     [facility] Office:   [facility] Office:  [address] [place] [address] Dr Mookie Harding [address] [facility]  [address]     [address] [place] [address]  P: [phone]    P: [phone]  F: [phone]    F: [phone]

## 2020-09-29 NOTE — PROGRESS NOTES
Follow up visit with Ms. Christopher Jernigan in CCU. Pt was sitting in the chair at the time of my visit. She expressed to be feeling much better, and she reflected on events of the past few days. Normalized her feelings as she shared her experiences. Pt shared that she has support from her significant other, Aundra Mcclain. He was not present at the time of my visit, but I shared with  [facility] that I had met him the night she transferred to the CCU. No additional needs expressed by [facility] at this time. She expressed appreciation for the visit. Assured her of ongoing prayers.     Francisca Gómez, [employer]

## 2020-09-29 NOTE — PROGRESS NOTES
Problem: Mobility Impaired (Adult and Pediatric)  Goal: *Acute Goals and Plan of Care (Insert Text)  Description: FUNCTIONAL STATUS PRIOR TO ADMISSION: Patient was modified independent using a rolling walker for functional mobility. Had recently stopped using RW within her home because it was inconvenient essentially. Was using RW due to recent R great toe amputation. HOME SUPPORT PRIOR TO ADMISSION: The patient lived with her fiance and teenage son. Physical Therapy Goals  Initiated 9/28/2020  1. Patient will move from supine to sit and sit to supine , scoot up and down, and roll side to side in bed with independence within 7 day(s). 2.  Patient will transfer from bed to chair and chair to bed with modified independence using the least restrictive device and while compliant with NWB to R LEwithin 7 day(s). 3.  Patient will perform sit to stand with modified independence while compliant with NWB to R LE within 7 day(s). 4.  Patient will ambulate with supervision/set-up for 60 feet with the least restrictive device while compliant with NWB to R LE within 7 day(s). 5.  Patient will ascend/descend 15 stairs with left handrail(s) with minimal assistance/contact guard assist while compliant with NWB R LE within 7 day(s). Outcome: Progressing Towards Goal     PHYSICAL THERAPY TREATMENT  Patient: Margeret Collet (53 y.o. female)  Date: 9/29/2020  Diagnosis: Osteomyelitis of foot ([address] [M86.9]   <principal problem not specified>  Procedure(s) (LRB):  LEFT HEART CATH (N/A)  CORONARY ANGIOGRAPHY (N/A)  Percutaneous Coronary Intervention (N/A)  Insert Stent Ranjit Coronary (N/A)  Intravascular Ultrasound (N/A) 3 Days Post-Op  Precautions: Fall, NWB(R LE)  Chart, physical therapy assessment, plan of care and goals were reviewed. ASSESSMENT  Patient continues with skilled PT services and is progressing towards goals.  Patient was received supine in bed for therapy, stated she had been up in the chair for the past several hours, but was agreeable to therapy. Pt was able to fully adhere to WB precautions during ambulation approximately 10 ft within room, using RW with min A for safety and Ax2 for management of lines. Provided patient education on strengthening bilateral LE with NWB exercises either supine in bed or seated in chair as well as instructed her to perform tricep push-up exercises when seated in chair to prepare for d/c home with current NWB status. She is scheduled to have an additional debridement of her wound tomorrow. Next session, patient will attempt to progress ambulation with RW and hop-to gait pattern post-op. Current Level of Function Impacting Discharge (mobility/balance): NWB R LE, decreased balance, decreased activity tolerance, decreased strength, min A with ambulation    Other factors to consider for discharge: prior level of independence, supportive family         PLAN :  Patient continues to benefit from skilled intervention to address the above impairments. Continue treatment per established plan of care. to address goals. Recommendation for discharge: (in order for the patient to meet his/her long term goals)  Physical therapy at least 2 days/week in the home AND ensure assist and/or supervision for safety with navigation of RW with household ambulation and adherence to NWB precautions on R LE. This discharge recommendation:  Has been made in collaboration with the attending provider and/or case management    IF patient discharges home will need the following DME: rolling walker       SUBJECTIVE:   Patient stated I know I have to get stronger.  \"I'm sorry if I was rude to y'all earlier\"    OBJECTIVE DATA SUMMARY:   Critical Behavior:  Neurologic State: Alert  Orientation Level: Oriented X4  Cognition: Appropriate decision making, Appropriate for age attention/concentration, Appropriate safety awareness     Functional Mobility Training:  Bed Mobility:     Supine to Sit: Modified independent     Scooting: Modified independent        Transfers:  Sit to Stand: Contact guard assistance  Stand to Sit: Contact guard assistance                             Balance:  Sitting: Intact  Standing: With support; Impaired  Standing - Static: Fair  Standing - Dynamic : Fair  Ambulation/Gait Training:  Distance (ft): 10 Feet (ft)  Assistive Device: Gait belt;Walker, rolling  Ambulation - Level of Assistance: Minimal assistance        Gait Abnormalities: Decreased step clearance  Right Side Weight Bearing: Non-weight bearing  Left Side Weight Bearing: Full  Base of Support: Shift to left     Speed/Rossi: Slow      Therapeutic Exercises:  Instructed patient on performing LAQ and quad sets independently, as well as, tricep push ups while seated in chair. Activity Tolerance:   Fair; patient fatigues more quickly with activity due to her NWB status in R LE. Will benefit from increased additional of TE along with gait training as discussed with patient in today's session. Please refer to the flowsheet for vital signs taken during this treatment. After treatment patient left in no apparent distress:   Seated at EOB with call bell within reach and table situated at beside. COMMUNICATION/COLLABORATION:   The patients plan of care was discussed with: Registered nurse.      Rayna Capellan, SPT   Time Calculation: 11 mins

## 2020-09-29 NOTE — PROGRESS NOTES
Pharmacist Note - Vancomycin Dosing  Therapy day 6  Indication: Osteomyelitis of the right foot  Current regimen: 1250mg IV [id number]    A Trough Level resulted at 11 mcg/mL which was obtained 17 hrs post-dose. The extrapolated \"true\" trough is approximately 12 mcg/mL based on the patient's known kinetics. Goal trough: 15 - 20 mcg/mL       Plan: Change to 1250mg IV [id number] with the first new dose due at 13:00 today, 9/29/2020 . Pharmacy will continue to monitor this patient daily for changes in clinical status and renal function.

## 2020-09-29 NOTE — INTERDISCIPLINARY ROUNDS
Multidisciplinary rounds were held 9/29/20.   Today's plan/goal includes (but not limited to): wean pressors, transfer out

## 2020-09-29 NOTE — PROGRESS NOTES
[facility]    ICU TEAM History and Physical    Name: Sarah Romero   : 1975   MRN: [id number]   Date: 2020      Assessment:     ICU Problems:  - Hypoxic Respiratory Failure (secondary to pulmonary edema; resolved)  - S/P Ccardiac Arrest  - CAD - S/p JAELYN to Circ & LAD  - Right foot osteomyelitis  - Opiate dependence  - Diabetes Mellitus    ICU Comprehensive Plan of Care:     Plans for this Shift:     1. CAD  a. S/p JAELYN to Circ & LAD on 2020  b. DAPT/Statin/Coreg  c. Add low dose Midodrine to wean off Phenyl  d. Cardiology following - Appreciate Rosendo/Vee/Kiran input  2. Osteomyelitis  a. Podiatry following  b. F/u wound cultures  c. Vanc, Flagyl  3. Diabetes Mellitus (uncontrolled)  a. Lantus 40u daily  b. Humalog SSI q6h  c. Appreicate Diabetes Management  4. SBP Goal of: > 90 mmHg, MAP Goal of: > 65 mmHg  5. Phenylephrine - For above SBP/MAP goals  a. Hold home antihypertensives  6. Methadone 160 mg PO daily (takes chronically at home)  7. Transfusion Trigger (Hgb): <7 g/dL  8. Pulmonary toilet: Duo-Nebs   9. SpO2 Goal: > 92%  10. Keep K>4; Mg>2   11. PT/OT: PT consulted and on board and OT consulted and on board   12. Discussed Plan of Care/Code Status: Full Code  13. Appreciate Consultants Input  14. Discussed Care Plan with Bedside RN  15. Documentation of Current Medications  16.  Rest of Plan Below:    F - Feeding:  No   A - Analgesia: Fentanyl  S - Sedation: Propofol and Fentanyl  T - DVT Prophylaxis: SCD's or Sequential Compression Device and Heparin   H - Head of Bed: > 30 Degrees  U - Ulcer Prophylaxis: Protonix (pantoprazole)   G - Glycemic Control: Insulin  S - Spontaneous Breathing Trial: Yes  B - Bowel Regimen: MiraLax and Docusate (Colace)  I - Indwelling Catheter:   Tubes: ETT and Orogastric Tube  Lines: Peripheral IV  Drains: Painter Catheter  D - De-escalation of Antibiotics: vancomycin, flagyl    Subjective:   Progress Note: 2020      Reason for ICU Admission: hypoxic respiratory failure, Right foot osteomyelitis, cellulitis    HPI: 51 y/o F with Diabetic neuropathy c/b Right foot osteomyelitis following several week course and numerous I+Ds. She underwent debridement today in the OR - course was uneverntful. Patient noted to be alert and at her baseline prior to leaving the pacu. On arrival to the floor a rapid response was called and this was followed shortly by a code blue to her room. Reportedly had respiratory distress and refused to comply with wearing NRB. She became progressively more bradycardic and progressed to PEA at which point ACLS was initiated. She had several rounds of compressions, one dose of epinephrine, and one dose of narcan. ROSC was achieved immediately following narcan administration at which time vertical nystagmus was observed and her breathing pattern was irregular and shallow. She was intubated uneventfully at the bedside and transferred to the ICU. In terms of underlying cause for her arrest, there is a possibility of this being opiate overdose, but the timing of administration per [facility] is not consistent with this. It may be possible that opiates were supplied by a third party - will discuss this patient's partner when able. In the interim, we will send a UDS to investigate other medication causes for AMS. Patient has history of home methadone use, she was noted to be bradypnec on induction of anesthesia in the OR prior to her case this afternoon. Interestingly she did not receive any opiates for analgesia following her operation. She was given a dose of 30mg methadone earlier today (far lower than the recorded dose of 160mg every day in medications tab). Following narcan administration and intubation, patient with diaphoresis, tachycardia, HTN, and nystagmus with marked mydriasis -- possibility of narcan induced opiate withdrawal. Currently on fentanyl gtt. She has LBBB of unclear chronicity.  Will follow up cardiac biomarkers and involve cardiology as able. ECHO in am.   DVT duplex yesterday lowers my suspicion for PE. No large PTX on bedside CXR. Given GFR, Radiologist and I elected to not use IV contrast for her scans. POD:  Day of Surgery    S/P:   Procedure(s):  DEBRIDEMENT OF BONE, RIGHT FOOT/ OPEN BONE BIOPSY RIGHT FOOT    Active Problem List:     Problem List  Never Reviewed          Codes Class    Osteomyelitis of foot (HCC) ICD-10-CM: M86.9  ICD-9-CM: 730.27               Past Medical History:      has a past medical history of Arthritis, Diabetes ([facility] [address] Endocrine disease, Gastrointestinal disorder, and Hypertension. Past Surgical History:      has a past surgical history that includes hx gi. Home Medications:     Prior to Admission medications    Medication Sig Start Date End Date Taking? Authorizing Provider   insulin U-500 CONCENTRATED regular (HumuLIN R U-500, Conc, Kwikpen) 500 unit/mL (3 mL) inpn subQ pen 80 Units by SubCUTAneous route three (3) times daily (with meals). Yes Provider, Historical   lisinopril-hydroCHLOROthiazide (PRINZIDE, ZESTORETIC) 10-12.5 mg per tablet Take 1 Tab by mouth daily. Yes Provider, Historical   sertraline (ZOLOFT) 50 mg tablet Take 50 mg by mouth two (2) times a day. Yes Provider, Historical   methadone (DOLOPHINE) 10 mg tablet Take 160 mg by mouth daily. Yes Provider, Historical   clonazePAM (KlonoPIN) 1 mg tablet Take 1 mg by mouth three (3) times daily. Yes Provider, Historical   pregabalin (Lyrica) 200 mg capsule Take 200 mg by mouth four (4) times daily. Yes Other, MD Romel   tiZANidine (ZANAFLEX) 4 mg capsule Take 4 mg by mouth two (2) times daily as needed (muscle spasms). Yes Other, MD Romel   amylase-lipase-protease (CREON) 24,000-76,000 -120,000 unit capsule Take 1 Cap by mouth three (3) times daily (with meals). Yes Other, MD Romel   omeprazole (PRILOSEC) 40 mg capsule Take 40 mg by mouth daily.    Yes Other, MD Romel   albuterol (PROVENTIL HFA, VENTOLIN HFA, PROAIR HFA) 90 mcg/actuation inhaler Take 2 Puffs by inhalation every four (4) hours as needed for Wheezing. 17  Yes Ashutosh Lawrence MD   fenofibrate (LOFIBRA) 160 mg tablet Take 160 mg by mouth daily. Indications: HYPERCHOLESTEROLEMIA   Yes Romel Howard MD   atorvastatin (Lipitor) 40 mg tablet Take 40 mg by mouth nightly. Yes Romel Howard MD   metFORMIN (GLUCOPHAGE) 500 mg tablet Take 1,000 mg by mouth two (2) times daily (with meals). Yes Other, MD Romel   GLIPIZIDE PO Take 10 mg by mouth daily (with breakfast). Yes Anita, MD Romel   LEVOTHYROXINE SODIUM (SYNTHROID PO) Take 200 mcg by mouth Daily (before breakfast). Yes Anita, MD Romel       Allergies/Social/Family History:     No Known Allergies   Social History     Tobacco Use    Smoking status: Current Some Day Smoker     Packs/day: 0.25    Smokeless tobacco: Never Used   Substance Use Topics    Alcohol use: Yes     Comment: quit about a month ago - drank only on the weekends      Family History   Problem Relation Age of Onset    Cancer Mother         colon       Review of Systems:     Review of systems not obtained due to patient factors. Objective:   Vital Signs:  Visit Vitals  BP (!) 75/49   Pulse (!) 59   Temp 98.1 °F (36.7 °C)   Resp 8   Ht 5' 7\" (1.702 m)   Wt 83.3 kg (183 lb 10.3 oz)   SpO2 100%   BMI 28.76 kg/m²    O2 Flow Rate (L/min): 2 l/min O2 Device: Nasal cannula Temp (24hrs), Av °F (36.7 °C), Min:97.5 °F (36.4 °C), Max:98.6 °F (37 °C)           Intake/Output:     Intake/Output Summary (Last 24 hours) at 2020 1110  Last data filed at 2020 0700  Gross per 24 hour   Intake 1388.25 ml   Output 1450 ml   Net -61.75 ml       Physical Exam:  General:  Sedated and on the ventilator. No acute distress. Eyes:  Sclera anicteric. Pupils equal, round, reactive to light. Mouth/Throat: Orotracheal tube in place. Neck: Supple. Lungs:   Wheezing bilaterally, good effort.    Cardiovascular:  Tachycardiac, no murmur. Abdomen:   Soft, non-tender, bowel sounds normal, non-distended. Extremities: No cyanosis or edema. Skin: No acute rash or lesions. Lymph Nodes: Cervical and supraclavicular normal.   Musculoskeletal:  No swelling or deformity. Lines/Devices:  Intact, no erythema, drainage, or tenderness. Psychiatric: Sedated and appears comfortable on ventilator. LABS AND  DATA: Personally reviewed  Recent Labs     09/29/20 0412 09/28/20 0439   WBC 9.7 12.2*   HGB 7.9* 8.4*   HCT 26.8* 27.8*    276     Recent Labs     09/29/20 0412 09/28/20  0812 09/28/20 0439     --  142   K 4.1  --  3.3*   *  --  109*   CO2 26  --  28   BUN 20  --  19   CREA 1.07*  --  0.97   *  --  165*   CA 8.7  --  8.8   MG  --  2.4  --      Recent Labs     09/29/20 0412 09/28/20 0439   AP 61 60   TP 6.4 6.6   ALB 2.2* 2.2*   GLOB 4.2* 4.4*       Ventilator Settings:  Mode Rate Tidal Volume Pressure FiO2 PEEP   Spontaneous   480 ml  8 cm H2O 50 % 5 cm H20     Peak airway pressure: 13 cm H2O    Minute ventilation: 7.43 l/min        MEDS: Reviewed    Chest X-Ray:  CXR Results  (Last 48 hours)    None          ECHO:  Bedside TTE: no large pericardial effusion, RV appears normal caliber with normal function. LV with no neli WMAs. Multidisciplinary Rounds Completed:  No    ABCDEF Bundle/Checklist Completed:  Yes    SPECIAL EQUIPMENT  None    DISPOSITION  Stay in ICU    CRITICAL CARE CONSULTANT NOTE  I had a face to face encounter with the patient, reviewed and interpreted patient data including clinical events, labs, images, vital signs, I/O's, and examined patient.   I have discussed the case and the plan and management of the patient's care with the consulting services, the bedside nurses and the respiratory therapist.      NOTE OF PERSONAL INVOLVEMENT IN CARE   This patient has a high probability of imminent, clinically significant deterioration, which requires the highest level of preparedness to intervene urgently. I participated in the decision-making and personally managed or directed the management of the following life and organ supporting interventions that required my frequent assessment to treat or prevent imminent deterioration. I personally spent 35 minutes of critical care time. This is time spent at this critically ill patient's bedside actively involved in patient care as well as the coordination of care and discussions with the patient's family. This does not include any procedural time which has been billed separately.     Celia Chris DO  Anesthesiologist/Intensivist     [facility]

## 2020-09-30 ENCOUNTER — ANESTHESIA (OUTPATIENT)
Dept: SURGERY | Age: 45
DRG: 364 | End: 2020-09-30
Payer: COMMERCIAL

## 2020-09-30 LAB
ALBUMIN SERPL-MCNC: 2.2 G/DL (ref 3.5–5)
ALBUMIN/GLOB SERPL: 0.6 {RATIO} (ref 1.1–2.2)
ALP SERPL-CCNC: 52 U/L (ref 45–117)
ALT SERPL-CCNC: 24 U/L (ref 12–78)
ANION GAP SERPL CALC-SCNC: 4 MMOL/L (ref 5–15)
AST SERPL-CCNC: 19 U/L (ref 15–37)
BASOPHILS # BLD: 0.1 K/UL (ref 0–0.1)
BASOPHILS NFR BLD: 1 % (ref 0–1)
BILIRUB SERPL-MCNC: 0.2 MG/DL (ref 0.2–1)
BNP SERPL-MCNC: 4580 PG/ML
BUN SERPL-MCNC: 18 MG/DL (ref 6–20)
BUN/CREAT SERPL: 17 (ref 12–20)
CALCIUM SERPL-MCNC: 8.8 MG/DL (ref 8.5–10.1)
CHLORIDE SERPL-SCNC: 110 MMOL/L (ref 97–108)
CHOLEST SERPL-MCNC: 104 MG/DL
CO2 SERPL-SCNC: 26 MMOL/L (ref 21–32)
CREAT SERPL-MCNC: 1.03 MG/DL (ref 0.55–1.02)
DIFFERENTIAL METHOD BLD: ABNORMAL
EOSINOPHIL # BLD: 0.4 K/UL (ref 0–0.4)
EOSINOPHIL NFR BLD: 4 % (ref 0–7)
ERYTHROCYTE [DISTWIDTH] IN BLOOD BY AUTOMATED COUNT: 19.2 % (ref 11.5–14.5)
GLOBULIN SER CALC-MCNC: 3.8 G/DL (ref 2–4)
GLUCOSE BLD STRIP.AUTO-MCNC: 104 MG/DL (ref 65–100)
GLUCOSE BLD STRIP.AUTO-MCNC: 162 MG/DL (ref 65–100)
GLUCOSE BLD STRIP.AUTO-MCNC: 166 MG/DL (ref 65–100)
GLUCOSE BLD STRIP.AUTO-MCNC: 237 MG/DL (ref 65–100)
GLUCOSE SERPL-MCNC: 188 MG/DL (ref 65–100)
HCT VFR BLD AUTO: 25 % (ref 35–47)
HDLC SERPL-MCNC: 17 MG/DL
HDLC SERPL: 6.1 {RATIO} (ref 0–5)
HGB BLD-MCNC: 7.6 G/DL (ref 11.5–16)
IMM GRANULOCYTES # BLD AUTO: 0.4 K/UL (ref 0–0.04)
IMM GRANULOCYTES NFR BLD AUTO: 5 % (ref 0–0.5)
LDLC SERPL CALC-MCNC: 19 MG/DL (ref 0–100)
LIPID PROFILE,FLP: ABNORMAL
LYMPHOCYTES # BLD: 2.3 K/UL (ref 0.8–3.5)
LYMPHOCYTES NFR BLD: 26 % (ref 12–49)
MCH RBC QN AUTO: 26.8 PG (ref 26–34)
MCHC RBC AUTO-ENTMCNC: 30.4 G/DL (ref 30–36.5)
MCV RBC AUTO: 88 FL (ref 80–99)
MONOCYTES # BLD: 0.7 K/UL (ref 0–1)
MONOCYTES NFR BLD: 8 % (ref 5–13)
NEUTS SEG # BLD: 4.9 K/UL (ref 1.8–8)
NEUTS SEG NFR BLD: 56 % (ref 32–75)
NRBC # BLD: 0.1 K/UL (ref 0–0.01)
NRBC BLD-RTO: 1.1 PER 100 WBC
PLATELET # BLD AUTO: 260 K/UL (ref 150–400)
PMV BLD AUTO: 11.5 FL (ref 8.9–12.9)
POTASSIUM SERPL-SCNC: 4.2 MMOL/L (ref 3.5–5.1)
PROCALCITONIN SERPL-MCNC: 1.58 NG/ML
PROT SERPL-MCNC: 6 G/DL (ref 6.4–8.2)
RBC # BLD AUTO: 2.84 M/UL (ref 3.8–5.2)
RBC MORPH BLD: ABNORMAL
SERVICE CMNT-IMP: ABNORMAL
SODIUM SERPL-SCNC: 140 MMOL/L (ref 136–145)
T4 FREE SERPL-MCNC: 1.2 NG/DL (ref 0.8–1.5)
TRIGL SERPL-MCNC: 340 MG/DL (ref ?–150)
TSH SERPL DL<=0.05 MIU/L-ACNC: 17.6 UIU/ML (ref 0.36–3.74)
VLDLC SERPL CALC-MCNC: 68 MG/DL
WBC # BLD AUTO: 8.8 K/UL (ref 3.6–11)

## 2020-09-30 PROCEDURE — 76210000063 HC OR PH I REC FIRST 0.5 HR: Performed by: PODIATRIST

## 2020-09-30 PROCEDURE — 74011250637 HC RX REV CODE- 250/637: Performed by: ANESTHESIOLOGY

## 2020-09-30 PROCEDURE — 97116 GAIT TRAINING THERAPY: CPT

## 2020-09-30 PROCEDURE — 83880 ASSAY OF NATRIURETIC PEPTIDE: CPT

## 2020-09-30 PROCEDURE — 74011636637 HC RX REV CODE- 636/637: Performed by: STUDENT IN AN ORGANIZED HEALTH CARE EDUCATION/TRAINING PROGRAM

## 2020-09-30 PROCEDURE — 85025 COMPLETE CBC W/AUTO DIFF WBC: CPT

## 2020-09-30 PROCEDURE — 74011250637 HC RX REV CODE- 250/637: Performed by: SPECIALIST

## 2020-09-30 PROCEDURE — 2709999900 HC NON-CHARGEABLE SUPPLY: Performed by: PODIATRIST

## 2020-09-30 PROCEDURE — 84480 ASSAY TRIIODOTHYRONINE (T3): CPT

## 2020-09-30 PROCEDURE — 74011250637 HC RX REV CODE- 250/637: Performed by: STUDENT IN AN ORGANIZED HEALTH CARE EDUCATION/TRAINING PROGRAM

## 2020-09-30 PROCEDURE — 74011250636 HC RX REV CODE- 250/636: Performed by: ANESTHESIOLOGY

## 2020-09-30 PROCEDURE — 77030002916 HC SUT ETHLN J&J -A: Performed by: PODIATRIST

## 2020-09-30 PROCEDURE — 82962 GLUCOSE BLOOD TEST: CPT

## 2020-09-30 PROCEDURE — 65620000000 HC RM CCU GENERAL

## 2020-09-30 PROCEDURE — 74011250636 HC RX REV CODE- 250/636: Performed by: INTERNAL MEDICINE

## 2020-09-30 PROCEDURE — 76010000138 HC OR TIME 0.5 TO 1 HR: Performed by: PODIATRIST

## 2020-09-30 PROCEDURE — 74011250636 HC RX REV CODE- 250/636: Performed by: NURSE ANESTHETIST, CERTIFIED REGISTERED

## 2020-09-30 PROCEDURE — 74011250636 HC RX REV CODE- 250/636: Performed by: NURSE PRACTITIONER

## 2020-09-30 PROCEDURE — 99231 SBSQ HOSP IP/OBS SF/LOW 25: CPT | Performed by: CLINICAL NURSE SPECIALIST

## 2020-09-30 PROCEDURE — 74011250637 HC RX REV CODE- 250/637: Performed by: FAMILY MEDICINE

## 2020-09-30 PROCEDURE — 74011250636 HC RX REV CODE- 250/636: Performed by: FAMILY MEDICINE

## 2020-09-30 PROCEDURE — 74011250637 HC RX REV CODE- 250/637: Performed by: INTERNAL MEDICINE

## 2020-09-30 PROCEDURE — 74011250636 HC RX REV CODE- 250/636: Performed by: PODIATRIST

## 2020-09-30 PROCEDURE — 99233 SBSQ HOSP IP/OBS HIGH 50: CPT | Performed by: INTERNAL MEDICINE

## 2020-09-30 PROCEDURE — 74011000250 HC RX REV CODE- 250: Performed by: PODIATRIST

## 2020-09-30 PROCEDURE — 76060000032 HC ANESTHESIA 0.5 TO 1 HR: Performed by: PODIATRIST

## 2020-09-30 PROCEDURE — 74011000258 HC RX REV CODE- 258: Performed by: FAMILY MEDICINE

## 2020-09-30 PROCEDURE — 84443 ASSAY THYROID STIM HORMONE: CPT

## 2020-09-30 PROCEDURE — 84439 ASSAY OF FREE THYROXINE: CPT

## 2020-09-30 PROCEDURE — 77030031139 HC SUT VCRL2 J&J -A: Performed by: PODIATRIST

## 2020-09-30 PROCEDURE — 36415 COLL VENOUS BLD VENIPUNCTURE: CPT

## 2020-09-30 PROCEDURE — 0JQQ0ZZ REPAIR RIGHT FOOT SUBCUTANEOUS TISSUE AND FASCIA, OPEN APPROACH: ICD-10-PCS | Performed by: PODIATRIST

## 2020-09-30 PROCEDURE — 84145 PROCALCITONIN (PCT): CPT

## 2020-09-30 PROCEDURE — 74011250637 HC RX REV CODE- 250/637: Performed by: NURSE PRACTITIONER

## 2020-09-30 PROCEDURE — 80061 LIPID PANEL: CPT

## 2020-09-30 PROCEDURE — 80053 COMPREHEN METABOLIC PANEL: CPT

## 2020-09-30 RX ORDER — SODIUM CHLORIDE 9 MG/ML
100 INJECTION, SOLUTION INTRAVENOUS CONTINUOUS
Status: DISCONTINUED | OUTPATIENT
Start: 2020-09-30 | End: 2020-10-01

## 2020-09-30 RX ORDER — SODIUM CHLORIDE 0.9 % (FLUSH) 0.9 %
5-40 SYRINGE (ML) INJECTION AS NEEDED
Status: DISCONTINUED | OUTPATIENT
Start: 2020-09-30 | End: 2020-09-30 | Stop reason: HOSPADM

## 2020-09-30 RX ORDER — SODIUM CHLORIDE 0.9 % (FLUSH) 0.9 %
5-40 SYRINGE (ML) INJECTION EVERY 8 HOURS
Status: DISCONTINUED | OUTPATIENT
Start: 2020-09-30 | End: 2020-10-02 | Stop reason: HOSPADM

## 2020-09-30 RX ORDER — PHENYLEPHRINE HCL IN 0.9% NACL 0.4MG/10ML
SYRINGE (ML) INTRAVENOUS AS NEEDED
Status: DISCONTINUED | OUTPATIENT
Start: 2020-09-30 | End: 2020-09-30 | Stop reason: HOSPADM

## 2020-09-30 RX ORDER — HYDROMORPHONE HYDROCHLORIDE 1 MG/ML
1 INJECTION, SOLUTION INTRAMUSCULAR; INTRAVENOUS; SUBCUTANEOUS
Status: DISCONTINUED | OUTPATIENT
Start: 2020-09-30 | End: 2020-10-02 | Stop reason: HOSPADM

## 2020-09-30 RX ORDER — CLOPIDOGREL 300 MG/1
600 TABLET, FILM COATED ORAL ONCE
Status: COMPLETED | OUTPATIENT
Start: 2020-10-01 | End: 2020-10-01

## 2020-09-30 RX ORDER — MIDAZOLAM HYDROCHLORIDE 1 MG/ML
1 INJECTION, SOLUTION INTRAMUSCULAR; INTRAVENOUS
Status: DISCONTINUED | OUTPATIENT
Start: 2020-09-30 | End: 2020-09-30 | Stop reason: HOSPADM

## 2020-09-30 RX ORDER — PROPOFOL 10 MG/ML
INJECTION, EMULSION INTRAVENOUS
Status: DISCONTINUED | OUTPATIENT
Start: 2020-09-30 | End: 2020-09-30 | Stop reason: HOSPADM

## 2020-09-30 RX ORDER — PROPOFOL 10 MG/ML
INJECTION, EMULSION INTRAVENOUS AS NEEDED
Status: DISCONTINUED | OUTPATIENT
Start: 2020-09-30 | End: 2020-09-30 | Stop reason: HOSPADM

## 2020-09-30 RX ORDER — ACETAMINOPHEN 325 MG/1
650 TABLET ORAL ONCE
Status: COMPLETED | OUTPATIENT
Start: 2020-09-30 | End: 2020-09-30

## 2020-09-30 RX ORDER — SODIUM CHLORIDE, SODIUM LACTATE, POTASSIUM CHLORIDE, CALCIUM CHLORIDE 600; 310; 30; 20 MG/100ML; MG/100ML; MG/100ML; MG/100ML
125 INJECTION, SOLUTION INTRAVENOUS CONTINUOUS
Status: DISCONTINUED | OUTPATIENT
Start: 2020-09-30 | End: 2020-09-30 | Stop reason: HOSPADM

## 2020-09-30 RX ORDER — DIPHENHYDRAMINE HYDROCHLORIDE 50 MG/ML
12.5 INJECTION, SOLUTION INTRAMUSCULAR; INTRAVENOUS AS NEEDED
Status: DISCONTINUED | OUTPATIENT
Start: 2020-09-30 | End: 2020-09-30 | Stop reason: HOSPADM

## 2020-09-30 RX ORDER — CLOPIDOGREL BISULFATE 75 MG/1
75 TABLET ORAL DAILY
Status: DISCONTINUED | OUTPATIENT
Start: 2020-10-02 | End: 2020-10-02 | Stop reason: HOSPADM

## 2020-09-30 RX ORDER — FENTANYL CITRATE 50 UG/ML
INJECTION, SOLUTION INTRAMUSCULAR; INTRAVENOUS AS NEEDED
Status: DISCONTINUED | OUTPATIENT
Start: 2020-09-30 | End: 2020-09-30 | Stop reason: HOSPADM

## 2020-09-30 RX ORDER — LIDOCAINE HYDROCHLORIDE 10 MG/ML
0.5 INJECTION, SOLUTION EPIDURAL; INFILTRATION; INTRACAUDAL; PERINEURAL AS NEEDED
Status: DISCONTINUED | OUTPATIENT
Start: 2020-09-30 | End: 2020-09-30 | Stop reason: HOSPADM

## 2020-09-30 RX ORDER — MORPHINE SULFATE 10 MG/ML
2 INJECTION, SOLUTION INTRAMUSCULAR; INTRAVENOUS
Status: DISCONTINUED | OUTPATIENT
Start: 2020-09-30 | End: 2020-09-30 | Stop reason: HOSPADM

## 2020-09-30 RX ORDER — ONDANSETRON 2 MG/ML
INJECTION INTRAMUSCULAR; INTRAVENOUS AS NEEDED
Status: DISCONTINUED | OUTPATIENT
Start: 2020-09-30 | End: 2020-09-30 | Stop reason: HOSPADM

## 2020-09-30 RX ORDER — SODIUM CHLORIDE, SODIUM LACTATE, POTASSIUM CHLORIDE, CALCIUM CHLORIDE 600; 310; 30; 20 MG/100ML; MG/100ML; MG/100ML; MG/100ML
INJECTION, SOLUTION INTRAVENOUS
Status: DISCONTINUED | OUTPATIENT
Start: 2020-09-30 | End: 2020-09-30 | Stop reason: HOSPADM

## 2020-09-30 RX ORDER — ONDANSETRON 2 MG/ML
4 INJECTION INTRAMUSCULAR; INTRAVENOUS AS NEEDED
Status: DISCONTINUED | OUTPATIENT
Start: 2020-09-30 | End: 2020-09-30 | Stop reason: HOSPADM

## 2020-09-30 RX ORDER — MIDAZOLAM HYDROCHLORIDE 1 MG/ML
1 INJECTION, SOLUTION INTRAMUSCULAR; INTRAVENOUS AS NEEDED
Status: DISCONTINUED | OUTPATIENT
Start: 2020-09-30 | End: 2020-09-30 | Stop reason: HOSPADM

## 2020-09-30 RX ORDER — OXYCODONE HYDROCHLORIDE 5 MG/1
10 TABLET ORAL
Status: DISCONTINUED | OUTPATIENT
Start: 2020-09-30 | End: 2020-10-02 | Stop reason: HOSPADM

## 2020-09-30 RX ORDER — FENTANYL CITRATE 50 UG/ML
50 INJECTION, SOLUTION INTRAMUSCULAR; INTRAVENOUS AS NEEDED
Status: DISCONTINUED | OUTPATIENT
Start: 2020-09-30 | End: 2020-09-30 | Stop reason: HOSPADM

## 2020-09-30 RX ORDER — MORPHINE SULFATE 10 MG/ML
4 INJECTION, SOLUTION INTRAMUSCULAR; INTRAVENOUS
Status: DISCONTINUED | OUTPATIENT
Start: 2020-09-30 | End: 2020-10-02 | Stop reason: HOSPADM

## 2020-09-30 RX ORDER — SODIUM CHLORIDE 0.9 % (FLUSH) 0.9 %
5-40 SYRINGE (ML) INJECTION AS NEEDED
Status: DISCONTINUED | OUTPATIENT
Start: 2020-09-30 | End: 2020-10-02 | Stop reason: HOSPADM

## 2020-09-30 RX ORDER — OXYCODONE HYDROCHLORIDE 5 MG/1
5 TABLET ORAL AS NEEDED
Status: DISCONTINUED | OUTPATIENT
Start: 2020-09-30 | End: 2020-09-30 | Stop reason: HOSPADM

## 2020-09-30 RX ORDER — BUPIVACAINE HYDROCHLORIDE 5 MG/ML
INJECTION, SOLUTION EPIDURAL; INTRACAUDAL AS NEEDED
Status: DISCONTINUED | OUTPATIENT
Start: 2020-09-30 | End: 2020-09-30 | Stop reason: HOSPADM

## 2020-09-30 RX ORDER — MIDAZOLAM HYDROCHLORIDE 1 MG/ML
INJECTION, SOLUTION INTRAMUSCULAR; INTRAVENOUS AS NEEDED
Status: DISCONTINUED | OUTPATIENT
Start: 2020-09-30 | End: 2020-09-30 | Stop reason: HOSPADM

## 2020-09-30 RX ORDER — SODIUM CHLORIDE 0.9 % (FLUSH) 0.9 %
5-40 SYRINGE (ML) INJECTION EVERY 8 HOURS
Status: DISCONTINUED | OUTPATIENT
Start: 2020-09-30 | End: 2020-09-30 | Stop reason: HOSPADM

## 2020-09-30 RX ORDER — DEXTROSE, SODIUM CHLORIDE, SODIUM LACTATE, POTASSIUM CHLORIDE, AND CALCIUM CHLORIDE 5; .6; .31; .03; .02 G/100ML; G/100ML; G/100ML; G/100ML; G/100ML
125 INJECTION, SOLUTION INTRAVENOUS CONTINUOUS
Status: DISCONTINUED | OUTPATIENT
Start: 2020-09-30 | End: 2020-09-30 | Stop reason: HOSPADM

## 2020-09-30 RX ORDER — FENTANYL CITRATE 50 UG/ML
25 INJECTION, SOLUTION INTRAMUSCULAR; INTRAVENOUS
Status: DISCONTINUED | OUTPATIENT
Start: 2020-09-30 | End: 2020-09-30 | Stop reason: HOSPADM

## 2020-09-30 RX ADMIN — ATORVASTATIN CALCIUM 80 MG: 40 TABLET, FILM COATED ORAL at 08:49

## 2020-09-30 RX ADMIN — Medication 10 ML: at 21:26

## 2020-09-30 RX ADMIN — METRONIDAZOLE 500 MG: 500 INJECTION, SOLUTION INTRAVENOUS at 12:14

## 2020-09-30 RX ADMIN — MIDODRINE HYDROCHLORIDE 5 MG: 5 TABLET ORAL at 21:27

## 2020-09-30 RX ADMIN — Medication 80 MCG: at 18:19

## 2020-09-30 RX ADMIN — Medication 10 ML: at 16:00

## 2020-09-30 RX ADMIN — DOCUSATE SODIUM 50MG AND SENNOSIDES 8.6MG 1 TABLET: 8.6; 5 TABLET, FILM COATED ORAL at 08:49

## 2020-09-30 RX ADMIN — SODIUM CHLORIDE 100 ML/HR: 9 INJECTION, SOLUTION INTRAVENOUS at 23:14

## 2020-09-30 RX ADMIN — CEFEPIME HYDROCHLORIDE 2 G: 2 INJECTION, POWDER, FOR SOLUTION INTRAVENOUS at 21:26

## 2020-09-30 RX ADMIN — METHADONE HYDROCHLORIDE 160 MG: 10 TABLET ORAL at 09:21

## 2020-09-30 RX ADMIN — CLONAZEPAM 1 MG: 1 TABLET ORAL at 15:59

## 2020-09-30 RX ADMIN — PROPOFOL 50 MG: 10 INJECTION, EMULSION INTRAVENOUS at 17:47

## 2020-09-30 RX ADMIN — FENTANYL CITRATE 50 MCG: 50 INJECTION, SOLUTION INTRAMUSCULAR; INTRAVENOUS at 17:45

## 2020-09-30 RX ADMIN — METRONIDAZOLE 500 MG: 500 INJECTION, SOLUTION INTRAVENOUS at 23:28

## 2020-09-30 RX ADMIN — CEFEPIME HYDROCHLORIDE 2 G: 2 INJECTION, POWDER, FOR SOLUTION INTRAVENOUS at 12:11

## 2020-09-30 RX ADMIN — Medication 10 ML: at 06:36

## 2020-09-30 RX ADMIN — HYDROMORPHONE HYDROCHLORIDE 2 MG: 1 INJECTION, SOLUTION INTRAMUSCULAR; INTRAVENOUS; SUBCUTANEOUS at 03:56

## 2020-09-30 RX ADMIN — Medication 10 ML: at 21:28

## 2020-09-30 RX ADMIN — MIDODRINE HYDROCHLORIDE 5 MG: 5 TABLET ORAL at 15:56

## 2020-09-30 RX ADMIN — TICAGRELOR 90 MG: 90 TABLET ORAL at 08:49

## 2020-09-30 RX ADMIN — PROPOFOL 50 MCG/KG/MIN: 10 INJECTION, EMULSION INTRAVENOUS at 17:49

## 2020-09-30 RX ADMIN — ASPIRIN 81 MG CHEWABLE TABLET 81 MG: 81 TABLET CHEWABLE at 08:49

## 2020-09-30 RX ADMIN — VANCOMYCIN HYDROCHLORIDE 1250 MG: 10 INJECTION, POWDER, LYOPHILIZED, FOR SOLUTION INTRAVENOUS at 00:18

## 2020-09-30 RX ADMIN — CLONAZEPAM 1 MG: 1 TABLET ORAL at 23:25

## 2020-09-30 RX ADMIN — PREGABALIN 150 MG: 75 CAPSULE ORAL at 19:20

## 2020-09-30 RX ADMIN — TICAGRELOR 90 MG: 90 TABLET ORAL at 21:27

## 2020-09-30 RX ADMIN — OXYCODONE 10 MG: 5 TABLET ORAL at 09:21

## 2020-09-30 RX ADMIN — MIDAZOLAM HYDROCHLORIDE 2 MG: 1 INJECTION, SOLUTION INTRAMUSCULAR; INTRAVENOUS at 17:45

## 2020-09-30 RX ADMIN — INSULIN GLARGINE 60 UNITS: 100 INJECTION, SOLUTION SUBCUTANEOUS at 08:49

## 2020-09-30 RX ADMIN — MIDODRINE HYDROCHLORIDE 5 MG: 5 TABLET ORAL at 06:39

## 2020-09-30 RX ADMIN — ONDANSETRON HYDROCHLORIDE 4 MG: 2 INJECTION, SOLUTION INTRAMUSCULAR; INTRAVENOUS at 18:10

## 2020-09-30 RX ADMIN — SODIUM CHLORIDE 100 ML/HR: 9 INJECTION, SOLUTION INTRAVENOUS at 12:11

## 2020-09-30 RX ADMIN — VANCOMYCIN HYDROCHLORIDE 1250 MG: 10 INJECTION, POWDER, LYOPHILIZED, FOR SOLUTION INTRAVENOUS at 12:19

## 2020-09-30 RX ADMIN — SODIUM CHLORIDE, POTASSIUM CHLORIDE, SODIUM LACTATE AND CALCIUM CHLORIDE: 600; 310; 30; 20 INJECTION, SOLUTION INTRAVENOUS at 17:42

## 2020-09-30 RX ADMIN — SODIUM CHLORIDE, SODIUM LACTATE, POTASSIUM CHLORIDE, AND CALCIUM CHLORIDE 125 ML/HR: 600; 310; 30; 20 INJECTION, SOLUTION INTRAVENOUS at 17:34

## 2020-09-30 RX ADMIN — FENOFIBRATE 145 MG: 145 TABLET ORAL at 08:49

## 2020-09-30 RX ADMIN — PANTOPRAZOLE SODIUM 40 MG: 40 TABLET, DELAYED RELEASE ORAL at 06:39

## 2020-09-30 RX ADMIN — HYDROMORPHONE HYDROCHLORIDE 1 MG: 1 INJECTION, SOLUTION INTRAMUSCULAR; INTRAVENOUS; SUBCUTANEOUS at 23:25

## 2020-09-30 RX ADMIN — Medication 80 MCG: at 18:10

## 2020-09-30 RX ADMIN — CLONAZEPAM 1 MG: 1 TABLET ORAL at 12:34

## 2020-09-30 RX ADMIN — Medication 80 MCG: at 17:48

## 2020-09-30 RX ADMIN — LEVOTHYROXINE SODIUM 200 MCG: 0.1 TABLET ORAL at 06:39

## 2020-09-30 RX ADMIN — ACETAMINOPHEN 650 MG: 325 TABLET ORAL at 17:35

## 2020-09-30 RX ADMIN — HYDROMORPHONE HYDROCHLORIDE 2 MG: 1 INJECTION, SOLUTION INTRAMUSCULAR; INTRAVENOUS; SUBCUTANEOUS at 19:17

## 2020-09-30 RX ADMIN — PROPOFOL 50 MG: 10 INJECTION, EMULSION INTRAVENOUS at 17:50

## 2020-09-30 RX ADMIN — MIDAZOLAM HYDROCHLORIDE 3 MG: 1 INJECTION, SOLUTION INTRAMUSCULAR; INTRAVENOUS at 17:42

## 2020-09-30 RX ADMIN — FENTANYL CITRATE 50 MCG: 50 INJECTION, SOLUTION INTRAMUSCULAR; INTRAVENOUS at 17:48

## 2020-09-30 RX ADMIN — Medication 80 MCG: at 17:55

## 2020-09-30 RX ADMIN — INSULIN LISPRO 4 UNITS: 100 INJECTION, SOLUTION INTRAVENOUS; SUBCUTANEOUS at 06:39

## 2020-09-30 RX ADMIN — PREGABALIN 150 MG: 75 CAPSULE ORAL at 08:49

## 2020-09-30 RX ADMIN — TRAZODONE HYDROCHLORIDE 50 MG: 50 TABLET ORAL at 21:27

## 2020-09-30 RX ADMIN — INSULIN LISPRO 2 UNITS: 100 INJECTION, SOLUTION INTRAVENOUS; SUBCUTANEOUS at 12:35

## 2020-09-30 RX ADMIN — Medication 10 ML: at 06:35

## 2020-09-30 RX ADMIN — CLONAZEPAM 1 MG: 1 TABLET ORAL at 04:00

## 2020-09-30 RX ADMIN — CEFEPIME HYDROCHLORIDE 2 G: 2 INJECTION, POWDER, FOR SOLUTION INTRAVENOUS at 04:02

## 2020-09-30 NOTE — PERIOP NOTES
TRANSFER - IN REPORT:    Verbal report received from Shaheed Zendejas RN(name) on Melyssa Lewis  being received from CCU(unit) for ordered procedure      Report consisted of patients Situation, Background, Assessment and   Recommendations(SBAR). Information from the following report(s) SBAR, Kardex, ED Summary, OR Summary, Procedure Summary, Intake/Output, MAR, Recent Results and Cardiac Rhythm SR was reviewed with the receiving nurse. Opportunity for questions and clarification was provided. Assessment completed upon patients arrival to unit and care assumed.

## 2020-09-30 NOTE — PROGRESS NOTES
[facility]    ICU TEAM History and Physical    Name: Arleth Thomas   : 1975   MRN: [id number]   Date: 2020      Assessment:     ICU Problems:  - Hypoxic Respiratory Failure (secondary to pulmonary edema; resolved)  - S/P Ccardiac Arrest  - CAD - S/p JAELYN to Circ & LAD  - Right foot osteomyelitis  - Opiate dependence  - Diabetes Mellitus    ICU Comprehensive Plan of Care:     Plans for this Shift:     1. CAD  a. S/p JAELYN to Circ & LAD on 2020  b. DAPT/Statin/Coreg  c. Frank off < 24h  d. Continue Midodrine   e. Cardiology following - Appreciate Rosendo/Vee/Kiran input  2. Osteomyelitis  a. Podiatry following - to OR in am for further debridement   b. F/u wound cultures  c. Vanc, Flagyl  3. Diabetes Mellitus (uncontrolled)  a. Lantus 40u daily  b. Humalog SSI q6h  c. Appreicate Diabetes Management  4. SBP Goal of: > 90 mmHg, MAP Goal of: > 65 mmHg  5. None - For above SBP/MAP goals  a. Hold home antihypertensives  6. Methadone 160 mg PO daily (takes chronically at home)  a. Avoid QTc-prolonging medications   7. Transfusion Trigger (Hgb): <7 g/dL  8. Pulmonary toilet: Duo-Nebs   9. SpO2 Goal: > 92%  10. Keep K>4; Mg>2   11. PT/OT: PT consulted and on board and OT consulted and on board   12. Discussed Plan of Care/Code Status: Full Code  13. Appreciate Consultants Input  14. Discussed Care Plan with Bedside RN  15. Documentation of Current Medications  16.  Rest of Plan Below:    F - Feeding:  No   A - Analgesia: Fentanyl  S - Sedation: N/A  T - DVT Prophylaxis: SCD's or Sequential Compression Device and Heparin   H - Head of Bed: > 30 Degrees  U - Ulcer Prophylaxis: Protonix (pantoprazole)   G - Glycemic Control: Insulin  S - Spontaneous Breathing Trial: Yes  B - Bowel Regimen: MiraLax and Docusate (Colace)  I - Indwelling Catheter:   Tubes: None  Lines: Peripheral IV  Drains: Painter Catheter  D - De-escalation of Antibiotics: vancomycin, flagyl    Subjective:   Progress Note: 9/30/2020      Reason for ICU Admission: hypoxic respiratory failure, Right foot osteomyelitis, cellulitis    HPI: 49 y/o F with Diabetic neuropathy c/b Right foot osteomyelitis following several week course and numerous I+Ds. She underwent debridement today in the OR - course was uneverntful. Patient noted to be alert and at her baseline prior to leaving the pacu. On arrival to the floor a rapid response was called and this was followed shortly by a code blue to her room. Reportedly had respiratory distress and refused to comply with wearing NRB. She became progressively more bradycardic and progressed to PEA at which point ACLS was initiated. She had several rounds of compressions, one dose of epinephrine, and one dose of narcan. ROSC was achieved immediately following narcan administration at which time vertical nystagmus was observed and her breathing pattern was irregular and shallow. She was intubated uneventfully at the bedside and transferred to the ICU. In terms of underlying cause for her arrest, there is a possibility of this being opiate overdose, but the timing of administration per [facility] is not consistent with this. It may be possible that opiates were supplied by a third party - will discuss this patient's partner when able. In the interim, we will send a UDS to investigate other medication causes for AMS. Patient has history of home methadone use, she was noted to be bradypnec on induction of anesthesia in the OR prior to her case this afternoon. Interestingly she did not receive any opiates for analgesia following her operation. She was given a dose of 30mg methadone earlier today (far lower than the recorded dose of 160mg every day in medications tab). Following narcan administration and intubation, patient with diaphoresis, tachycardia, HTN, and nystagmus with marked mydriasis -- possibility of narcan induced opiate withdrawal. Currently on fentanyl gtt. She has LBBB of unclear chronicity. Will follow up cardiac biomarkers and involve cardiology as able. ECHO in am.   DVT duplex yesterday lowers my suspicion for PE. No large PTX on bedside CXR. Given GFR, Radiologist and I elected to not use IV contrast for her scans. Overnight events:   No major o/n events reported. Frank off yesterday. S/P:   Procedure(s):  DEBRIDEMENT OF BONE, RIGHT FOOT/ OPEN BONE BIOPSY RIGHT FOOT    Active Problem List:     Problem List  Never Reviewed          Codes Class    Osteomyelitis of foot (HCC) ICD-10-CM: M86.9  ICD-9-CM: 730.27               Past Medical History:      has a past medical history of Arthritis, Diabetes ([facility] [address] Endocrine disease, Gastrointestinal disorder, and Hypertension. Past Surgical History:      has a past surgical history that includes hx gi. Home Medications:     Prior to Admission medications    Medication Sig Start Date End Date Taking? Authorizing Provider   insulin U-500 CONCENTRATED regular (HumuLIN R U-500, Conc, Kwikpen) 500 unit/mL (3 mL) inpn subQ pen 80 Units by SubCUTAneous route three (3) times daily (with meals). Yes Provider, Historical   lisinopril-hydroCHLOROthiazide (PRINZIDE, ZESTORETIC) 10-12.5 mg per tablet Take 1 Tab by mouth daily. Yes Provider, Historical   sertraline (ZOLOFT) 50 mg tablet Take 50 mg by mouth two (2) times a day. Yes Provider, Historical   methadone (DOLOPHINE) 10 mg tablet Take 160 mg by mouth daily. Yes Provider, Historical   clonazePAM (KlonoPIN) 1 mg tablet Take 1 mg by mouth three (3) times daily. Yes Provider, Historical   pregabalin (Lyrica) 200 mg capsule Take 200 mg by mouth four (4) times daily. Yes Other, MD Romel   tiZANidine (ZANAFLEX) 4 mg capsule Take 4 mg by mouth two (2) times daily as needed (muscle spasms). Yes Other, MD Romel   amylase-lipase-protease (CREON) 24,000-76,000 -120,000 unit capsule Take 1 Cap by mouth three (3) times daily (with meals).    Yes Other, MD Romel   omeprazole (PRILOSEC) 40 mg capsule Take 40 mg by mouth daily. Yes Anita, MD Romel   albuterol (PROVENTIL HFA, VENTOLIN HFA, PROAIR HFA) 90 mcg/actuation inhaler Take 2 Puffs by inhalation every four (4) hours as needed for Wheezing. 17  Yes Ashutosh Lawrence MD   fenofibrate (LOFIBRA) 160 mg tablet Take 160 mg by mouth daily. Indications: HYPERCHOLESTEROLEMIA   Yes Anita, MD Romel   atorvastatin (Lipitor) 40 mg tablet Take 40 mg by mouth nightly. Yes Romel Howard MD   metFORMIN (GLUCOPHAGE) 500 mg tablet Take 1,000 mg by mouth two (2) times daily (with meals). Yes Romel Howard MD   GLIPIZIDE PO Take 10 mg by mouth daily (with breakfast). Yes Romel Howard MD   LEVOTHYROXINE SODIUM (SYNTHROID PO) Take 200 mcg by mouth Daily (before breakfast). Yes Anita, MD Romel       Allergies/Social/Family History:     No Known Allergies   Social History     Tobacco Use    Smoking status: Current Some Day Smoker     Packs/day: 0.25    Smokeless tobacco: Never Used   Substance Use Topics    Alcohol use: Yes     Comment: quit about a month ago - drank only on the weekends      Family History   Problem Relation Age of Onset    Cancer Mother         colon       Review of Systems:     Review of systems not obtained due to patient factors. Objective:   Vital Signs:  Visit Vitals  BP (!) 86/51   Pulse (!) 59   Temp 98.7 °F (37.1 °C)   Resp 8   Ht 5' 7\" (1.702 m)   Wt 83.5 kg (184 lb 1.4 oz)   SpO2 99%   BMI 28.83 kg/m²    O2 Flow Rate (L/min): 2 l/min O2 Device: Nasal cannula Temp (24hrs), Av.9 °F (36.6 °C), Min:97.5 °F (36.4 °C), Max:98.7 °F (37.1 °C)           Intake/Output:     Intake/Output Summary (Last 24 hours) at 2020 1142  Last data filed at 2020 0402  Gross per 24 hour   Intake 1801.92 ml   Output 1800 ml   Net 1.92 ml       Physical Exam:  General:  No acute distress. Eyes:  Sclera anicteric. Pupils equal, round, reactive to light. Mouth/Throat: Clear    Neck: Supple. Lungs:   Wheezing bilaterally, good effort. Cardiovascular:  Tachycardiac, no murmur. Abdomen:   Soft, non-tender, bowel sounds normal, non-distended. Extremities: No cyanosis or edema. Skin: No acute rash or lesions. Dressings in place. Lymph Nodes: Cervical and supraclavicular normal.   Musculoskeletal:  No swelling or deformity. Lines/Devices:  Intact, no erythema, drainage, or tenderness. Neuro/Psych: Appropriate, nonfocal grossly        LABS AND  DATA: Personally reviewed  Recent Labs     09/30/20 0335 09/29/20 0412   WBC 8.8 9.7   HGB 7.6* 7.9*   HCT 25.0* 26.8*    257     Recent Labs     09/30/20 0335 09/29/20 0412 09/28/20  0812    143  --    K 4.2 4.1  --    * 112*  --    CO2 26 26  --    BUN 18 20  --    CREA 1.03* 1.07*  --    * 170*  --    CA 8.8 8.7  --    MG  --   --  2.4     Recent Labs     09/30/20 0335 09/29/20 0412   AP 52 61   TP 6.0* 6.4   ALB 2.2* 2.2*   GLOB 3.8 4.2*       Ventilator Settings:  Mode Rate Tidal Volume Pressure FiO2 PEEP   Spontaneous   480 ml  8 cm H2O 50 % 5 cm H20     Peak airway pressure: 13 cm H2O    Minute ventilation: 7.43 l/min        MEDS: Reviewed    Chest X-Ray:  CXR Results  (Last 48 hours)    None          ECHO:  Bedside TTE: no large pericardial effusion, RV appears normal caliber with normal function. LV with no neli WMAs. Multidisciplinary Rounds Completed: Yes    ABCDEF Bundle/Checklist Completed:  Yes    SPECIAL EQUIPMENT  None    DISPOSITION  Stay in ICU    CRITICAL CARE CONSULTANT NOTE  I had a face to face encounter with the patient, reviewed and interpreted patient data including clinical events, labs, images, vital signs, I/O's, and examined patient.   I have discussed the case and the plan and management of the patient's care with the consulting services, the bedside nurses and the respiratory therapist.      NOTE OF PERSONAL INVOLVEMENT IN CARE   This patient has a high probability of imminent, clinically significant deterioration, which requires the highest level of preparedness to intervene urgently. I participated in the decision-making and personally managed or directed the management of the following life and organ supporting interventions that required my frequent assessment to treat or prevent imminent deterioration. I personally spent 31 minutes of critical care time. This is time spent at this critically ill patient's bedside actively involved in patient care as well as the coordination of care and discussions with the patient's family. This does not include any procedural time which has been billed separately.     DO Manjeet Giron

## 2020-09-30 NOTE — PROGRESS NOTES
Cardiology Progress Note           9/24/2020 12:02 PM   Osteomyelitis of foot ([facility] [address] [M86.9]   HPI: Álvaro Dacosta is a 50 y.o. female admitted for Osteomyelitis of foot ([facility] [address] [M86.9]. Has PMH of DM 2 and  HTN. Had [place] for several days prior to admit, thought it was GERD. Underwent I&D of right foot. Had postop arrest and EKG changes. NSTEMI, troponin peak to >100. Underwent LHC with stent to Left circumflex and LAD. Echo with NL EF, mild AS. Was hypotensive and required neosynephrine which is now off. Was started on midodrine. .       Subjective: This a.m.(9/30.), pt is sleeping. No chest pain or SOB overnight. Nurse reports pt was a \"little wobbly\" when she first got up this a.m. but resolved quickly. She remains hypotensive. Off neosynephrine. On midodrine. Investigation  Telemetry: normal sinus rhythm  ECG:   Echocardiogram:   09/24/20   ECHO ADULT COMPLETE 09/27/2020 9/27/2020    Narrative · LV: Estimated LVEF is 60 - 65%. Normal cavity size and systolic function   (ejection fraction normal). Upper normal wall thickness. Wall motion:   normal.  · AV: Aortic valve is functionally bicuspid. [place]he located between the   right cusp and noncoronary cusp. Moderate aortic valve sclerosis with   stenosis. Severe aortic valve leaflet calcification present. Severely   reduced right leaflet mobility of the aortic valve. Aortic valve mean   gradient is 7 mmHg. Aortic valve area is 1.8 cm2. Mild aortic valve   stenosis is present. · LA: Mildly dilated left atrium. · MV: Mild mitral valve regurgitation is present. · TV: Mild tricuspid valve regurgitation is present. · PA: Mild pulmonary hypertension. Pulmonary arterial systolic pressure is   40 mmHg. Signed by: Rufina Fernandez MD          Assessment and PLAN     1. NSTEMI/CAD: s/p stent to Left Circumflex and LAD. Continue ASA, high intensity statin.   Continue Brilinta for today but will transition to plavix tomorrow due to continued anemia. Echocardiogram with NL EF, NWMA, mild AS, mild MR. No BB for now given low  BP. If BP improves, will start metoprolol tartrate. [facility] SYSTEM from cardiology standpoint to transfer to floor once off neosynephrine. 2. Hypotension: now BP low NL. Do not suspect cardiogenic shock given NL LV function. Off neosynephrine. On midodrine 5 mg TID. Hold BB for now. Will restart IVF. TSH is elevated so will check Free T4, T3. (If abnormal, hypothyroidism could also be contributing to hypothyroidism. Check cortisol level in a.m. 3.  Hx of HTN:  BP low. Off home lisinopril-HCTZ. 4. Dyslipidemia: LDL 22, HDL 13, Triglycerides 364. On fenofibrate. Lipitor started this admission. .    5. DM: A1C=9.8. Management per primary team.    6. Right foot cellulitis/oseto: s/p I&D with removal of all nonviable soft tissue right foot, natalie debridement rt foot. Antibiotics per podiatry, primary team.  Discussed with Dr. Miles Negro- he is planning to do D&I and close RLE foot wound. OK to proceed from cardiac standpoint without interruption of DAPT. Perioperative hydration important. Would prefer option of MAC sedation with local block to right foot for procedure. 8. Anemia: hgb trending slightly down 7.6 from 7.9. May also contribute to hypotension. .     9. Hypothyroidism : on synthroid. TSH 17.6. Check free T4, T3.     [x]    High complexity decision making was performed  []    Patient is at high-risk of decompensation with multiple organ involvement     Review of Symptoms:  Respiratory: No exertional dyspnea, orthopnea, PND, cough, hemoptysis, URI. Cardiovascular: No CP, palpitations, sweating, lightheadedness, dizziness, syncope, presyncope, lower extremity swelling. Otherwise no other pertinent positive or negative symptoms on ROS.      Patient Active Problem List    Diagnosis Date Noted    Osteomyelitis of foot ([facility] [address] 09/24/2020      Other, MD Romel  Past Medical History:   Diagnosis Date  Arthritis     Diabetes ([facility] [address]     Endocrine disease     hypothyroid    Gastrointestinal disorder     pancreatitis    Hypertension       Past Surgical History:   Procedure Laterality Date    HX GI      colonoscpy     Social History     Socioeconomic History    Marital status: SINGLE     Spouse name: Not on file    Number of children: Not on file    Years of education: Not on file    Highest education level: Not on file   Tobacco Use    Smoking status: Current Some Day Smoker     Packs/day: 0.25    Smokeless tobacco: Never Used   Substance and Sexual Activity    Alcohol use: Yes     Comment: quit about a month ago - drank only on the weekends    Drug use: No    Sexual activity: Yes     Partners: Male     Family History   Problem Relation Age of Onset    Cancer Mother         colon      Current Facility-Administered Medications   Medication Dose Route Frequency    oxyCODONE IR (ROXICODONE) tablet 10 mg  10 mg Oral Q4H PRN    morphine 10 mg/ml injection 4 mg  4 mg IntraVENous Q3H PRN    0.9% sodium chloride infusion  100 mL/hr IntraVENous CONTINUOUS    vancomycin (VANCOCIN) 1250 mg in  ml infusion  1,250 mg IntraVENous Q12H    insulin lispro (HUMALOG) injection 8 Units  8 Units SubCUTAneous TID WITH MEALS    midodrine (PROAMATINE) tablet 5 mg  5 mg Oral Q8H    cefepime (MAXIPIME) 2 g in 0.9% sodium chloride (MBP/ADV) 100 mL  2 g IntraVENous Q8H    insulin lispro (HUMALOG) injection   SubCUTAneous AC&HS    HYDROmorphone (PF) (DILAUDID) injection 2 mg  2 mg IntraVENous Q4H PRN    PHENYLephrine (ARELI-SYNEPHRINE) 30 mg in 0.9% sodium chloride 250 mL infusion   mcg/min IntraVENous TITRATE    insulin glargine (LANTUS) injection 60 Units  60 Units SubCUTAneous DAILY    aspirin chewable tablet 81 mg  81 mg Oral DAILY    sodium chloride (NS) flush 5-40 mL  5-40 mL IntraVENous Q8H    sodium chloride (NS) flush 5-40 mL  5-40 mL IntraVENous PRN    ticagrelor (BRILINTA) tablet 90 mg  90 mg Oral Q12H    atorvastatin (LIPITOR) tablet 80 mg  80 mg Oral DAILY    traZODone (DESYREL) tablet 50 mg  50 mg Oral QHS    nitroglycerin (NITROLINGUAL) sublingual 0.4 mg/spray  1 Spray SubLINGual Q5MIN PRN    metroNIDAZOLE (FLAGYL) IVPB premix 500 mg  500 mg IntraVENous Q12H    albuterol-ipratropium (DUO-NEB) 2.5 MG-0.5 MG/3 ML  3 mL Nebulization Q6H PRN    methadone (DOLOPHINE) tablet 160 mg  160 mg Oral DAILY    clonazePAM (KlonoPIN) tablet 1 mg  1 mg Oral TID PRN    sodium chloride (NS) flush 5-40 mL  5-40 mL IntraVENous Q8H    sodium chloride (NS) flush 5-40 mL  5-40 mL IntraVENous PRN    ondansetron (ZOFRAN) injection 4 mg  4 mg IntraVENous Q4H PRN    polyethylene glycol (MIRALAX) packet 17 g  17 g Oral DAILY    senna-docusate (PERICOLACE) 8.6-50 mg per tablet 1 Tab  1 Tab Oral DAILY    lipase-protease-amylase (CREON 24,000) capsule 1 Cap  1 Cap Oral TID WITH MEALS    busPIRone (BUSPAR) tablet 15 mg  15 mg Oral TID    fenofibrate nanocrystallized (TRICOR) tablet 145 mg  145 mg Oral DAILY    levothyroxine (SYNTHROID) tablet 200 mcg  200 mcg Oral ACB    pantoprazole (PROTONIX) tablet 40 mg  40 mg Oral ACB    pregabalin (LYRICA) capsule 150 mg  150 mg Oral BID    zolpidem (AMBIEN) tablet 5 mg  5 mg Oral QHS PRN    sodium chloride (NS) flush 5-40 mL  5-40 mL IntraVENous Q8H    sodium chloride (NS) flush 5-40 mL  5-40 mL IntraVENous PRN    acetaminophen (TYLENOL) tablet 650 mg  650 mg Oral Q4H PRN    glucose chewable tablet 16 g  4 Tab Oral PRN    glucagon (GLUCAGEN) injection 1 mg  1 mg IntraMUSCular PRN    dextrose 10% infusion 0-250 mL  0-250 mL IntraVENous PRN    Vancomycin pharmacy to dose   Other Rx Dosing/Monitoring      Prior to Admission Medications   Prescriptions Last Dose Informant Patient Reported? Taking? GLIPIZIDE PO 9/23/2020 at Unknown time  Yes Yes   Sig: Take 10 mg by mouth daily (with breakfast).    LEVOTHYROXINE SODIUM (SYNTHROID PO) 9/23/2020 at Unknown time  Yes Yes Sig: Take 200 mcg by mouth Daily (before breakfast). albuterol (PROVENTIL HFA, VENTOLIN HFA, PROAIR HFA) 90 mcg/actuation inhaler   No Yes   Sig: Take 2 Puffs by inhalation every four (4) hours as needed for Wheezing. amylase-lipase-protease (CREON) 24,000-76,000 -120,000 unit capsule  at [address] [place] [address]  Yes Yes   Sig: Take 1 Cap by mouth three (3) times daily (with meals). atorvastatin (Lipitor) 40 mg tablet   Yes Yes   Sig: Take 40 mg by mouth nightly. clonazePAM (KlonoPIN) 1 mg tablet 2020 at Unknown time  Yes Yes   Sig: Take 1 mg by mouth three (3) times daily. fenofibrate (LOFIBRA) 160 mg tablet 2020 at Unknown time  Yes Yes   Sig: Take 160 mg by mouth daily. Indications: HYPERCHOLESTEROLEMIA   insulin U-500 CONCENTRATED regular (HumuLIN R U-500, Conc, Kwikpen) 500 unit/mL (3 mL) inpn subQ pen 2020 at Unknown time  Yes Yes   Si Units by SubCUTAneous route three (3) times daily (with meals). lisinopril-hydroCHLOROthiazide (PRINZIDE, ZESTORETIC) 10-12.5 mg per tablet 2020 at Unknown time  Yes Yes   Sig: Take 1 Tab by mouth daily. metFORMIN (GLUCOPHAGE) 500 mg tablet 2020 at Unknown time  Yes Yes   Sig: Take 1,000 mg by mouth two (2) times daily (with meals). methadone (DOLOPHINE) 10 mg tablet 2020 at Unknown time  Yes Yes   Sig: Take 160 mg by mouth daily. omeprazole (PRILOSEC) 40 mg capsule   Yes Yes   Sig: Take 40 mg by mouth daily. pregabalin (Lyrica) 200 mg capsule 2020 at Unknown time  Yes Yes   Sig: Take 200 mg by mouth four (4) times daily. sertraline (ZOLOFT) 50 mg tablet 2020 at Unknown time  Yes Yes   Sig: Take 50 mg by mouth two (2) times a day. tiZANidine (ZANAFLEX) 4 mg capsule   Yes Yes   Sig: Take 4 mg by mouth two (2) times daily as needed (muscle spasms).       Facility-Administered Medications: None      No Known Allergies    Labs:   Recent Results (from the past 24 hour(s))   GLUCOSE, POC    Collection Time: 20  5:31 PM Result Value Ref Range    Glucose (POC) 117 (H) 65 - 100 mg/dL    Performed by Stephanie Galvez    GLUCOSE, POC    Collection Time: 09/29/20  9:07 PM   Result Value Ref Range    Glucose (POC) 156 (H) 65 - 100 mg/dL    Performed by Douglas Barnett    CBC WITH AUTOMATED DIFF    Collection Time: 09/30/20  3:35 AM   Result Value Ref Range    WBC 8.8 3.6 - 11.0 K/uL    RBC 2.84 (L) 3.80 - 5.20 M/uL    HGB 7.6 (L) 11.5 - 16.0 g/dL    HCT 25.0 (L) 35.0 - 47.0 %    MCV 88.0 80.0 - 99.0 FL    MCH 26.8 26.0 - 34.0 PG    MCHC 30.4 30.0 - 36.5 g/dL    RDW 19.2 (H) 11.5 - 14.5 %    PLATELET 427 535 - 991 K/uL    MPV 11.5 8.9 - 12.9 FL    NRBC 1.1 (H) 0  WBC    ABSOLUTE NRBC 0.10 (H) 0.00 - 0.01 K/uL    NEUTROPHILS 56 32 - 75 %    LYMPHOCYTES 26 12 - 49 %    MONOCYTES 8 5 - 13 %    EOSINOPHILS 4 0 - 7 %    BASOPHILS 1 0 - 1 %    IMMATURE GRANULOCYTES 5 (H) 0.0 - 0.5 %    ABS. NEUTROPHILS 4.9 1.8 - 8.0 K/UL    ABS. LYMPHOCYTES 2.3 0.8 - 3.5 K/UL    ABS. MONOCYTES 0.7 0.0 - 1.0 K/UL    ABS. EOSINOPHILS 0.4 0.0 - 0.4 K/UL    ABS. BASOPHILS 0.1 0.0 - 0.1 K/UL    ABS. IMM.  GRANS. 0.4 (H) 0.00 - 0.04 K/UL    DF SMEAR SCANNED      RBC COMMENTS ANISOCYTOSIS  1+       PROCALCITONIN    Collection Time: 09/30/20  3:35 AM   Result Value Ref Range    Procalcitonin 1.58 ng/mL   NT-PRO BNP    Collection Time: 09/30/20  3:35 AM   Result Value Ref Range    NT pro-BNP 4,580 (H) <611 PG/ML   METABOLIC PANEL, COMPREHENSIVE    Collection Time: 09/30/20  3:35 AM   Result Value Ref Range    Sodium 140 136 - 145 mmol/L    Potassium 4.2 3.5 - 5.1 mmol/L    Chloride 110 (H) 97 - 108 mmol/L    CO2 26 21 - 32 mmol/L    Anion gap 4 (L) 5 - 15 mmol/L    Glucose 188 (H) 65 - 100 mg/dL    BUN 18 6 - 20 MG/DL    Creatinine 1.03 (H) 0.55 - 1.02 MG/DL    BUN/Creatinine ratio 17 12 - 20      GFR est AA >60 >60 ml/min/1.73m2    GFR est non-AA 57 (L) >60 ml/min/1.73m2    Calcium 8.8 8.5 - 10.1 MG/DL    Bilirubin, total 0.2 0.2 - 1.0 MG/DL    ALT (SGPT) 24 12 - 78 U/L    AST (SGOT) 19 15 - 37 U/L    Alk.  phosphatase 52 45 - 117 U/L    Protein, total 6.0 (L) 6.4 - 8.2 g/dL    Albumin 2.2 (L) 3.5 - 5.0 g/dL    Globulin 3.8 2.0 - 4.0 g/dL    A-G Ratio 0.6 (L) 1.1 - 2.2     LIPID PANEL    Collection Time: 09/30/20  3:35 AM   Result Value Ref Range    LIPID PROFILE          Cholesterol, total 104 <200 MG/DL    Triglyceride 340 (H) <150 MG/DL    HDL Cholesterol 17 MG/DL    LDL, calculated 19 0 - 100 MG/DL    VLDL, calculated 68 MG/DL    CHOL/HDL Ratio 6.1 (H) 0.0 - 5.0     TSH 3RD GENERATION    Collection Time: 09/30/20  3:35 AM   Result Value Ref Range    TSH 17.60 (H) 0.36 - 3.74 uIU/mL   GLUCOSE, POC    Collection Time: 09/30/20  6:33 AM   Result Value Ref Range    Glucose (POC) 237 (H) 65 - 100 mg/dL    Performed by [address], POC    Collection Time: 09/30/20 12:23 PM   Result Value Ref Range    Glucose (POC) 166 (H) 65 - 100 mg/dL    Performed by Osmani Lucio [facility]) Elenore Rubinstein        Intake/Output Summary (Last 24 hours) at 9/30/2020 1317  Last data filed at 9/30/2020 0402  Gross per 24 hour   Intake 1801.92 ml   Output 1500 ml   Net 301.92 ml      Patient Vitals for the past 24 hrs:   Temp Pulse Resp BP SpO2   09/30/20 1100 -- (!) 59 8 (!) 86/51 --   09/30/20 1000 -- 61 9 (!) 81/49 --   09/30/20 0900 -- 76 20 (!) 88/58 --   09/30/20 0800 98.7 °F (37.1 °C) 64 9 (!) 83/45 --   09/30/20 0700 -- 62 (!) 7 (!) 89/53 --   09/30/20 0600 -- (!) 59 14 (!) 88/55 --   09/30/20 0500 -- 67 11 96/60 --   09/30/20 0400 97.8 °F (36.6 °C) 70 10 97/64 99 %   09/30/20 0330 -- 65 16 (!) 96/59 --   09/30/20 0245 -- 63 11 -- --   09/30/20 0230 -- (!) 58 11 -- --   09/30/20 0215 -- 60 13 -- --   09/30/20 0200 -- 62 12 (!) 89/55 --   09/30/20 0145 -- 60 9 (!) 88/54 --   09/30/20 0130 -- 64 11 (!) 92/57 --   09/30/20 0115 -- (!) 59 8 (!) 82/53 --   09/30/20 0100 -- (!) 59 (!) 7 (!) 92/57 --   09/30/20 0045 -- 60 8 (!) 90/52 --   09/30/20 0030 -- 64 11 (!) 91/52 --   09/30/20 0015 -- (!) 59 11 (!) 88/55 --   09/30/20 0000 97.6 °F (36.4 °C) (!) 58 8 (!) 89/56 100 %   09/29/20 2345 -- 61 11 96/62 --   09/29/20 2330 -- 61 8 96/61 --   09/29/20 2315 -- (!) 59 8 93/60 --   09/29/20 2300 -- 61 11 100/64 --   09/29/20 2245 -- 65 16 106/65 --   09/29/20 2230 -- 62 11 97/68 --   09/29/20 2215 -- 62 11 105/67 --   09/29/20 2200 -- 68 11 112/72 --   09/29/20 2145 -- 68 14 104/73 --   09/29/20 2130 -- 66 10 100/63 --   09/29/20 2115 -- 68 12 98/67 --   09/29/20 2100 -- 70 17 110/70 --   09/29/20 2045 -- 68 13 109/77 --   09/29/20 2030 -- 71 17 105/77 --   09/29/20 2015 -- 60 14 (!) 84/67 --   09/29/20 2000 -- 62 8 95/66 --   09/29/20 1830 -- 61 8 98/64 --   09/29/20 1800 -- 68 16 112/70 --   09/29/20 1730 -- 67 13 105/71 --   09/29/20 1700 -- 64 13 (!) 88/59 --   09/29/20 1600 -- 83 22 -- --   09/29/20 1500 -- 67 16 -- --   09/29/20 1400 97.5 °F (36.4 °C) 69 14 100/63 100 %      General:    Alert, cooperative, no distress. Psychiatric:    Normal Mood and affect    Eye/ENT:     Conjunctival pink. Able to hear voice at normal amplitude   Lungs:      Visibly symmetric chest expansion, No palpable tenderness. Clear to auscultation bilaterally. Heart[de-identified]    Regular rate and rhythm, S1, S2 normal,no murmur noted. Normal palpable peripheral pulses. No cyanosis   Abdomen:     Soft, non-tender, nondistended. organomegaly. Extremities:   Extremities normal, atraumatic, no edema. RLE foot in ace wrap. Neurologic:   MCKEON, No gross focal deficits     MD Becky Perez.  TIFFANY Roque  9/30/2020   9:03 AM     [facility] Office:   Azalea Guillory Office:  [address] [place] [address] 100     [address] [place] [address]  P: [phone]    P: [phone]  F: [phone]    F: [phone]

## 2020-09-30 NOTE — PROGRESS NOTES
Problem: Mobility Impaired (Adult and Pediatric)  Goal: *Acute Goals and Plan of Care (Insert Text)  Description: FUNCTIONAL STATUS PRIOR TO ADMISSION: Patient was modified independent using a rolling walker for functional mobility. Had recently stopped using RW within her home because it was inconvenient essentially. Was using RW due to recent R great toe amputation. HOME SUPPORT PRIOR TO ADMISSION: The patient lived with her fiance and teenage son. Physical Therapy Goals  Initiated 9/28/2020  1. Patient will move from supine to sit and sit to supine , scoot up and down, and roll side to side in bed with independence within 7 day(s). 2.  Patient will transfer from bed to chair and chair to bed with modified independence using the least restrictive device and while compliant with NWB to R LEwithin 7 day(s). 3.  Patient will perform sit to stand with modified independence while compliant with NWB to R LE within 7 day(s). 4.  Patient will ambulate with supervision/set-up for 60 feet with the least restrictive device while compliant with NWB to R LE within 7 day(s). 5.  Patient will ascend/descend 15 stairs with left handrail(s) with minimal assistance/contact guard assist while compliant with NWB R LE within 7 day(s). Outcome: Progressing Towards Goal     PHYSICAL THERAPY TREATMENT  Patient: Cory Pain (53 y.o. female)  Date: 9/30/2020  Diagnosis: Osteomyelitis of foot ([facility] [address] [M86.9]   <principal problem not specified>  Procedure(s) (LRB):  BONE DEBRIDEMENT, OPEN BONE BIOPSY, DELAYED PRIMARY CLOSURE RIGHT FOOT (URGENT) (MAC WITH IV SED) (Right) Day of Surgery  Precautions: Fall, NWB(R LE)  Chart, physical therapy assessment, plan of care and goals were reviewed. ASSESSMENT  Patient continues with skilled PT services and is progressing towards goals. Patient was received for therapy asleep in bed, but was easily able to wake and agreeable to participate in therapy.  Patient's BP is still hypotensive (seated resting 100/72), but remained stable from what RN had seen earlier in day and did not drop much with ambulation. Pt currently on 2L/min O2. Patient ambulated with min A with RW, adhering to NWB status on R LE. Patient was returned to chair and prompted to demonstrate exercises prescribed last session (tricep dip and LAQ). PT provided education on continuing to maintain adequate UE strength with respect to NWB status. Patient is scheduled to have additional debridement later today and stated multiple times during session about how anxious she was for the procedure. PT provided assurance that we will see her again tomorrow after surgery to continue with her progression in order to ensure safe discharge home. Current Level of Function Impacting Discharge (mobility/balance): NWB R LE, maintenance of appropriate VS, min A with ambulation    Other factors to consider for discharge: additional surgery to be performed, prior level of independence, supportive family         PLAN :  Patient continues to benefit from skilled intervention to address the above impairments. Continue treatment per established plan of care. to address goals. Recommendation for discharge: (in order for the patient to meet his/her long term goals)  Physical therapy at least 2 days/week in the home AND ensure assist and/or supervision for safety with RW navigation adhering to NWB R LE precautions post-op. This discharge recommendation:  A follow-up discussion with the attending provider and/or case management is planned    IF patient discharges home will need the following DME: to be determined (TBD); possible portable oxygen? SUBJECTIVE:   Patient stated i've been so nervous.  (regarding additional debridement procedure)    OBJECTIVE DATA SUMMARY:   Critical Behavior:  Neurologic State: Alert  Orientation Level: Oriented X4  Cognition: Appropriate decision making, Appropriate for age attention/concentration, Appropriate safety awareness, Follows commands     Functional Mobility Training:  Bed Mobility:     Supine to Sit: Independent     Scooting: Independent        Transfers:  Sit to Stand: Contact guard assistance  Stand to Sit: Stand-by assistance                             Balance:  Sitting: Intact  Standing: With support; Impaired  Standing - Static: Good;Constant support  Standing - Dynamic : Fair;Constant support  Ambulation/Gait Training:  Distance (ft): 10 Feet (ft)  Assistive Device: Gait belt;Walker, rolling  Ambulation - Level of Assistance: Minimal assistance; Adaptive equipment;Assist x1        Gait Abnormalities: Decreased step clearance(Hop- to gait pattern with RW)  Right Side Weight Bearing: Non-weight bearing  Left Side Weight Bearing: Full  Base of Support: Shift to left     Speed/Rossi: Slow      Therapeutic Exercises:   Reviewed TE exercises from last session - 5 reps of each   - Seated tricep push-up and seated LAQ with slow eccentric lowering    Activity Tolerance:   Fair and desaturates with exertion and requires oxygen  Please refer to the flowsheet for vital signs taken during this treatment. After treatment patient left in no apparent distress:   Sitting in chair and Call bell within reach    COMMUNICATION/COLLABORATION:   The patients plan of care was discussed with: Registered nurse.      Bárbara Mcdonald, SPT   Time Calculation: 18 mins

## 2020-09-30 NOTE — PROGRESS NOTES
Physician Progress Note      Yamila Patiño  [facility] #:                  [id number]  :                       1975  ADMIT DATE:       2020 12:02 PM  [address] [place] DATE:  RESPONDING  PROVIDER #:        Giovanny Dolan DO          QUERY TEXT:    Patient admitted with NSTEMI, noted to have DEEPTI on CKD documented without specified stage If possible, please document in progress notes and discharge summary if you are evaluating and/or treating any of the following: The medical record reflects the following:  Risk Factors: no past medical HX of CKD  Clinical Indicators:    -cardiology progress note  DEEPTI on CKD    2020 11:28  BUN: 42 (H)  Creatinine: 2.06 (H)  GFR est non-AA: 26 (L)      corrected to  2020 03:35  BUN: 18  Creatinine: 1.03 (H)  GFR est non-AA: 57 (L)      Treatment: lab monitoring, IV fluids    Thank you,  Ben Steele RN, CCDS Clinical   [phone]  Options provided:  -- CKD ruled out  -- CKD Stage 2 GFR 60-90  -- CKD Stage 3 GFR 30-59  -- Other - I will add my own diagnosis  -- Disagree - Not applicable / Not valid  -- Disagree - Clinically unable to determine / Unknown  -- Refer to Clinical Documentation Reviewer    PROVIDER RESPONSE TEXT:    This patient has CKD Stage 3.     Query created by: Chung Retana on 2020 12:27 PM      Electronically signed by:  Giovanny Dolan DO 2020 1:03 PM

## 2020-09-30 NOTE — DIABETES MGMT
FANNY GUERRERO  CLINICAL NURSE SPECIALIST CONSULT  PROGRAM FOR DIABETES HEALTH    FOLLOW UP NOTE    Presentation   Hima Pyle is a 50 y.o. female who presented for evaluation of right foot wound. She developed a wound to her right great toe about 6 months ago. She was seen and managed at [facility] [place] and now s/o toe amputation. She had poor wound heeling for which she followed up with podiatry who diagnosed her with osteomyelitis and was sent to [facility] for management. HX: Arthritis, Diabetes, Hypothyroid, Pancreatitis     DX: Osteomyelitis of the right foot with cellulitis    TX: PCI, IV antibiotics with wound debridement     Current clinical course has been complicated by acute hypoxic respiratory failure s/o pulmonary edema and cardiac arrest; CAD. Diabetes: Patient has a 16 year history of known Type 2 diabetes, treated with metformin, glipizide and humalog PTA. Admitting A1C 9.8% (up from 7% per patient report 6 mos ago)     Consulted by Provider for advanced diabetes nursing assessment and care, specifically related to   [x] Inpatient management strategy      Diabetes medication history  Drug class Currently in use Discontinued Never used   Biguanide Metformin 100mg BID     DDP-4 inhibitor       Sulfonylurea Glipizide  Once daily      Thiazolidinedione      GLP-1 RA      SGLT-2 inhibitors      Basal insulin      Fixed Dose  Combinations      Bolus insulin Humalog  80 units with meals       Subjective   Lantus 60 units daily  Fasting glucose 237 today and 170 yesterday  24 units correctional insulin  Eating very well  Pre-prandial hyperglycemia to 210  OR for delayed closure and washout    Objective   Physical exam  General Alert, oriented and in acute distress/ill-appearing. Conversant and cooperative. Vital Signs   Visit Vitals  BP (!) 92/57   Pulse 61   Temp 98.6 °F (37 °C)   Resp 8   Ht 5' 7\" (1.702 m)   Wt 83.5 kg (184 lb 1.4 oz)   SpO2 100%   BMI 28.83 kg/m²     Skin  Warm and dry.  No acanthosis noted along neckline. No lipohypertrophy or lipoatrophy noted at injection sites   Heart   Regular rate and rhythm. No murmurs, rubs or gallops  Lungs  Clear to auscultation without rales or rhonchi  Extremities No foot wounds        Laboratory  Lab Results   Component Value Date/Time    Hemoglobin A1c 9.8 (H) 09/24/2020 11:28 AM     Lab Results   Component Value Date/Time    LDL, calculated 19 09/30/2020 03:35 AM     Lab Results   Component Value Date/Time    Creatinine 1.03 (H) 09/30/2020 03:35 AM     Lab Results   Component Value Date/Time    Sodium 140 09/30/2020 03:35 AM    Potassium 4.2 09/30/2020 03:35 AM    Chloride 110 (H) 09/30/2020 03:35 AM    CO2 26 09/30/2020 03:35 AM    Anion gap 4 (L) 09/30/2020 03:35 AM    Glucose 188 (H) 09/30/2020 03:35 AM    BUN 18 09/30/2020 03:35 AM    Creatinine 1.03 (H) 09/30/2020 03:35 AM    BUN/Creatinine ratio 17 09/30/2020 03:35 AM    GFR est AA >60 09/30/2020 03:35 AM    GFR est non-AA 57 (L) 09/30/2020 03:35 AM    Calcium 8.8 09/30/2020 03:35 AM    Bilirubin, total 0.2 09/30/2020 03:35 AM    Alk.  phosphatase 52 09/30/2020 03:35 AM    Protein, total 6.0 (L) 09/30/2020 03:35 AM    Albumin 2.2 (L) 09/30/2020 03:35 AM    Globulin 3.8 09/30/2020 03:35 AM    A-G Ratio 0.6 (L) 09/30/2020 03:35 AM    ALT (SGPT) 24 09/30/2020 03:35 AM     Lab Results   Component Value Date/Time    ALT (SGPT) 24 09/30/2020 03:35 AM       Factors affecting BG pattern  Factor Dose Comments   Nutrition:  Carb-controlled meals   60 grams/meal    Drugs:  IV antibiotics  Steroids Phenylephrine    flagyl, vanco, cefepime  Methylpred 125 after cardiac arrest x1  Decadron 4mg in OR x1      Pain On Dilaudid    Infection Osteomyelitis with cellulitis S/p bone debridement and biopsy    Potential for withdrawal Opiate dependent    Other: s/p cardiac arrest  Lactic acidosis      Blood glucose pattern        Assessment and Plan   Nursing Diagnosis Risk for unstable blood glucose pattern   Nursing Intervention Domain 12 Decision-making Support   Nursing Interventions Examined current inpatient diabetes control   Explored factors facilitating and impeding inpatient management  Identified self-management practices impeding diabetes control  Explored corrective strategies with patient and responsible inpatient provider   Informed patient of rational for insulin strategy while hospitalized  Instructed patient in impact of stress and infection on glucose, current A1C value     Evaluation   Claudell Rumble is a 50year old female with uncontrolled diabetes on metformin, glipizide and basal insulin who presented with confriemd osteomyelitis and poor wound heeling s/p amputation of her right great tow. Initial glucose 515 with A1C 9.8% and patient reports non-compliance with antihyperglycemic. Hospital course has been complicated with cardiac arrest with new CAD s/p PCI. At this time, glucose impacted by oral intake and infection but largely in goal on basal, bolus and correctional insulin. Inpatient ICU glucose goal 140-180. Recommendations   Recommend:  Continue current management plan with:  1. Basal insulin at 0.7 units/k units daily. Do not hold when NPO    2. Continue correctional insulin at resistant sensitivity AHCS. Do not hold if NPO    3. Continue mealtime bolus insulin: 0.1 units/k units with meals. Hold if patient consumes less than 50% of carbohydrates on meal tray or NPO      Billing Code(s)   I personally reviewed chart, notes, data and current medications in the medical record, and examined the patient at bedside before making care recommendations.      [x] IP subsequent hospital care - 15 minutes      MARLYN Patrick  Program for Diabetes Health  Access via [address] [place] [address]  [phone]

## 2020-09-30 NOTE — PERIOP NOTES
TRANSFER - OUT REPORT:    Verbal report given to Deanna Buck (name) on Humberto Capps  being transferred to CCU (unit) for routine post - op       Report consisted of patients Situation, Background, Assessment and   Recommendations(SBAR). Information from the following report(s) SBAR, OR Summary, Procedure Summary, Intake/Output, MAR and Cardiac Rhythm Sinus Shannon Rey was reviewed with the receiving nurse. Lines:   Quad Lumen 09/26/20 Anterior;Right Neck (Active)   Central Line Being Utilized Yes 09/30/20 1837   Criteria for Appropriate Use Hemodynamically unstable, requiring monitoring lines, vasopressors, or volume resuscitation 09/30/20 1837   Site Assessment Clean, dry, & intact 09/30/20 1837   Infiltration Assessment 0 09/30/20 1837   Affected Extremity/Extremities Color distal to insertion site pink (or appropriate for race); Pulses palpable;Range of motion performed 09/30/20 1837   Date of Last Dressing Change 09/27/20 09/30/20 1837   Dressing Status Clean, dry, & intact 09/30/20 1837   Dressing Type Disk with Chlorhexadine gluconate (CHG) 09/30/20 1837   Action Taken Open ports on tubing capped 09/30/20 1837   Proximal Hub Color/Line Status White 09/30/20 1837   Positive Blood Return (Medial Site) Yes 09/30/20 1600   Medial 1 Hub Color/Line Status Gray 09/30/20 1837   Positive Blood Return (Lateral Site) Yes 09/30/20 1600   Medial 2 Hub Color/Line Status Blue 09/30/20 1837   Positive Blood Return (Site #3) Yes 09/30/20 1600   Distal Hub Color/Line Status Brown 09/30/20 1837   Positive Blood Return (Site #4) Yes 09/30/20 1600   Alcohol Cap Used Yes 09/30/20 1837       Peripheral IV 09/25/20 Left Antecubital (Active)   Site Assessment Clean, dry, & intact 09/30/20 1837   Phlebitis Assessment 0 09/30/20 1837   Infiltration Assessment 0 09/30/20 1837   Dressing Status Clean, dry, & intact 09/30/20 1837   Dressing Type Tape;Transparent 09/30/20 1837   Hub Color/Line Status Pink;Capped 09/30/20 1837   Action Taken Open ports on tubing capped 09/30/20 1837   Alcohol Cap Used Yes 09/30/20 1837        Opportunity for questions and clarification was provided.       Patient transported with:   Monitor  O2 @ 2 liters  Registered Nurse  [facility]

## 2020-09-30 NOTE — ANESTHESIA POSTPROCEDURE EVALUATION
Procedure(s):  DELAYED PRIMARY CLOSURE RIGHT FOOT . general    <BSHSIANPOST>    INITIAL Post-op Vital signs:   Vitals Value Taken Time   BP 94/58 9/30/2020  6:54 PM   Temp 36.4 °C (97.6 °F) 9/30/2020  6:37 PM   Pulse 55 9/30/2020  6:59 PM   Resp 12 9/30/2020  6:59 PM   SpO2 98 % 9/30/2020  6:59 PM   Vitals shown include unvalidated device data.

## 2020-09-30 NOTE — PROGRESS NOTES
0730: Report received from Steven Miles, [address] [place] [address]: TRANSFER - OUT REPORT:    Verbal report given to Milana Cerna RN (name) on Claudell Rumble  being transferred to OR holding (unit) for ordered procedure    Report consisted of patients Situation, Background, Assessment and   Recommendations(SBAR). Information from the following report(s) SBAR, Procedure Summary, Intake/Output, MAR, Recent Results and Cardiac Rhythm NSR was reviewed with the receiving nurse. Lines:   Quad Lumen 09/26/20 Anterior;Right Neck (Active)   Central Line Being Utilized Yes 09/30/20 1600   Criteria for Appropriate Use Hemodynamically unstable, requiring monitoring lines, vasopressors, or volume resuscitation 09/30/20 1600   Site Assessment Clean, dry, & intact 09/30/20 1600   Infiltration Assessment 0 09/30/20 1600   Affected Extremity/Extremities Color distal to insertion site pink (or appropriate for race); Pulses palpable;Range of motion performed 09/30/20 1600   Date of Last Dressing Change 09/27/20 09/30/20 1600   Dressing Status Clean, dry, & intact 09/30/20 1600   Dressing Type Disk with Chlorhexadine gluconate (CHG); Transparent 09/30/20 1600   Action Taken Open ports on tubing capped 09/30/20 1600   Proximal Hub Color/Line Status White 09/30/20 1600   Positive Blood Return (Medial Site) Yes 09/30/20 1600   Medial 1 Hub Color/Line Status Gray 09/30/20 1600   Positive Blood Return (Lateral Site) Yes 09/30/20 1600   Medial 2 Hub Color/Line Status Blue 09/30/20 1600   Positive Blood Return (Site #3) Yes 09/30/20 1600   Distal Hub Color/Line Status Brown 09/30/20 1600   Positive Blood Return (Site #4) Yes 09/30/20 1600   Alcohol Cap Used Yes 09/30/20 1600       Peripheral IV 09/25/20 Left Antecubital (Active)   Site Assessment Clean, dry, & intact 09/30/20 1600   Phlebitis Assessment 0 09/30/20 1600   Infiltration Assessment 0 09/30/20 1600   Dressing Status Clean, dry, & intact 09/30/20 1600   Dressing Type Transparent 09/30/20 1600   Hub Color/Line Status Pink 09/30/20 1600   Action Taken Open ports on tubing capped 09/30/20 1600   Alcohol Cap Used Yes 09/30/20 1600        Opportunity for questions and clarification was provided. Patient transported with:   Monitor  Registered Nurse    3007[facility] shift change report given to Michela Cespedes RN (oncoming nurse) by Yair Milner RN (offgoing nurse). Report included the following information SBAR, OR Summary, Procedure Summary, MAR, Recent Results and Cardiac Rhythm NSR/SB.

## 2020-10-01 LAB
ALBUMIN SERPL-MCNC: 2.2 G/DL (ref 3.5–5)
ALBUMIN/GLOB SERPL: 0.6 {RATIO} (ref 1.1–2.2)
ALP SERPL-CCNC: 55 U/L (ref 45–117)
ALT SERPL-CCNC: 19 U/L (ref 12–78)
ANION GAP SERPL CALC-SCNC: 5 MMOL/L (ref 5–15)
AST SERPL-CCNC: 24 U/L (ref 15–37)
BASOPHILS # BLD: 0.1 K/UL (ref 0–0.1)
BASOPHILS NFR BLD: 1 % (ref 0–1)
BILIRUB SERPL-MCNC: 0.2 MG/DL (ref 0.2–1)
BNP SERPL-MCNC: 4198 PG/ML
BUN SERPL-MCNC: 15 MG/DL (ref 6–20)
BUN/CREAT SERPL: 15 (ref 12–20)
CALCIUM SERPL-MCNC: 8.9 MG/DL (ref 8.5–10.1)
CHLORIDE SERPL-SCNC: 110 MMOL/L (ref 97–108)
CO2 SERPL-SCNC: 26 MMOL/L (ref 21–32)
CORTIS AM PEAK SERPL-MCNC: 4.2 UG/DL (ref 4.3–22.45)
CREAT SERPL-MCNC: 1.01 MG/DL (ref 0.55–1.02)
DATE LAST DOSE: ABNORMAL
DIFFERENTIAL METHOD BLD: ABNORMAL
EOSINOPHIL # BLD: 0.2 K/UL (ref 0–0.4)
EOSINOPHIL NFR BLD: 2 % (ref 0–7)
ERYTHROCYTE [DISTWIDTH] IN BLOOD BY AUTOMATED COUNT: 19.3 % (ref 11.5–14.5)
GLOBULIN SER CALC-MCNC: 3.9 G/DL (ref 2–4)
GLUCOSE BLD STRIP.AUTO-MCNC: 198 MG/DL (ref 65–100)
GLUCOSE BLD STRIP.AUTO-MCNC: 239 MG/DL (ref 65–100)
GLUCOSE BLD STRIP.AUTO-MCNC: 302 MG/DL (ref 65–100)
GLUCOSE BLD STRIP.AUTO-MCNC: 81 MG/DL (ref 65–100)
GLUCOSE SERPL-MCNC: 222 MG/DL (ref 65–100)
HCT VFR BLD AUTO: 25.6 % (ref 35–47)
HGB BLD-MCNC: 7.7 G/DL (ref 11.5–16)
IMM GRANULOCYTES # BLD AUTO: 0 K/UL
IMM GRANULOCYTES NFR BLD AUTO: 0 %
LYMPHOCYTES # BLD: 2.2 K/UL (ref 0.8–3.5)
LYMPHOCYTES NFR BLD: 24 % (ref 12–49)
MAGNESIUM SERPL-MCNC: 2.1 MG/DL (ref 1.6–2.4)
MCH RBC QN AUTO: 26.6 PG (ref 26–34)
MCHC RBC AUTO-ENTMCNC: 30.1 G/DL (ref 30–36.5)
MCV RBC AUTO: 88.3 FL (ref 80–99)
MONOCYTES # BLD: 0.4 K/UL (ref 0–1)
MONOCYTES NFR BLD: 4 % (ref 5–13)
MYELOCYTES NFR BLD MANUAL: 1 %
NEUTS SEG # BLD: 6.1 K/UL (ref 1.8–8)
NEUTS SEG NFR BLD: 68 % (ref 32–75)
NRBC # BLD: 0.1 K/UL (ref 0–0.01)
NRBC BLD-RTO: 1.1 PER 100 WBC
PLATELET # BLD AUTO: 292 K/UL (ref 150–400)
PMV BLD AUTO: 11.6 FL (ref 8.9–12.9)
POTASSIUM SERPL-SCNC: 4.3 MMOL/L (ref 3.5–5.1)
PROCALCITONIN SERPL-MCNC: 0.78 NG/ML
PROT SERPL-MCNC: 6.1 G/DL (ref 6.4–8.2)
RBC # BLD AUTO: 2.9 M/UL (ref 3.8–5.2)
RBC MORPH BLD: ABNORMAL
REPORTED DOSE,DOSE: ABNORMAL UNITS
REPORTED DOSE/TIME,TMG: ABNORMAL
SERVICE CMNT-IMP: ABNORMAL
SERVICE CMNT-IMP: NORMAL
SODIUM SERPL-SCNC: 141 MMOL/L (ref 136–145)
T3 SERPL-MCNC: 68 NG/DL (ref 71–180)
VANCOMYCIN TROUGH SERPL-MCNC: 16.8 UG/ML (ref 5–10)
WBC # BLD AUTO: 9 K/UL (ref 3.6–11)

## 2020-10-01 PROCEDURE — 80053 COMPREHEN METABOLIC PANEL: CPT

## 2020-10-01 PROCEDURE — 74011636637 HC RX REV CODE- 636/637: Performed by: STUDENT IN AN ORGANIZED HEALTH CARE EDUCATION/TRAINING PROGRAM

## 2020-10-01 PROCEDURE — 74011250637 HC RX REV CODE- 250/637: Performed by: STUDENT IN AN ORGANIZED HEALTH CARE EDUCATION/TRAINING PROGRAM

## 2020-10-01 PROCEDURE — 74011250636 HC RX REV CODE- 250/636: Performed by: INTERNAL MEDICINE

## 2020-10-01 PROCEDURE — 74011250636 HC RX REV CODE- 250/636: Performed by: PODIATRIST

## 2020-10-01 PROCEDURE — 74011250637 HC RX REV CODE- 250/637: Performed by: INTERNAL MEDICINE

## 2020-10-01 PROCEDURE — 74011250637 HC RX REV CODE- 250/637: Performed by: NURSE PRACTITIONER

## 2020-10-01 PROCEDURE — 82533 TOTAL CORTISOL: CPT

## 2020-10-01 PROCEDURE — 74011250636 HC RX REV CODE- 250/636: Performed by: NURSE PRACTITIONER

## 2020-10-01 PROCEDURE — 65660000001 HC RM ICU INTERMED STEPDOWN

## 2020-10-01 PROCEDURE — 80202 ASSAY OF VANCOMYCIN: CPT

## 2020-10-01 PROCEDURE — 74011250636 HC RX REV CODE- 250/636: Performed by: FAMILY MEDICINE

## 2020-10-01 PROCEDURE — 97164 PT RE-EVAL EST PLAN CARE: CPT

## 2020-10-01 PROCEDURE — 74011250637 HC RX REV CODE- 250/637: Performed by: ANESTHESIOLOGY

## 2020-10-01 PROCEDURE — 99233 SBSQ HOSP IP/OBS HIGH 50: CPT | Performed by: INTERNAL MEDICINE

## 2020-10-01 PROCEDURE — 99232 SBSQ HOSP IP/OBS MODERATE 35: CPT | Performed by: CLINICAL NURSE SPECIALIST

## 2020-10-01 PROCEDURE — 85025 COMPLETE CBC W/AUTO DIFF WBC: CPT

## 2020-10-01 PROCEDURE — 74011250637 HC RX REV CODE- 250/637: Performed by: SPECIALIST

## 2020-10-01 PROCEDURE — 36415 COLL VENOUS BLD VENIPUNCTURE: CPT

## 2020-10-01 PROCEDURE — 74011250637 HC RX REV CODE- 250/637: Performed by: FAMILY MEDICINE

## 2020-10-01 PROCEDURE — 97116 GAIT TRAINING THERAPY: CPT

## 2020-10-01 PROCEDURE — 84145 PROCALCITONIN (PCT): CPT

## 2020-10-01 PROCEDURE — 74011636637 HC RX REV CODE- 636/637: Performed by: ANESTHESIOLOGY

## 2020-10-01 PROCEDURE — 74011250636 HC RX REV CODE- 250/636: Performed by: ANESTHESIOLOGY

## 2020-10-01 PROCEDURE — 83880 ASSAY OF NATRIURETIC PEPTIDE: CPT

## 2020-10-01 PROCEDURE — 83735 ASSAY OF MAGNESIUM: CPT

## 2020-10-01 PROCEDURE — 74011000258 HC RX REV CODE- 258: Performed by: FAMILY MEDICINE

## 2020-10-01 PROCEDURE — 82962 GLUCOSE BLOOD TEST: CPT

## 2020-10-01 PROCEDURE — 74011636637 HC RX REV CODE- 636/637: Performed by: INTERNAL MEDICINE

## 2020-10-01 RX ORDER — NITROGLYCERIN 0.4 MG/1
0.4 TABLET SUBLINGUAL
Status: DISCONTINUED | OUTPATIENT
Start: 2020-10-01 | End: 2020-10-02 | Stop reason: HOSPADM

## 2020-10-01 RX ORDER — FUROSEMIDE 10 MG/ML
20 INJECTION INTRAMUSCULAR; INTRAVENOUS ONCE
Status: COMPLETED | OUTPATIENT
Start: 2020-10-01 | End: 2020-10-01

## 2020-10-01 RX ORDER — INSULIN LISPRO 100 [IU]/ML
12 INJECTION, SOLUTION INTRAVENOUS; SUBCUTANEOUS
Status: DISCONTINUED | OUTPATIENT
Start: 2020-10-01 | End: 2020-10-02 | Stop reason: HOSPADM

## 2020-10-01 RX ADMIN — PREGABALIN 150 MG: 75 CAPSULE ORAL at 17:41

## 2020-10-01 RX ADMIN — HYDROMORPHONE HYDROCHLORIDE 2 MG: 1 INJECTION, SOLUTION INTRAMUSCULAR; INTRAVENOUS; SUBCUTANEOUS at 05:02

## 2020-10-01 RX ADMIN — Medication 10 ML: at 23:51

## 2020-10-01 RX ADMIN — METRONIDAZOLE 500 MG: 500 INJECTION, SOLUTION INTRAVENOUS at 23:49

## 2020-10-01 RX ADMIN — FENOFIBRATE 145 MG: 145 TABLET ORAL at 08:51

## 2020-10-01 RX ADMIN — ATORVASTATIN CALCIUM 80 MG: 40 TABLET, FILM COATED ORAL at 08:51

## 2020-10-01 RX ADMIN — TRAZODONE HYDROCHLORIDE 50 MG: 50 TABLET ORAL at 23:44

## 2020-10-01 RX ADMIN — VANCOMYCIN HYDROCHLORIDE 1250 MG: 10 INJECTION, POWDER, LYOPHILIZED, FOR SOLUTION INTRAVENOUS at 16:11

## 2020-10-01 RX ADMIN — Medication 10 ML: at 05:08

## 2020-10-01 RX ADMIN — CLONAZEPAM 1 MG: 1 TABLET ORAL at 19:53

## 2020-10-01 RX ADMIN — PANCRELIPASE 1 CAPSULE: 24000; 76000; 120000 CAPSULE, DELAYED RELEASE PELLETS ORAL at 12:22

## 2020-10-01 RX ADMIN — HYDROMORPHONE HYDROCHLORIDE 1 MG: 1 INJECTION, SOLUTION INTRAMUSCULAR; INTRAVENOUS; SUBCUTANEOUS at 09:44

## 2020-10-01 RX ADMIN — METRONIDAZOLE 500 MG: 500 INJECTION, SOLUTION INTRAVENOUS at 12:20

## 2020-10-01 RX ADMIN — Medication 10 ML: at 23:45

## 2020-10-01 RX ADMIN — Medication 10 ML: at 13:47

## 2020-10-01 RX ADMIN — OXYCODONE 10 MG: 5 TABLET ORAL at 20:27

## 2020-10-01 RX ADMIN — INSULIN LISPRO 12 UNITS: 100 INJECTION, SOLUTION INTRAVENOUS; SUBCUTANEOUS at 12:21

## 2020-10-01 RX ADMIN — PREGABALIN 150 MG: 75 CAPSULE ORAL at 08:51

## 2020-10-01 RX ADMIN — INSULIN LISPRO 8 UNITS: 100 INJECTION, SOLUTION INTRAVENOUS; SUBCUTANEOUS at 08:53

## 2020-10-01 RX ADMIN — MIDODRINE HYDROCHLORIDE 5 MG: 5 TABLET ORAL at 13:46

## 2020-10-01 RX ADMIN — CLOPIDOGREL BISULFATE 600 MG: 300 TABLET, FILM COATED ORAL at 10:34

## 2020-10-01 RX ADMIN — OXYCODONE 10 MG: 5 TABLET ORAL at 16:25

## 2020-10-01 RX ADMIN — INSULIN LISPRO 12 UNITS: 100 INJECTION, SOLUTION INTRAVENOUS; SUBCUTANEOUS at 17:41

## 2020-10-01 RX ADMIN — INSULIN LISPRO 2 UNITS: 100 INJECTION, SOLUTION INTRAVENOUS; SUBCUTANEOUS at 23:45

## 2020-10-01 RX ADMIN — INSULIN LISPRO 10 UNITS: 100 INJECTION, SOLUTION INTRAVENOUS; SUBCUTANEOUS at 08:52

## 2020-10-01 RX ADMIN — INSULIN GLARGINE 60 UNITS: 100 INJECTION, SOLUTION SUBCUTANEOUS at 10:34

## 2020-10-01 RX ADMIN — MIDODRINE HYDROCHLORIDE 5 MG: 5 TABLET ORAL at 05:02

## 2020-10-01 RX ADMIN — Medication 10 ML: at 05:09

## 2020-10-01 RX ADMIN — VANCOMYCIN HYDROCHLORIDE 1250 MG: 10 INJECTION, POWDER, LYOPHILIZED, FOR SOLUTION INTRAVENOUS at 02:23

## 2020-10-01 RX ADMIN — CEFEPIME HYDROCHLORIDE 2 G: 2 INJECTION, POWDER, FOR SOLUTION INTRAVENOUS at 13:44

## 2020-10-01 RX ADMIN — METHYLPREDNISOLONE SODIUM SUCCINATE 125 MG: 125 INJECTION, POWDER, FOR SOLUTION INTRAMUSCULAR; INTRAVENOUS at 12:20

## 2020-10-01 RX ADMIN — METHADONE HYDROCHLORIDE 160 MG: 10 TABLET ORAL at 09:44

## 2020-10-01 RX ADMIN — OXYCODONE 10 MG: 5 TABLET ORAL at 23:57

## 2020-10-01 RX ADMIN — CEFEPIME HYDROCHLORIDE 2 G: 2 INJECTION, POWDER, FOR SOLUTION INTRAVENOUS at 05:02

## 2020-10-01 RX ADMIN — ASPIRIN 81 MG CHEWABLE TABLET 81 MG: 81 TABLET CHEWABLE at 08:51

## 2020-10-01 RX ADMIN — MIDODRINE HYDROCHLORIDE 5 MG: 5 TABLET ORAL at 23:44

## 2020-10-01 RX ADMIN — INSULIN LISPRO 3 UNITS: 100 INJECTION, SOLUTION INTRAVENOUS; SUBCUTANEOUS at 12:21

## 2020-10-01 RX ADMIN — LEVOTHYROXINE SODIUM 200 MCG: 0.1 TABLET ORAL at 07:30

## 2020-10-01 RX ADMIN — PANCRELIPASE 1 CAPSULE: 24000; 76000; 120000 CAPSULE, DELAYED RELEASE PELLETS ORAL at 17:42

## 2020-10-01 RX ADMIN — CLONAZEPAM 1 MG: 1 TABLET ORAL at 07:30

## 2020-10-01 RX ADMIN — PANTOPRAZOLE SODIUM 40 MG: 40 TABLET, DELAYED RELEASE ORAL at 07:30

## 2020-10-01 RX ADMIN — FUROSEMIDE 20 MG: 10 INJECTION, SOLUTION INTRAMUSCULAR; INTRAVENOUS at 13:49

## 2020-10-01 NOTE — PROGRESS NOTES
Transitions of Care Plan:   RUR: 19%   PODx1 from foot wound closure; HH for PT; owns DME needed; IV abx   Baseline - ambulates without DME; resides with fiance and teenage son              Disposition - home health for therapy    Therapy recommending home health - patient owns DME needed to remain absolute nonweightbearing status post surgery. 1130 - CM spoke with patient - offered [place] [facility] for home health agency. Patient states she has used [facility] in the past and prefers to use the same for therapy. Transition of Care Plan:     The Plan for Transition of Care is related to the following treatment goals: Home Health    The Patient and/or patient representative PATIENT was provided with a choice of provider and agrees  with the discharge plan. Yes [x] No []    A [place] of choice list was provided with basic dialogue that supports the patient's individualized plan of care/goals and shares the quality data associated with the providers. Yes [x] No []    CM monitoring need for IV abx post discharge. Of note, patient is chronic methadone user which could be contraindicated for IV PICC line at home. [facility] RN to inquire with attending MD re antibiotic plan after discharge. 5 - Sent HH order to [facility] for review. 1430 - Denial received from [facility] - unable to see patient in a timely manner after discharge. CM will continue to follow.     Yolanda Stahl, MPH

## 2020-10-01 NOTE — PROGRESS NOTES
Bedside and Verbal shift change report given to Maria Dolores Estrada (oncoming nurse) by Junior Lubin (offgoing nurse). Report included the following information SBAR, Kardex, Procedure Summary, Intake/Output, MAR, Recent Results and Cardiac Rhythm NSR.

## 2020-10-01 NOTE — PROGRESS NOTES
0730: Bedside shift change report given to [address] Farhat Jessica (oncoming nurse) by José Miguel Schneider (offgoing nurse). Report included the following information SBAR, Kardex, Intake/Output, MAR, Accordion, Recent Results and Cardiac Rhythm NSR.     0940: Weaned patient down to 1L NC. Saturating at 95%. 1302: Patient had 5 beats of v tach. Jude, NP, made aware. Mg to be checked. (Resulted as 2.1). 1525: Patient saturating at 97% on RA.     1700: Patient placed self back on oxygen. Stated felt SOB. RN asked for patient to make RN aware of any SOB so RN could assess patient needs. 1930: Bedside shift change [id number] by Jaison Garcia RN (offgoing nurse). Report included the following information SBAR, Kardex, Procedure Summary, Intake/Output, MAR, Accordion, Recent Results and Cardiac Rhythm NSR. Problem: Falls - Risk of  Goal: *Absence of Falls  Description: Document Maritza Huffman Fall Risk and appropriate interventions in the flowsheet.   Outcome: Progressing Towards Goal  Note: Fall Risk Interventions:  Mobility Interventions: Communicate number of staff needed for ambulation/transfer, Patient to call before getting OOB, Utilize walker, cane, or other assistive device         Medication Interventions: Patient to call before getting OOB, Teach patient to arise slowly    Elimination Interventions: Call light in reach, Patient to call for help with toileting needs    History of Falls Interventions: Door open when patient unattended         Problem: Patient Education: Go to Patient Education Activity  Goal: Patient/Family Education  Outcome: Progressing Towards Goal

## 2020-10-01 NOTE — DIABETES MGMT
FANNY GUERRERO  CLINICAL NURSE SPECIALIST CONSULT  PROGRAM FOR DIABETES HEALTH    FOLLOW UP NOTE    Presentation   Jaime Parsons is a 50 y.o. female who presented for evaluation of right foot wound. She developed a wound to her right great toe about 6 months ago. She was seen and managed at [facility] and now s/o toe amputation. She had poor wound heeling for which she followed up with podiatry who diagnosed her with osteomyelitis and was sent to [facility] for management. HX: Arthritis, Diabetes, Hypothyroid, Pancreatitis     DX: Osteomyelitis of the right foot with cellulitis    TX: PCI, IV antibiotics with wound debridement     Current clinical course has been complicated by acute hypoxic respiratory failure s/o pulmonary edema and cardiac arrest; CAD. Diabetes: Patient has a 16 year history of known Type 2 diabetes, treated with metformin, glipizide and humalog PTA. Admitting A1C 9.8% (up from 7% per patient report 6 mos ago)     Consulted by Provider for advanced diabetes nursing assessment and care, specifically related to   [x] Inpatient management strategy      Diabetes medication history  Drug class Currently in use Discontinued Never used   Biguanide Metformin 100mg BID     DDP-4 inhibitor       Sulfonylurea Glipizide  Once daily      Thiazolidinedione      GLP-1 RA      SGLT-2 inhibitors      Basal insulin      Fixed Dose  Combinations      Bolus insulin Humulin R U-500  80 units with meals       Subjective   \"I ate like a pig when I got back last night. I had all of my dinner and then my fiance brought me a subway sub and I ate all of that. \"    Lantus 60 units daily  Fasting glucose 237 yesterday- fasted throughout the day with subsequent glucose values of 166 and 104   Fasting today at 307    6 units correctional insulin  Eating very well  OR for delayed closure and washout    Objective   Physical exam  General Alert, oriented and in acute distress/ill-appearing. Conversant and cooperative.    Vital Signs   Visit Vitals  /72 (BP 1 Location: Right arm, BP Patient Position: At rest)   Pulse 76   Temp 98.2 °F (36.8 °C)   Resp 18   Ht 5' 7\" (1.702 m)   Wt 83.5 kg (184 lb 1.4 oz)   SpO2 98%   BMI 28.83 kg/m²     Skin  Warm and dry. No acanthosis noted along neckline. No lipohypertrophy or lipoatrophy noted at injection sites   Heart   Regular rate and rhythm. No murmurs, rubs or gallops  Lungs  Clear to auscultation without rales or rhonchi  Extremities No foot wounds        Laboratory  Lab Results   Component Value Date/Time    Hemoglobin A1c 9.8 (H) 09/24/2020 11:28 AM     Lab Results   Component Value Date/Time    LDL, calculated 19 09/30/2020 03:35 AM     Lab Results   Component Value Date/Time    Creatinine 1.01 10/01/2020 04:49 AM     Lab Results   Component Value Date/Time    Sodium 141 10/01/2020 04:49 AM    Potassium 4.3 10/01/2020 04:49 AM    Chloride 110 (H) 10/01/2020 04:49 AM    CO2 26 10/01/2020 04:49 AM    Anion gap 5 10/01/2020 04:49 AM    Glucose 222 (H) 10/01/2020 04:49 AM    BUN 15 10/01/2020 04:49 AM    Creatinine 1.01 10/01/2020 04:49 AM    BUN/Creatinine ratio 15 10/01/2020 04:49 AM    GFR est AA >60 10/01/2020 04:49 AM    GFR est non-AA 59 (L) 10/01/2020 04:49 AM    Calcium 8.9 10/01/2020 04:49 AM    Bilirubin, total 0.2 10/01/2020 04:49 AM    Alk.  phosphatase 55 10/01/2020 04:49 AM    Protein, total 6.1 (L) 10/01/2020 04:49 AM    Albumin 2.2 (L) 10/01/2020 04:49 AM    Globulin 3.9 10/01/2020 04:49 AM    A-G Ratio 0.6 (L) 10/01/2020 04:49 AM    ALT (SGPT) 19 10/01/2020 04:49 AM     Lab Results   Component Value Date/Time    ALT (SGPT) 19 10/01/2020 04:49 AM       Factors affecting BG pattern  Factor Dose Comments   Nutrition:  Carb-controlled meals   60 grams/meal    Drugs:  IV antibiotics  Steroids Phenylephrine    flagyl, vanco, cefepime  Methylpred 125 after cardiac arrest x1  Decadron 4mg in OR x1      Pain On Dilaudid    Infection Osteomyelitis with cellulitis S/p bone debridement and biopsy    Potential for withdrawal Opiate dependent    Other: s/p cardiac arrest  Lactic acidosis      Blood glucose pattern        Assessment and Plan   Nursing Diagnosis Risk for unstable blood glucose pattern   Nursing Intervention Domain 1381 Decision-making Support   Nursing Interventions Examined current inpatient diabetes control   Explored factors facilitating and impeding inpatient management  Identified self-management practices impeding diabetes control  Explored corrective strategies with patient and responsible inpatient provider   Informed patient of rational for insulin strategy while hospitalized  Instructed patient in impact of stress and infection on glucose, current A1C value     Evaluation   Davian Zuniga is a 50year old female with uncontrolled diabetes on metformin, glipizide and basal insulin who presented with confriemd osteomyelitis and poor wound heeling s/p amputation of her right great tow. Initial glucose 515 with A1C 9.8% and patient reports non-compliance with antihyperglycemic. Hospital course has been complicated with cardiac arrest with new CAD s/p PCI. She was NPO during the day for delayed wound closure and demonstrated stable glucose indicating adequate basal dose. She did eat late last night and awoke with a high fasting glucose indicating a need to advance bolus insulin. Inpatient ICU glucose goal 140-180. Recommendations   Recommend:  Continue current management plan with:  1. Continue basal insulin at 0.7 units/k units daily. Do not hold when NPO    2. Continue correctional insulin at resistant sensitivity AH. Do not hold if NPO    3. Advance mealtime bolus insulin: to 12 units with meals.   Hold if patient consumes less than 50% of carbohydrates on meal tray or NPO      On Discharge:  Due to significantly elevated A1C and osteomyelitis, send home on basal/bolus insulin    D/C Metformin and Glipizide  Add Lantus Toujeo Pen: 60 units once daily  Continue Humulin R U-500: 10-15 units with meals (No sliding scale)    Billing Code(s)   I personally reviewed chart, notes, data and current medications in the medical record, and examined the patient at bedside before making care recommendations.      [x] IP subsequent hospital care - 25 minutes      MARLYN Salazar  Program [facility] Diabetes Health  Access via [facility]  [phone]

## 2020-10-01 NOTE — PROGRESS NOTES
Cardiology Progress Note           9/24/2020 12:02 PM   Osteomyelitis of foot ([facility] [M86.9]   HPI: Dedrick Daily is a 50 y.o. female admitted for Osteomyelitis of foot ([facility] [M86.9]. Has PMH of DM 2 and  HTN. Had [place] for several days prior to admit, thought it was GERD. Underwent I&D of right foot. Had postop arrest and EKG changes. NSTEMI, troponin peak to >100. Underwent LHC with stent to Left circumflex and LAD. Echo with NL EF, mild AS. Was hypotensive and required neosynephrine which is now off. Was started on midodrine. .       Subjective:   Underwent Primary closure of Rt foot wound yesterday. This a.m.(10/1.), pt is awake, alert. Denies chest pain. Feels a little SOB this a.m. BP improved this a.m. Investigation  Telemetry: normal sinus rhythm  ECG:   Echocardiogram:   09/24/20   ECHO ADULT COMPLETE 09/27/2020 9/27/2020    Narrative · LV: Estimated LVEF is 60 - 65%. Normal cavity size and systolic function   (ejection fraction normal). Upper normal wall thickness. Wall motion:   normal.  · AV: Aortic valve is functionally bicuspid. Raphe located between the   right cusp and noncoronary cusp. Moderate aortic valve sclerosis with   stenosis. Severe aortic valve leaflet calcification present. Severely   reduced right leaflet mobility of the aortic valve. Aortic valve mean   gradient is 7 mmHg. Aortic valve area is 1.8 cm2. Mild aortic valve   stenosis is present. · LA: Mildly dilated left atrium. · MV: Mild mitral valve regurgitation is present. · TV: Mild tricuspid valve regurgitation is present. · PA: Mild pulmonary hypertension. Pulmonary arterial systolic pressure is   40 mmHg. Signed by: Vance Aden MD          Assessment and PLAN     1. NSTEMI/CAD: s/p stent to Left Circumflex and LAD. Continue ASA, high intensity statin. Transitioned from [address] today to Plavix due to persistent anemia. Echocardiogram with NL EF, NWMA, mild AS, mild MR. No BB for now given low  BP. If BP improves, will start metoprolol tartrate. Anticipate possible DC late today or more likely early a.m. from cardiology perspective. Will arrange follow up for Dr. Arvin Bryant in 2 weeks. 2. Hypotension: now BP low NL. Do not suspect cardiogenic shock given NL LV function. Off neosynephrine. On midodrine 5 mg TID. Hold BB for now. Will restart IVF. TSH is elevated but free T4 normal.   Cortisol level is slightly low- will give 1 dose IV solumedrol to assess response. Suspect BP will get after acute stress resolves. .    3. SOB: few basilar crackles. Suspect SOB may be multifactorial (mild fluid overload due to preop IV fluids given for low BP as well as presence of anemia). Will give IV lasix 20 mg x 1 dose. Unlikely that she will need Lasix at discharge. 3.  Hx of HTN:  BP low. Off home lisinopril-HCTZ. 4. Dyslipidemia: LDL 22, HDL 13, Triglycerides 364. On fenofibrate. Lipitor started this admission. .    5. DM: A1C=9.8. Management per primary team.    6. Right foot cellulitis/osteo: s/p I&D with removal of all nonviable soft tissue right foot, natalie debridement rt foot. And now s/p primary closure of rt foot wound. NWB RLE. Antibiotics per podiatry, primary team.      8. Anemia: hgb stable but low 7.7. May also contribute to hypotension and SOB. 9. Hypothyroidism : on synthroid 200 daily. TSH 17.6. Consider slowly increasing dose. Defer to Primary team. Could be the reason why hypotensive. [x]    High complexity decision making was performed  []    Patient is at high-risk of decompensation with multiple organ involvement     Review of Symptoms:  Respiratory: +SOB. no cough, hemoptysis, URI. Cardiovascular: No CP, palpitations, sweating, lightheadedness, dizziness, syncope, presyncope, lower extremity swelling. Otherwise no other pertinent positive or negative symptoms on ROS.      Patient Active Problem List    Diagnosis Date Noted    Osteomyelitis of foot ([facility] 09/24/2020      Other, MD Romel  Past Medical History:   Diagnosis Date    Arthritis     Diabetes ([facility]     Endocrine disease     hypothyroid    Gastrointestinal disorder     pancreatitis    Hypertension       Past Surgical History:   Procedure Laterality Date    HX GI      colonoscpy     Social History     Socioeconomic History    Marital status: SINGLE     Spouse name: Not on file    Number of children: Not on file    Years of education: Not on file    Highest education level: Not on file   Tobacco Use    Smoking status: Current Some Day Smoker     Packs/day: 0.25    Smokeless tobacco: Never Used   Substance and Sexual Activity    Alcohol use: Yes     Comment: quit about a month ago - drank only on the weekends    Drug use: No    Sexual activity: Yes     Partners: Male     Family History   Problem Relation Age of Onset    Cancer Mother         colon      Current Facility-Administered Medications   Medication Dose Route Frequency    nitroglycerin (NITROSTAT) tablet 0.4 mg  0.4 mg SubLINGual Q5MIN PRN    vancomycin trough on 10/1 @ 14:00    Other ONCE    insulin lispro (HUMALOG) injection 12 Units  12 Units SubCUTAneous TID WITH MEALS    methylPREDNISolone (PF) (Solu-MEDROL) injection 125 mg  125 mg IntraVENous ONCE    furosemide (LASIX) injection 20 mg  20 mg IntraVENous ONCE    oxyCODONE IR (ROXICODONE) tablet 10 mg  10 mg Oral Q4H PRN    morphine 10 mg/ml injection 4 mg  4 mg IntraVENous Q3H PRN    [START ON 10/2/2020] clopidogreL (PLAVIX) tablet 75 mg  75 mg Oral DAILY    sodium chloride (NS) flush 5-40 mL  5-40 mL IntraVENous Q8H    sodium chloride (NS) flush 5-40 mL  5-40 mL IntraVENous PRN    HYDROmorphone (PF) (DILAUDID) injection 1 mg  1 mg IntraVENous Q4H PRN    vancomycin (VANCOCIN) 1250 mg in  ml infusion  1,250 mg IntraVENous Q12H    midodrine (PROAMATINE) tablet 5 mg  5 mg Oral Q8H    cefepime (MAXIPIME) 2 g in 0.9% sodium chloride (MBP/ADV) 100 mL  2 g IntraVENous Q8H    insulin lispro (HUMALOG) injection   SubCUTAneous AC&HS    HYDROmorphone (PF) (DILAUDID) injection 2 mg  2 mg IntraVENous Q4H PRN    insulin glargine (LANTUS) injection 60 Units  60 Units SubCUTAneous DAILY    aspirin chewable tablet 81 mg  81 mg Oral DAILY    sodium chloride (NS) flush 5-40 mL  5-40 mL IntraVENous Q8H    sodium chloride (NS) flush 5-40 mL  5-40 mL IntraVENous PRN    atorvastatin (LIPITOR) tablet 80 mg  80 mg Oral DAILY    traZODone (DESYREL) tablet 50 mg  50 mg Oral QHS    metroNIDAZOLE (FLAGYL) IVPB premix 500 mg  500 mg IntraVENous Q12H    albuterol-ipratropium (DUO-NEB) 2.5 MG-0.5 MG/3 ML  3 mL Nebulization Q6H PRN    methadone (DOLOPHINE) tablet 160 mg  160 mg Oral DAILY    clonazePAM (KlonoPIN) tablet 1 mg  1 mg Oral TID PRN    sodium chloride (NS) flush 5-40 mL  5-40 mL IntraVENous Q8H    sodium chloride (NS) flush 5-40 mL  5-40 mL IntraVENous PRN    ondansetron (ZOFRAN) injection 4 mg  4 mg IntraVENous Q4H PRN    polyethylene glycol (MIRALAX) packet 17 g  17 g Oral DAILY    senna-docusate (PERICOLACE) 8.6-50 mg per tablet 1 Tab  1 Tab Oral DAILY    lipase-protease-amylase (CREON 24,000) capsule 1 Cap  1 Cap Oral TID WITH MEALS    busPIRone (BUSPAR) tablet 15 mg  15 mg Oral TID    fenofibrate nanocrystallized (TRICOR) tablet 145 mg  145 mg Oral DAILY    levothyroxine (SYNTHROID) tablet 200 mcg  200 mcg Oral ACB    pantoprazole (PROTONIX) tablet 40 mg  40 mg Oral ACB    pregabalin (LYRICA) capsule 150 mg  150 mg Oral BID    zolpidem (AMBIEN) tablet 5 mg  5 mg Oral QHS PRN    sodium chloride (NS) flush 5-40 mL  5-40 mL IntraVENous Q8H    sodium chloride (NS) flush 5-40 mL  5-40 mL IntraVENous PRN    acetaminophen (TYLENOL) tablet 650 mg  650 mg Oral Q4H PRN    glucose chewable tablet 16 g  4 Tab Oral PRN    glucagon (GLUCAGEN) injection 1 mg  1 mg IntraMUSCular PRN    dextrose 10% infusion 0-250 mL  0-250 mL IntraVENous PRN    Vancomycin pharmacy to dose   Other Rx Dosing/Monitoring      Prior to Admission Medications   Prescriptions Last Dose Informant Patient Reported? Taking? GLIPIZIDE PO 2020 at Unknown time  Yes Yes   Sig: Take 10 mg by mouth daily (with breakfast). LEVOTHYROXINE SODIUM (SYNTHROID PO) 2020 at Unknown time  Yes Yes   Sig: Take 200 mcg by mouth Daily (before breakfast). albuterol (PROVENTIL HFA, VENTOLIN HFA, PROAIR HFA) 90 mcg/actuation inhaler   No Yes   Sig: Take 2 Puffs by inhalation every four (4) hours as needed for Wheezing. amylase-lipase-protease (CREON) 24,000-76,000 -120,000 unit capsule  at [address] [place] [address]  Yes Yes   Sig: Take 1 Cap by mouth three (3) times daily (with meals). atorvastatin (Lipitor) 40 mg tablet   Yes Yes   Sig: Take 40 mg by mouth nightly. clonazePAM (KlonoPIN) 1 mg tablet 2020 at Unknown time  Yes Yes   Sig: Take 1 mg by mouth three (3) times daily. fenofibrate (LOFIBRA) 160 mg tablet 2020 at Unknown time  Yes Yes   Sig: Take 160 mg by mouth daily. Indications: HYPERCHOLESTEROLEMIA   insulin U-500 CONCENTRATED regular (HumuLIN R U-500, Conc, Kwikpen) 500 unit/mL (3 mL) inpn subQ pen 2020 at Unknown time  Yes Yes   Si Units by SubCUTAneous route three (3) times daily (with meals). lisinopril-hydroCHLOROthiazide (PRINZIDE, ZESTORETIC) 10-12.5 mg per tablet 2020 at Unknown time  Yes Yes   Sig: Take 1 Tab by mouth daily. metFORMIN (GLUCOPHAGE) 500 mg tablet 2020 at Unknown time  Yes Yes   Sig: Take 1,000 mg by mouth two (2) times daily (with meals). methadone (DOLOPHINE) 10 mg tablet 2020 at Unknown time  Yes Yes   Sig: Take 160 mg by mouth daily. omeprazole (PRILOSEC) 40 mg capsule   Yes Yes   Sig: Take 40 mg by mouth daily. pregabalin (Lyrica) 200 mg capsule 2020 at Unknown time  Yes Yes   Sig: Take 200 mg by mouth four (4) times daily.    sertraline (ZOLOFT) 50 mg tablet 2020 at Unknown time  Yes Yes   Sig: Take 50 mg by mouth two (2) times a day. tiZANidine (ZANAFLEX) 4 mg capsule   Yes Yes   Sig: Take 4 mg by mouth two (2) times daily as needed (muscle spasms). Facility-Administered Medications: None      No Known Allergies    Labs:   Recent Results (from the past 24 hour(s))   GLUCOSE, POC    Collection Time: 09/30/20 12:23 PM   Result Value Ref Range    Glucose (POC) 166 (H) 65 - 100 mg/dL    Performed by Jacquenette Severance Paulita Kyle) Homer Nancy, POC    Collection Time: 09/30/20  4:03 PM   Result Value Ref Range    Glucose (POC) 104 (H) 65 - 100 mg/dL    Performed by Jacquenette Severance Paulita Kyle) Clover Baker    GLUCOSE, POC    Collection Time: 09/30/20  9:19 PM   Result Value Ref Range    Glucose (POC) 162 (H) 65 - 100 mg/dL    Performed by Fabian Stuart    NT-PRO BNP    Collection Time: 10/01/20  4:49 AM   Result Value Ref Range    NT pro-BNP 4,198 (H) <125 PG/ML   PROCALCITONIN    Collection Time: 10/01/20  4:49 AM   Result Value Ref Range    Procalcitonin 0.78 ng/mL   CBC WITH AUTOMATED DIFF    Collection Time: 10/01/20  4:49 AM   Result Value Ref Range    WBC 9.0 3.6 - 11.0 K/uL    RBC 2.90 (L) 3.80 - 5.20 M/uL    HGB 7.7 (L) 11.5 - 16.0 g/dL    HCT 25.6 (L) 35.0 - 47.0 %    MCV 88.3 80.0 - 99.0 FL    MCH 26.6 26.0 - 34.0 PG    MCHC 30.1 30.0 - 36.5 g/dL    RDW 19.3 (H) 11.5 - 14.5 %    PLATELET 628 260 - 548 K/uL    MPV 11.6 8.9 - 12.9 FL    NRBC 1.1 (H) 0  WBC    ABSOLUTE NRBC 0.10 (H) 0.00 - 0.01 K/uL    NEUTROPHILS 68 32 - 75 %    LYMPHOCYTES 24 12 - 49 %    MONOCYTES 4 (L) 5 - 13 %    EOSINOPHILS 2 0 - 7 %    BASOPHILS 1 0 - 1 %    MYELOCYTES 1 (H) 0 %    IMMATURE GRANULOCYTES 0 %    ABS. NEUTROPHILS 6.1 1.8 - 8.0 K/UL    ABS. LYMPHOCYTES 2.2 0.8 - 3.5 K/UL    ABS. MONOCYTES 0.4 0.0 - 1.0 K/UL    ABS. EOSINOPHILS 0.2 0.0 - 0.4 K/UL    ABS. BASOPHILS 0.1 0.0 - 0.1 K/UL    ABS. IMM.  GRANS. 0.0 K/UL    DF MANUAL      RBC COMMENTS ANISOCYTOSIS  1+       METABOLIC PANEL, COMPREHENSIVE    Collection Time: 10/01/20  4:49 AM   Result Value Ref Range    Sodium 141 136 - 145 mmol/L    Potassium 4.3 3.5 - 5.1 mmol/L    Chloride 110 (H) 97 - 108 mmol/L    CO2 26 21 - 32 mmol/L    Anion gap 5 5 - 15 mmol/L    Glucose 222 (H) 65 - 100 mg/dL    BUN 15 6 - 20 MG/DL    Creatinine 1.01 0.55 - 1.02 MG/DL    BUN/Creatinine ratio 15 12 - 20      GFR est AA >60 >60 ml/min/1.73m2    GFR est non-AA 59 (L) >60 ml/min/1.73m2    Calcium 8.9 8.5 - 10.1 MG/DL    Bilirubin, total 0.2 0.2 - 1.0 MG/DL    ALT (SGPT) 19 12 - 78 U/L    AST (SGOT) 24 15 - 37 U/L    Alk.  phosphatase 55 45 - 117 U/L    Protein, total 6.1 (L) 6.4 - 8.2 g/dL    Albumin 2.2 (L) 3.5 - 5.0 g/dL    Globulin 3.9 2.0 - 4.0 g/dL    A-G Ratio 0.6 (L) 1.1 - 2.2     CORTISOL, AM    Collection Time: 10/01/20  4:49 AM   Result Value Ref Range    Cortisol, a.m. 4.2 (L) 4.30 - 22.45 ug/dL   GLUCOSE, POC    Collection Time: 10/01/20  7:33 AM   Result Value Ref Range    Glucose (POC) 302 (H) 65 - 100 mg/dL    Performed by [address], POC    Collection Time: 10/01/20 12:02 PM   Result Value Ref Range    Glucose (POC) 198 (H) 65 - 100 mg/dL    Performed by Rema Livingston        Intake/Output Summary (Last 24 hours) at 10/1/2020 1205  Last data filed at 10/1/2020 0929  Gross per 24 hour   Intake 3031.67 ml   Output 600 ml   Net 2431.67 ml      Patient Vitals for the past 24 hrs:   Temp Pulse Resp BP SpO2   10/01/20 1159 98.4 °F (36.9 °C) 67 18 101/75 93 %   10/01/20 0652 98.2 °F (36.8 °C) 76 18 117/72 98 %   10/01/20 0602 -- 71 18 (!) 90/59 95 %   10/01/20 0600 -- 66 8 -- 95 %   10/01/20 0500 -- 73 14 100/61 96 %   10/01/20 0400 97.6 °F (36.4 °C) 63 13 (!) 92/57 95 %   10/01/20 0300 -- (!) 57 10 94/61 --   10/01/20 0200 -- (!) 55 9 (!) 89/53 97 %   10/01/20 0100 -- (!) 56 13 (!) 84/49 97 %   10/01/20 0000 97.7 °F (36.5 °C) 67 15 98/68 98 %   09/30/20 2300 -- 68 9 98/62 97 %   09/30/20 2200 -- (!) 59 18 (!) 87/71 --   09/30/20 2100 -- 63 15 (!) 88/52 --   09/30/20 2000 97.7 °F (36.5 °C) 66 10 105/66 98 %   09/30/20 1914 -- 60 9 90/61 98 %   09/30/20 1855 97.7 °F (36.5 °C) (!) 55 10 -- 98 %   09/30/20 1854 -- (!) 54 -- (!) 94/58 98 %   09/30/20 1850 -- (!) 53 -- -- 97 %   09/30/20 1845 -- (!) 53 9 (!) 85/60 99 %   09/30/20 1840 -- (!) 52 -- (!) 88/56 100 %   09/30/20 1837 97.6 °F (36.4 °C) (!) 52 10 98/61 100 %   09/30/20 1835 -- -- -- 98/61 100 %   09/30/20 1701 -- 65 10 98/65 96 %   09/30/20 1634 98.5 °F (36.9 °C) 66 13 110/75 96 %   09/30/20 1600 97.7 °F (36.5 °C) 74 19 -- 93 %   09/30/20 1530 -- 82 11 97/62 --   09/30/20 1500 -- (!) 58 (!) 6 (!) 88/59 --   09/30/20 1400 -- 60 (!) 7 93/61 96 %   09/30/20 1300 -- 61 8 (!) 92/57 --      General:    Alert, cooperative, no distress. Psychiatric:    Normal Mood and affect    Eye/ENT:     Conjunctival pink. Able to hear voice at normal amplitude   Lungs:      Visibly symmetric chest expansion, No palpable tenderness. Clear to auscultation bilaterally. Heart[de-identified]    Regular rate and rhythm, S1, S2 normal,no murmur noted. Normal palpable peripheral pulses. No cyanosis   Abdomen:     Soft, non-tender, nondistended. organomegaly. Extremities:   Extremities normal, atraumatic, no edema. RLE foot in ace wrap. Neurologic:   MCKEON, No gross focal deficits     Anthony San. TIFFANY Roque.  TIFFANY Roque  10/1/2020   9:03 AM     [facility] Office:   Kaleb Hughes Office:  [address]    [facility] [address] [facility] [address]     [address] [place] [address]  P: [phone]    P: [phone]  F: [phone]    F: [phone]

## 2020-10-01 NOTE — PROGRESS NOTES
[facility]  [address] [facility], [address] [place] [address]  [phone]    Foot and Ankle Surgery - Progress Note                                                   Assessment/Plan:  Osteomyelitis right foot  Cellulitis right foot  Ulcer with necrotic bone right foot  Smoker  Chronic methadone user   PVD  DM/neuropathy     Pt is S/P incision and drainage with removal of all nonviable soft tissue right foot, bone debridement right foot, open bone biopsies right foot DOS: 09-    Pt is S/P delayed primary closure right foot DOS: 09-     Discussed surgical findings with noted good soft tissue and bone envelope with negative proximal margins. We have no further plans for surgery at this time per foot and ankle, the patient is cleared once cleared by all other specialities and once DME is arranged by CM based on physical therapy recs. The patient will not require any HHC/woundcare as all bandage changes will be completed in my office.  The patient is to be ABSOLUTE  nonweightbearing to the right foot     Labs/imaging reviewed    Elevate and offload right lower extremity as much as possible, ABSOLUTE nonweightbearing at this time,    Physical therapy to evaluate and treat      Foot and ankle to follow      SUBJECTIVE:   Pt resting in NAD,  no new pedal complaints, pain currently controlled, not an any tristin at this time     Objective:  Vitals:   Patient Vitals for the past 12 hrs:   BP Temp Pulse Resp SpO2   09/30/20 2200 (!) 87/71 -- (!) 59 18 --   09/30/20 2100 (!) 88/52 -- 63 15 --   09/30/20 2000 105/66 97.7 °F (36.5 °C) 66 10 98 %   09/30/20 1914 90/61 -- 60 9 98 %   09/30/20 1855 -- 97.7 °F (36.5 °C) (!) 55 10 98 %   09/30/20 1854 (!) 94/58 -- (!) 54 -- 98 %   09/30/20 1850 -- -- (!) 53 -- 97 %   09/30/20 1845 (!) 85/60 -- (!) 53 9 99 %   09/30/20 1840 (!) 88/56 -- (!) 52 -- 100 %   09/30/20 1837 98/61 97.6 °F (36.4 °C) (!) 52 10 100 %   09/30/20 6642 98/61 -- -- -- 100 %   09/30/20 1701 98/65 -- 65 10 96 %   09/30/20 1634 110/75 98.5 °F (36.9 °C) 66 13 96 %   09/30/20 1600 -- 97.7 °F (36.5 °C) 74 19 93 %   09/30/20 1530 97/62 -- 82 11 --   09/30/20 1500 (!) 88/59 -- (!) 58 (!) 6 --   09/30/20 1400 93/61 -- 60 (!) 7 96 %   09/30/20 1300 (!) 92/57 -- 61 8 --     Dermatological:  Dressing to the right foot clean dry and intact, remaining toes of normal color and texture     Vascular:   DP/PT pulses decreased +1/4 Bilateral lower extremity. CFT<5 seconds to all digits. Pedal hair growth absent     Neurological:   Epicritic and protective threshold is deminished to B/L LE, Pt is unable to detect a 5.07 monofilament wire on 5/5 random tested spots B/L LE.      MSK:  Deferred      Imaging:  Xray 3 views right foot postop  IMPRESSION  IMPRESSION: Postoperative image of first ray amputation.     CT chest/abdomen/pelvis  IMPRESSION  IMPRESSION:   1. Diffuse interlobular septal thickening and patchy groundglass attenuation in  the lungs may represent pulmonary edema or atypical/viral infection.     2. Bilateral dependent lung airspace opacities may represent atelectasis,  infection, or aspiration.     3.  No acute abnormality in the abdomen or pelvis    Labs:  Recent Labs     09/30/20  0335 09/29/20  1102   WBC 8.8  --    CREA 1.03*  --    BUN 18  --    HGB 7.6*  --    HCT 25.0*  --    INR  --  1.1     --    K 4.2  --    *  --    CO2 26  --    *  --          Josue Bergman DPM  10/1/2020  [facility] [address] [facility] [id number]

## 2020-10-01 NOTE — PROGRESS NOTES
Problem: Mobility Impaired (Adult and Pediatric)  Goal: *Acute Goals and Plan of Care (Insert Text)  Description: FUNCTIONAL STATUS PRIOR TO ADMISSION: Patient was modified independent using a rolling walker for functional mobility. Had recently stopped using RW within her home because it was inconvenient essentially. Was using RW due to recent R great toe amputation. HOME SUPPORT PRIOR TO ADMISSION: The patient lived with her fiance and teenage son. Physical Therapy Goals  Initiated 10/1/2020  1. Patient will transfer from bed to chair and chair to bed with modified independence using the least restrictive device and while compliant with NWB to R LE within 7 day(s). 2.  Patient will perform sit to stand with modified independence while compliant with NWB to R LE within 7 day(s). 3.  Patient will ambulate with supervision/set-up for 25 feet with the least restrictive device while compliant with NWB to R LE within 7 day(s). 4.  Patient will ascend/descend 15 stairs with left handrail(s) with minimal assistance/contact guard assist while compliant with NWB R LE within 7 day(s). Initiated 9/28/2020-  1. Patient will move from supine to sit and sit to supine , scoot up and down, and roll side to side in bed with independence within 7 day(s). 2.  Patient will transfer from bed to chair and chair to bed with modified independence using the least restrictive device and while compliant with NWB to R LEwithin 7 day(s). 3.  Patient will perform sit to stand with modified independence while compliant with NWB to R LE within 7 day(s). 4.  Patient will ambulate with supervision/set-up for 60 feet with the least restrictive device while compliant with NWB to R LE within 7 day(s). 5.  Patient will ascend/descend 15 stairs with left handrail(s) with minimal assistance/contact guard assist while compliant with NWB R LE within 7 day(s).       Outcome: Progressing Towards Goal   PHYSICAL THERAPY REEVALUATION  Patient: Álvaro Age (53 y.o. female)  Date: 10/1/2020  Primary Diagnosis: Osteomyelitis of foot ([facility] [address] [M86.9]  Procedure(s) (LRB):  DELAYED PRIMARY CLOSURE RIGHT FOOT  (Right) 1 Day Post-Op   Precautions:   Fall, NWB(STRICT NWB R LE)      ASSESSMENT  Based on the objective data described below, the patient presents with continued weakness, decreased endurance, and poor L foot clearance with hop to gait using RW. Patient POD 1 from delayed closure of wound and agreeable to therapy although citing fatigue. Required moderate cues to maintain NWB during sit>stand and gait (cues for knee and hip flexion to keep R LE off ground). Required 2 standing rest breaks in 10 ft of gait. Required mod Ax2 for hop onto scale (~3 inches of clearance), therefore question ability to progress to climbing 15 stairs (bed and bathroom upstairs). She stated she would \"go up on (her) bottom. \" Discussed that if this is the case, she would need someone to ensure the R foot does not hit the stairs/drag on the way up and that she would need significant assistance to achieve standing position while maintaining NWB once on the second floor. She also reported she will be alone during the day and that no one will be at home to assist with mobility or household management/meal prep as needed. She stated she was not interested in rehab, and therefore PT encouraged her to begin planning for how home would be a safe d/c. She did endorse that she was non-compliant with her WB status after her last surgery when asked how she had managed at home in the interim. Also states the knee scooter is not conducive to use in her home. Educated her that she must use knee scooter or RW.      Current Level of Function Impacting Discharge (mobility/balance): CGA-min A, mod Ax2 to hop and clear 3 inches  Other factors to consider for discharge: NWB, little support during the day, second floor bedroom/bathroom     Patient will benefit from skilled therapy intervention to address the above noted impairments. PLAN :  Recommendations and Planned Interventions: bed mobility training, transfer training, gait training, therapeutic exercises, neuromuscular re-education, modalities, edema management/control, patient and family training/education, and therapeutic activities      Frequency/Duration: Patient will be followed by physical therapy:  5 times a week to address goals. Recommendation for discharge: (in order for the patient to meet his/her long term goals)  Physical therapy at least 2 days/week in the home AND ensure assist and/or supervision for safety with household mobility/management    This discharge recommendation:  Has been made in collaboration with the attending provider and/or case management    Equipment recommendations for successful discharge (if) home: owns knee scooter, BSC, and RW-- do not anticipate other needs         SUBJECTIVE:   Patient stated I was walking on my heel and the outside of my foot.     OBJECTIVE DATA SUMMARY:   HISTORY:    Past Medical History:   Diagnosis Date    Arthritis     Diabetes ([facility] [address]     Endocrine disease     hypothyroid    Gastrointestinal disorder     pancreatitis    Hypertension      Past Surgical History:   Procedure Laterality Date    HX GI      colonoscpy     Hospital course since last seen and reason for reevaluation: POD 1 delayed closure wound R foot    Personal factors and/or comorbidities impacting plan of care: PMH    Home Situation  Home Environment: Private residence  # Steps to Enter: 3  Rails to Enter: Yes  Hand Rails : Left  One/Two Story Residence: Two story  # of Interior Steps: 13  Interior Rails: Left  Lift Chair Available: No  Living Alone: No  Support Systems: Child(sunita), Spouse/Significant Other/Partner  Patient Expects to be Discharged to[de-identified] Private residence  Current DME Used/Available at Home: Walker, rolling, Commode, bedside(knee scooter)    EXAMINATION/PRESENTATION/DECISION MAKING:   Critical Behavior:  Neurologic State: Drowsy  Orientation Level: Oriented X4  Cognition: Follows commands  Hearing: Auditory  Auditory Impairment: None  Range Of Motion:  AROM: Generally decreased, functional  Strength:    Strength: Generally decreased, functional  Tone & Sensation:   Tone: Normal  Coordination:  Coordination: Generally decreased, functional  Functional Mobility:  Bed Mobility:  Supine to Sit: Independent  Sit to Supine: Independent  Scooting: Independent  Transfers:  Sit to Stand: Contact guard assistance  Stand to Sit: Contact guard assistance  Balance:   Sitting: Intact  Standing: Impaired; With support  Standing - Static: Fair  Standing - Dynamic : Fair  Ambulation/Gait Training:  Distance (ft): 10 Feet (ft)  Assistive Device: Gait belt;Walker, rolling  Ambulation - Level of Assistance: Contact guard assistance;Minimal assistance  Gait Abnormalities: Decreased step clearance(hop to with RW)  Right Side Weight Bearing: Non-weight bearing   Base of Support: Shift to left  Speed/Rossi: Slow         Pain Ratin/10 rest    Activity Tolerance:   Fair, desaturates with exertion and requires oxygen, and requires frequent rest breaks  Please refer to the flowsheet for vital signs taken during this treatment. After treatment patient left in no apparent distress:   Supine in bed, Heels elevated for pressure relief, Call bell within reach, and Side rails x 3    COMMUNICATION/EDUCATION:   The patients plan of care was discussed with: Registered nurse and Case management. Fall prevention education was provided and the patient/caregiver indicated understanding., Patient/family have participated as able in goal setting and plan of care. , and Patient/family agree to work toward stated goals and plan of care.     Thank you for this referral.  Amelia Carreno, PT, DPT   Time Calculation: 19 mins

## 2020-10-01 NOTE — PROGRESS NOTES
[address] [facility] Adult  Hospitalist Group                                                                                          Hospitalist Progress Note  Augustina Agarwal MD  Answering service: [phone] or [phone] from in house phone        Date of Service:  10/1/2020  NAME:  Emmett Reyna  :  1975  MRN:  [id number]      Admission Summary:   HPI: 49 y/o F with Diabetic neuropathy c/b Right foot osteomyelitis following several week course and numerous I+Ds. She underwent debridement today in the OR - course was uneverntful. Patient noted to be alert and at her baseline prior to leaving the pacu. On arrival to the floor a rapid response was called and this was followed shortly by a code blue to her room. Reportedly had respiratory distress and refused to comply with wearing NRB. She became progressively more bradycardic and progressed to PEA at which point ACLS was initiated. She had several rounds of compressions, one dose of epinephrine, and one dose of narcan. ROSC was achieved immediately following narcan administration at which time vertical nystagmus was observed and her breathing pattern was irregular and shallow. She was intubated uneventfully at the bedside and transferred to the ICU.      In terms of underlying cause for her arrest, there is a possibility of this being opiate overdose, but the timing of administration per [facility] is not consistent with this. It may be possible that opiates were supplied by a third party - will discuss this patient's partner when able. In the interim, we will send a UDS to investigate other medication causes for AMS. Patient has history of home methadone use, she was noted to be bradypnec on induction of anesthesia in the OR prior to her case this afternoon. Interestingly she did not receive any opiates for analgesia following her operation.  She was given a dose of 30mg methadone earlier today (far lower than the recorded dose of 160mg every day in medications tab).   Following narcan administration and intubation, patient with diaphoresis, tachycardia, HTN, and nystagmus with marked mydriasis -- possibility of narcan induced opiate withdrawal. Currently on fentanyl gtt.     She has LBBB of unclear chronicity. Will follow up cardiac biomarkers and involve cardiology as able. ECHO in am.   DVT duplex yesterday lowers my suspicion for PE. No large PTX on bedside CXR. Given GFR, Radiologist and I elected to not use IV contrast for her scans.       Interval history / Subjective:       10/1/2020 ; no distress, anticipates home w pt/ot on discharge. Cont on abx, cult pendings      Assessment & Plan:       1. CAD  a. S/p JAELYN to Circ & LAD on 9/26/2020  b. DAPT/Statin/Coreg  c. Frank off < 24h  d. Continue Midodrine   e. Cardiology following - Appreciate Rosendo/Vee/Kiran input  f. No cp am rounds 10/1/2020   2. Osteomyelitis  a. Podiatry following - to OR in am for further debridement   b. F/u wound cultures  c. Vanc, Flagyl (Maxipime dropping off MAR 10/1/2020   3. Diabetes Mellitus (uncontrolled)  a. Lantus 40u daily  b. Humalog SSI q6h  c. Appreicate Diabetes Management  d. Pre meal Humalog increased 10/1/2020 to 12 units   4. SBP Goal of: > 90 mmHg, MAP Goal of: > 65 mmHg  5. None - For above SBP/MAP goals  a. Hold home antihypertensives  6. Methadone 160 mg PO daily (takes chronically at home)  a. Avoid QTc-prolonging medications   7. Transfusion Trigger (Hgb): <7 g/dL  Lab Results   Component Value Date/Time    HGB 7.7 (L) 10/01/2020 04:49 AM      8. Pulmonary toilet: Duo-Nebs   9. SpO2 Goal: > 92%  10. Keep K>4; Mg>2   11. PT/OT: PT consulted and on board and OT consulted and on board      Hospital Problems  Never Reviewed          Codes Class Noted POA    Osteomyelitis of foot [facility]) ICD-10-CM: M86.9  ICD-9-CM: 730.27  9/24/2020 Unknown                Review of Systems:   Pertinent items are noted in HPI.        Vital Signs:    Last 24hrs VS reviewed since prior progress note. Most recent are:  Visit Vitals  /72 (BP 1 Location: Right arm, BP Patient Position: At rest)   Pulse 76   Temp 98.2 °F (36.8 °C)   Resp 18   Ht 5' 7\" (1.702 m)   Wt 83.5 kg (184 lb 1.4 oz)   SpO2 98%   BMI 28.83 kg/m²         Intake/Output Summary (Last 24 hours) at 10/1/2020 9910  Last data filed at 10/1/2020 0600  Gross per 24 hour   Intake 3031.67 ml   Output --   Net 3031.67 ml        Physical Examination:             Constitutional:  No acute distress, cooperative, pleasant    ENT:  Oral mucosa moist, oropharynx benign. Resp:  CTA bilaterally. No wheezing/rhonchi/rales. No accessory muscle use   CV:  Regular rhythm, normal rate, no murmurs, gallops, rubs    GI:  Soft, non distended, non tender. normoactive bowel sounds, no hepatosplenomegaly     Musculoskeletal:  No edema, warm, 2+ pulses throughout    Neurologic:  Moves all extremities. AAOx3, CN II-XII reviewed     Psych:  Good insight, Not anxious nor agitated. Skin:  Good turgor, no rashes or ulcers       Data Review:    Review and/or order of clinical lab test      Labs:     Recent Labs     10/01/20  0449 09/30/20  0335   WBC 9.0 8.8   HGB 7.7* 7.6*   HCT 25.6* 25.0*    260     Recent Labs     10/01/20  0449 09/30/20  0335 09/29/20  0412    140 143   K 4.3 4.2 4.1   * 110* 112*   CO2 26 26 26   BUN 15 18 20   CREA 1.01 1.03* 1.07*   * 188* 170*   CA 8.9 8.8 8.7     Recent Labs     10/01/20  0449 09/30/20  0335 09/29/20  0412   ALT 19 24 28   AP 55 52 61   TBILI 0.2 0.2 0.2   TP 6.1* 6.0* 6.4   ALB 2.2* 2.2* 2.2*   GLOB 3.9 3.8 4.2*     Recent Labs     09/29/20  1102   INR 1.1   PTP 11.7*      No results for input(s): FE, TIBC, PSAT, FERR in the last 72 hours. No results found for: FOL, RBCF   No results for input(s): PH, PCO2, PO2 in the last 72 hours. No results for input(s): CPK, CKNDX, TROIQ in the last 72 hours.     No lab exists for component: CPKMB  Lab Results   Component Value Date/Time Cholesterol, total 104 09/30/2020 03:35 AM    HDL Cholesterol 17 09/30/2020 03:35 AM    LDL, calculated 19 09/30/2020 03:35 AM    Triglyceride 340 (H) 09/30/2020 03:35 AM    CHOL/HDL Ratio 6.1 (H) 09/30/2020 03:35 AM     Lab Results   Component Value Date/Time    Glucose (POC) 302 (H) 10/01/2020 07:33 AM    Glucose (POC) 162 (H) 09/30/2020 09:19 PM    Glucose (POC) 104 (H) 09/30/2020 04:03 PM    Glucose (POC) 166 (H) 09/30/2020 12:23 PM    Glucose (POC) 237 (H) 09/30/2020 06:33 AM     No results found for: COLOR, APPRN, SPGRU, REFSG, SONJA, PROTU, GLUCU, KETU, BILU, UROU, YEE, LEUKU, GLUKE, EPSU, BACTU, WBCU, RBCU, CASTS, UCRY      Medications Reviewed:     Current Facility-Administered Medications   Medication Dose Route Frequency    nitroglycerin (NITROSTAT) tablet 0.4 mg  0.4 mg SubLINGual Q5MIN PRN    vancomycin trough on 10/1 @ 14:00    Other ONCE    insulin lispro (HUMALOG) injection 12 Units  12 Units SubCUTAneous TID WITH MEALS    oxyCODONE IR (ROXICODONE) tablet 10 mg  10 mg Oral Q4H PRN    morphine 10 mg/ml injection 4 mg  4 mg IntraVENous Q3H PRN    0.9% sodium chloride infusion  100 mL/hr IntraVENous CONTINUOUS    clopidogreL (PLAVIX) 600 mg  600 mg Oral ONCE    [START ON 10/2/2020] clopidogreL (PLAVIX) tablet 75 mg  75 mg Oral DAILY    sodium chloride (NS) flush 5-40 mL  5-40 mL IntraVENous Q8H    sodium chloride (NS) flush 5-40 mL  5-40 mL IntraVENous PRN    HYDROmorphone (PF) (DILAUDID) injection 1 mg  1 mg IntraVENous Q4H PRN    vancomycin (VANCOCIN) 1250 mg in  ml infusion  1,250 mg IntraVENous Q12H    midodrine (PROAMATINE) tablet 5 mg  5 mg Oral Q8H    cefepime (MAXIPIME) 2 g in 0.9% sodium chloride (MBP/ADV) 100 mL  2 g IntraVENous Q8H    insulin lispro (HUMALOG) injection   SubCUTAneous AC&HS    HYDROmorphone (PF) (DILAUDID) injection 2 mg  2 mg IntraVENous Q4H PRN    PHENYLephrine (ARELI-SYNEPHRINE) 30 mg in 0.9% sodium chloride 250 mL infusion   mcg/min IntraVENous TITRATE    insulin glargine (LANTUS) injection 60 Units  60 Units SubCUTAneous DAILY    aspirin chewable tablet 81 mg  81 mg Oral DAILY    sodium chloride (NS) flush 5-40 mL  5-40 mL IntraVENous Q8H    sodium chloride (NS) flush 5-40 mL  5-40 mL IntraVENous PRN    atorvastatin (LIPITOR) tablet 80 mg  80 mg Oral DAILY    traZODone (DESYREL) tablet 50 mg  50 mg Oral QHS    metroNIDAZOLE (FLAGYL) IVPB premix 500 mg  500 mg IntraVENous Q12H    albuterol-ipratropium (DUO-NEB) 2.5 MG-0.5 MG/3 ML  3 mL Nebulization Q6H PRN    methadone (DOLOPHINE) tablet 160 mg  160 mg Oral DAILY    clonazePAM (KlonoPIN) tablet 1 mg  1 mg Oral TID PRN    sodium chloride (NS) flush 5-40 mL  5-40 mL IntraVENous Q8H    sodium chloride (NS) flush 5-40 mL  5-40 mL IntraVENous PRN    ondansetron (ZOFRAN) injection 4 mg  4 mg IntraVENous Q4H PRN    polyethylene glycol (MIRALAX) packet 17 g  17 g Oral DAILY    senna-docusate (PERICOLACE) 8.6-50 mg per tablet 1 Tab  1 Tab Oral DAILY    lipase-protease-amylase (CREON 24,000) capsule 1 Cap  1 Cap Oral TID WITH MEALS    busPIRone (BUSPAR) tablet 15 mg  15 mg Oral TID    fenofibrate nanocrystallized (TRICOR) tablet 145 mg  145 mg Oral DAILY    levothyroxine (SYNTHROID) tablet 200 mcg  200 mcg Oral ACB    pantoprazole (PROTONIX) tablet 40 mg  40 mg Oral ACB    pregabalin (LYRICA) capsule 150 mg  150 mg Oral BID    zolpidem (AMBIEN) tablet 5 mg  5 mg Oral QHS PRN    sodium chloride (NS) flush 5-40 mL  5-40 mL IntraVENous Q8H    sodium chloride (NS) flush 5-40 mL  5-40 mL IntraVENous PRN    acetaminophen (TYLENOL) tablet 650 mg  650 mg Oral Q4H PRN    glucose chewable tablet 16 g  4 Tab Oral PRN    glucagon (GLUCAGEN) injection 1 mg  1 mg IntraMUSCular PRN    dextrose 10% infusion 0-250 mL  0-250 mL IntraVENous PRN    Vancomycin pharmacy to dose   Other Rx Dosing/Monitoring     ______________________________________________________________________  EXPECTED LENGTH OF STAY: 4d 0h  ACTUAL LENGTH OF STAY:          Jodi Magallon MD

## 2020-10-01 NOTE — PROGRESS NOTES
Day #8 of Vancomycin  Indication:  Osteomyelitis of Rt foot  Current regimen:  1250 mg IV Q 12hrs  Abx regimen:  Vancomycin, cefepime, and Flagyl    Recent Labs     10/01/20  0449 20  0335 20  0412   WBC 9.0 8.8 9.7   CREA 1.01 1.03* 1.07*   BUN 15 18 20   Est CrCl: 70-75 ml/min; UO: >1 ml/kg/hr     Temp (24hrs), Av °F (36.7 °C), Min:97.6 °F (36.4 °C), Max:98.7 °F (37.1 °C)    Cultures:    Blood - NG, final   wound (rt ft) - few diphtheroids - final   rt ft bone - scant diphtheroids - final   Blood - NGTD    Goal trough = 15 - 20 mcg/mL    Recent trough history:   @ 2311 = 14.3 mcg/mL - continue 1250 mg IV q18h   - increased to 1250 mg IV q16h due to improvement in Scr/BUN   @ 0051 = 11 mcg/ml (17 hrs post-dose) - 1250 mg IV q16h changed to q12h    Level today @ 14:11 = 16.8 mcg/ml (~ 12 hrs post-dose)  Continue current dose.

## 2020-10-01 NOTE — PROGRESS NOTES
NUTRITION  Reason for Rescreen: LOS        RECOMMENDATIONS:   1. Encourage high protein foods  2. Add daily MVI for wound healing  3. Insulin recommendations per diabetes team  Interventions   - added supplements/snacks: Ensure HP   - diet education provided       Information obtained from:   patient  Nutrition Assessment:    Pt admitted for cellulitis. Past Medical History:   Diagnosis Date    Arthritis     Diabetes ([facility] [address]     Endocrine disease     hypothyroid    Gastrointestinal disorder     pancreatitis    Hypertension      S/p I&D on 9/25 and closure of foot wound yesterday for osteo, abx rx. . Possible d/c today noted by cardio. Chronic pancreatitis with Creon at home, no diarrhea noted and pt with no concerns regarding this topic during visit. She reports good appetite. Able to correctly identify high protein foods and understands importance for wound healing. Agreeable to Ensure High Protein with nuts as HS snacks for low CHO, high protein snack, has had the shakes at home before. [facility] following with meal time insulin increased. Hyperglycemia on admit.      Diet:  cardiac, consistent CHO  Supplements: None  Meal intake:   Patient Vitals for the past 168 hrs:   % Diet Eaten   09/27/20 1900 50 %   09/27/20 1200 50 %   09/27/20 0800 0 %   09/24/20 1600 100 %     Weight Changes:   Gain  Wt Readings from Last 10 Encounters:   10/01/20 86.6 kg (190 lb 14.7 oz)   04/11/17 75.8 kg (167 lb)   10/05/15 80.1 kg (176 lb 9.4 oz)   01/11/12 85.7 kg (189 lb)     Nutrition-Related Concerns Identified:  None    Nutrition Diagnosis:   · Increased nutrient needs(protein) related to (wound healing) as evidenced by (foot wound s/p I&D)    Estimated Nutrition Needs:   Energy: 2747-9128(69-16STZS/EZ for gradual wt loss)  Wt used: Current(86kg)  Protein: 102 - 1.2g/kg  Wt used: Current(86kg)   Fluid: 2100 - 1ml/kcal     PLAN:   - Continue current diet    Will revisit per policy    Tremayne Majano, NICHOLE [address], Pager #[phone] or via [employer]

## 2020-10-02 VITALS
RESPIRATION RATE: 18 BRPM | WEIGHT: 191.8 LBS | DIASTOLIC BLOOD PRESSURE: 72 MMHG | OXYGEN SATURATION: 98 % | HEART RATE: 72 BPM | TEMPERATURE: 97.5 F | BODY MASS INDEX: 30.1 KG/M2 | SYSTOLIC BLOOD PRESSURE: 126 MMHG | HEIGHT: 67 IN

## 2020-10-02 PROBLEM — M86.9 OSTEOMYELITIS OF FOOT (HCC): Status: RESOLVED | Noted: 2020-09-24 | Resolved: 2020-10-02

## 2020-10-02 LAB
ALBUMIN SERPL-MCNC: 2.5 G/DL (ref 3.5–5)
ALBUMIN/GLOB SERPL: 0.6 {RATIO} (ref 1.1–2.2)
ALP SERPL-CCNC: 75 U/L (ref 45–117)
ALT SERPL-CCNC: 25 U/L (ref 12–78)
ANION GAP SERPL CALC-SCNC: 7 MMOL/L (ref 5–15)
AST SERPL-CCNC: 19 U/L (ref 15–37)
BACTERIA SPEC CULT: NORMAL
BASOPHILS # BLD: 0 K/UL (ref 0–0.1)
BASOPHILS NFR BLD: 0 % (ref 0–1)
BILIRUB SERPL-MCNC: 0.3 MG/DL (ref 0.2–1)
BNP SERPL-MCNC: 6606 PG/ML
BUN SERPL-MCNC: 18 MG/DL (ref 6–20)
BUN/CREAT SERPL: 17 (ref 12–20)
CALCIUM SERPL-MCNC: 8.9 MG/DL (ref 8.5–10.1)
CHLORIDE SERPL-SCNC: 106 MMOL/L (ref 97–108)
CO2 SERPL-SCNC: 25 MMOL/L (ref 21–32)
CREAT SERPL-MCNC: 1.07 MG/DL (ref 0.55–1.02)
DIFFERENTIAL METHOD BLD: ABNORMAL
EOSINOPHIL # BLD: 0 K/UL (ref 0–0.4)
EOSINOPHIL NFR BLD: 0 % (ref 0–7)
ERYTHROCYTE [DISTWIDTH] IN BLOOD BY AUTOMATED COUNT: 19.4 % (ref 11.5–14.5)
GLOBULIN SER CALC-MCNC: 4.1 G/DL (ref 2–4)
GLUCOSE BLD STRIP.AUTO-MCNC: 285 MG/DL (ref 65–100)
GLUCOSE BLD STRIP.AUTO-MCNC: 347 MG/DL (ref 65–100)
GLUCOSE BLD STRIP.AUTO-MCNC: 364 MG/DL (ref 65–100)
GLUCOSE SERPL-MCNC: 288 MG/DL (ref 65–100)
HCT VFR BLD AUTO: 27.5 % (ref 35–47)
HGB BLD-MCNC: 8.7 G/DL (ref 11.5–16)
IMM GRANULOCYTES # BLD AUTO: 0 K/UL
IMM GRANULOCYTES NFR BLD AUTO: 0 %
LYMPHOCYTES # BLD: 2.8 K/UL (ref 0.8–3.5)
LYMPHOCYTES NFR BLD: 21 % (ref 12–49)
MCH RBC QN AUTO: 27.6 PG (ref 26–34)
MCHC RBC AUTO-ENTMCNC: 31.6 G/DL (ref 30–36.5)
MCV RBC AUTO: 87.3 FL (ref 80–99)
METAMYELOCYTES NFR BLD MANUAL: 2 %
MONOCYTES # BLD: 0.3 K/UL (ref 0–1)
MONOCYTES NFR BLD: 2 % (ref 5–13)
MYELOCYTES NFR BLD MANUAL: 1 %
NEUTS BAND NFR BLD MANUAL: 3 % (ref 0–6)
NEUTS SEG # BLD: 9.8 K/UL (ref 1.8–8)
NEUTS SEG NFR BLD: 71 % (ref 32–75)
NRBC # BLD: 0.08 K/UL (ref 0–0.01)
NRBC BLD-RTO: 0.6 PER 100 WBC
PLATELET # BLD AUTO: 363 K/UL (ref 150–400)
PMV BLD AUTO: 11.5 FL (ref 8.9–12.9)
POTASSIUM SERPL-SCNC: 4 MMOL/L (ref 3.5–5.1)
PROCALCITONIN SERPL-MCNC: 0.51 NG/ML
PROT SERPL-MCNC: 6.6 G/DL (ref 6.4–8.2)
RBC # BLD AUTO: 3.15 M/UL (ref 3.8–5.2)
RBC MORPH BLD: ABNORMAL
RBC MORPH BLD: ABNORMAL
SERVICE CMNT-IMP: ABNORMAL
SERVICE CMNT-IMP: NORMAL
SODIUM SERPL-SCNC: 138 MMOL/L (ref 136–145)
WBC # BLD AUTO: 13.3 K/UL (ref 3.6–11)

## 2020-10-02 PROCEDURE — 74011250636 HC RX REV CODE- 250/636: Performed by: ANESTHESIOLOGY

## 2020-10-02 PROCEDURE — 80053 COMPREHEN METABOLIC PANEL: CPT

## 2020-10-02 PROCEDURE — 85025 COMPLETE CBC W/AUTO DIFF WBC: CPT

## 2020-10-02 PROCEDURE — 74011250637 HC RX REV CODE- 250/637: Performed by: STUDENT IN AN ORGANIZED HEALTH CARE EDUCATION/TRAINING PROGRAM

## 2020-10-02 PROCEDURE — 82962 GLUCOSE BLOOD TEST: CPT

## 2020-10-02 PROCEDURE — 74011250637 HC RX REV CODE- 250/637: Performed by: INTERNAL MEDICINE

## 2020-10-02 PROCEDURE — 83880 ASSAY OF NATRIURETIC PEPTIDE: CPT

## 2020-10-02 PROCEDURE — 97530 THERAPEUTIC ACTIVITIES: CPT

## 2020-10-02 PROCEDURE — 74011250637 HC RX REV CODE- 250/637: Performed by: NURSE PRACTITIONER

## 2020-10-02 PROCEDURE — 99232 SBSQ HOSP IP/OBS MODERATE 35: CPT | Performed by: INTERNAL MEDICINE

## 2020-10-02 PROCEDURE — 36415 COLL VENOUS BLD VENIPUNCTURE: CPT

## 2020-10-02 PROCEDURE — 74011636637 HC RX REV CODE- 636/637: Performed by: INTERNAL MEDICINE

## 2020-10-02 PROCEDURE — 74011636637 HC RX REV CODE- 636/637: Performed by: STUDENT IN AN ORGANIZED HEALTH CARE EDUCATION/TRAINING PROGRAM

## 2020-10-02 PROCEDURE — 74011250637 HC RX REV CODE- 250/637: Performed by: SPECIALIST

## 2020-10-02 PROCEDURE — 74011250636 HC RX REV CODE- 250/636: Performed by: INTERNAL MEDICINE

## 2020-10-02 PROCEDURE — 84145 PROCALCITONIN (PCT): CPT

## 2020-10-02 PROCEDURE — 74011250637 HC RX REV CODE- 250/637: Performed by: ANESTHESIOLOGY

## 2020-10-02 PROCEDURE — 74011250637 HC RX REV CODE- 250/637: Performed by: FAMILY MEDICINE

## 2020-10-02 PROCEDURE — 99233 SBSQ HOSP IP/OBS HIGH 50: CPT | Performed by: CLINICAL NURSE SPECIALIST

## 2020-10-02 RX ORDER — AMOXICILLIN 250 MG
1 CAPSULE ORAL DAILY
Qty: 10 TAB | Refills: 0 | Status: SHIPPED | OUTPATIENT
Start: 2020-10-03 | End: 2022-08-31

## 2020-10-02 RX ORDER — BUSPIRONE HYDROCHLORIDE 15 MG/1
15 TABLET ORAL 3 TIMES DAILY
Qty: 90 TAB | Refills: 0 | Status: SHIPPED | OUTPATIENT
Start: 2020-10-02 | End: 2020-10-02

## 2020-10-02 RX ORDER — POLYETHYLENE GLYCOL 3350 17 G/17G
17 POWDER, FOR SOLUTION ORAL DAILY
Qty: 850 G | Refills: 0 | Status: SHIPPED | OUTPATIENT
Start: 2020-10-02

## 2020-10-02 RX ORDER — CLOPIDOGREL BISULFATE 75 MG/1
75 TABLET ORAL DAILY
Qty: 30 TAB | Refills: 0 | Status: SHIPPED | OUTPATIENT
Start: 2020-10-03

## 2020-10-02 RX ORDER — GUAIFENESIN 100 MG/5ML
81 LIQUID (ML) ORAL DAILY
Qty: 30 TAB | Refills: 0 | Status: SHIPPED | OUTPATIENT
Start: 2020-10-03 | End: 2021-09-01

## 2020-10-02 RX ORDER — INSULIN GLARGINE 100 [IU]/ML
70 INJECTION, SOLUTION SUBCUTANEOUS DAILY
Qty: 1 ADJUSTABLE DOSE PRE-FILLED PEN SYRINGE | Refills: 0 | Status: SHIPPED | OUTPATIENT
Start: 2020-10-02

## 2020-10-02 RX ORDER — INSULIN HUMAN 500 [IU]/ML
25 INJECTION, SOLUTION SUBCUTANEOUS
Qty: 1 ADJUSTABLE DOSE PRE-FILLED PEN SYRINGE | Refills: 0 | Status: SHIPPED | OUTPATIENT
Start: 2020-10-02 | End: 2020-10-02 | Stop reason: SDUPTHER

## 2020-10-02 RX ORDER — INSULIN PUMP SYRINGE, 3 ML
EACH MISCELLANEOUS
Qty: 1 KIT | Refills: 0 | Status: SHIPPED | OUTPATIENT
Start: 2020-10-02

## 2020-10-02 RX ORDER — INSULIN HUMAN 500 [IU]/ML
20 INJECTION, SOLUTION SUBCUTANEOUS
Qty: 1 ADJUSTABLE DOSE PRE-FILLED PEN SYRINGE | Refills: 0 | Status: SHIPPED | OUTPATIENT
Start: 2020-10-02

## 2020-10-02 RX ORDER — TRAZODONE HYDROCHLORIDE 50 MG/1
50 TABLET ORAL
Qty: 30 TAB | Refills: 0 | Status: SHIPPED | OUTPATIENT
Start: 2020-10-02

## 2020-10-02 RX ORDER — MIDODRINE HYDROCHLORIDE 5 MG/1
5 TABLET ORAL EVERY 8 HOURS
Qty: 90 TAB | Refills: 0 | Status: SHIPPED | OUTPATIENT
Start: 2020-10-02 | End: 2020-10-20

## 2020-10-02 RX ORDER — PREGABALIN 150 MG/1
150 CAPSULE ORAL 2 TIMES DAILY
Qty: 60 CAP | Refills: 0 | Status: SHIPPED | OUTPATIENT
Start: 2020-10-02

## 2020-10-02 RX ADMIN — MIDODRINE HYDROCHLORIDE 5 MG: 5 TABLET ORAL at 14:17

## 2020-10-02 RX ADMIN — INSULIN LISPRO 10 UNITS: 100 INJECTION, SOLUTION INTRAVENOUS; SUBCUTANEOUS at 12:37

## 2020-10-02 RX ADMIN — ASPIRIN 81 MG CHEWABLE TABLET 81 MG: 81 TABLET CHEWABLE at 09:49

## 2020-10-02 RX ADMIN — INSULIN GLARGINE 60 UNITS: 100 INJECTION, SOLUTION SUBCUTANEOUS at 09:00

## 2020-10-02 RX ADMIN — FENOFIBRATE 145 MG: 145 TABLET ORAL at 09:50

## 2020-10-02 RX ADMIN — INSULIN LISPRO 12 UNITS: 100 INJECTION, SOLUTION INTRAVENOUS; SUBCUTANEOUS at 10:37

## 2020-10-02 RX ADMIN — OXYCODONE 10 MG: 5 TABLET ORAL at 08:35

## 2020-10-02 RX ADMIN — ATORVASTATIN CALCIUM 80 MG: 40 TABLET, FILM COATED ORAL at 09:49

## 2020-10-02 RX ADMIN — CLONAZEPAM 1 MG: 1 TABLET ORAL at 04:20

## 2020-10-02 RX ADMIN — Medication 20 ML: at 08:00

## 2020-10-02 RX ADMIN — ACETAMINOPHEN 650 MG: 325 TABLET ORAL at 14:19

## 2020-10-02 RX ADMIN — PANCRELIPASE 1 CAPSULE: 24000; 76000; 120000 CAPSULE, DELAYED RELEASE PELLETS ORAL at 12:39

## 2020-10-02 RX ADMIN — METHADONE HYDROCHLORIDE 160 MG: 10 TABLET ORAL at 09:51

## 2020-10-02 RX ADMIN — INSULIN LISPRO 12 UNITS: 100 INJECTION, SOLUTION INTRAVENOUS; SUBCUTANEOUS at 12:38

## 2020-10-02 RX ADMIN — MIDODRINE HYDROCHLORIDE 5 MG: 5 TABLET ORAL at 06:55

## 2020-10-02 RX ADMIN — Medication 10 ML: at 08:00

## 2020-10-02 RX ADMIN — METRONIDAZOLE 500 MG: 500 INJECTION, SOLUTION INTRAVENOUS at 12:35

## 2020-10-02 RX ADMIN — CLONAZEPAM 1 MG: 1 TABLET ORAL at 12:39

## 2020-10-02 RX ADMIN — INSULIN LISPRO 7 UNITS: 100 INJECTION, SOLUTION INTRAVENOUS; SUBCUTANEOUS at 06:56

## 2020-10-02 RX ADMIN — PANTOPRAZOLE SODIUM 40 MG: 40 TABLET, DELAYED RELEASE ORAL at 06:55

## 2020-10-02 RX ADMIN — Medication 40 ML: at 14:00

## 2020-10-02 RX ADMIN — OXYCODONE 10 MG: 5 TABLET ORAL at 12:39

## 2020-10-02 RX ADMIN — DOCUSATE SODIUM 50MG AND SENNOSIDES 8.6MG 1 TABLET: 8.6; 5 TABLET, FILM COATED ORAL at 09:49

## 2020-10-02 RX ADMIN — VANCOMYCIN HYDROCHLORIDE 1250 MG: 10 INJECTION, POWDER, LYOPHILIZED, FOR SOLUTION INTRAVENOUS at 04:06

## 2020-10-02 RX ADMIN — OXYCODONE 10 MG: 5 TABLET ORAL at 04:06

## 2020-10-02 RX ADMIN — PANCRELIPASE 1 CAPSULE: 24000; 76000; 120000 CAPSULE, DELAYED RELEASE PELLETS ORAL at 10:36

## 2020-10-02 RX ADMIN — CLOPIDOGREL BISULFATE 75 MG: 75 TABLET ORAL at 09:50

## 2020-10-02 RX ADMIN — LEVOTHYROXINE SODIUM 200 MCG: 0.1 TABLET ORAL at 06:55

## 2020-10-02 RX ADMIN — PREGABALIN 150 MG: 75 CAPSULE ORAL at 09:50

## 2020-10-02 NOTE — PROGRESS NOTES
Problem: Mobility Impaired (Adult and Pediatric) Goal: *Acute Goals and Plan of Care (Insert Text) Description: FUNCTIONAL STATUS PRIOR TO ADMISSION: Patient was modified independent using a rolling walker for functional mobility. Had recently stopped using RW within her home because it was inconvenient essentially. Was using RW due to recent R great toe amputation. HOME SUPPORT PRIOR TO ADMISSION: The patient lived with her fiance and teenage son. Physical Therapy Goals Initiated 10/1/2020 1. Patient will transfer from bed to chair and chair to bed with modified independence using the least restrictive device and while compliant with NWB to R LE within 7 day(s). 2.  Patient will perform sit to stand with modified independence while compliant with NWB to R LE within 7 day(s). 3.  Patient will ambulate with supervision/set-up for 25 feet with the least restrictive device while compliant with NWB to R LE within 7 day(s). 4.  Patient will ascend/descend 15 stairs with left handrail(s) with minimal assistance/contact guard assist while compliant with NWB R LE within 7 day(s). Initiated 9/28/2020- 1. Patient will move from supine to sit and sit to supine , scoot up and down, and roll side to side in bed with independence within 7 day(s). 2.  Patient will transfer from bed to chair and chair to bed with modified independence using the least restrictive device and while compliant with NWB to R LEwithin 7 day(s). 3.  Patient will perform sit to stand with modified independence while compliant with NWB to R LE within 7 day(s). 4.  Patient will ambulate with supervision/set-up for 60 feet with the least restrictive device while compliant with NWB to R LE within 7 day(s). 5.  Patient will ascend/descend 15 stairs with left handrail(s) with minimal assistance/contact guard assist while compliant with NWB R LE within 7 day(s). Outcome: Progressing Towards Goal 
  
PHYSICAL THERAPY TREATMENT Patient: Margeret Collet (53 y.o. female) Date: 10/2/2020 Diagnosis: Osteomyelitis of foot ([facility] [M86.9] <principal problem not specified> Procedure(s) (LRB): 
DELAYED PRIMARY CLOSURE RIGHT FOOT  (Right) 2 Days Post-Op Precautions: Fall, NWB(STRICT NWB R LE) Chart, physical therapy assessment, plan of care and goals were reviewed. ASSESSMENT Patient continues with skilled PT services and is progressing towards goals. Pt was received seated EOB for therapy with RN present. Patient stated she did not need to do PT because she was going home today, but was agreeable to PT assisting with sit to stand transfer to bedside commode and providing education about safety and compliance with R LE NWB upon discharge. Pt completed stand pivot transfer to bedside commode with contact guard with UE support on recliner chair situated in front of commode. Demonstrated compliance with R LE NWB status during transfer. Patient did require assistance to pull pants up after using bedside commode. PT reviewed discharge plan with patient and she stated that she is now planning on going to friend's [place], who is able to be home with her 24/7 to assist her with ADL's. Patient reported her friend's home is 1-story with 3 steps to enter with unilateral HR. Confirmed spouse is bringing over all necessary DME to friend's house. PT assured patient that this is likely the safer option with the absence of interior steps and support present. PT provided education for safe negotiation of 3 exterior steps. Instructed her to go up/down on bottom with friend supporting R LE and have chair situated at top or bottom and perform floor to chair transfer prior to standing with RW. PT provided visual demonstration with chair in patient's room to ensure understanding with hand placement and not using R LE.  Patient was in agreement that friend's house was better discharge option and that she would not be able to complete stairs at her house independently. Current Level of Function Impacting Discharge (mobility/balance): decreased balance (NWB R LE), decreased strength, decreased activity tolerance Other factors to consider for discharge: compliance with  NWB status PLAN : 
Patient continues to benefit from skilled intervention to address the above impairments. Continue treatment per established plan of care. to address goals. Recommendation for discharge: (in order for the patient to meet his/her long term goals) Physical therapy at least 2 days/week in the home AND ensure assist and/or supervision for safety with NWB compliance with transfers and ambulation to provide appropriate healing in L LE. This discharge recommendation: 
Has been made in collaboration with the attending provider and/or case management IF patient discharges home will need the following DME: patient owns DME required for discharge SUBJECTIVE:  
Patient stated I didn't think I would be able to do all those stairs.  (15 steps at her home to get to bedroom/bathroom) OBJECTIVE DATA SUMMARY:  
Critical Behavior: 
Neurologic State: Alert Orientation Level: Oriented X4 Cognition: Appropriate safety awareness, Appropriate for age attention/concentration, Appropriate decision making, Follows commands Functional Mobility Training: 
Bed Mobility: 
  
  
  
  
  
  
Transfers: 
Sit to Stand: Supervision Stand to Sit: Contact guard assistance(stand pivot transfer to bedside commode) Balance: 
Sitting: Intact; Without support Standing: Intact; With support(HHA) Standing - Static: Good Standing - Dynamic : Fair Stairs: 
 Provided education and demonstration on for safety with stair navigation when going home to friends house with three steps to enter. Activity Tolerance:  
Fair and SpO2 stable on RA After treatment patient left in no apparent distress:  
Seated at EOB, Call bell within reach COMMUNICATION/COLLABORATION:  
The patients plan of care was discussed with: Registered nurse. Tod Williamson, SPT Time Calculation: 17 mins

## 2020-10-02 NOTE — PROGRESS NOTES
Cardiology Progress Note           9/24/2020 12:02 PM   Osteomyelitis of foot ([facility] [M86.9]     HPI: Salome Nelson is a 50 y.o. female admitted for Osteomyelitis of foot ([facility] [M86.9]. Has PMH of DM 2 and  HTN. Had [place] for several days prior to admit, thought it was GERD. Underwent I&D of right foot. Had postop arrest and EKG changes. NSTEMI, troponin peak to >100. Underwent LHC with stent to Left circumflex and LAD. Echo with NL EF, mild AS. Was hypotensive and required neosynephrine which is now off. Was started on midodrine. . Underwent primary closure of Rt foot wound on 9/30/20. Subjective:   Denies chest pain. States SOB has improved. Received IV lasix yesterday x 1 and voided 4300 yesterday. Investigation  Telemetry: normal sinus rhythm  ECG:   Echocardiogram:   09/24/20   ECHO ADULT COMPLETE 09/27/2020 9/27/2020    Narrative · LV: Estimated LVEF is 60 - 65%. Normal cavity size and systolic function   (ejection fraction normal). Upper normal wall thickness. Wall motion:   normal.  · AV: Aortic valve is functionally bicuspid. Raphe located between the   right cusp and noncoronary cusp. Moderate aortic valve sclerosis with   stenosis. Severe aortic valve leaflet calcification present. Severely   reduced right leaflet mobility of the aortic valve. Aortic valve mean   gradient is 7 mmHg. Aortic valve area is 1.8 cm2. Mild aortic valve   stenosis is present. · LA: Mildly dilated left atrium. · MV: Mild mitral valve regurgitation is present. · TV: Mild tricuspid valve regurgitation is present. · PA: Mild pulmonary hypertension. Pulmonary arterial systolic pressure is   40 mmHg. Signed by: Nishant Bautista MD          Assessment and PLAN     1. NSTEMI/CAD: s/p stent to Left Circumflex and LAD. No further chest pain. Continue ASA, Plavix, high intensity statin. No BB for now given low NL BP on midodrine.  If BP improves off midodrine, will start metoprolol tartrate. Echocardiogram with NL EF, NWMA, mild AS, mild MR. Will arrange follow up for Dr. Caty Nix in [address]. Unable to do cardiac rehab at this time due to NWB RLE. 2. Hypotension: now BP improved low NL. On midodrine 5 mg TID. Received 1 dose IV steroid yesterday for mildly low cortisol level. EF normal by echo. 3. SOB: much improved. received Lasix yesterday. EF NL. No need for diuretic at discharge. 3. Hx of HTN:  BP low normal  Off home lisinopril-HCTZ. 4. Dyslipidemia: LDL 22, HDL 13, Triglycerides 364. On fenofibrate, Lipitor. 5. DM: A1C=9.8. Per primary team.    6. Right foot cellulitis/osteo: s/p I&D with removal of all nonviable soft tissue right foot, bone debridement rt foot. And now s/p primary closure of rt foot wound. NWB RLE. Per podiatry. 7. Anemia: hgb improving 8.7. May also contribute to hypotension and SOB. 8. Hypothyroidism : on synthroid 200 daily. TSH 17.6. Free T4 NL, T3 mildly low (68) Consider slowly increasing dose. Defer to Primary team. Could be the reason why was hypotensive. 50 y.o. female who was admitted for treatment of osteomyelitis of right foot then revealed symptoms of [place] for prior several days, found to have NSTEMI/CAD and underwent PCI/stent to LCX and LAD. No further chest pain and Echo with preserved EF. Continue plavix based DAPT. No BB due to hypotension, BP  now improved/low nL on midodrine. Dr. Tierra Gamboa saw patient. Ok to discharge home from cardiology perspective. Notified Dr. Ivan Alba. Future Appointments   Date Time Provider Jamar John   10/20/2020  1:00 PM MD SAM Solorzano AMB     . [x]    High complexity decision making was performed  []    Patient is at high-risk of decompensation with multiple organ involvement     Review of Symptoms:  Respiratory: Breathing/sob much improved. no cough, hemoptysis, URI.   Cardiovascular: No CP, palpitations, sweating, lightheadedness, dizziness, syncope, presyncope, lower extremity swelling. Otherwise no other pertinent positive or negative symptoms on ROS.      Patient Active Problem List    Diagnosis Date Noted    Osteomyelitis of foot ([facility] 09/24/2020      Other, MD Romel  Past Medical History:   Diagnosis Date    Arthritis     Diabetes ([facility]     Endocrine disease     hypothyroid    Gastrointestinal disorder     pancreatitis    Hypertension       Past Surgical History:   Procedure Laterality Date    HX GI      colonoscpy     Social History     Socioeconomic History    Marital status: SINGLE     Spouse name: Not on file    Number of children: Not on file    Years of education: Not on file    Highest education level: Not on file   Tobacco Use    Smoking status: Current Some Day Smoker     Packs/day: 0.25    Smokeless tobacco: Never Used   Substance and Sexual Activity    Alcohol use: Yes     Comment: quit about a month ago - drank only on the weekends    Drug use: No    Sexual activity: Yes     Partners: Male     Family History   Problem Relation Age of Onset    Cancer Mother         colon      Current Facility-Administered Medications   Medication Dose Route Frequency    nitroglycerin (NITROSTAT) tablet 0.4 mg  0.4 mg SubLINGual Q5MIN PRN    insulin lispro (HUMALOG) injection 12 Units  12 Units SubCUTAneous TID WITH MEALS    oxyCODONE IR (ROXICODONE) tablet 10 mg  10 mg Oral Q4H PRN    morphine 10 mg/ml injection 4 mg  4 mg IntraVENous Q3H PRN    clopidogreL (PLAVIX) tablet 75 mg  75 mg Oral DAILY    sodium chloride (NS) flush 5-40 mL  5-40 mL IntraVENous Q8H    sodium chloride (NS) flush 5-40 mL  5-40 mL IntraVENous PRN    HYDROmorphone (PF) (DILAUDID) injection 1 mg  1 mg IntraVENous Q4H PRN    vancomycin (VANCOCIN) 1250 mg in  ml infusion  1,250 mg IntraVENous Q12H    midodrine (PROAMATINE) tablet 5 mg  5 mg Oral Q8H    insulin lispro (HUMALOG) injection   SubCUTAneous AC&HS    HYDROmorphone (PF) (DILAUDID) injection 2 mg  2 mg IntraVENous Q4H PRN    insulin glargine (LANTUS) injection 60 Units  60 Units SubCUTAneous DAILY    aspirin chewable tablet 81 mg  81 mg Oral DAILY    sodium chloride (NS) flush 5-40 mL  5-40 mL IntraVENous Q8H    sodium chloride (NS) flush 5-40 mL  5-40 mL IntraVENous PRN    atorvastatin (LIPITOR) tablet 80 mg  80 mg Oral DAILY    traZODone (DESYREL) tablet 50 mg  50 mg Oral QHS    metroNIDAZOLE (FLAGYL) IVPB premix 500 mg  500 mg IntraVENous Q12H    albuterol-ipratropium (DUO-NEB) 2.5 MG-0.5 MG/3 ML  3 mL Nebulization Q6H PRN    methadone (DOLOPHINE) tablet 160 mg  160 mg Oral DAILY    clonazePAM (KlonoPIN) tablet 1 mg  1 mg Oral TID PRN    sodium chloride (NS) flush 5-40 mL  5-40 mL IntraVENous Q8H    sodium chloride (NS) flush 5-40 mL  5-40 mL IntraVENous PRN    ondansetron (ZOFRAN) injection 4 mg  4 mg IntraVENous Q4H PRN    polyethylene glycol (MIRALAX) packet 17 g  17 g Oral DAILY    senna-docusate (PERICOLACE) 8.6-50 mg per tablet 1 Tab  1 Tab Oral DAILY    lipase-protease-amylase (CREON 24,000) capsule 1 Cap  1 Cap Oral TID WITH MEALS    busPIRone (BUSPAR) tablet 15 mg  15 mg Oral TID    fenofibrate nanocrystallized (TRICOR) tablet 145 mg  145 mg Oral DAILY    levothyroxine (SYNTHROID) tablet 200 mcg  200 mcg Oral ACB    pantoprazole (PROTONIX) tablet 40 mg  40 mg Oral ACB    pregabalin (LYRICA) capsule 150 mg  150 mg Oral BID    zolpidem (AMBIEN) tablet 5 mg  5 mg Oral QHS PRN    sodium chloride (NS) flush 5-40 mL  5-40 mL IntraVENous Q8H    sodium chloride (NS) flush 5-40 mL  5-40 mL IntraVENous PRN    acetaminophen (TYLENOL) tablet 650 mg  650 mg Oral Q4H PRN    glucose chewable tablet 16 g  4 Tab Oral PRN    glucagon (GLUCAGEN) injection 1 mg  1 mg IntraMUSCular PRN    dextrose 10% infusion 0-250 mL  0-250 mL IntraVENous PRN    Vancomycin pharmacy to dose   Other Rx Dosing/Monitoring      Prior to Admission Medications   Prescriptions Last Dose Informant Patient Reported? Taking? GLIPIZIDE PO 2020 at Unknown time  Yes Yes   Sig: Take 10 mg by mouth daily (with breakfast). LEVOTHYROXINE SODIUM (SYNTHROID PO) 2020 at Unknown time  Yes Yes   Sig: Take 200 mcg by mouth Daily (before breakfast). albuterol (PROVENTIL HFA, VENTOLIN HFA, PROAIR HFA) 90 mcg/actuation inhaler   No Yes   Sig: Take 2 Puffs by inhalation every four (4) hours as needed for Wheezing. amylase-lipase-protease (CREON) 24,000-76,000 -120,000 unit capsule  at [address] [place] [address]  Yes Yes   Sig: Take 1 Cap by mouth three (3) times daily (with meals). atorvastatin (Lipitor) 40 mg tablet   Yes Yes   Sig: Take 40 mg by mouth nightly. clonazePAM (KlonoPIN) 1 mg tablet 2020 at Unknown time  Yes Yes   Sig: Take 1 mg by mouth three (3) times daily. fenofibrate (LOFIBRA) 160 mg tablet 2020 at Unknown time  Yes Yes   Sig: Take 160 mg by mouth daily. Indications: HYPERCHOLESTEROLEMIA   insulin U-500 CONCENTRATED regular (HumuLIN R U-500, Conc, Kwikpen) 500 unit/mL (3 mL) inpn subQ pen 2020 at Unknown time  Yes Yes   Si Units by SubCUTAneous route three (3) times daily (with meals). lisinopril-hydroCHLOROthiazide (PRINZIDE, ZESTORETIC) 10-12.5 mg per tablet 2020 at Unknown time  Yes Yes   Sig: Take 1 Tab by mouth daily. metFORMIN (GLUCOPHAGE) 500 mg tablet 2020 at Unknown time  Yes Yes   Sig: Take 1,000 mg by mouth two (2) times daily (with meals). methadone (DOLOPHINE) 10 mg tablet 2020 at Unknown time  Yes Yes   Sig: Take 160 mg by mouth daily. omeprazole (PRILOSEC) 40 mg capsule   Yes Yes   Sig: Take 40 mg by mouth daily. pregabalin (Lyrica) 200 mg capsule 2020 at Unknown time  Yes Yes   Sig: Take 200 mg by mouth four (4) times daily. sertraline (ZOLOFT) 50 mg tablet 2020 at Unknown time  Yes Yes   Sig: Take 50 mg by mouth two (2) times a day.    tiZANidine (ZANAFLEX) 4 mg capsule   Yes Yes   Sig: Take 4 mg by mouth two (2) times daily as needed (muscle spasms). Facility-Administered Medications: None      No Known Allergies    Labs:   Recent Results (from the past 24 hour(s))   GLUCOSE, POC    Collection Time: 10/01/20 12:02 PM   Result Value Ref Range    Glucose (POC) 198 (H) 65 - 100 mg/dL    Performed by Arian White, TROUGH    Collection Time: 10/01/20  2:11 PM   Result Value Ref Range    Vancomycin,trough 16.8 (H) 5.0 - 10.0 ug/mL    Reported dose date NOT PROVIDED      Reported dose time: NOT PROVIDED      Reported dose: NOT PROVIDED UNITS   GLUCOSE, POC    Collection Time: 10/01/20  4:13 PM   Result Value Ref Range    Glucose (POC) 81 65 - 100 mg/dL    Performed by Serafin Moore    GLUCOSE, POC    Collection Time: 10/01/20  9:12 PM   Result Value Ref Range    Glucose (POC) 239 (H) 65 - 100 mg/dL    Performed by JHONNY GAY    NT-PRO BNP    Collection Time: 10/02/20  4:17 AM   Result Value Ref Range    NT pro-BNP 6,606 (H) <125 PG/ML   PROCALCITONIN    Collection Time: 10/02/20  4:17 AM   Result Value Ref Range    Procalcitonin 0.51 ng/mL   CBC WITH AUTOMATED DIFF    Collection Time: 10/02/20  4:17 AM   Result Value Ref Range    WBC 13.3 (H) 3.6 - 11.0 K/uL    RBC 3.15 (L) 3.80 - 5.20 M/uL    HGB 8.7 (L) 11.5 - 16.0 g/dL    HCT 27.5 (L) 35.0 - 47.0 %    MCV 87.3 80.0 - 99.0 FL    MCH 27.6 26.0 - 34.0 PG    MCHC 31.6 30.0 - 36.5 g/dL    RDW 19.4 (H) 11.5 - 14.5 %    PLATELET 640 339 - 145 K/uL    MPV 11.5 8.9 - 12.9 FL    NRBC 0.6 (H) 0  WBC    ABSOLUTE NRBC 0.08 (H) 0.00 - 0.01 K/uL    NEUTROPHILS 71 32 - 75 %    BAND NEUTROPHILS 3 0 - 6 %    LYMPHOCYTES 21 12 - 49 %    MONOCYTES 2 (L) 5 - 13 %    EOSINOPHILS 0 0 - 7 %    BASOPHILS 0 0 - 1 %    METAMYELOCYTES 2 (H) 0 %    MYELOCYTES 1 (H) 0 %    IMMATURE GRANULOCYTES 0 %    ABS. NEUTROPHILS 9.8 (H) 1.8 - 8.0 K/UL    ABS. LYMPHOCYTES 2.8 0.8 - 3.5 K/UL    ABS. MONOCYTES 0.3 0.0 - 1.0 K/UL    ABS. EOSINOPHILS 0.0 0.0 - 0.4 K/UL    ABS.  BASOPHILS 0.0 0.0 - 0.1 K/UL    ABS. IMM. GRANS. 0.0 K/UL    DF MANUAL      RBC COMMENTS ANISOCYTOSIS  1+        RBC COMMENTS POLYCHROMASIA  PRESENT       METABOLIC PANEL, COMPREHENSIVE    Collection Time: 10/02/20  4:17 AM   Result Value Ref Range    Sodium 138 136 - 145 mmol/L    Potassium 4.0 3.5 - 5.1 mmol/L    Chloride 106 97 - 108 mmol/L    CO2 25 21 - 32 mmol/L    Anion gap 7 5 - 15 mmol/L    Glucose 288 (H) 65 - 100 mg/dL    BUN 18 6 - 20 MG/DL    Creatinine 1.07 (H) 0.55 - 1.02 MG/DL    BUN/Creatinine ratio 17 12 - 20      GFR est AA >60 >60 ml/min/1.73m2    GFR est non-AA 55 (L) >60 ml/min/1.73m2    Calcium 8.9 8.5 - 10.1 MG/DL    Bilirubin, total 0.3 0.2 - 1.0 MG/DL    ALT (SGPT) 25 12 - 78 U/L    AST (SGOT) 19 15 - 37 U/L    Alk. phosphatase 75 45 - 117 U/L    Protein, total 6.6 6.4 - 8.2 g/dL    Albumin 2.5 (L) 3.5 - 5.0 g/dL    Globulin 4.1 (H) 2.0 - 4.0 g/dL    A-G Ratio 0.6 (L) 1.1 - 2.2     GLUCOSE, POC    Collection Time: 10/02/20  6:13 AM   Result Value Ref Range    Glucose (POC) 285 (H) 65 - 100 mg/dL    Performed by Leonidas Gutierrez        Intake/Output Summary (Last 24 hours) at 10/2/2020 1126  Last data filed at 10/2/2020 0406  Gross per 24 hour   Intake 1870 ml   Output 3350 ml   Net -1480 ml      Patient Vitals for the past 24 hrs:   Temp Pulse Resp BP SpO2   10/02/20 0844 97.9 °F (36.6 °C) 66 18 114/67 95 %   10/02/20 0321 98.4 °F (36.9 °C) 76 18 111/66 93 %   10/01/20 2343 98.2 °F (36.8 °C) 67 18 101/61 94 %   10/01/20 2013 98.4 °F (36.9 °C) 65 18 112/73 93 %   10/01/20 1525 97.9 °F (36.6 °C) 67 19 108/60 95 %   10/01/20 1159 98.4 °F (36.9 °C) 67 18 101/75 93 %      General:    Alert, cooperative, no distress. Psychiatric:    Normal Mood and affect    Eye/ENT:     Conjunctival pink. Able to hear voice at normal amplitude   Lungs:      Visibly symmetric chest expansion, No palpable tenderness. Clear to auscultation bilaterally. Heart[de-identified]    Regular rate and rhythm, S1, S2 normal,no murmur noted. Normal palpable peripheral pulses. No cyanosis   Abdomen:     Soft, non-tender, nondistended. organomegaly. Extremities:   Extremities normal, atraumatic, no edema. RLE foot in ace wrap. Neurologic:   MCKEON, No gross focal deficits     [employer] Runner. Zimmer, NP [employer]ner.  TIFFANY Roque  10/2/2020   9:03 AM     [facility] Office:   [address] [facility] Office:  [address]     [address]     [address] [place] [address]  P: [phone]    P: [phone]  F: [phone]    F: [phone]

## 2020-10-02 NOTE — OP NOTES
[address] [facility]  OPERATIVE REPORT    Name:  Chris Negron  MR#:  [id number]  :  1975  ACCOUNT #:  [de-identified]  DATE OF SERVICE:  2020      PREOPERATIVE DIAGNOSES:  1.  Osteomyelitis, right foot. 2.  Cellulitis, right foot. 3.  Ulcer with exposed necrotic bone, right foot. POSTOPERATIVE DIAGNOSES:  1.  Osteomyelitis, right foot. 2.  Cellulitis, right foot. 3.  Ulcer with exposed necrotic bone, right foot. PROCEDURE PERFORMED:  Delayed primary closure, right foot. SURGEON:  Nidhi Sykes DPM    ASSISTANT:  None. ANESTHESIA:  MAC with local.    COMPLICATIONS:  None. SPECIMENS REMOVED:  Pathology, none. IMPLANTS:  none    ESTIMATED BLOOD LOSS:  Minimal.    HEMOSTASIS:  Correct anatomic dissection. MATERIALS USED:  None. INJECTABLES:  20 mL of 0.5% Marcaine plain. FINDINGS:  Noted viable soft tissue and bone envelope, right foot. INDICATIONS FOR PROCEDURE:  The patient is a 78-year-old uncontrolled diabetic female. She was admitted to the hospital with infection to her right foot. She underwent an outpatient MRI and advanced imaging noting osteomyelitis to the remaining remnant of a previously resected partial first ray. She underwent debridement in the operating room with removal of all nonviable soft tissue and bone. She underwent the surgery and was extubated and in the PACU doing well. Upon return to her room, she actually underwent respiratory arrest followed by cardiac arrest, required multiple rounds of CPR and had a myocardial infarction. She subsequently underwent stenting and further evaluation and workup by the cardiology team.  Her proximal margin from surgical pathology returned negative for osteomyelitis and for this reason, she has elected to move forward with the noted surgery listed above.   She was made aware of all risks and complications of the procedure and acknowledged the risks by signing the consent form which was placed in the patient's chart. N.p.o. status was confirmed. The patient was cleared for the surgery by the cardiology and hospital intensivist medical teams. PROCEDURE IN DETAIL:  The patient was brought back to the operating room, placed in the supine position. She was placed under MAC anesthesia, and following this, she was scrubbed, prepped and draped using normal aseptic technique. Upon completion, we injected approximately 20 mL of 0.5% Marcaine plain into the affected area on the right foot where there was a noted remaining open gapped surgical incision from previous debridement. Evaluation of the soft tissue and bone envelope appeared to have more normal-appearing anatomy. There was no further purulent drainage identified or nonviable tissue. The area was copiously irrigated with normal saline utilizing a pulse lavage in standard technique 3 L. Following this, we then reapproximated and rearranged the skin borders closing the wound utilizing a combination of 2-0 and #2 nylon suture. Good capillary refill was noted to all remaining digits on the patient's right foot. The patient then had a dry sterile dressing applied including Xeroform, 4x4s, multiple ABDs and Kerlix and a light Ace wrap. The patient tolerated the anesthesia and procedure well and was taken to the post anesthesia care unit where they will remain until they are stable enough to return to their hospital floor. They were given in-depth instructions regarding absolute nonweightbearing status to the right lower extremity. The patient will be followed by the foot and ankle surgery team while here in the hospital and they will follow with us on an outpatient basis once discharged for further evaluation and management of their postsurgical condition.         JULI [id number]  D:  10/01/2020 17:16  T:  10/01/2020 23:29  JOB #:  [id number]

## 2020-10-02 NOTE — DISCHARGE INSTRUCTIONS
Smoking Cessation Program: This is a free, phone/text/email based, smoking cessation program. The program is individualized to meet each patient's needs. To enroll use the link - [email or web address] or text Maura Jason to [phone] from any smart phone. FOOT AND ANKLE  The patient will not require any HHC/woundcare as all bandage changes will be completed in my office. The patient is to be ABSOLUTE  nonweightbearing to the right foot, take medications as directed, keep appt with specialists, follow up in office 1 week from [place]. Discharge Instructions       PATIENT ID: Cory Cantu  MRN: [id number]   YOB: 1975    DATE OF ADMISSION: 9/24/2020 12:02 PM    DATE OF DISCHARGE: 10/2/2020    PRIMARY CARE PROVIDER: Romel Howard MD     ATTENDING PHYSICIAN: Henreitta Cabot, MD  DISCHARGING PROVIDER: Taran Randle MD    To contact this individual call [phone] and ask the  to page. If unavailable ask to be transferred the Adult Hospitalist Department. DISCHARGE DIAGNOSES see dc note     CONSULTATIONS: IP CONSULT TO CARDIOLOGY    PROCEDURES/SURGERIES: Procedure(s):  DELAYED PRIMARY CLOSURE RIGHT FOOT     PENDING TEST RESULTS:   At the time of discharge the following test results are still pending: na    FOLLOW UP APPOINTMENTS:   Follow-up Information     Follow up With Specialties Details Why Contact Info Additional Information    Zhen  will follow up after discharge to enroll in OP program once all other therapies completed for recent foot surgery and able to [facility] [address] [facility] [facility] [address] [facility], [address]. Please arrive 15 minutes prior to your appointment time and you will register in the [place] [address], [address], on the first floor of the [address]. Telephone: [phone] Fax: [phone] Driving directions To [facility] [place].  Building: Driving WEST on [address], take exit [address] to [place]. Turn left onto [facility], then turn right into [place] Parking lot Driving EAST on [address], take exit [address] [facility]. Turn right at the end of the exit ramp. Turn left onto [facility], then turn right into [employer] lot. Deareyes Grewal DPM Foot and Ankle Surgery  as directed call on or befor this coming  Monday for appointment  [address]  [facility]  [phone]       Program for Diabetes Health RDE Diabetes Schedule an appointment as soon as possible for a visit Call to set up an appoitnment with a diabetes educator  [address] [facility], [facility] Giovani Lyle MD Cardiology On 10/20/2020 1PM.  Cardiology followup.  [address]   [address] [facility] [place] [address]  [place] [address]  [phone]       Other, MD Romel    Patient can only remember the practice name and not the physician              ADDITIONAL CARE RECOMMENDATIONS: na    DIET: Cardiac Diet     ACTIVITY: as directed by Dr. Kajal Angeles: per Dr. Juan Morales needed: na      Radiology      DISCHARGE MEDICATIONS:   See Medication Reconciliation Form    · It is important that you take the medication exactly as they are prescribed. · Keep your medication in the bottles provided by the pharmacist and keep a list of the medication names, dosages, and times to be taken in your wallet. · Do not take other medications without consulting your doctor. NOTIFY YOUR PHYSICIAN FOR ANY OF THE FOLLOWING:   Fever over 101 degrees for 24 hours. Chest pain, shortness of breath, fever, chills, nausea, vomiting, diarrhea, change in mentation, falling, weakness, bleeding. Severe pain or pain not relieved by medications. Or, any other signs or symptoms that you may have questions about.       DISPOSITION:    Home With:   OT  PT  [facility]  RN       SNF/Inpatient Rehab/LTAC Independent/assisted living    Hospice    Other:          Signed:   Michaela Calhoun MD  10/2/2020  1:52 PM

## 2020-10-02 NOTE — PROGRESS NOTES
Transitions of Care Plan:              RUR: 20%              PODx2 from foot wound closure; HH for PT; owns DME needed              Baseline - ambulates without DME; resides with fiance and teenage son              Disposition - home health for therapy    1145 - CM updated patient that [facility] denied due to not being able to staff in a timely manner. CM offered choice for another home health agency. Patient agreeable to [facility] [place]. 1150 - CM sent referral to [facility] [place] via [employer]. CM will continue to follow.     Perry Sierra, MPH

## 2020-10-02 NOTE — PROGRESS NOTES
15:00 Central line L neck removed, IV removed, discharge instructions reviewed and copy given to patient, prescriptions given to patient. Physical therapy seeing patient prior to discharge to reinforce safety teaching regarding NWB surgical site. Ride should arrive at 16:00; pt states she will be going to stay with friend in a one level house after discharge.

## 2020-10-02 NOTE — DIABETES MGMT
FANNY GUERRERO  CLINICAL NURSE SPECIALIST CONSULT  PROGRAM FOR DIABETES HEALTH    FOLLOW UP NOTE    Presentation   Rosalee Oropeza is a 50 y.o. female who presented for evaluation of right foot wound. She developed a wound to her right great toe about 6 months ago. She was seen and managed at [facility] [place] and now s/o toe amputation. She had poor wound heeling for which she followed up with podiatry who diagnosed her with osteomyelitis and was sent to [facility] for management. HX: Arthritis, Diabetes, Hypothyroid, Pancreatitis     DX: Osteomyelitis of the right foot with cellulitis    TX: PCI, IV antibiotics with wound debridement     Current clinical course has been complicated by acute hypoxic respiratory failure s/o pulmonary edema and cardiac arrest; CAD. Diabetes: Patient has a 16 year history of known Type 2 diabetes, treated with metformin, glipizide and humalog PTA. Admitting A1C 9.8% (up from 7% per patient report 6 mos ago)     Consulted by Provider for advanced diabetes nursing assessment and care, specifically related to   [x] Inpatient management strategy      Diabetes medication history  Drug class Currently in use Discontinued Never used   Biguanide Metformin 100mg BID     DDP-4 inhibitor       Sulfonylurea Glipizide  Once daily      Thiazolidinedione      GLP-1 RA      SGLT-2 inhibitors      Basal insulin      Fixed Dose  Combinations      Bolus insulin Humulin R U-500  80 units with meals       Subjective   \"Can I go home? \"    Lantus 60 units daily  Fasting glucose 285  Fasting today at 307  Was given 125 mg methylprednisolone at 1220 yesterday for cortisol stim test    19 units correctional insulin  12 units with meals    Eating very well  OR for delayed closure and washout    Objective   Physical exam  General Alert, oriented and in acute distress/ill-appearing. Conversant and cooperative.    Vital Signs   Visit Vitals  /72 (BP 1 Location: Right arm, BP Patient Position: Sitting)   Pulse 72   Temp 97.5 °F (36.4 °C)   Resp 18   Ht 5' 7\" (1.702 m)   Wt 87 kg (191 lb 12.8 oz)   SpO2 98%   BMI 30.04 kg/m²     Skin  Warm and dry. No acanthosis noted along neckline. No lipohypertrophy or lipoatrophy noted at injection sites   Heart   Regular rate and rhythm. No murmurs, rubs or gallops  Lungs  Clear to auscultation without rales or rhonchi  Extremities No foot wounds        Laboratory  Lab Results   Component Value Date/Time    Hemoglobin A1c 9.8 (H) 09/24/2020 11:28 AM     Lab Results   Component Value Date/Time    LDL, calculated 19 09/30/2020 03:35 AM     Lab Results   Component Value Date/Time    Creatinine 1.07 (H) 10/02/2020 04:17 AM     Lab Results   Component Value Date/Time    Sodium 138 10/02/2020 04:17 AM    Potassium 4.0 10/02/2020 04:17 AM    Chloride 106 10/02/2020 04:17 AM    CO2 25 10/02/2020 04:17 AM    Anion gap 7 10/02/2020 04:17 AM    Glucose 288 (H) 10/02/2020 04:17 AM    BUN 18 10/02/2020 04:17 AM    Creatinine 1.07 (H) 10/02/2020 04:17 AM    BUN/Creatinine ratio 17 10/02/2020 04:17 AM    GFR est AA >60 10/02/2020 04:17 AM    GFR est non-AA 55 (L) 10/02/2020 04:17 AM    Calcium 8.9 10/02/2020 04:17 AM    Bilirubin, total 0.3 10/02/2020 04:17 AM    Alk.  phosphatase 75 10/02/2020 04:17 AM    Protein, total 6.6 10/02/2020 04:17 AM    Albumin 2.5 (L) 10/02/2020 04:17 AM    Globulin 4.1 (H) 10/02/2020 04:17 AM    A-G Ratio 0.6 (L) 10/02/2020 04:17 AM    ALT (SGPT) 25 10/02/2020 04:17 AM     Lab Results   Component Value Date/Time    ALT (SGPT) 25 10/02/2020 04:17 AM       Factors affecting BG pattern  Factor Dose Comments   Nutrition:  Carb-controlled meals   60 grams/meal    Drugs:  IV antibiotics  Steroids Flagyl, vanco, cefepime  Methylpred 125 mg x1     Pain On Dilaudid    Infection Osteomyelitis with cellulitis S/p bone debridement and biopsy    Potential for withdrawal On methadone    Other: s/p cardiac arrest  Lactic acidosis      Blood glucose pattern        Assessment and Plan   Nursing Diagnosis Risk for unstable blood glucose pattern   Nursing Intervention Domain 0055 Decision-making Support   Nursing Interventions Examined current inpatient diabetes control   Explored factors facilitating and impeding inpatient management  Identified self-management practices impeding diabetes control  Explored corrective strategies with patient and responsible inpatient provider   Informed patient of rational for insulin strategy while hospitalized    Diabetes Education Checklist  Diagnostic Criteria  [x] Interpretation of Labs  [x] Hemoglobin A1c  [x] Blood glucose goals  Notes: Goal A1C 7%, patients A1C 9.8%  Goal glucose under 200    Blood Glucose Monitoring  [x] Using a glucometer  [x] When to check BG  [x] Record keeping  Notes: Patient simulated obtaining a blood glucose with a home glucometer glucometer. Discussed they will need to obtain a meter, lancets and strips. Patient instructed to obtain a glucose reading before meals and bedtime and if the \"don't feel right\"  Patient to create a log of blood sugar readings and present to their PCP for long term management. Insulin  [x] Long-acting insulin  [x] Rapid-acting insulin  [x] Using insulin pens / vials  [x] Injection sites & site rotation  [x] Proper storage  [x] Insulin expiration guidelines  [x] Sharps disposal  Notes: Discussed the difference between long acting and short acting insulin  Used an insulin pen demo and simulated an insulin injection      Hypoglycemia  [x] Signs & symptoms  [x] Rule of 15 and other treatment  Notes: If patient feels dizzy, light headed or \"woozy\" patient to obtain a blood glucose reading. If blood glucose is under 70, patient instructed to drink 4-6 oz of milk, juice or regular soda then recheck 15 minutes later. If glucose under 70, repeat with another 4-6 oz regular juice/soda/milk.   Once glucose over 70, eat a 15 gram snack like 1/2 peanut butter sandwich or meal.    Hyperglycemia  [x] Signs & symptoms    Nutrition Basics  [x] Starter tips for meal planning  [x] Meal & snack schedule  [x] Reading food labels  [x] Balanced plate method  [x] How different foods impact BG levels  [x] Carbohydrate food sources  [x] Carbohydrate counting        [x] Low carb meal & snack options     Reducing Risks  [x] Long-term complications of diabetes  [x] Health Maintenance  [x] Healthy coping           Evaluation   Davian Zuniga is a 50year old female with uncontrolled diabetes on metformin, glipizide and basal insulin who presented with confriemd osteomyelitis and poor wound heeling s/p amputation of her right great tow. Initial glucose 515 with A1C 9.8% and patient reports non-compliance with antihyperglycemic. Hospital course has been complicated with cardiac arrest with new CAD s/p PCI. She was NPO during the day for delayed wound closure and demonstrated stable glucose indicating adequate basal dose. She did have a cortisol stimulation test with high dose methylprednisolone for evaluation of BP which largely impacted glucose in response. At this time, patient preparing for discharge. Recommendations   Recommend:  Continue current management plan with:  1. Adjust basal insulin at 0.8 units/k units daily. Do not hold when NPO    2. Continue correctional insulin at resistant sensitivity [facility]. Do not hold if NPO    3. Advance mealtime bolus insulin: to 15 units with meals. Hold if patient consumes less than 50% of carbohydrates on meal tray or NPO      On Discharge:  Due to significantly elevated A1C and osteomyelitis, send home on basal/bolus insulin    1. D/C Metformin and Glipizide    2. Add Lantus Toujeo Pen: 70 units once daily    3. Continue Humulin R U-500: 20 units with meals (No sliding scale)    4. Will need to see PCP within 1-2 weeks of hospital discharge    5. Prescription for glucometer kit (Meter, Lancets (100), Strips (100)). Patient to obtain a blood glucose reading four times daily. First thing in the morning prior to eating and drinking anything then before lunch, dinner and bedtime. Create a log and present to PCP for interpretation. Billing Code(s)   I personally reviewed chart, notes, data and current medications in the medical record, and examined the patient at bedside before making care recommendations.      [x] IP subsequent hospital care - 35 minutes      MARLYN Pollard  Program for Diabetes Health  Access via [address] [place] [address]  [phone]

## 2020-10-02 NOTE — DISCHARGE SUMMARY
Discharge Summary       PATIENT ID: Binu Merritt  MRN: [id number]   YOB: 1975    DATE OF ADMISSION: 9/24/2020 12:02 PM    DATE OF DISCHARGE: 10/2/2020    PRIMARY CARE PROVIDER: Romel Howard MD     ATTENDING PHYSICIAN: Linda Thomas MD   DISCHARGING PROVIDER: Linda Thomas MD    To contact this individual call [phone] and ask the  to page. If unavailable ask to be transferred the Adult Hospitalist Department. CONSULTATIONS: IP CONSULT TO CARDIOLOGY    PROCEDURES/SURGERIES: Procedure(s):  DELAYED PRIMARY CLOSURE RIGHT FOOT     ADMITTING DIAGNOSES & HOSPITAL COURSE:   Per recent notes; Admission Summary:   FQO: 71 y/o F with Diabetic neuropathy c/b Right foot osteomyelitis following several week course and numerous I+Ds. She underwent debridement today in the OR - course was uneverntful. Patient noted to be alert and at her baseline prior to leaving the pacu. On arrival to the floor a rapid response was called and this was followed shortly by a code blue to her room. Reportedly had respiratory distress and refused to comply with wearing NRB. She became progressively more bradycardic and progressed to PEA at which point ACLS was initiated. She had several rounds of compressions, one dose of epinephrine, and one dose of narcan. ROSC was achieved immediately following narcan administration at which time vertical nystagmus was observed and her breathing pattern was irregular and shallow. She was intubated uneventfully at the bedside and transferred to the ICU.      In terms of underlying cause for her arrest, there is a possibility of this being opiate overdose, but the timing of administration per [facility] is not consistent with this. It may be possible that opiates were supplied by a third party - will discuss this patient's partner when able. In the interim, we will send a UDS to investigate other medication causes for AMS.  Patient has history of home methadone use, she was noted to be bradypnec on induction of anesthesia in the OR prior to her case this afternoon. Interestingly she did not receive any opiates for analgesia following her operation. She was given a dose of 30mg methadone earlier today (far lower than the recorded dose of 160mg every day in medications tab). Following narcan administration and intubation, patient with diaphoresis, tachycardia, HTN, and nystagmus with marked mydriasis -- possibility of narcan induced opiate withdrawal. Currently on fentanyl gtt.     She has LBBB of unclear chronicity. Will follow up cardiac biomarkers and involve cardiology as able. ECHO in am.   DVT duplex yesterday lowers my suspicion for PE. No large PTX on bedside CXR. Given GFR, Radiologist and I elected to not use IV contrast for her scans.        Interval history / Subjective:       NO distress, anticipates home w pt/ot on discharge.       Assessment & Plan:         1. CAD  a. S/p JAELYN to Circ & LAD on 9/26/2020  b. DAPT/Statin/Coreg  c. Frank off < 24h  d. Continue Midodrine   e. Cardiology following - Appreciate Rosendo/Vee/Kiran input  f. No cp am rounds 10/1/2020   2. Osteomyelitis  a. Podiatry following - to OR in am for further debridement   b. F/u wound cultures  c. Vanc, Flagyl (Maxipime dropping off MAR 10/1/2020   3. Diabetes Mellitus (uncontrolled)  a. Lantus 40u daily  b. Humalog SSI q6h  c. Appreicate Diabetes Management  d. Pre meal Humalog increased 10/1/2020 to 12 units   4. SBP Goal of: > 90 mmHg, MAP Goal of: > 65 mmHg  5. None - For above SBP/MAP goals  a. Hold home antihypertensives  6. Methadone 160 mg PO daily (takes chronically at home)  a. Avoid QTc-prolonging medications   7. Transfusion Trigger (Hgb): <7 g/dL        Lab Results   Component Value Date/Time     HGB 7.7 (L) 10/01/2020 04:49 AM      8. Pulmonary toilet: Duo-Nebs   9. SpO2 Goal: > 92%  10. Keep K>4; Mg>2   11.  PT/OT: PT consulted and on board and OT consulted and on board             DISCHARGE DIAGNOSES / PLAN:      1.  as above      ADDITIONAL CARE RECOMMENDATIONS:   na     PENDING TEST RESULTS:   At the time of discharge the following test results are still pending: na    FOLLOW UP APPOINTMENTS:    Follow-up Information     Follow up With Specialties Details Why Contact Info Additional Information    Zhen  will follow up after discharge to enroll in OP program once all other therapies completed for recent foot surgery and able to ambulate [address] [facility] [place] [address] [facility], [address]. Please arrive 15 minutes prior to your appointment time and you will register in the [place] [address], [address], on the first floor of the [address] Telephone: [phone] Fax: [phone] Driving directions To [facility] [place]. Building: Driving WEST on [address], take exit [address] to [place]. Turn left onto [address], then turn right into [employer] lot Driving EAST on [address], take exit [address] [facility]. Turn right at the end of the exit ramp. Turn left onto [address], then turn right into [place] lot.     Jemal Liu DPM Foot and Ankle Surgery  as directed call on or befor this coming  Monday for appointment  [address]  [facility]  [phone]       Program for Diabetes Health RDE Diabetes Schedule an appointment as soon as possible for a visit Call to set up an appoitnment with a diabetes educator  [address] Giovani Lauren MD Cardiology On 10/20/2020 1PM.  Cardiology followup.  [address]  [address]  [phone]       Other, MD Romel    Patient can only remember the practice name and not the physician       Giovani Champagne  Call As needed to schedule home health PT services [address],  [place], [address] [facility]  [phone]              DIET: Resume previous diet     ACTIVITY: Activity as tolerated    WOUND CARE: na    EQUIPMENT needed: na      DISCHARGE MEDICATIONS:  Current Discharge Medication List      START taking these medications    Details   clopidogreL (PLAVIX) 75 mg tab Take 1 Tab by mouth daily. Qty: 30 Tab, Refills: 0      aspirin 81 mg chewable tablet Take 1 Tab by mouth daily. Qty: 30 Tab, Refills: 0      insulin glargine (Lantus Solostar U-100 Insulin) 100 unit/mL (3 mL) inpn 70 Units by SubCUTAneous route daily. Qty: 1 Adjustable Dose Pre-filled Pen Syringe, Refills: 0      midodrine (PROAMATINE) 5 mg tablet Take 1 Tab by mouth every eight (8) hours for 30 days. Qty: 90 Tab, Refills: 0      polyethylene glycol (Miralax) 17 gram/dose powder Take 17 g by mouth daily. Qty: 850 g, Refills: 0      senna-docusate (PERICOLACE) 8.6-50 mg per tablet Take 1 Tab by mouth daily. Qty: 10 Tab, Refills: 0      traZODone (DESYREL) 50 mg tablet Take 1 Tab by mouth nightly. Qty: 30 Tab, Refills: 0      Blood-Glucose Meter monitoring kit Need glucometer, glucometer test strips. Lancets. Test up to 4 times a day  Qty: 1 Kit, Refills: 0         CONTINUE these medications which have CHANGED    Details   pregabalin (LYRICA) 150 mg capsule Take 1 Cap by mouth two (2) times a day. Max Daily Amount: 300 mg. Qty: 60 Cap, Refills: 0    Associated Diagnoses: Type 2 diabetes mellitus with hyperglycemia, with long-term current use of insulin (HCC)      insulin U-500 CONCENTRATED regular (HumuLIN R U-500, Conc, Kwikpen) 500 unit/mL (3 mL) inpn subQ pen 20 Units by SubCUTAneous route three (3) times daily (with meals). Qty: 1 Adjustable Dose Pre-filled Pen Syringe, Refills: 0         CONTINUE these medications which have NOT CHANGED    Details   lisinopril-hydroCHLOROthiazide (PRINZIDE, ZESTORETIC) 10-12.5 mg per tablet Take 1 Tab by mouth daily.       sertraline (ZOLOFT) 50 mg tablet Take 50 mg by mouth two (2) times a day. methadone (DOLOPHINE) 10 mg tablet Take 160 mg by mouth daily. clonazePAM (KlonoPIN) 1 mg tablet Take 1 mg by mouth three (3) times daily. amylase-lipase-protease (CREON) 24,000-76,000 -120,000 unit capsule Take 1 Cap by mouth three (3) times daily (with meals). omeprazole (PRILOSEC) 40 mg capsule Take 40 mg by mouth daily. albuterol (PROVENTIL HFA, VENTOLIN HFA, PROAIR HFA) 90 mcg/actuation inhaler Take 2 Puffs by inhalation every four (4) hours as needed for Wheezing. Qty: 1 Inhaler, Refills: 0      fenofibrate (LOFIBRA) 160 mg tablet Take 160 mg by mouth daily. Indications: HYPERCHOLESTEROLEMIA      atorvastatin (Lipitor) 40 mg tablet Take 40 mg by mouth nightly. metFORMIN (GLUCOPHAGE) 500 mg tablet Take 1,000 mg by mouth two (2) times daily (with meals). LEVOTHYROXINE SODIUM (SYNTHROID PO) Take 200 mcg by mouth Daily (before breakfast). STOP taking these medications       tiZANidine (ZANAFLEX) 4 mg capsule Comments:   Reason for Stopping:         GLIPIZIDE PO Comments:   Reason for Stopping:         busPIRone (BUSPAR) 15 mg tablet Comments:   Reason for Stopping:         zolpidem (AMBIEN) 5 mg tablet Comments:   Reason for Stopping:         lisinopril (PRINIVIL, ZESTRIL) 10 mg tablet Comments:   Reason for Stopping:                 NOTIFY YOUR PHYSICIAN FOR ANY OF THE FOLLOWING:   Fever over 101 degrees for 24 hours. Chest pain, shortness of breath, fever, chills, nausea, vomiting, diarrhea, change in mentation, falling, weakness, bleeding. Severe pain or pain not relieved by medications. Or, any other signs or symptoms that you may have questions about.     DISPOSITION:    Home With:   OT  PT  HH  RN       Long term SNF/Inpatient Rehab   x Independent/assisted living    Hospice    Other:       PATIENT CONDITION AT DISCHARGE:     Functional status    Poor     Deconditioned    x Independent      Cognition   x  Lucid     Forgetful Dementia      Catheters/lines (plus indication)    Painter     PICC     PEG     None      Code status    x Full code     DNR      PHYSICAL EXAMINATION AT DISCHARGE:  General:          Alert, cooperative, no distress, appears stated age. HEENT:           Atraumatic, anicteric sclerae, pink conjunctivae                          No oral ulcers, mucosa moist, throat clear, dentition fair  Neck:               Supple, symmetrical  Lungs:             Clear to auscultation bilaterally. No Wheezing or Rhonchi. No rales. Chest wall:      No tenderness  No Accessory muscle use. Heart:              Regular  rhythm,  No  murmur   No edema  Abdomen:        Soft, non-tender. Not distended. Bowel sounds normal  Extremities:     No cyanosis. No clubbing,                            Skin turgor normal, Capillary refill normal  Skin:                Not pale. Not Jaundiced  No rashes   Psych:             Not anxious or agitated.   Neurologic:      Alert, moves all extremities, answers questions appropriately and responds to commands       CHRONIC MEDICAL DIAGNOSES:  Problem List as of 10/2/2020 Date Reviewed: 10/2/2020          Codes Class Noted - Resolved    RESOLVED: [facility] ICD-10-CM: M86.9  ICD-9-CM: 730.27  9/24/2020 - 10/2/2020              Greater than 20  minutes were spent with the patient on counseling and coordination of care    Signed:   Carrington Patel MD  10/2/2020  2:22 PM

## 2020-10-05 ENCOUNTER — PATIENT OUTREACH (OUTPATIENT)
Dept: CASE MANAGEMENT | Age: 45
End: 2020-10-05

## 2020-10-05 NOTE — ACP (ADVANCE CARE PLANNING)
Advance Care Planning:   Does patient have an Advance Directive: health care decision makers updated and currently not on file; education provided

## 2020-10-05 NOTE — PROGRESS NOTES
Patient contacted regarding recent discharge and COVID-19 risk. Discussed COVID-19 related testing which was not done at this time. Test results were not done. Patient informed of results, if available? no    Care Transition Nurse/ Ambulatory Care Manager/ LPN Care Coordinator contacted the patient by telephone to perform post discharge assessment. Verified name and  with patient as identifiers. Patient has following risk factors of: diabetes and post op, cardiac cath. CTN/ACM/LPN reviewed discharge instructions, medical action plan and red flags related to discharge diagnosis. Reviewed and educated them on any new and changed medications related to discharge diagnosis. Advised obtaining a 90-day supply of all daily and as-needed medications. Advance Care Planning:   Does patient have an Advance Directive: health care decision makers updated and currently not on file; education provided     Education provided regarding infection prevention, and signs and symptoms of COVID-19 and when to seek medical attention with patient who verbalized understanding. Discussed exposure protocols and quarantine from [address] you at higher risk for severe illness  and given an opportunity for questions and concerns. The patient agrees to contact the COVID-19 hotline [phone] or PCP office for questions related to their healthcare. CTN/ACM/LPN provided contact information for future reference. From CDC: Are you at higher risk for severe illness?  Wash your hands often.  Avoid close contact (6 feet, which is about two arm lengths) with people who are sick.  Put distance between yourself and other people if COVID-19 is spreading in your community.  Clean and disinfect frequently touched surfaces.  Avoid all cruise travel and non-essential air travel.  Call your healthcare professional if you have concerns about COVID-19 and your underlying condition or if you are sick.     For more information on steps you can take to protect yourself, see CDC's How to Protect Yourself      Patient/family/caregiver given information for [facility] and agrees to enroll no    Plan for follow-up call in 7-14 days based on severity of symptoms and risk factors.

## 2020-10-09 ENCOUNTER — TELEPHONE (OUTPATIENT)
Dept: CARDIAC REHAB | Age: 45
End: 2020-10-09

## 2020-10-09 NOTE — TELEPHONE ENCOUNTER
10/9/2020 Cardiac Rehab: Called Ms. Sarah Romero to discuss participation in the Cardiac Rehab Program following NSTEMI and stent 9/24/2020. Family that answered said pt is at the foot doctor. Will follow up call next week.  Jann Nelson RN

## 2020-10-13 ENCOUNTER — TELEPHONE (OUTPATIENT)
Dept: CARDIAC REHAB | Age: 45
End: 2020-10-13

## 2020-10-13 NOTE — TELEPHONE ENCOUNTER
10/13/2020 Cardiac Rehab: Called Ms. Connie Cuevas  to discuss participation in the Cardiac Rehab Program . She is done with PT at home but unable to bear weight as of yet. Agreed to call on 10/16 . Connie Cuevas will follow up with podiatry tomorrow 10/14 and ask when she might be able to enroll. Derrick Nathan RN            10/9/2020 Cardiac Rehab: Called Ms. Connie Cuevas to discuss participation in the Cardiac Rehab Program following NSTEMI and stent 9/24/2020. Family that answered said pt is at the foot doctor. Will follow up call next week.  Carrie Vegas RN

## 2020-10-20 ENCOUNTER — OFFICE VISIT (OUTPATIENT)
Dept: CARDIOLOGY CLINIC | Age: 45
End: 2020-10-20
Payer: COMMERCIAL

## 2020-10-20 ENCOUNTER — TELEPHONE (OUTPATIENT)
Dept: CARDIAC REHAB | Age: 45
End: 2020-10-20

## 2020-10-20 ENCOUNTER — PATIENT OUTREACH (OUTPATIENT)
Dept: CASE MANAGEMENT | Age: 45
End: 2020-10-20

## 2020-10-20 VITALS
DIASTOLIC BLOOD PRESSURE: 70 MMHG | OXYGEN SATURATION: 98 % | RESPIRATION RATE: 18 BRPM | BODY MASS INDEX: 27.78 KG/M2 | WEIGHT: 177 LBS | HEIGHT: 67 IN | HEART RATE: 90 BPM | SYSTOLIC BLOOD PRESSURE: 120 MMHG

## 2020-10-20 DIAGNOSIS — I25.10 CORONARY ARTERY DISEASE INVOLVING NATIVE CORONARY ARTERY OF NATIVE HEART WITHOUT ANGINA PECTORIS: ICD-10-CM

## 2020-10-20 DIAGNOSIS — I10 ESSENTIAL HYPERTENSION: ICD-10-CM

## 2020-10-20 DIAGNOSIS — I21.3 ST ELEVATION MYOCARDIAL INFARCTION (STEMI), UNSPECIFIED ARTERY (HCC): Primary | ICD-10-CM

## 2020-10-20 DIAGNOSIS — E78.2 MIXED HYPERLIPIDEMIA: ICD-10-CM

## 2020-10-20 PROCEDURE — 99215 OFFICE O/P EST HI 40 MIN: CPT | Performed by: INTERNAL MEDICINE

## 2020-10-20 PROCEDURE — 93000 ELECTROCARDIOGRAM COMPLETE: CPT | Performed by: INTERNAL MEDICINE

## 2020-10-20 RX ORDER — TIZANIDINE 4 MG/1
4 TABLET ORAL DAILY
COMMUNITY
Start: 2020-10-12

## 2020-10-20 RX ORDER — METOPROLOL SUCCINATE 25 MG/1
25 TABLET, EXTENDED RELEASE ORAL DAILY
Qty: 90 TAB | Refills: 1 | Status: SHIPPED | OUTPATIENT
Start: 2020-10-20 | End: 2021-02-05 | Stop reason: SDUPTHER

## 2020-10-20 RX ORDER — LISINOPRIL 10 MG/1
10 TABLET ORAL DAILY
Qty: 90 TAB | Refills: 1 | Status: SHIPPED | OUTPATIENT
Start: 2020-10-20 | End: 2021-02-18

## 2020-10-20 RX ORDER — CLINDAMYCIN HYDROCHLORIDE 300 MG/1
300 CAPSULE ORAL 4 TIMES DAILY
COMMUNITY
Start: 2020-10-09

## 2020-10-20 NOTE — PROGRESS NOTES
Cardiology Progress Note         Primary CARE physician: Dr. Marlene Wagoner  Reason for visit: Coronary artery disease    HPI: Kiara Newman is a 50 y.o. female admitted for No admission diagnoses are documented for this encounter. Mattie Adame Has PMH of DM 2 and  HTN. Had [place] for several days prior to admit, thought it was GERD. Underwent I&D of right foot. Had postop arrest and EKG changes. NSTEMI, troponin peak to >100. Underwent LHC with stent to Left circumflex and LAD. Echo with NL EF, mild AS. Was hypotensive and required neosynephrine which is now off. Was started on midodrine. . Underwent primary closure of Rt foot wound on 9/30/20. She underwent cardiac catheterization which showed two-vessel disease which were treated with drug-eluting stent placement in circumflex and LAD territory. RCA had minimal disease. Her EF was initially slightly reduced which subsequently improved to normal.  Currently she appears to be asymptomatic from a cardiovascular standpoint with no shortness of breath, chest discomfort, lightheadedness, palpitation, dizziness. She has stopped smoking. She underwent right toe amputation while in the hospital which is healing well. No side effects reported from the medication. Investigation personally reviewed by me  ECG October 2020[de-identified] Sinus rhythm right bundle branch block. Echocardiogram:   09/24/20   ECHO ADULT COMPLETE 09/27/2020 9/27/2020    Narrative · LV: Estimated LVEF is 60 - 65%. Normal cavity size and systolic function   (ejection fraction normal). Upper normal wall thickness. Wall motion:   normal.  · AV: Aortic valve is functionally bicuspid. Raphe located between the   right cusp and noncoronary cusp. Moderate aortic valve sclerosis with   stenosis. Severe aortic valve leaflet calcification present. Severely   reduced right leaflet mobility of the aortic valve. Aortic valve mean   gradient is 7 mmHg. Aortic valve area is 1.8 cm2.  Mild aortic valve   stenosis is present. · LA: Mildly dilated left atrium. · MV: Mild mitral valve regurgitation is present. · TV: Mild tricuspid valve regurgitation is present. · PA: Mild pulmonary hypertension. Pulmonary arterial systolic pressure is   40 mmHg. Signed by: Yousif Osorio MD          Assessment and PLAN     1. NSTEMI/CAD: s/p stent to Left Circumflex and LAD. No further chest pain. Continue ASA, Plavix, high intensity statin. She is currently tolerating lisinopril hydrochlorothiazide combination well. I recommend discontinuing hydrochlorothiazide component of the medication and continuing lisinopril 10 mg only. We will also start her on metoprolol succinate 25 mg daily. Midodrine can be discontinued. May start cardiac rehab. Echocardiogram with NL EF, NWMA, mild AS, mild MR. From a cardiac standpoint she can continue to takeMethadone and other medications that she gets from behavioral rehab center. 2. Hypotension: now BP improved low NL.  EF normal by echo. Will reinitiate guideline based medical therapy    3. SOB: much improved. Does not need diuretic for maintenance. 3. Hx of HTN:  BP low normal  continue lisinopril 10 mg and Toprol 25 mg    4. Dyslipidemia: LDL 22, HDL 13, Triglycerides 364. On fenofibrate, Lipitor. 5. DM: A1C=9.8. Per primary team.    6. Right foot cellulitis/osteo: s/p I&D with removal of all nonviable soft tissue right foot, bone debridement rt foot. And now s/p primary closure of rt foot wound. NWB RLE. Per podiatry. 7. Anemia: hgb improving 8.7. May also contribute to hypotension and SOB. 8. Hypothyroidism : on synthroid 200 daily. TSH 17.6. Free T4 NL, T3 mildly low (68) Consider slowly increasing dose. Defer to Primary team. Could be the reason why was hypotensive.       50 y.o. female who was admitted for treatment of osteomyelitis of right foot then revealed symptoms of [place] for prior several days, found to have NSTEMI/CAD and underwent PCI/stent to LCX and LAD in October 2020. No further chest pain and Echo with preserved EF. Continue plavix based DAPT at least until October 2021. Reinitiating guideline based medical therapy with lisinopril, beta-blockeralong with continuation of dual antiplatelet therapy and statins    . [x]    High complexity decision making was performed   []    Patient is at high-risk of decompensation with multiple organ involvement     Review of Symptoms:  Respiratory: Breathing/sob much improved. no cough, hemoptysis, URI. Cardiovascular: No CP, palpitations, sweating, lightheadedness, dizziness, syncope, presyncope, lower extremity swelling. Otherwise no other pertinent positive or negative symptoms on ROS. There are no active problems to display for this patient. Bettie England MD  Past Medical History:   Diagnosis Date    Arthritis     Diabetes [facility])     Endocrine disease     hypothyroid    Gastrointestinal disorder     pancreatitis    Hypertension       Past Surgical History:   Procedure Laterality Date    HX GI      colonoscpy     Social History     Socioeconomic History    Marital status: SINGLE     Spouse name: Not on file    Number of children: Not on file    Years of education: Not on file    Highest education level: Not on file   Tobacco Use    Smoking status: Former Smoker     Packs/day: 0.25    Smokeless tobacco: Never Used    Tobacco comment: since 9/20/20   Substance and Sexual Activity    Alcohol use: Not Currently     Comment: quit about a month ago - drank only on the weekends    Drug use: No    Sexual activity: Yes     Partners: Male     Family History   Problem Relation Age of Onset    Cancer Mother         colon      Current Outpatient Medications   Medication Sig    clindamycin (CLEOCIN) 300 mg capsule Take 300 mg by mouth four (4) times daily.  tiZANidine (ZANAFLEX) 4 mg tablet Take 4 mg by mouth daily.     pregabalin (LYRICA) 150 mg capsule Take 1 Cap by mouth two (2) times a day. Max Daily Amount: 300 mg.  clopidogreL (PLAVIX) 75 mg tab Take 1 Tab by mouth daily.  aspirin 81 mg chewable tablet Take 1 Tab by mouth daily.  insulin glargine (Lantus Solostar U-100 Insulin) 100 unit/mL (3 mL) inpn 70 Units by SubCUTAneous route daily.  midodrine (PROAMATINE) 5 mg tablet Take 1 Tab by mouth every eight (8) hours for 30 days.  insulin U-500 CONCENTRATED regular (HumuLIN R U-500, Conc, Kwikpen) 500 unit/mL (3 mL) inpn subQ pen 20 Units by SubCUTAneous route three (3) times daily (with meals).  Blood-Glucose Meter monitoring kit Need glucometer, glucometer test strips. Lancets. Test up to 4 times a day    lisinopril-hydroCHLOROthiazide (PRINZIDE, ZESTORETIC) 10-12.5 mg per tablet Take 1 Tab by mouth daily.  sertraline (ZOLOFT) 50 mg tablet Take 50 mg by mouth two (2) times a day.  methadone (DOLOPHINE) 10 mg tablet Take 160 mg by mouth daily.  clonazePAM (KlonoPIN) 1 mg tablet Take 1 mg by mouth three (3) times daily.  amylase-lipase-protease (CREON) 24,000-76,000 -120,000 unit capsule Take 1 Cap by mouth three (3) times daily (with meals).  albuterol (PROVENTIL HFA, VENTOLIN HFA, PROAIR HFA) 90 mcg/actuation inhaler Take 2 Puffs by inhalation every four (4) hours as needed for Wheezing.  fenofibrate (LOFIBRA) 160 mg tablet Take 160 mg by mouth daily. Indications: HYPERCHOLESTEROLEMIA    atorvastatin (Lipitor) 40 mg tablet Take 40 mg by mouth nightly.  LEVOTHYROXINE SODIUM (SYNTHROID PO) Take 200 mcg by mouth Daily (before breakfast).  polyethylene glycol (Miralax) 17 gram/dose powder Take 17 g by mouth daily.  senna-docusate (PERICOLACE) 8.6-50 mg per tablet Take 1 Tab by mouth daily.  traZODone (DESYREL) 50 mg tablet Take 1 Tab by mouth nightly.  omeprazole (PRILOSEC) 40 mg capsule Take 40 mg by mouth daily.  metFORMIN (GLUCOPHAGE) 500 mg tablet Take 1,000 mg by mouth two (2) times daily (with meals).      No current facility-administered medications for this visit. Prior to Admission Medications   Prescriptions Last Dose Informant Patient Reported? Taking? GLIPIZIDE PO 2020 at Unknown time  Yes Yes   Sig: Take 10 mg by mouth daily (with breakfast). LEVOTHYROXINE SODIUM (SYNTHROID PO) 2020 at Unknown time  Yes Yes   Sig: Take 200 mcg by mouth Daily (before breakfast). albuterol (PROVENTIL HFA, VENTOLIN HFA, PROAIR HFA) 90 mcg/actuation inhaler   No Yes   Sig: Take 2 Puffs by inhalation every four (4) hours as needed for Wheezing. amylase-lipase-protease (CREON) 24,000-76,000 -120,000 unit capsule  at [address] [place] [address]  Yes Yes   Sig: Take 1 Cap by mouth three (3) times daily (with meals). atorvastatin (Lipitor) 40 mg tablet   Yes Yes   Sig: Take 40 mg by mouth nightly. clonazePAM (KlonoPIN) 1 mg tablet 2020 at Unknown time  Yes Yes   Sig: Take 1 mg by mouth three (3) times daily. fenofibrate (LOFIBRA) 160 mg tablet 2020 at Unknown time  Yes Yes   Sig: Take 160 mg by mouth daily. Indications: HYPERCHOLESTEROLEMIA   insulin U-500 CONCENTRATED regular (HumuLIN R U-500, Conc, Kwikpen) 500 unit/mL (3 mL) inpn subQ pen 2020 at Unknown time  Yes Yes   Si Units by SubCUTAneous route three (3) times daily (with meals). lisinopril-hydroCHLOROthiazide (PRINZIDE, ZESTORETIC) 10-12.5 mg per tablet 2020 at Unknown time  Yes Yes   Sig: Take 1 Tab by mouth daily. metFORMIN (GLUCOPHAGE) 500 mg tablet 2020 at Unknown time  Yes Yes   Sig: Take 1,000 mg by mouth two (2) times daily (with meals). methadone (DOLOPHINE) 10 mg tablet 2020 at Unknown time  Yes Yes   Sig: Take 160 mg by mouth daily. omeprazole (PRILOSEC) 40 mg capsule   Yes Yes   Sig: Take 40 mg by mouth daily. pregabalin (Lyrica) 200 mg capsule 2020 at Unknown time  Yes Yes   Sig: Take 200 mg by mouth four (4) times daily.    sertraline (ZOLOFT) 50 mg tablet 2020 at Unknown time  Yes Yes   Sig: Take 50 mg by mouth two (2) times a day. tiZANidine (ZANAFLEX) 4 mg capsule   Yes Yes   Sig: Take 4 mg by mouth two (2) times daily as needed (muscle spasms). Facility-Administered Medications: None      No Known Allergies    Labs:   No results found for this or any previous visit (from the past 24 hour(s)). Intake/Output Summary (Last 24 hours) at 10/2/2020 1126  Last data filed at 10/2/2020 0406  Gross per 24 hour   Intake 1870 ml   Output 3350 ml   Net -1480 ml      Patient Vitals for the past 24 hrs:   Temp Pulse Resp BP SpO2   10/02/20 0844 97.9 °F (36.6 °C) 66 18 114/67 95 %   10/02/20 0321 98.4 °F (36.9 °C) 76 18 111/66 93 %   10/01/20 2343 98.2 °F (36.8 °C) 67 18 101/61 94 %   10/01/20 2013 98.4 °F (36.9 °C) 65 18 112/73 93 %   10/01/20 1525 97.9 °F (36.6 °C) 67 19 108/60 95 %   10/01/20 1159 98.4 °F (36.9 °C) 67 18 101/75 93 %      General:    Alert, cooperative, no distress. Psychiatric:    Normal Mood and affect    Eye/ENT:     Conjunctival pink. Able to hear voice at normal amplitude   Lungs:      Visibly symmetric chest expansion, No palpable tenderness. Clear to auscultation bilaterally. Heart[de-identified]    Regular rate and rhythm, S1, S2 normal,no murmur noted. Normal palpable peripheral pulses. No cyanosis   Abdomen:     Soft, non-tender, nondistended. organomegaly. Extremities:   Extremities normal, atraumatic, no edema. RLE foot in ace wrap.     Neurologic:   MCKEON, No gross focal deficits     MD Raquel Crisostomo MD  10/20/2020   9:03 AM     [facility] Office:   [facility] Office:  [address] [place] [address] Dr Mookie Amin  [address]     [address] [place] [address]  P: [phone]    P: [phone]  F: [phone]    F: [phone]

## 2020-10-20 NOTE — PROGRESS NOTES
Patient resolved from Transition of Care episode on 10/20/20  Discussed COVID-19 related testing which was not done at this time. Patient/family has been provided the following resources and education related to COVID-19:                         Signs, symptoms and red flags related to COVID-19            CDC exposure and quarantine guidelines            Conduit exposure contact - [phone]            Contact for their local Department of Health                 Patient currently reports that the following symptoms have improved:  no new symptoms and no worsening symptoms. No further outreach scheduled with this CTN/ACM/LPN/HC/ MA. Episode of Care resolved. Patient has this CTN/ACM/LPN/HC/MA contact information if future needs arise.

## 2020-10-20 NOTE — LETTER
10/20/20 Patient: Michelle Pringle YOB: 1975 Date of Visit: 10/20/2020 Hemal Webb MD 
[address] [address] VIA Facsimile: [phone] Dear Hemal Webb MD, Thank you for referring Ms. Michelle Pringle to [facility] for evaluation. My notes for this consultation are attached. If you have questions, please do not hesitate to call me. I look forward to following your patient along with you.  
 
 
Sincerely, 
 
Gulshan Long MD

## 2020-10-26 LAB
BACTERIA SPEC CULT: NORMAL
BACTERIA SPEC CULT: NORMAL
SERVICE CMNT-IMP: NORMAL
SERVICE CMNT-IMP: NORMAL

## 2020-11-03 ENCOUNTER — TELEPHONE (OUTPATIENT)
Dept: CARDIAC REHAB | Age: 45
End: 2020-11-03

## 2020-11-03 NOTE — TELEPHONE ENCOUNTER
11/3/2020 Cardiac Rehab: Called Ms. Henry Molina  to discuss participation in the Cardiac Rehab Program. Henry Molina will see her podiatrist tomorrow and get clearance to exercise. Agreed to follow up call 11/9/2020  . Laura Early RN        10/20/2020 Cardiac Rehab: Called Ms. Henry Molina  to discuss participation in the Cardiac Rehab Program  Left voicemail message. Laura Early RN        10/13/2020 Cardiac Rehab: Called Ms. Vivian Viveros  to discuss participation in the Cardiac Rehab Program . She is done with PT at home but unable to bear weight as of yet. Agreed to call on 10/16 .Army Sheppard follow up with podiatry tomorrow 10/14 and ask when she might be able to enroll.  Laura Early RN                 10/9/2020 Cardiac Rehab: Called Ms. Vivian Viveros to discuss participation in the Cardiac Rehab Program following NSTEMI and stent 9/24/2020. Family that answered said pt is at the foot doctor.  Will follow up call next week. Joan Henley RN

## 2020-11-10 ENCOUNTER — TELEPHONE (OUTPATIENT)
Dept: CARDIAC REHAB | Age: 45
End: 2020-11-10

## 2020-11-10 NOTE — TELEPHONE ENCOUNTER
11/10/2020 Cardiac Rehab: Called Ms. John Ellsworth  to discuss participation in the Cardiac Rehab Program . She has f/u with podiatry on 11/11 and still receiving HH PT every Friday. Agreed to f/u 3 weeks  On 12/1. Paulina Dalal RN      11/3/2020 Cardiac Rehab: Called Ms. John Ellsworth  to discuss participation in the Cardiac Rehab Program. John Ellsworth will see her podiatrist tomorrow and get clearance to exercise. Agreed to follow up call 11/9/2020  . Paulina Dalal RN           10/20/2020 Cardiac Rehab: Called Ms. Vivian Viveros  to discuss participation in the Cardiac Rehab Program  Left voicemail message. Paulina Dalal RN        10/13/2020 Cardiac Rehab: Called Ms. Vivian Viveros  to discuss participation in the Cardiac Rehab Program . She is done with PT at home but unable to bear weight as of yet. Agreed to call on 10/16 .Kenyetta Koroma follow up with podiatry tomorrow 10/14 and ask when she might be able to enroll.  Paulina Dalal RN                 10/9/2020 Cardiac Rehab: Called Ms. Vivian Viveros to discuss participation in the Cardiac Rehab Program following NSTEMI and stent 9/24/2020. Family that answered said pt is at the foot doctor.  Will follow up call next week. Joan Montoya RN

## 2020-12-01 ENCOUNTER — TELEPHONE (OUTPATIENT)
Dept: CARDIAC REHAB | Age: 45
End: 2020-12-01

## 2020-12-01 NOTE — TELEPHONE ENCOUNTER
12/1/2020 Cardiac Rehab: Called Ms. Poncho Thomas  to discuss participation in the Cardiac Rehab Program. She is to d/c from PT tomorrow and will be able to obtain foot-ware and exercise in 3-4 weeks. Agreed to call 1/4/2021 to schedule intake. 11/10/2020 Cardiac Rehab: Called Ms. Poncho Thomas  to discuss participation in the Cardiac Rehab Program . She has f/u with podiatry on 11/11 and still receiving  PT every Friday. Agreed to f/u 3 weeks  On 12/1. Milly Andre RN        11/3/2020 Cardiac Rehab: Called Ms. Vivian Viveros  to discuss participation in the Cardiac Rehab Program. Vivian Viveros will see her podiatrist tomorrow and get clearance to exercise. Agreed to follow up call 11/9/2020  .Milly Andre RN           10/20/2020 Cardiac Rehab: Called Ms. Vivian Viveros  to discuss participation in the Cardiac Rehab Program  Left voicemail message. Milly Andre RN        10/13/2020 Cardiac Rehab: Called Ms. Vivian Viveros  to discuss participation in the Cardiac Rehab Program . She is done with PT at home but unable to bear weight as of yet. Agreed to call on 10/16 .Brenden Hutchinson follow up with podiatry tomorrow 10/14 and ask when she might be able to enroll.  Milly Andre RN                 10/9/2020 Cardiac Rehab: Called Ms. Vivian Viveros to discuss participation in the Cardiac Rehab Program following NSTEMI and stent 9/24/2020. Family that answered said pt is at the foot doctor.  Will follow up call next week. Joan Phoenix RN

## 2021-01-04 ENCOUNTER — TELEPHONE (OUTPATIENT)
Dept: CARDIAC REHAB | Age: 46
End: 2021-01-04

## 2021-01-04 NOTE — TELEPHONE ENCOUNTER
1/4/2021 Cardiac Rehab: Called Ms. Fang Found  to discuss participation in the Cardiac Rehab Program . Still can not weight bear but asked for a call in a month 2/1/2021. Regi Maddox RN           12/1/2020 Cardiac Rehab: Called Ms. Fang Found  to discuss participation in the Cardiac Rehab Program. She is to d/c from PT tomorrow and will be able to obtain foot-ware and exercise in 3-4 weeks. Agreed to call 1/4/2021 to schedule intake. 11/10/2020 Cardiac Rehab: Called Ms. Vivian Viveros  to discuss participation in the Cardiac Rehab Program . She has f/u with podiatry on 11/11 and still receiving  PT every Friday. Agreed to f/u 3 weeks  On 12/1. Carisa Maddox RN        11/3/2020 Cardiac Rehab: Called Ms. Vivian Viveros  to discuss participation in the Cardiac Rehab Program. Vivian Viveros will see her podiatrist tomorrow and get clearance to exercise. Agreed to follow up call 11/9/2020  .Carisa Maddox RN           10/20/2020 Cardiac Rehab: Called Ms. Vivian Viveros  to discuss participation in the Cardiac Rehab Program  Left voicemail message. Carisa Maddox RN        10/13/2020 Cardiac Rehab: Called Ms. Vivian Viveros  to discuss participation in the Cardiac Rehab Program . She is done with PT at home but unable to bear weight as of yet. Agreed to call on 10/16 .Tracy Lara follow up with podiatry tomorrow 10/14 and ask when she might be able to enroll.  Carisa Maddox RN                 10/9/2020 Cardiac Rehab: Called Ms. Vivian Viveros to discuss participation in the Cardiac Rehab Program following NSTEMI and stent 9/24/2020. Family that answered said pt is at the foot doctor.  Will follow up call next week. Joan Mitchell RN      Electronically signed by Jane Mayer RN at 12/01/20 3068

## 2021-02-01 ENCOUNTER — TELEPHONE (OUTPATIENT)
Dept: CARDIAC REHAB | Age: 46
End: 2021-02-01

## 2021-02-01 NOTE — TELEPHONE ENCOUNTER
2/1/2021 Cardiac Rehab: Called Ms. Amber Dewitt  to discuss participation in the Cardiac Rehab Program . Spoke with Murrell Najjar and at this time she will call OP program directly once ready to enroll. Gita Flower RN        1/4/2021 Cardiac Rehab: Called Ms. Amber Dewitt  to discuss participation in the Cardiac Rehab Program . Still can not weight bear but asked for a call in a month 2/1/2021. Ania Pike RN              12/1/2020 Cardiac Rehab: Called Ms. Vivian Viveros  to discuss participation in the Cardiac Rehab Program. She is to d/c from PT tomorrow and will be able to obtain foot-ware and exercise in 3-4 weeks. Agreed to call 1/4/2021 to schedule intake. 11/10/2020 Cardiac Rehab: Called Ms. Vivain Viveros  to discuss participation in the Cardiac Rehab Program . She has f/u with podiatry on 11/11 and still receiving  PT every Friday. Agreed to f/u 3 weeks  On 12/1. Vincent Pike RN        11/3/2020 Cardiac Rehab: Called Ms. Vivian Viveros  to discuss participation in the Cardiac Rehab Program. Vivian Viveros will see her podiatrist tomorrow and get clearance to exercise. Agreed to follow up call 11/9/2020  .Vincent Pike RN           10/20/2020 Cardiac Rehab: Called Ms. Vivian Viveros  to discuss participation in the Cardiac Rehab Program  Left voicemail message. Vincent Pike RN        10/13/2020 Cardiac Rehab: Called Ms. Vivian Viveros  to discuss participation in the Cardiac Rehab Program . She is done with PT at home but unable to bear weight as of yet.  Agreed to call on 10/16 .Giovanni Mcmahon follow up with podiatry tomorrow 10/14 and ask when she might be able to enroll.  Vincent Pike RN

## 2021-02-05 RX ORDER — METOPROLOL SUCCINATE 25 MG/1
TABLET, EXTENDED RELEASE ORAL
Qty: 90 TAB | Refills: 1 | Status: SHIPPED | OUTPATIENT
Start: 2021-02-05 | End: 2021-09-01

## 2021-02-18 RX ORDER — LISINOPRIL 10 MG/1
TABLET ORAL
Qty: 90 TAB | Refills: 1 | Status: SHIPPED | OUTPATIENT
Start: 2021-02-18 | End: 2021-09-29

## 2021-04-19 ENCOUNTER — TELEPHONE (OUTPATIENT)
Dept: CARDIOLOGY CLINIC | Age: 46
End: 2021-04-19

## 2021-04-19 NOTE — TELEPHONE ENCOUNTER
Patient would like to let Dr. Alexis Christy know that she was admitted to the hospital last week and they have amputated half of her foot. Patient just wanted to ensure that Dr. Frantz Acosta is aware. Thanks.

## 2021-09-01 ENCOUNTER — OFFICE VISIT (OUTPATIENT)
Dept: CARDIOLOGY CLINIC | Age: 46
End: 2021-09-01
Payer: COMMERCIAL

## 2021-09-01 VITALS
HEIGHT: 67 IN | BODY MASS INDEX: 29.82 KG/M2 | WEIGHT: 190 LBS | HEART RATE: 55 BPM | OXYGEN SATURATION: 95 % | DIASTOLIC BLOOD PRESSURE: 60 MMHG | RESPIRATION RATE: 16 BRPM | SYSTOLIC BLOOD PRESSURE: 118 MMHG

## 2021-09-01 DIAGNOSIS — I21.3 ST ELEVATION MYOCARDIAL INFARCTION (STEMI), UNSPECIFIED ARTERY (HCC): Primary | ICD-10-CM

## 2021-09-01 PROCEDURE — 99214 OFFICE O/P EST MOD 30 MIN: CPT | Performed by: INTERNAL MEDICINE

## 2021-09-01 PROCEDURE — 93000 ELECTROCARDIOGRAM COMPLETE: CPT | Performed by: INTERNAL MEDICINE

## 2021-09-01 NOTE — PROGRESS NOTES
Kalpesh Tai is a 48 y.o. female  Chief Complaint   Patient presents with    Follow-up     Health Maintenance Due   Topic Date Due    Hepatitis C Screening  Never done    MICROALBUMIN Q1  Never done    Eye Exam Retinal or Dilated  Never done    COVID-19 Vaccine (1) Never done    DTaP/Tdap/Td series (1 - Tdap) Never done    PAP AKA CERVICAL CYTOLOGY  Never done    Colorectal Cancer Screening Combo  Never done    A1C test (Diabetic or Prediabetic)  12/24/2020    Flu Vaccine (1) 09/01/2021    Foot Exam Q1  09/24/2021     Visit Vitals  /60   Pulse (!) 55   Resp 16   Ht 5' 7\" (1.702 m)   Wt 190 lb (86.2 kg)   SpO2 95%   BMI 29.76 kg/m²

## 2021-09-01 NOTE — LETTER
9/6/2021    Patient: Kalpesh Tai   YOB: 1975   Date of Visit: 9/1/2021     Elizabeth Joseph MD  [address] [address]  Via Fax: [phone]    Dear Elizabeth Joseph MD,      Thank you for referring Ms. Kalpesh Tai to [facility] for evaluation. My notes for this consultation are attached. If you have questions, please do not hesitate to call me. I look forward to following your patient along with you.       Sincerely,    Zamzam Mac MD

## 2021-09-01 NOTE — PROGRESS NOTES
Cardiology Progress Note         Primary CARE physician: Dr. Minor Bourne  Reason for visit: Coronary artery disease    HPI: Regina Marte is a 48 y.o. female is seen for history of coronary artery disease. She has history of acute myocardial infarction and cardiac arrest perioperatively during surgery for right toe osteomyelitis. Underwent LHC with stent to Left circumflex and LAD. Echo with NL EF, mild AS. Underwent primary closure of Rt foot wound on 9/30/20. Currently had recurrent infections for which she is wearing a protection boot. She underwent cardiac catheterization which showed two-vessel disease which were treated with drug-eluting stent placement in circumflex and LAD territory. RCA had minimal disease. Her EF was initially slightly reduced which subsequently improved to normal.  Currently she appears to be asymptomatic from a cardiovascular standpoint with no shortness of breath, chest discomfort, lightheadedness, palpitation, dizziness. She had stopped smoking after the MI but restarted again. Investigation personally reviewed by me  ECG October 2020[de-identified] Sinus rhythm right bundle branch block. Echocardiogram:   09/24/20   ECHO ADULT COMPLETE 09/27/2020 9/27/2020    Narrative · LV: Estimated LVEF is 60 - 65%. Normal cavity size and systolic function   (ejection fraction normal). Upper normal wall thickness. Wall motion:   normal.  · AV: Aortic valve is functionally bicuspid. Raphe located between the   right cusp and noncoronary cusp. Moderate aortic valve sclerosis with   stenosis. Severe aortic valve leaflet calcification present. Severely   reduced right leaflet mobility of the aortic valve. Aortic valve mean   gradient is 7 mmHg. Aortic valve area is 1.8 cm2. Mild aortic valve   stenosis is present. · LA: Mildly dilated left atrium. · MV: Mild mitral valve regurgitation is present. · TV: Mild tricuspid valve regurgitation is present. · PA:  Mild pulmonary hypertension. Pulmonary arterial systolic pressure is   40 mmHg. Signed by: Samy Veliz MD          Assessment and PLAN     1. CAD manifested in the form of acute coronary syndrome in October 2020 that is post PCI to LAD and circumflex  3. Hx of HTN:  BP low normal  continue lisinopril 10 mg. Metoprolol discontinued since patient has normal LV function and no further angina. 4. Dyslipidemia: 5. DM  6. Right foot cellulitis/osteo  7. Anemia  8. Hypothyroidism   9. Chronic back pain      50 y.o. female with history of coronary disease manifesting the form of  NSTEMI/CAD and underwent PCI/stent to LCX and LAD in October 2020. No further chest pain and Echo with preserved EF. Continue plavix based DAPT at least until September/October 2021. Long extensive stenting in circumflex as well as LAD. Continue guideline based medical therapy with lisinopril, beta-blocker along with continuation of dual antiplatelet therapy and statins. Discussed about smoking cessation again. [x]    High complexity decision making was performed   []    Patient is at high-risk of decompensation with multiple organ involvement     Review of Symptoms:  Respiratory: Breathing/sob much improved. no cough, hemoptysis, URI. Cardiovascular: No CP, palpitations, sweating, lightheadedness, dizziness, syncope, presyncope, lower extremity swelling. Otherwise no other pertinent positive or negative symptoms on ROS. There are no problems to display for this patient.      Karo Gay MD  Past Medical History:   Diagnosis Date    Arthritis     Diabetes [facility])     Endocrine disease     hypothyroid    Gastrointestinal disorder     pancreatitis    Hypertension       Past Surgical History:   Procedure Laterality Date    HX GI      colonoscpy     Social History     Socioeconomic History    Marital status: SINGLE     Spouse name: Not on file    Number of children: Not on file    Years of education: Not on file    Highest education level: Not on file   Tobacco Use    Smoking status: Former Smoker     Packs/day: 0.25    Smokeless tobacco: Never Used    Tobacco comment: since 9/20/20   Substance and Sexual Activity    Alcohol use: Not Currently     Comment: quit about a month ago - drank only on the weekends    Drug use: No    Sexual activity: Yes     Partners: Male     Social Determinants of Health     Financial Resource Strain:     Difficulty of Paying Living Expenses:    Food Insecurity:     Worried About Running Out of Food in the Last Year:     [address] in the Last Year:    Transportation Needs:     Lack of Transportation (Medical):  Lack of Transportation (Non-Medical):    Physical Activity:     Days of Exercise per Week:     Minutes of Exercise per Session:    Stress:     Feeling of Stress :    Social Connections:     Frequency of Communication with Friends and Family:     Frequency of Social Gatherings with Friends and Family:     Attends Gn[employer] Services:     Active Member of Clubs or Organizations:     Attends Club or Organization Meetings:     Marital Status:      Family History   Problem Relation Age of Onset    Cancer Mother         colon      Current Outpatient Medications   Medication Sig    lisinopriL (PRINIVIL, ZESTRIL) 10 mg tablet TAKE 1 TABLET BY MOUTH EVERY DAY    metoprolol succinate (TOPROL-XL) 25 mg XL tablet TAKE 1 TABLET BY MOUTH EVERY DAY    clindamycin (CLEOCIN) 300 mg capsule Take 300 mg by mouth four (4) times daily.  tiZANidine (ZANAFLEX) 4 mg tablet Take 4 mg by mouth daily.  pregabalin (LYRICA) 150 mg capsule Take 1 Cap by mouth two (2) times a day. Max Daily Amount: 300 mg.  clopidogreL (PLAVIX) 75 mg tab Take 1 Tab by mouth daily.  aspirin 81 mg chewable tablet Take 1 Tab by mouth daily.  insulin glargine (Lantus Solostar U-100 Insulin) 100 unit/mL (3 mL) inpn 70 Units by SubCUTAneous route daily.     polyethylene glycol (Miralax) 17 gram/dose powder Take 17 g by mouth daily.  senna-docusate (PERICOLACE) 8.6-50 mg per tablet Take 1 Tab by mouth daily.  traZODone (DESYREL) 50 mg tablet Take 1 Tab by mouth nightly.  insulin U-500 CONCENTRATED regular (HumuLIN R U-500, Conc, Kwikpen) 500 unit/mL (3 mL) inpn subQ pen 20 Units by SubCUTAneous route three (3) times daily (with meals).  Blood-Glucose Meter monitoring kit Need glucometer, glucometer test strips. Lancets. Test up to 4 times a day    sertraline (ZOLOFT) 50 mg tablet Take 50 mg by mouth two (2) times a day.  methadone (DOLOPHINE) 10 mg tablet Take 160 mg by mouth daily.  clonazePAM (KlonoPIN) 1 mg tablet Take 1 mg by mouth three (3) times daily.  amylase-lipase-protease (CREON) 24,000-76,000 -120,000 unit capsule Take 1 Cap by mouth three (3) times daily (with meals).  omeprazole (PRILOSEC) 40 mg capsule Take 40 mg by mouth daily.  albuterol (PROVENTIL HFA, VENTOLIN HFA, PROAIR HFA) 90 mcg/actuation inhaler Take 2 Puffs by inhalation every four (4) hours as needed for Wheezing.  fenofibrate (LOFIBRA) 160 mg tablet Take 160 mg by mouth daily. Indications: HYPERCHOLESTEROLEMIA    atorvastatin (Lipitor) 40 mg tablet Take 40 mg by mouth nightly.  metFORMIN (GLUCOPHAGE) 500 mg tablet Take 1,000 mg by mouth two (2) times daily (with meals).  LEVOTHYROXINE SODIUM (SYNTHROID PO) Take 200 mcg by mouth Daily (before breakfast). No current facility-administered medications for this visit. Prior to Admission Medications   Prescriptions Last Dose Informant Patient Reported? Taking? GLIPIZIDE PO 9/23/2020 at Unknown time  Yes Yes   Sig: Take 10 mg by mouth daily (with breakfast). LEVOTHYROXINE SODIUM (SYNTHROID PO) 9/23/2020 at Unknown time  Yes Yes   Sig: Take 200 mcg by mouth Daily (before breakfast).    albuterol (PROVENTIL HFA, VENTOLIN HFA, PROAIR HFA) 90 mcg/actuation inhaler   No Yes   Sig: Take 2 Puffs by inhalation every four (4) hours as needed for Wheezing. amylase-lipase-protease (CREON) 24,000-76,000 -120,000 unit capsule  at [facility]  Yes Yes   Sig: Take 1 Cap by mouth three (3) times daily (with meals). atorvastatin (Lipitor) 40 mg tablet   Yes Yes   Sig: Take 40 mg by mouth nightly. clonazePAM (KlonoPIN) 1 mg tablet 2020 at Unknown time  Yes Yes   Sig: Take 1 mg by mouth three (3) times daily. fenofibrate (LOFIBRA) 160 mg tablet 2020 at Unknown time  Yes Yes   Sig: Take 160 mg by mouth daily. Indications: HYPERCHOLESTEROLEMIA   insulin U-500 CONCENTRATED regular (HumuLIN R U-500, Conc, Kwikpen) 500 unit/mL (3 mL) inpn subQ pen 2020 at Unknown time  Yes Yes   Si Units by SubCUTAneous route three (3) times daily (with meals). lisinopril-hydroCHLOROthiazide (PRINZIDE, ZESTORETIC) 10-12.5 mg per tablet 2020 at Unknown time  Yes Yes   Sig: Take 1 Tab by mouth daily. metFORMIN (GLUCOPHAGE) 500 mg tablet 2020 at Unknown time  Yes Yes   Sig: Take 1,000 mg by mouth two (2) times daily (with meals). methadone (DOLOPHINE) 10 mg tablet 2020 at Unknown time  Yes Yes   Sig: Take 160 mg by mouth daily. omeprazole (PRILOSEC) 40 mg capsule   Yes Yes   Sig: Take 40 mg by mouth daily. pregabalin (Lyrica) 200 mg capsule 2020 at Unknown time  Yes Yes   Sig: Take 200 mg by mouth four (4) times daily. sertraline (ZOLOFT) 50 mg tablet 2020 at Unknown time  Yes Yes   Sig: Take 50 mg by mouth two (2) times a day. tiZANidine (ZANAFLEX) 4 mg capsule   Yes Yes   Sig: Take 4 mg by mouth two (2) times daily as needed (muscle spasms). Facility-Administered Medications: None      No Known Allergies    Labs:   No results found for this or any previous visit (from the past 24 hour(s)).     Intake/Output Summary (Last 24 hours) at 10/2/2020 1126  Last data filed at 10/2/2020 0406  Gross per 24 hour   Intake 1870 ml   Output 3350 ml   Net -1480 ml      Patient Vitals for the past 24 hrs:   Temp Pulse Resp BP SpO2 10/02/20 0844 97.9 °F (36.6 °C) 66 18 114/67 95 %   10/02/20 0321 98.4 °F (36.9 °C) 76 18 111/66 93 %   10/01/20 2343 98.2 °F (36.8 °C) 67 18 101/61 94 %   10/01/20 2013 98.4 °F (36.9 °C) 65 18 112/73 93 %   10/01/20 1525 97.9 °F (36.6 °C) 67 19 108/60 95 %   10/01/20 1159 98.4 °F (36.9 °C) 67 18 101/75 93 %      General:    Alert, cooperative, no distress. Psychiatric:    Normal Mood and affect    Eye/ENT:     Conjunctival pink. Able to hear voice at normal amplitude   Lungs:      Visibly symmetric chest expansion, No palpable tenderness. Clear to auscultation bilaterally. Heart[de-identified]    Regular rate and rhythm, S1, S2 normal,no murmur noted. Normal palpable peripheral pulses. No cyanosis   Abdomen:     Soft, non-tender, nondistended. organomegaly. Extremities:   Extremities normal, atraumatic, no edema. RLE foot in ace wrap.     Neurologic:   MCKEON, No gross focal deficits     Edward Shukla MD  9/1/2021   9:03 AM     [facility] Office:   [facility] Office:  [address] [place] [address] Dr    [facility] [address] [facility]  [address]     [address] [place] [address]  P: [phone]    P: [phone]  F: [phone]    F: [phone]

## 2021-09-29 RX ORDER — LISINOPRIL 10 MG/1
TABLET ORAL
Qty: 90 TABLET | Refills: 1 | Status: SHIPPED | OUTPATIENT
Start: 2021-09-29 | End: 2022-06-21

## 2021-10-18 ENCOUNTER — TRANSCRIBE ORDER (OUTPATIENT)
Dept: SCHEDULING | Age: 46
End: 2021-10-18

## 2021-10-18 DIAGNOSIS — G89.29 CHRONIC LOW BACK PAIN: Primary | ICD-10-CM

## 2021-10-18 DIAGNOSIS — M54.50 CHRONIC LOW BACK PAIN: Primary | ICD-10-CM

## 2022-06-21 RX ORDER — LISINOPRIL 10 MG/1
TABLET ORAL
Qty: 90 TABLET | Refills: 1 | Status: SHIPPED | OUTPATIENT
Start: 2022-06-21

## 2022-07-30 NOTE — PROGRESS NOTES
[facility] Pediatric Surgery   Daily Progress Note    ADMISSION DATE:  7/27/2022  DATE:  7/30/2022  CURRENT HOSPITAL DAY:  Hospital Day: 4  SURGEON:  Alannah Connors MD  Phone Number: [phone]  ATTENDING PHYSICIAN:  Jasmin Joe Progress note:    Discussed recent events with patient's significant other and Cardiologist Loraine Guevara MD. Per significant others report, Ms Quintin Soni has experienced several days of increased shortness of breath and dull chest pains preceding her operation and arrest yesterday. Her troponin is out of proportion of receiving 2 rounds of CPR and is more concerning for subacute MI likely exacerbated by perioperative stress. This likely precipitated her pulmonary edema as identified on imaging and is more likely the significant contributor to her cardiac arrest. She was started on heparin, given asa and lipitor. On low dose phenylephrine and dobutamine. Will proceed to cath lab in early am with Melita Porras.      Mariia Villafana MD  9/26/2020  4:35 AM   Pulse 100 Pulse  Min: 80  Max: 100   Respiratory (!) 32 Resp  Min: 24  Max: 32   Blood Pressure (!) 122/76 BP  Min: 105/75  Max: 126/84   Pulse Oximetry 95 % SpO2  Min: 95 %  Max: 98 %     INTAKE/OUTPUT:      Intake/Output Summary (Last 24 hours) at 7/30/2022 0930  Last data filed at 7/30/2022 0659  Gross per 24 hour   Intake 2980 ml   Output 1276 ml   Net 1704 ml         PHYSICAL EXAM:    Physical Exam  Constitutional:       General: He is not in acute distress.     Appearance: He is obese.   HENT:      Head: Normocephalic.   Cardiovascular:      Rate and Rhythm: Normal rate.   Pulmonary:      Effort: Pulmonary effort is normal.   Abdominal:      General: There is no distension.      Palpations: Abdomen is soft.      Tenderness: There is abdominal tenderness (TTP periumbilical, RLQ).   Skin:     General: Skin is warm and dry.      Comments: Incisions c/d/i with overlying glue   Neurological:      Mental Status: He is alert and oriented for age.            LABORATORY DATA:    Lab Results   Component Value Date    SODIUM 138 07/30/2022    POTASSIUM 3.0 (L) 07/30/2022    CHLORIDE 110 07/30/2022    CO2 23 07/30/2022    BUN 7 07/30/2022    CREATININE 0.56 07/30/2022    GLUCOSE 106 (H) 07/30/2022     Lab Results   Component Value Date    WBC 21.8 (H) 07/27/2022    HCT 34.9 (L) 07/27/2022    HGB 11.1 (L) 07/27/2022     07/27/2022     Lab Results   Component Value Date    COL Yellow 07/28/2022    UAPP Cloudy 07/28/2022    USPG 1.020 07/28/2022    UPH 6.0 07/28/2022    UPROT Trace (A) 07/28/2022    UGLU Negative 07/28/2022    UKET 15   (A) 07/28/2022    UBILI Negative 07/28/2022    URBC Negative 07/28/2022    UNITR Negative 07/28/2022    UROB 0.2 07/28/2022    UWBC Negative 07/28/2022     Lab Results   Component Value Date    AST 13 07/27/2022    GPT 17 07/27/2022    ALKPT 189 07/27/2022    BILIRUBIN 0.7 07/27/2022          IMAGING STUDIES:    CT ABDOMEN PELVIS W CONTRAST  Narrative: CLINICAL HISTORY: 7-year-old  with right lower quadrant pain.  There is also  history of possible prior trauma.    TECHNIQUE: Oral contrast was at administered. 50 mL of Omnipaque 350 were  injected. Axial images were obtained. Coronal and sagittal reformatted  images are reviewed.  Total exam DLP is 376.37mGy*cm    COMPARISON: none    FINDINGS:  The lung bases show minimal bilateral groundglass attenuation.   This is less pronounced than one would see an infection.  It may represent  mild patchy areas of atelectasis.   No gallbladder abnormality is identified.    The liver, spleen, pancreas, adrenal glands and kidneys show no  significant abnormality .      There are multiple diffuse mildly distended small bowel loops.  There is  air in the colon.     Multiple flecks of radiodensity are seen in the colon as noted  radiographically.    In addition there is a radiodensity in the appendix just proximal to its  insertion on the cecum.  This measures up to 6 mm.  This would represent an  appendicolith.     The appendix is abnormal.  It measures up to 14 mm in transverse diameter.   There is stranding of the fat adjacent to the appendix.  There is  enhancement of the appendiceal wall.   The appearance is consistent with appendicitis and the bowel distention  may well reflect accompanying ileus.       There is a fluid collection between the bladder and rectum which measures  up to 4 cm in diameter and may well represent pelvic abscess.  Impression: Acute appendicitis with evidence of pelvic abscess between the  bladder and rectum.  Multiple radiodense particles are present within the  cecum with a similar density in the appendix.    Electronically Signed by: THALIA SMYTH M.D.   Signed on: 7/27/2022 4:35 PM   XR ABDOMEN 1 VIEW  Narrative: CLINICAL HISTORY: Abdominal pain, trauma    TECHNIQUE: Two supine images    COMPARISON: none    FINDINGS: There are multiple moderately distended bowel loops which would  appear to represent small bowel loops.   There probably is some air in the  colon.  This pattern is most suggestive of ileus with a partial small bowel  obstruction thought less likely.     Scattered flecks of radiodensity are seen in the right lower quadrant  which could represent ingested foreign material.  This is not the  appearance of appendicolith.  There is no abdominal mass or organomegaly.  No evidence of free air is  seen on the supine images.  Impression: Multiple loops of moderately prominent bowel suggestive of  ileus.  Partial small bowel obstruction is thought less likely.  Flecks of radiodensity in the right lower quadrant suggests possible  foreign body ingestion.    Electronically Signed by: THALIA SMYTH M.D.   Signed on: 7/27/2022 2:24 PM   XR CHEST PA OR AP 1 VIEW  Narrative: CLINICAL HISTORY: Chest pain, dyspnea    TECHNIQUE: Single frontal image    COMPARISON: none    FINDINGS: This is stated to be an upright image.  There is curvature in the  thoracic spine to the right with convexity to the left below this.  This  could be positional.     The heart is not enlarged.  There is no hilar or mediastinal mass.  The lungs are clear.     Pulmonary vascularity is normal.  There is no pleural effusion or pneumothorax.  Impression: Possible spinal curvature which may be artifactual.  Otherwise  normal study.    Electronically Signed by: THALIA SMYTH M.D.   Signed on: 7/27/2022 2:19 PM                                  ASSESSMENT:    Sandoval is an otherwise healthy 7-year-old male who presented to the ED with complaints of abdominal pain, vomiting, and diarrhea X 2 days and was found to have acute appendicitis with evidence of a pelvic abscess on CT imaging. He is now POD #2 s/p laparoscopic appendectomy for perforated appendicitis.    PLAN:    - May have regular diet as tolerated  - Continue pain management with scheduled toradol and tylenol  - Continue 1.5 x mIVF, decrease according to po intake  - Continue IV ABX  - Rest of care per  general pediatrics       Patient seen with Dr. Cotto, Pediatric General Surgery Attending.    Eligio Arias PA-C  Physician Assistant, Pediatric General Surgery

## 2022-08-31 ENCOUNTER — OFFICE VISIT (OUTPATIENT)
Dept: CARDIOLOGY CLINIC | Age: 47
End: 2022-08-31

## 2022-08-31 ENCOUNTER — ANCILLARY PROCEDURE (OUTPATIENT)
Dept: CARDIOLOGY CLINIC | Age: 47
End: 2022-08-31
Payer: COMMERCIAL

## 2022-08-31 VITALS
DIASTOLIC BLOOD PRESSURE: 80 MMHG | HEIGHT: 67 IN | RESPIRATION RATE: 18 BRPM | HEART RATE: 85 BPM | BODY MASS INDEX: 25.9 KG/M2 | SYSTOLIC BLOOD PRESSURE: 138 MMHG | OXYGEN SATURATION: 95 % | WEIGHT: 165 LBS

## 2022-08-31 DIAGNOSIS — E11.9 TYPE 2 DIABETES MELLITUS WITHOUT COMPLICATION, WITHOUT LONG-TERM CURRENT USE OF INSULIN (HCC): ICD-10-CM

## 2022-08-31 DIAGNOSIS — I25.10 CORONARY ARTERY DISEASE INVOLVING NATIVE CORONARY ARTERY OF NATIVE HEART WITHOUT ANGINA PECTORIS: ICD-10-CM

## 2022-08-31 DIAGNOSIS — I21.3 ST ELEVATION MYOCARDIAL INFARCTION (STEMI), UNSPECIFIED ARTERY (HCC): Primary | ICD-10-CM

## 2022-08-31 DIAGNOSIS — I35.0 AORTIC VALVE STENOSIS, ETIOLOGY OF CARDIAC VALVE DISEASE UNSPECIFIED: ICD-10-CM

## 2022-08-31 DIAGNOSIS — I10 ESSENTIAL HYPERTENSION: ICD-10-CM

## 2022-08-31 PROCEDURE — 93306 TTE W/DOPPLER COMPLETE: CPT | Performed by: INTERNAL MEDICINE

## 2022-08-31 PROCEDURE — 99214 OFFICE O/P EST MOD 30 MIN: CPT | Performed by: INTERNAL MEDICINE

## 2022-08-31 PROCEDURE — 93000 ELECTROCARDIOGRAM COMPLETE: CPT | Performed by: INTERNAL MEDICINE

## 2022-08-31 RX ORDER — GUAIFENESIN 100 MG/5ML
81 LIQUID (ML) ORAL DAILY
COMMUNITY
Start: 2022-07-22

## 2022-08-31 RX ORDER — BLOOD SUGAR DIAGNOSTIC
STRIP MISCELLANEOUS
COMMUNITY
Start: 2022-06-21

## 2022-08-31 RX ORDER — SULFAMETHOXAZOLE AND TRIMETHOPRIM 800; 160 MG/1; MG/1
TABLET ORAL
COMMUNITY
Start: 2022-08-22 | End: 2022-08-31

## 2022-08-31 RX ORDER — AMLODIPINE BESYLATE 2.5 MG/1
2.5 TABLET ORAL DAILY
Qty: 90 TABLET | Refills: 3 | Status: SHIPPED | OUTPATIENT
Start: 2022-08-31

## 2022-08-31 NOTE — PROGRESS NOTES
Cardiology Progress Note         Primary CARE physician: Dr. Leala Bence  Reason for visit: Coronary artery disease     Assessment and PLAN     1. CAD manifested in the form of acute coronary syndrome in October 2020 now status post PCI to LAD and circumflex  3. Hx of HTN:  BP low normal  continue lisinopril 10 mg. Metoprolol discontinued since patient has normal LV function and no further angina. 4. Dyslipidemia: 5. DM  6. Right foot cellulitis/osteo  7. Anemia  8. Hypothyroidism   9. Chronic back pain  10. Fingertip ulcerations with focal gangrene      50 y.o. female with history of coronary disease manifesting the form of  NSTEMI/CAD and underwent PCI/stent to LCX and LAD in October 2020. No further chest pain and Echo with preserved EF. Continue plavix based DAPT at least until September/October 2021. Long extensive stenting in circumflex as well as LAD. Continue guideline based medical therapy with lisinopril, beta-blocker along with continuation of dual antiplatelet therapy and statins. Discussed about smoking cessation again. She has ulceration of the tip of her fingers. Etiology of this is uncertain. Potential cardiovascular etiology would include medial calcinosis, Buerger's disease involving upper extremity or episodes of vasospasm. I have initiated her on amlodipine for that reason. She is not on a beta-blocker anymore. Smoking cessation has been discussed again. She will also see a dermatologist to ensure that this is not some kind of dermatological illness as the ulceration is preceded by bullae formation. []    High complexity decision making was performed   []    Patient is at high-risk of decompensation with multiple organ involvement    HPI: Doneta Dubin is a 50 y.o. female is seen for history of coronary artery disease. She has history of acute myocardial infarction and cardiac arrest perioperatively during surgery for right toe osteomyelitis. Underwent LHC with stent to Left circumflex and LAD. Echo with NL EF, mild AS. Underwent primary closure of Rt foot wound on 9/30/20. Currently had recurrent infections for which she is wearing a protection boot. She underwent cardiac catheterization which showed two-vessel disease which were treated with drug-eluting stent placement in circumflex and LAD territory. RCA had minimal disease. Her EF was initially slightly reduced which subsequently improved to normal.  Currently she appears to be asymptomatic from a cardiovascular standpoint with no shortness of breath, chest discomfort, lightheadedness, palpitation, dizziness. She had stopped smoking after the MI but restarted again. Her current problem is bullous changes followed by ulceration at the tip of her fingers. Investigation personally reviewed by me  ECG October 2020[de-identified] Sinus rhythm right bundle branch block. Echocardiogram September 2020: Overall normal LV function with mild lateral wall hypokinesis     Review of Symptoms:  Respiratory: Breathing/sob much improved. no cough, hemoptysis, URI. Cardiovascular: No CP, palpitations, sweating, lightheadedness, dizziness, syncope, presyncope, lower extremity swelling. Otherwise no other pertinent positive or negative symptoms on ROS. There are no problems to display for this patient.      Hermelinda Mackenzie MD  Past Medical History:   Diagnosis Date    Arthritis     Diabetes [facility])     Endocrine disease     hypothyroid    Gastrointestinal disorder     pancreatitis    Hypertension       Past Surgical History:   Procedure Laterality Date    HX GI      colonoscpy     Social History     Socioeconomic History    Marital status: SINGLE   Tobacco Use    Smoking status: Former     Packs/day: 0.25     Types: Cigarettes    Smokeless tobacco: Never    Tobacco comments:     since 9/20/20   Substance and Sexual Activity    Alcohol use: Not Currently     Comment: quit about a month ago - drank only on the weekends    Drug use: No    Sexual activity: Yes     Partners: Male     Family History   Problem Relation Age of Onset    Cancer Mother         colon      Current Outpatient Medications   Medication Sig    lisinopriL (PRINIVIL, ZESTRIL) 10 mg tablet TAKE 1 TABLET BY MOUTH EVERY DAY    clindamycin (CLEOCIN) 300 mg capsule Take 300 mg by mouth four (4) times daily. tiZANidine (ZANAFLEX) 4 mg tablet Take 4 mg by mouth daily. pregabalin (LYRICA) 150 mg capsule Take 1 Cap by mouth two (2) times a day. Max Daily Amount: 300 mg.    clopidogreL (PLAVIX) 75 mg tab Take 1 Tab by mouth daily. insulin glargine (Lantus Solostar U-100 Insulin) 100 unit/mL (3 mL) inpn 70 Units by SubCUTAneous route daily. polyethylene glycol (Miralax) 17 gram/dose powder Take 17 g by mouth daily. (Patient not taking: Reported on 9/1/2021)    senna-docusate (PERICOLACE) 8.6-50 mg per tablet Take 1 Tab by mouth daily. (Patient not taking: Reported on 9/1/2021)    traZODone (DESYREL) 50 mg tablet Take 1 Tab by mouth nightly. insulin U-500 CONCENTRATED regular (HumuLIN R U-500, Conc, Kwikpen) 500 unit/mL (3 mL) inpn subQ pen 20 Units by SubCUTAneous route three (3) times daily (with meals). Blood-Glucose Meter monitoring kit Need glucometer, glucometer test strips. Lancets. Test up to 4 times a day    sertraline (ZOLOFT) 50 mg tablet Take 50 mg by mouth two (2) times a day. (Patient not taking: Reported on 9/1/2021)    methadone (DOLOPHINE) 10 mg tablet Take 160 mg by mouth daily. clonazePAM (KlonoPIN) 1 mg tablet Take 1 mg by mouth three (3) times daily. amylase-lipase-protease (CREON) 24,000-76,000 -120,000 unit capsule Take 1 Cap by mouth three (3) times daily (with meals). omeprazole (PRILOSEC) 40 mg capsule Take 40 mg by mouth daily. albuterol (PROVENTIL HFA, VENTOLIN HFA, PROAIR HFA) 90 mcg/actuation inhaler Take 2 Puffs by inhalation every four (4) hours as needed for Wheezing.     fenofibrate (LOFIBRA) 160 mg tablet Take 160 mg by mouth daily. Indications: HYPERCHOLESTEROLEMIA    atorvastatin (Lipitor) 40 mg tablet Take 40 mg by mouth nightly. metFORMIN (GLUCOPHAGE) 500 mg tablet Take 1,000 mg by mouth two (2) times daily (with meals). LEVOTHYROXINE SODIUM (SYNTHROID PO) Take 200 mcg by mouth Daily (before breakfast). No current facility-administered medications for this visit. No Known Allergies    Labs:   No results found for this or any previous visit (from the past 24 hour(s)). Intake/Output Summary (Last 24 hours) at 10/2/2020 1126  Last data filed at 10/2/2020 0406  Gross per 24 hour   Intake 1870 ml   Output 3350 ml   Net -1480 ml      Patient Vitals for the past 24 hrs:   Temp Pulse Resp BP SpO2   10/02/20 0844 97.9 °F (36.6 °C) 66 18 114/67 95 %   10/02/20 0321 98.4 °F (36.9 °C) 76 18 111/66 93 %   10/01/20 2343 98.2 °F (36.8 °C) 67 18 101/61 94 %   10/01/20 2013 98.4 °F (36.9 °C) 65 18 112/73 93 %   10/01/20 1525 97.9 °F (36.6 °C) 67 19 108/60 95 %   10/01/20 1159 98.4 °F (36.9 °C) 67 18 101/75 93 %      General:    Alert, cooperative, no distress. Psychiatric:    Normal Mood and affect    Eye/ENT:     Conjunctival pink. Able to hear voice at normal amplitude   Lungs:      Visibly symmetric chest expansion, No palpable tenderness. Clear to auscultation bilaterally. Heart[de-identified]    Regular rate and rhythm, S1, S2 normal,no murmur noted. Normal palpable peripheral pulses. No cyanosis   Abdomen:     Soft, non-tender, nondistended. organomegaly. Extremities:   Extremities normal, atraumatic, no edema. RLE foot in ace wrap.     Neurologic:   MCKEON, No gross focal deficits     Renuka Jay MD  8/31/2022   9:03 AM     [facility] Office:   [employer] Office:  [address] [place] [address] Dr    [place] [address] [facility]  [address]     [address] [place] [id number] [facility]  P: [phone]    P: [phone]  F: [phone]    F: [phone]

## 2022-08-31 NOTE — PATIENT INSTRUCTIONS
Resume taking lipitor 40 mg nightly. Start taking norvasc 2.5 mg daily. Follow up with Dr. Yadira Sebastian in 6 months.

## 2022-08-31 NOTE — LETTER
9/7/2022    Patient: Kaitlin Rosenthal   YOB: 1975   Date of Visit: 8/31/2022     Danielle Hernández MD  [address] [address]  Via Fax: [phone]    Dear Danielle Hernández MD,      Thank you for referring Ms. Kaitlin Rosenthal to [facility] for evaluation. My notes for this consultation are attached. If you have questions, please do not hesitate to call me. I look forward to following your patient along with you.       Sincerely,    Jacinta Grey MD

## 2022-09-01 RX ORDER — ATORVASTATIN CALCIUM 40 MG/1
40 TABLET, FILM COATED ORAL
Qty: 90 TABLET | Refills: 1 | Status: SHIPPED | OUTPATIENT
Start: 2022-09-01

## 2022-09-02 LAB
ECHO AO ASC DIAM: 3.3 CM
ECHO AO ASCENDING AORTA INDEX: 1.77 CM/M2
ECHO AO ROOT DIAM: 3.1 CM
ECHO AO ROOT INDEX: 1.67 CM/M2
ECHO AV AREA PEAK VELOCITY: 1.6 CM2
ECHO AV AREA VTI: 1.7 CM2
ECHO AV AREA/BSA PEAK VELOCITY: 0.9 CM2/M2
ECHO AV AREA/BSA VTI: 0.9 CM2/M2
ECHO AV MEAN GRADIENT: 10 MMHG
ECHO AV MEAN VELOCITY: 1.5 M/S
ECHO AV PEAK GRADIENT: 20 MMHG
ECHO AV PEAK VELOCITY: 2.2 M/S
ECHO AV VELOCITY RATIO: 0.64
ECHO AV VTI: 39.6 CM
ECHO LA DIAMETER INDEX: 2.37 CM/M2
ECHO LA DIAMETER: 4.4 CM
ECHO LA TO AORTIC ROOT RATIO: 1.42
ECHO LA VOL 2C: 73 ML (ref 22–52)
ECHO LA VOL 4C: 46 ML (ref 22–52)
ECHO LA VOL BP: 60 ML (ref 22–52)
ECHO LA VOL/BSA BIPLANE: 32 ML/M2 (ref 16–34)
ECHO LA VOLUME AREA LENGTH: 64 ML
ECHO LA VOLUME INDEX A2C: 39 ML/M2 (ref 16–34)
ECHO LA VOLUME INDEX A4C: 25 ML/M2 (ref 16–34)
ECHO LA VOLUME INDEX AREA LENGTH: 34 ML/M2 (ref 16–34)
ECHO LV E' LATERAL VELOCITY: 6 CM/S
ECHO LV E' SEPTAL VELOCITY: 4 CM/S
ECHO LV FRACTIONAL SHORTENING: 35 % (ref 28–44)
ECHO LV INTERNAL DIMENSION DIASTOLE INDEX: 2.58 CM/M2
ECHO LV INTERNAL DIMENSION DIASTOLIC: 4.8 CM (ref 3.9–5.3)
ECHO LV INTERNAL DIMENSION SYSTOLIC INDEX: 1.67 CM/M2
ECHO LV INTERNAL DIMENSION SYSTOLIC: 3.1 CM
ECHO LV IVSD: 1.2 CM (ref 0.6–0.9)
ECHO LV MASS 2D: 206.4 G (ref 67–162)
ECHO LV MASS INDEX 2D: 111 G/M2 (ref 43–95)
ECHO LV POSTERIOR WALL DIASTOLIC: 1.1 CM (ref 0.6–0.9)
ECHO LV RELATIVE WALL THICKNESS RATIO: 0.46
ECHO LVOT AREA: 2.5 CM2
ECHO LVOT AV VTI INDEX: 0.62
ECHO LVOT DIAM: 1.8 CM
ECHO LVOT MEAN GRADIENT: 4 MMHG
ECHO LVOT PEAK GRADIENT: 8 MMHG
ECHO LVOT PEAK VELOCITY: 1.4 M/S
ECHO LVOT STROKE VOLUME INDEX: 33.8 ML/M2
ECHO LVOT SV: 62.8 ML
ECHO LVOT VTI: 24.7 CM
ECHO MV A VELOCITY: 1.09 M/S
ECHO MV AREA PHT: 2.7 CM2
ECHO MV E DECELERATION TIME (DT): 283.8 MS
ECHO MV E VELOCITY: 0.83 M/S
ECHO MV E/A RATIO: 0.76
ECHO MV E/E' LATERAL: 13.83
ECHO MV E/E' RATIO (AVERAGED): 17.29
ECHO MV E/E' SEPTAL: 20.75
ECHO MV PRESSURE HALF TIME (PHT): 82.3 MS
ECHO RV INTERNAL DIMENSION: 3.7 CM
ECHO RV TAPSE: 1.7 CM (ref 1.7–?)

## 2022-12-19 ENCOUNTER — APPOINTMENT (OUTPATIENT)
Dept: ULTRASOUND IMAGING | Age: 47
DRG: 113 | End: 2022-12-19
Attending: PHYSICIAN ASSISTANT
Payer: COMMERCIAL

## 2022-12-19 ENCOUNTER — HOSPITAL ENCOUNTER (INPATIENT)
Age: 47
LOS: 8 days | Discharge: HOME | DRG: 113 | End: 2022-12-28
Attending: EMERGENCY MEDICINE | Admitting: FAMILY MEDICINE
Payer: COMMERCIAL

## 2022-12-19 DIAGNOSIS — N17.9 AKI (ACUTE KIDNEY INJURY) (HCC): Primary | ICD-10-CM

## 2022-12-19 LAB
ALBUMIN SERPL-MCNC: 3.2 G/DL (ref 3.5–5)
ALBUMIN/GLOB SERPL: 0.7 {RATIO} (ref 1.1–2.2)
ALP SERPL-CCNC: 72 U/L (ref 45–117)
ALT SERPL-CCNC: 29 U/L (ref 12–78)
ANION GAP SERPL CALC-SCNC: 5 MMOL/L (ref 5–15)
AST SERPL-CCNC: 23 U/L (ref 15–37)
BASOPHILS # BLD: 0 K/UL (ref 0–0.1)
BASOPHILS NFR BLD: 1 % (ref 0–1)
BILIRUB SERPL-MCNC: 0.1 MG/DL (ref 0.2–1)
BUN SERPL-MCNC: 52 MG/DL (ref 6–20)
BUN/CREAT SERPL: 25 (ref 12–20)
CALCIUM SERPL-MCNC: 9.3 MG/DL (ref 8.5–10.1)
CHLORIDE SERPL-SCNC: 110 MMOL/L (ref 97–108)
CO2 SERPL-SCNC: 25 MMOL/L (ref 21–32)
COMMENT, HOLDF: NORMAL
CREAT SERPL-MCNC: 2.04 MG/DL (ref 0.55–1.02)
DIFFERENTIAL METHOD BLD: ABNORMAL
EOSINOPHIL # BLD: 0.3 K/UL (ref 0–0.4)
EOSINOPHIL NFR BLD: 4 % (ref 0–7)
ERYTHROCYTE [DISTWIDTH] IN BLOOD BY AUTOMATED COUNT: 15.7 % (ref 11.5–14.5)
GLOBULIN SER CALC-MCNC: 4.3 G/DL (ref 2–4)
GLUCOSE SERPL-MCNC: 162 MG/DL (ref 65–100)
HCT VFR BLD AUTO: 32.3 % (ref 35–47)
HGB BLD-MCNC: 9.9 G/DL (ref 11.5–16)
IMM GRANULOCYTES # BLD AUTO: 0.1 K/UL (ref 0–0.04)
IMM GRANULOCYTES NFR BLD AUTO: 1 % (ref 0–0.5)
LYMPHOCYTES # BLD: 2 K/UL (ref 0.8–3.5)
LYMPHOCYTES NFR BLD: 31 % (ref 12–49)
MAGNESIUM SERPL-MCNC: 2.7 MG/DL (ref 1.6–2.4)
MCH RBC QN AUTO: 27.6 PG (ref 26–34)
MCHC RBC AUTO-ENTMCNC: 30.7 G/DL (ref 30–36.5)
MCV RBC AUTO: 90 FL (ref 80–99)
MONOCYTES # BLD: 0.7 K/UL (ref 0–1)
MONOCYTES NFR BLD: 10 % (ref 5–13)
NEUTS SEG # BLD: 3.6 K/UL (ref 1.8–8)
NEUTS SEG NFR BLD: 53 % (ref 32–75)
NRBC # BLD: 0 K/UL (ref 0–0.01)
NRBC BLD-RTO: 0 PER 100 WBC
PHOSPHATE SERPL-MCNC: 5.4 MG/DL (ref 2.6–4.7)
PLATELET # BLD AUTO: 221 K/UL (ref 150–400)
PMV BLD AUTO: 11 FL (ref 8.9–12.9)
POTASSIUM SERPL-SCNC: 5.4 MMOL/L (ref 3.5–5.1)
PROT SERPL-MCNC: 7.5 G/DL (ref 6.4–8.2)
RBC # BLD AUTO: 3.59 M/UL (ref 3.8–5.2)
SAMPLES BEING HELD,HOLD: NORMAL
SODIUM SERPL-SCNC: 140 MMOL/L (ref 136–145)
WBC # BLD AUTO: 6.6 K/UL (ref 3.6–11)

## 2022-12-19 PROCEDURE — 99285 EMERGENCY DEPT VISIT HI MDM: CPT

## 2022-12-19 PROCEDURE — 84100 ASSAY OF PHOSPHORUS: CPT

## 2022-12-19 PROCEDURE — 74011250636 HC RX REV CODE- 250/636: Performed by: PHYSICIAN ASSISTANT

## 2022-12-19 PROCEDURE — 93005 ELECTROCARDIOGRAM TRACING: CPT

## 2022-12-19 PROCEDURE — 83735 ASSAY OF MAGNESIUM: CPT

## 2022-12-19 PROCEDURE — 85025 COMPLETE CBC W/AUTO DIFF WBC: CPT

## 2022-12-19 PROCEDURE — 80053 COMPREHEN METABOLIC PANEL: CPT

## 2022-12-19 PROCEDURE — 76770 US EXAM ABDO BACK WALL COMP: CPT

## 2022-12-19 PROCEDURE — 96374 THER/PROPH/DIAG INJ IV PUSH: CPT

## 2022-12-19 RX ADMIN — SODIUM CHLORIDE 1000 ML: 900 INJECTION, SOLUTION INTRAVENOUS at 22:57

## 2022-12-20 ENCOUNTER — APPOINTMENT (OUTPATIENT)
Dept: GENERAL RADIOLOGY | Age: 47
DRG: 113 | End: 2022-12-20
Attending: FAMILY MEDICINE
Payer: COMMERCIAL

## 2022-12-20 ENCOUNTER — APPOINTMENT (OUTPATIENT)
Dept: NON INVASIVE DIAGNOSTICS | Age: 47
DRG: 113 | End: 2022-12-20
Attending: FAMILY MEDICINE
Payer: COMMERCIAL

## 2022-12-20 ENCOUNTER — APPOINTMENT (OUTPATIENT)
Dept: ULTRASOUND IMAGING | Age: 47
DRG: 113 | End: 2022-12-20
Attending: FAMILY MEDICINE
Payer: COMMERCIAL

## 2022-12-20 PROBLEM — N17.9 AKI (ACUTE KIDNEY INJURY) (HCC): Status: ACTIVE | Noted: 2022-12-20

## 2022-12-20 PROBLEM — E87.5 HYPERKALEMIA, DIMINISHED RENAL EXCRETION: Status: ACTIVE | Noted: 2022-12-20

## 2022-12-20 LAB
ALBUMIN SERPL-MCNC: 3 G/DL (ref 3.5–5)
ANION GAP SERPL CALC-SCNC: 5 MMOL/L (ref 5–15)
APPEARANCE UR: CLEAR
APPEARANCE UR: CLEAR
ATRIAL RATE: 92 BPM
BACTERIA URNS QL MICRO: NEGATIVE /HPF
BACTERIA URNS QL MICRO: NEGATIVE /HPF
BILIRUB UR QL: NEGATIVE
BILIRUB UR QL: NEGATIVE
BUN SERPL-MCNC: 44 MG/DL (ref 6–20)
BUN/CREAT SERPL: 25 (ref 12–20)
CALCIUM SERPL-MCNC: 8.2 MG/DL (ref 8.5–10.1)
CALCULATED P AXIS, ECG09: 63 DEGREES
CALCULATED R AXIS, ECG10: 52 DEGREES
CALCULATED T AXIS, ECG11: 46 DEGREES
CHLORIDE SERPL-SCNC: 109 MMOL/L (ref 97–108)
CO2 SERPL-SCNC: 23 MMOL/L (ref 21–32)
COLOR UR: ABNORMAL
COLOR UR: ABNORMAL
COMMENT, HOLDF: NORMAL
COVID-19 RAPID TEST, COVR: NOT DETECTED
CREAT SERPL-MCNC: 1.77 MG/DL (ref 0.55–1.02)
CREAT UR-MCNC: 42.6 MG/DL
DIAGNOSIS, 93000: NORMAL
ECHO AV AREA PEAK VELOCITY: 2.5 CM2
ECHO AV AREA PEAK VELOCITY: 2.6 CM2
ECHO AV AREA VTI: 2.5 CM2
ECHO AV AREA/BSA VTI: 1.3 CM2/M2
ECHO AV MEAN GRADIENT: 12 MMHG
ECHO AV MEAN VELOCITY: 1.7 M/S
ECHO AV PEAK GRADIENT: 19 MMHG
ECHO AV PEAK VELOCITY: 2.2 M/S
ECHO AV VTI: 40.5 CM
ECHO EST RA PRESSURE: 3 MMHG
ECHO LA DIAMETER INDEX: 2.38 CM/M2
ECHO LA DIAMETER: 4.6 CM
ECHO LA VOL 4C: 88 ML (ref 22–52)
ECHO LA VOLUME INDEX A4C: 46 ML/M2 (ref 16–34)
ECHO LV FRACTIONAL SHORTENING: 33 % (ref 28–44)
ECHO LV INTERNAL DIMENSION DIASTOLE INDEX: 2.69 CM/M2
ECHO LV INTERNAL DIMENSION DIASTOLIC: 5.2 CM (ref 3.9–5.3)
ECHO LV INTERNAL DIMENSION SYSTOLIC INDEX: 1.81 CM/M2
ECHO LV INTERNAL DIMENSION SYSTOLIC: 3.5 CM
ECHO LV IVSD: 0.8 CM (ref 0.6–0.9)
ECHO LV MASS 2D: 156.9 G (ref 67–162)
ECHO LV MASS INDEX 2D: 81.3 G/M2 (ref 43–95)
ECHO LV POSTERIOR WALL DIASTOLIC: 0.9 CM (ref 0.6–0.9)
ECHO LV RELATIVE WALL THICKNESS RATIO: 0.35
ECHO LVOT AREA: 3.1 CM2
ECHO LVOT AV VTI INDEX: 0.77
ECHO LVOT DIAM: 2 CM
ECHO LVOT MEAN GRADIENT: 8 MMHG
ECHO LVOT PEAK GRADIENT: 12 MMHG
ECHO LVOT PEAK GRADIENT: 13 MMHG
ECHO LVOT PEAK VELOCITY: 1.7 M/S
ECHO LVOT PEAK VELOCITY: 1.8 M/S
ECHO LVOT STROKE VOLUME INDEX: 50.8 ML/M2
ECHO LVOT SV: 98 ML
ECHO LVOT VTI: 31.2 CM
ECHO MV A VELOCITY: 1.27 M/S
ECHO MV E VELOCITY: 1.24 M/S
ECHO MV E/A RATIO: 0.98
ECHO RIGHT VENTRICULAR SYSTOLIC PRESSURE (RVSP): 30 MMHG
ECHO RV INTERNAL DIMENSION: 3.1 CM
ECHO RV TAPSE: 2.8 CM (ref 1.7–?)
ECHO TV REGURGITANT MAX VELOCITY: 2.58 M/S
ECHO TV REGURGITANT PEAK GRADIENT: 27 MMHG
EPITH CASTS URNS QL MICRO: ABNORMAL /LPF
EPITH CASTS URNS QL MICRO: ABNORMAL /LPF
EST. AVERAGE GLUCOSE BLD GHB EST-MCNC: 269 MG/DL
FERRITIN SERPL-MCNC: 80 NG/ML (ref 8–252)
FLUAV AG NPH QL IA: POSITIVE
FLUBV AG NOSE QL IA: NEGATIVE
FOLATE SERPL-MCNC: 22.8 NG/ML (ref 5–21)
GLUCOSE BLD STRIP.AUTO-MCNC: 130 MG/DL (ref 65–117)
GLUCOSE BLD STRIP.AUTO-MCNC: 134 MG/DL (ref 65–117)
GLUCOSE BLD STRIP.AUTO-MCNC: 162 MG/DL (ref 65–117)
GLUCOSE BLD STRIP.AUTO-MCNC: 74 MG/DL (ref 65–117)
GLUCOSE SERPL-MCNC: 131 MG/DL (ref 65–100)
GLUCOSE UR STRIP.AUTO-MCNC: 500 MG/DL
GLUCOSE UR STRIP.AUTO-MCNC: >1000 MG/DL
HBA1C MFR BLD: 11 % (ref 4–5.6)
HCG UR QL: NEGATIVE
HGB UR QL STRIP: ABNORMAL
HGB UR QL STRIP: NEGATIVE
HYALINE CASTS URNS QL MICRO: ABNORMAL /LPF (ref 0–5)
HYALINE CASTS URNS QL MICRO: ABNORMAL /LPF (ref 0–5)
IRON SATN MFR SERPL: 10 % (ref 20–50)
IRON SERPL-MCNC: 48 UG/DL (ref 35–150)
KETONES UR QL STRIP.AUTO: NEGATIVE MG/DL
KETONES UR QL STRIP.AUTO: NEGATIVE MG/DL
LEUKOCYTE ESTERASE UR QL STRIP.AUTO: NEGATIVE
LEUKOCYTE ESTERASE UR QL STRIP.AUTO: NEGATIVE
MAGNESIUM SERPL-MCNC: 2.4 MG/DL (ref 1.6–2.4)
NITRITE UR QL STRIP.AUTO: NEGATIVE
NITRITE UR QL STRIP.AUTO: NEGATIVE
P-R INTERVAL, ECG05: 170 MS
PH UR STRIP: 5 [PH] (ref 5–8)
PH UR STRIP: 5.5 [PH] (ref 5–8)
PHOSPHATE SERPL-MCNC: 3.7 MG/DL (ref 2.6–4.7)
POTASSIUM SERPL-SCNC: 5 MMOL/L (ref 3.5–5.1)
PROT UR STRIP-MCNC: NEGATIVE MG/DL
PROT UR STRIP-MCNC: NEGATIVE MG/DL
Q-T INTERVAL, ECG07: 354 MS
QRS DURATION, ECG06: 86 MS
QTC CALCULATION (BEZET), ECG08: 437 MS
RBC #/AREA URNS HPF: ABNORMAL /HPF (ref 0–5)
RBC #/AREA URNS HPF: ABNORMAL /HPF (ref 0–5)
RETICS # AUTO: 0.08 M/UL (ref 0.02–0.08)
RETICS/RBC NFR AUTO: 2.7 % (ref 0.7–2.1)
SAMPLES BEING HELD,HOLD: NORMAL
SERVICE CMNT-IMP: ABNORMAL
SERVICE CMNT-IMP: NORMAL
SODIUM SERPL-SCNC: 137 MMOL/L (ref 136–145)
SODIUM UR-SCNC: 108 MMOL/L
SOURCE, COVRS: NORMAL
SP GR UR REFRACTOMETRY: 1.01 (ref 1–1.03)
SP GR UR REFRACTOMETRY: 1.01 (ref 1–1.03)
TIBC SERPL-MCNC: 460 UG/DL (ref 250–450)
UR CULT HOLD, URHOLD: NORMAL
UR CULT HOLD, URHOLD: NORMAL
UROBILINOGEN UR QL STRIP.AUTO: 0.2 EU/DL (ref 0.2–1)
UROBILINOGEN UR QL STRIP.AUTO: 0.2 EU/DL (ref 0.2–1)
VENTRICULAR RATE, ECG03: 92 BPM
VIT B12 SERPL-MCNC: 499 PG/ML (ref 193–986)
WBC URNS QL MICRO: ABNORMAL /HPF (ref 0–4)
WBC URNS QL MICRO: ABNORMAL /HPF (ref 0–4)

## 2022-12-20 PROCEDURE — 82570 ASSAY OF URINE CREATININE: CPT

## 2022-12-20 PROCEDURE — 87040 BLOOD CULTURE FOR BACTERIA: CPT

## 2022-12-20 PROCEDURE — 74011000250 HC RX REV CODE- 250: Performed by: FAMILY MEDICINE

## 2022-12-20 PROCEDURE — 65270000046 HC RM TELEMETRY

## 2022-12-20 PROCEDURE — 82746 ASSAY OF FOLIC ACID SERUM: CPT

## 2022-12-20 PROCEDURE — 71046 X-RAY EXAM CHEST 2 VIEWS: CPT

## 2022-12-20 PROCEDURE — 36415 COLL VENOUS BLD VENIPUNCTURE: CPT

## 2022-12-20 PROCEDURE — 87635 SARS-COV-2 COVID-19 AMP PRB: CPT

## 2022-12-20 PROCEDURE — 93306 TTE W/DOPPLER COMPLETE: CPT | Performed by: SPECIALIST

## 2022-12-20 PROCEDURE — 85045 AUTOMATED RETICULOCYTE COUNT: CPT

## 2022-12-20 PROCEDURE — 74011250637 HC RX REV CODE- 250/637: Performed by: FAMILY MEDICINE

## 2022-12-20 PROCEDURE — 83735 ASSAY OF MAGNESIUM: CPT

## 2022-12-20 PROCEDURE — 84300 ASSAY OF URINE SODIUM: CPT

## 2022-12-20 PROCEDURE — 82607 VITAMIN B-12: CPT

## 2022-12-20 PROCEDURE — 82728 ASSAY OF FERRITIN: CPT

## 2022-12-20 PROCEDURE — 80069 RENAL FUNCTION PANEL: CPT

## 2022-12-20 PROCEDURE — 81025 URINE PREGNANCY TEST: CPT

## 2022-12-20 PROCEDURE — 76705 ECHO EXAM OF ABDOMEN: CPT

## 2022-12-20 PROCEDURE — 87804 INFLUENZA ASSAY W/OPTIC: CPT

## 2022-12-20 PROCEDURE — 81001 URINALYSIS AUTO W/SCOPE: CPT

## 2022-12-20 PROCEDURE — 83036 HEMOGLOBIN GLYCOSYLATED A1C: CPT

## 2022-12-20 PROCEDURE — 74011250636 HC RX REV CODE- 250/636: Performed by: FAMILY MEDICINE

## 2022-12-20 PROCEDURE — 93306 TTE W/DOPPLER COMPLETE: CPT

## 2022-12-20 PROCEDURE — 82962 GLUCOSE BLOOD TEST: CPT

## 2022-12-20 PROCEDURE — 83540 ASSAY OF IRON: CPT

## 2022-12-20 PROCEDURE — 74011000258 HC RX REV CODE- 258: Performed by: FAMILY MEDICINE

## 2022-12-20 RX ORDER — FENOFIBRATE 160 MG/1
160 TABLET ORAL DAILY
Status: DISCONTINUED | OUTPATIENT
Start: 2022-12-20 | End: 2022-12-28 | Stop reason: HOSPADM

## 2022-12-20 RX ORDER — PREGABALIN 75 MG/1
150 CAPSULE ORAL 2 TIMES DAILY
Status: DISCONTINUED | OUTPATIENT
Start: 2022-12-20 | End: 2022-12-28 | Stop reason: HOSPADM

## 2022-12-20 RX ORDER — LISINOPRIL 10 MG/1
10 TABLET ORAL DAILY
Status: DISCONTINUED | OUTPATIENT
Start: 2022-12-20 | End: 2022-12-21

## 2022-12-20 RX ORDER — PANTOPRAZOLE SODIUM 40 MG/1
40 TABLET, DELAYED RELEASE ORAL
Status: DISCONTINUED | OUTPATIENT
Start: 2022-12-20 | End: 2022-12-28 | Stop reason: HOSPADM

## 2022-12-20 RX ORDER — ONDANSETRON 4 MG/1
4 TABLET, ORALLY DISINTEGRATING ORAL
Status: DISCONTINUED | OUTPATIENT
Start: 2022-12-20 | End: 2022-12-28 | Stop reason: HOSPADM

## 2022-12-20 RX ORDER — ACETAMINOPHEN 325 MG/1
650 TABLET ORAL
Status: DISCONTINUED | OUTPATIENT
Start: 2022-12-20 | End: 2022-12-28 | Stop reason: HOSPADM

## 2022-12-20 RX ORDER — INSULIN LISPRO 100 [IU]/ML
INJECTION, SOLUTION INTRAVENOUS; SUBCUTANEOUS
Status: DISCONTINUED | OUTPATIENT
Start: 2022-12-20 | End: 2022-12-28 | Stop reason: HOSPADM

## 2022-12-20 RX ORDER — AMLODIPINE BESYLATE 5 MG/1
2.5 TABLET ORAL DAILY
Status: DISCONTINUED | OUTPATIENT
Start: 2022-12-20 | End: 2022-12-21

## 2022-12-20 RX ORDER — HEPARIN SODIUM 5000 [USP'U]/ML
5000 INJECTION, SOLUTION INTRAVENOUS; SUBCUTANEOUS EVERY 8 HOURS
Status: DISCONTINUED | OUTPATIENT
Start: 2022-12-20 | End: 2022-12-28 | Stop reason: HOSPADM

## 2022-12-20 RX ORDER — ACETAMINOPHEN 650 MG/1
650 SUPPOSITORY RECTAL
Status: DISCONTINUED | OUTPATIENT
Start: 2022-12-20 | End: 2022-12-28 | Stop reason: HOSPADM

## 2022-12-20 RX ORDER — GUAIFENESIN 100 MG/5ML
100 SOLUTION ORAL
Status: DISCONTINUED | OUTPATIENT
Start: 2022-12-20 | End: 2022-12-28 | Stop reason: HOSPADM

## 2022-12-20 RX ORDER — PREGABALIN 200 MG/1
200 CAPSULE ORAL 4 TIMES DAILY
COMMUNITY
Start: 2022-11-20

## 2022-12-20 RX ORDER — LEVOTHYROXINE SODIUM 100 UG/1
200 TABLET ORAL
Status: DISCONTINUED | OUTPATIENT
Start: 2022-12-20 | End: 2022-12-28 | Stop reason: HOSPADM

## 2022-12-20 RX ORDER — IBUPROFEN 600 MG/1
600 TABLET ORAL ONCE
Status: COMPLETED | OUTPATIENT
Start: 2022-12-20 | End: 2022-12-20

## 2022-12-20 RX ORDER — POLYETHYLENE GLYCOL 3350 17 G/17G
17 POWDER, FOR SOLUTION ORAL DAILY PRN
Status: DISCONTINUED | OUTPATIENT
Start: 2022-12-20 | End: 2022-12-28 | Stop reason: HOSPADM

## 2022-12-20 RX ORDER — LEVOTHYROXINE SODIUM 200 UG/1
200 TABLET ORAL DAILY
COMMUNITY
Start: 2022-09-29

## 2022-12-20 RX ORDER — OSELTAMIVIR PHOSPHATE 75 MG/1
75 CAPSULE ORAL ONCE
Status: COMPLETED | OUTPATIENT
Start: 2022-12-20 | End: 2022-12-21

## 2022-12-20 RX ORDER — METFORMIN HYDROCHLORIDE 500 MG/1
1000 TABLET, EXTENDED RELEASE ORAL 2 TIMES DAILY
COMMUNITY
Start: 2022-12-09

## 2022-12-20 RX ORDER — METHADONE HYDROCHLORIDE 5 MG/5ML
160 SOLUTION ORAL DAILY
Status: DISCONTINUED | OUTPATIENT
Start: 2022-12-20 | End: 2022-12-25 | Stop reason: ALTCHOICE

## 2022-12-20 RX ORDER — CLOPIDOGREL BISULFATE 75 MG/1
75 TABLET ORAL DAILY
Status: DISCONTINUED | OUTPATIENT
Start: 2022-12-20 | End: 2022-12-20

## 2022-12-20 RX ORDER — ROSUVASTATIN CALCIUM 40 MG/1
40 TABLET, COATED ORAL DAILY
COMMUNITY
Start: 2022-11-18

## 2022-12-20 RX ORDER — MAGNESIUM SULFATE 100 %
4 CRYSTALS MISCELLANEOUS AS NEEDED
Status: DISCONTINUED | OUTPATIENT
Start: 2022-12-20 | End: 2022-12-28 | Stop reason: HOSPADM

## 2022-12-20 RX ORDER — SODIUM CHLORIDE 0.9 % (FLUSH) 0.9 %
5-40 SYRINGE (ML) INJECTION AS NEEDED
Status: DISCONTINUED | OUTPATIENT
Start: 2022-12-20 | End: 2022-12-28 | Stop reason: HOSPADM

## 2022-12-20 RX ORDER — FUROSEMIDE 40 MG/1
40 TABLET ORAL DAILY
COMMUNITY
Start: 2022-11-21

## 2022-12-20 RX ORDER — ATORVASTATIN CALCIUM 40 MG/1
40 TABLET, FILM COATED ORAL
Status: DISCONTINUED | OUTPATIENT
Start: 2022-12-20 | End: 2022-12-28 | Stop reason: HOSPADM

## 2022-12-20 RX ORDER — EMPAGLIFLOZIN 10 MG/1
10 TABLET, FILM COATED ORAL DAILY
COMMUNITY
Start: 2022-12-01

## 2022-12-20 RX ORDER — OSELTAMIVIR PHOSPHATE 30 MG/1
30 CAPSULE ORAL 2 TIMES DAILY
Status: DISCONTINUED | OUTPATIENT
Start: 2022-12-21 | End: 2022-12-25

## 2022-12-20 RX ORDER — CLONAZEPAM 0.5 MG/1
1 TABLET ORAL 3 TIMES DAILY
Status: DISCONTINUED | OUTPATIENT
Start: 2022-12-20 | End: 2022-12-28 | Stop reason: HOSPADM

## 2022-12-20 RX ORDER — LIDOCAINE 4 G/100G
1 PATCH TOPICAL EVERY 24 HOURS
Status: DISCONTINUED | OUTPATIENT
Start: 2022-12-20 | End: 2022-12-28 | Stop reason: HOSPADM

## 2022-12-20 RX ORDER — SODIUM CHLORIDE 0.9 % (FLUSH) 0.9 %
5-40 SYRINGE (ML) INJECTION EVERY 8 HOURS
Status: DISCONTINUED | OUTPATIENT
Start: 2022-12-20 | End: 2022-12-28 | Stop reason: HOSPADM

## 2022-12-20 RX ORDER — TRAZODONE HYDROCHLORIDE 50 MG/1
50 TABLET ORAL
Status: DISCONTINUED | OUTPATIENT
Start: 2022-12-20 | End: 2022-12-28 | Stop reason: HOSPADM

## 2022-12-20 RX ORDER — IBUPROFEN 200 MG
1 TABLET ORAL DAILY
Status: DISCONTINUED | OUTPATIENT
Start: 2022-12-20 | End: 2022-12-28 | Stop reason: HOSPADM

## 2022-12-20 RX ORDER — SODIUM CHLORIDE 9 MG/ML
75 INJECTION, SOLUTION INTRAVENOUS CONTINUOUS
Status: DISPENSED | OUTPATIENT
Start: 2022-12-20 | End: 2022-12-21

## 2022-12-20 RX ORDER — CLOPIDOGREL BISULFATE 75 MG/1
75 TABLET ORAL DAILY
Status: DISCONTINUED | OUTPATIENT
Start: 2022-12-20 | End: 2022-12-28 | Stop reason: HOSPADM

## 2022-12-20 RX ORDER — ONDANSETRON 2 MG/ML
4 INJECTION INTRAMUSCULAR; INTRAVENOUS
Status: DISCONTINUED | OUTPATIENT
Start: 2022-12-20 | End: 2022-12-28 | Stop reason: HOSPADM

## 2022-12-20 RX ADMIN — SODIUM ZIRCONIUM CYCLOSILICATE 10 G: 10 POWDER, FOR SUSPENSION ORAL at 07:21

## 2022-12-20 RX ADMIN — ATORVASTATIN CALCIUM 40 MG: 40 TABLET, FILM COATED ORAL at 21:09

## 2022-12-20 RX ADMIN — SODIUM CHLORIDE, PRESERVATIVE FREE 10 ML: 5 INJECTION INTRAVENOUS at 21:12

## 2022-12-20 RX ADMIN — PANTOPRAZOLE SODIUM 40 MG: 40 TABLET, DELAYED RELEASE ORAL at 08:48

## 2022-12-20 RX ADMIN — CLONAZEPAM 1 MG: 1 TABLET ORAL at 21:08

## 2022-12-20 RX ADMIN — CLONAZEPAM 1 MG: 1 TABLET ORAL at 17:31

## 2022-12-20 RX ADMIN — SODIUM CHLORIDE, PRESERVATIVE FREE 10 ML: 5 INJECTION INTRAVENOUS at 01:30

## 2022-12-20 RX ADMIN — HEPARIN SODIUM 5000 UNITS: 5000 INJECTION, SOLUTION INTRAVENOUS; SUBCUTANEOUS at 04:00

## 2022-12-20 RX ADMIN — CLONAZEPAM 1 MG: 1 TABLET ORAL at 08:48

## 2022-12-20 RX ADMIN — PREGABALIN 150 MG: 100 CAPSULE ORAL at 21:08

## 2022-12-20 RX ADMIN — FENOFIBRATE 160 MG: 160 TABLET ORAL at 08:50

## 2022-12-20 RX ADMIN — METHADONE HYDROCHLORIDE 160 MG: 5 SOLUTION ORAL at 11:10

## 2022-12-20 RX ADMIN — ACETAMINOPHEN 650 MG: 325 TABLET ORAL at 17:31

## 2022-12-20 RX ADMIN — PREGABALIN 150 MG: 100 CAPSULE ORAL at 08:48

## 2022-12-20 RX ADMIN — LEVOTHYROXINE SODIUM 200 MCG: 0.1 TABLET ORAL at 08:50

## 2022-12-20 RX ADMIN — SODIUM CHLORIDE, PRESERVATIVE FREE 10 ML: 5 INJECTION INTRAVENOUS at 17:38

## 2022-12-20 RX ADMIN — CLOPIDOGREL BISULFATE 75 MG: 75 TABLET ORAL at 18:35

## 2022-12-20 RX ADMIN — IBUPROFEN 600 MG: 600 TABLET, FILM COATED ORAL at 19:15

## 2022-12-20 RX ADMIN — ACETAMINOPHEN 650 MG: 325 TABLET ORAL at 09:05

## 2022-12-20 RX ADMIN — SODIUM CHLORIDE, PRESERVATIVE FREE 10 ML: 5 INJECTION INTRAVENOUS at 07:20

## 2022-12-20 RX ADMIN — PIPERACILLIN AND TAZOBACTAM 4.5 G: 4; .5 INJECTION, POWDER, FOR SOLUTION INTRAVENOUS at 18:35

## 2022-12-20 RX ADMIN — SODIUM CHLORIDE 75 ML/HR: 9 INJECTION, SOLUTION INTRAVENOUS at 07:19

## 2022-12-20 NOTE — ED NOTES
Bedside and Verbal shift change report given to Carmen Morales RN (oncoming nurse) by Tyler Gonzalez RN (offgoing nurse). Report included the following information SBAR, ED Summary, MAR and Recent Results.

## 2022-12-20 NOTE — ED PROVIDER NOTES
64-year-old female presenting to the ED for abnormal labs. History of insulin-dependent type 2 diabetes, hypothyroidism, hypertension, high cholesterol, coronary artery disease. Patient reports that about 3 weeks ago she went to her PCP, so they were concerned about possible ascites, referred her to GI. She saw GI about 2 weeks ago, they sent labs, called her on Friday, told her to come to be admitted. Patient is overall she has been feeling okay, denies fever, chest pain, shortness of breath, vomiting. Denies any prior kidney problems. No other concerns. Past medical history: List up-to-date  Past surgical history: Right below the knee amputation, cardiac catheterization  Social history: + Smoker. Currently not working. The history is provided by the patient. Referral / Consult  Pertinent negatives include no chest pain and no shortness of breath. Past Medical History:   Diagnosis Date    Arthritis     Diabetes ([facility] [address]     Endocrine disease     hypothyroid    Gastrointestinal disorder     pancreatitis    Hypertension     MI (myocardial infarction) ([facility] [address]        Past Surgical History:   Procedure Laterality Date    HX BELOW KNEE AMPUTATION Right     HX GI      colonoscpy         Family History:   Problem Relation Age of Onset    Cancer Mother         colon       Social History     Socioeconomic History    Marital status: SINGLE     Spouse name: Not on file    Number of children: Not on file    Years of education: Not on file    Highest education level: Not on file   Occupational History    Not on file   Tobacco Use    Smoking status: Former     Packs/day: 0.25     Types: Cigarettes    Smokeless tobacco: Never    Tobacco comments:     Quit 5 days ago.    Substance and Sexual Activity    Alcohol use: Not Currently     Comment: quit about a month ago - drank only on the weekends    Drug use: No    Sexual activity: Yes     Partners: Male   Other Topics Concern    Not on file   Social History Narrative Not on file     Social Determinants of Health     Financial Resource Strain: Not on file   Food Insecurity: Not on file   Transportation Needs: Not on file   Physical Activity: Not on file   Stress: Not on file   Social Connections: Not on file   Intimate Partner Violence: Not on file   Housing Stability: Not on file         ALLERGIES: Patient has no known allergies. Review of Systems   Constitutional:  Negative for fever. HENT:  Negative for facial swelling. Respiratory:  Negative for shortness of breath. Cardiovascular:  Negative for chest pain. Gastrointestinal:  Negative for vomiting. Musculoskeletal:  Negative for neck stiffness. Skin:  Negative for wound. Neurological:  Negative for syncope. All other systems reviewed and are negative. Vitals:    12/19/22 2109   BP: 126/66   Pulse: 98   Resp: 16   Temp: 97.7 °F (36.5 °C)   SpO2: 96%            Physical Exam  Vitals and nursing note reviewed. Constitutional:       General: She is not in acute distress. Appearance: She is well-developed. Comments: Pleasant, well-appearing, no distress   HENT:      Head: Normocephalic and atraumatic. Right Ear: External ear normal.      Left Ear: External ear normal.   Eyes:      General: No scleral icterus. Conjunctiva/sclera: Conjunctivae normal.   Neck:      Trachea: No tracheal deviation. Cardiovascular:      Rate and Rhythm: Normal rate and regular rhythm. Heart sounds: Normal heart sounds. No murmur heard. No friction rub. No gallop. Pulmonary:      Effort: Pulmonary effort is normal. No respiratory distress. Breath sounds: Normal breath sounds. No stridor. No wheezing. Abdominal:      General: There is no distension. Palpations: Abdomen is soft. Musculoskeletal:         General: Normal range of motion. Cervical back: Neck supple. Skin:     General: Skin is warm and dry.    Neurological:      Mental Status: She is alert and oriented to person, place, and time. Psychiatric:         Behavior: Behavior normal.        MDM  Number of Diagnoses or Management Options  DEEPTI (acute kidney injury) ([facility] [address]  Diagnosis management comments: 24-year-old female presenting to the ED at the referral of her GI for abnormal outpatient lab Labs today remarkable for DEEPTI with elevated BUN, mild elevation in potassium without EKG changes. Will consult medicine for admission. Amount and/or Complexity of Data Reviewed  Clinical lab tests: reviewed and ordered  Tests in the radiology section of CPT®: ordered and reviewed  Discuss the patient with other providers: yes (Dr. Adrian aRo ED attending)           Procedures                 Perfect Serve Consult for Admission  12:38 AM    ED Room Number: R31/R31  Patient Name and age:  Katlin Brown 50 y.o.  female  Working Diagnosis:   1.  DEEPTI (acute kidney injury) ([facility] [address]        COVID-19 Suspicion:  no  Sepsis present:  no  Reassessment needed: no  Code Status:  Full Code  Readmission: no  Isolation Requirements:  no  Recommended Level of Care:  med/surg  Department:[facility] Adult ED - 21   Other:  sent in by GI for abnormal outpatient labs - Cr 2 (baseline 1)- mild hyperkalemia (5.4) but no EKG changes

## 2022-12-20 NOTE — PROGRESS NOTES
Day #1 of Zosyn  Indication:  sepsis of unknown origin  Current regimen:  3375mg IV Q8H  Abx regimen: Zosyn  Recent Labs     22  0946 22  2126   WBC  --  6.6   CREA 1.77* 2.04*   BUN 44* 52*     Est CrCl: 42 ml/min; UO: -- ml/kg/hr  Temp (24hrs), Av.2 °F (37.9 °C), Min:97.7 °F (36.5 °C), Max:103 °F (39.4 °C)    Cultures:    blood - in process    Plan: Continue current regimen, add 4500mg loading dose.

## 2022-12-20 NOTE — ED NOTES
Dr. Willis Jurist notified of temp of 103 and MEWS of 5; stated will review and input orders shortly.

## 2022-12-20 NOTE — ED TRIAGE NOTES
She had blood work drawn 2 weeks ago at her gastroenterologist. She was called Friday and told she needed to be admitted. She went to [facility] but they were too full. Today her doctor told her to come to [facility] to be admitted. She says she has fluid in her abdomen and that her kidney function and liver function was bad.

## 2022-12-20 NOTE — ED NOTES
Patient resting quietly in bed. No acute distress noted at this time. VSS. RN will continue to monitor.

## 2022-12-20 NOTE — CONSULTS
Assessment:  DEEPTI: Serum Cr 2mg/dl-> 1.7mgdl today. Suspect intravascular depletion -> poorly controlled DM2. Prior lab work from 10/2/20 showed a serum Cr 1.0mg/dl. At risk for CKD given uncontrolled DM2. Hyperkalemia: improving. HTN:stable    DM2: Uncontrolled. HgbA1c 11    Abd distension: not ascites    PVD w prior R BKA    Plan/Recommendations:  NS at 75ml/hr  Holding Metformin/Jardiance  Hold Lisinopril  K restricted diet  Strict I/Os  Avoid nephrotoxins  Am labs    Discussed with patient    Thanks for the consultation. Renal service will follow patient with you. Please contact me with any questions or concerns. Initial Consult note         Patient name: Keily Frankel  MR no: [id number]  Date of admission: 12/19/2022  Date of consultation: 12/20/2022  Requested by: Dr. Mely Diop  Reason for consult: DEEPTI    Patient seen and examined. History obtained from patient and chart review. Relevant labs, data and notes reviewed. HPI: Keily Frankel is a 50 y.o. female with PMH significant for DM2, HTN, PVD s/p R BKA, CAD s/p stents presented the ER with abnl outpatient lab work showing elevated serum Cr and K. ED labs notable for a serum Cr 2.0mg/dl and K 5.4. Nephrology consulted to evaluate and manage DEEPTI. Denies any prior history of CKD. Was having seen a nephrologist in the past.  Reports she was feeling fine at home. Admits to poor oral hydration. Denies any nausea vomiting or diarrhea. No new rashes. Denies any NSAID use. Has noted significant abd distension. Bld sugars have been uncontrolled.     PMH:  Past Medical History:   Diagnosis Date    Arthritis     Diabetes ([facility] [address]     Endocrine disease     hypothyroid    Gastrointestinal disorder     pancreatitis    Hypertension     MI (myocardial infarction) ([facility] [address]      PSH:  Past Surgical History:   Procedure Laterality Date    HX BELOW KNEE AMPUTATION Right     HX GI      colonoscpy       Social history: Social History     Tobacco Use    Smoking status: Former     Packs/day: 0.25     Types: Cigarettes    Smokeless tobacco: Never    Tobacco comments:     Quit 5 days ago. Substance Use Topics    Alcohol use: Not Currently     Comment: quit about a month ago - drank only on the weekends    Drug use: No       Family history:  No history of CKD or ESRD in the family.      No Known Allergies    Current Facility-Administered Medications   Medication Dose Route Frequency Last Admin    sodium chloride (NS) flush 5-40 mL  5-40 mL IntraVENous Q8H 10 mL at 12/20/22 0720    sodium chloride (NS) flush 5-40 mL  5-40 mL IntraVENous PRN      acetaminophen (TYLENOL) tablet 650 mg  650 mg Oral Q6H  mg at 12/20/22 7971    Or    acetaminophen (TYLENOL) suppository 650 mg  650 mg Rectal Q6H PRN      polyethylene glycol (MIRALAX) packet 17 g  17 g Oral DAILY PRN      ondansetron (ZOFRAN ODT) tablet 4 mg  4 mg Oral Q8H PRN      Or    ondansetron (ZOFRAN) injection 4 mg  4 mg IntraVENous Q6H PRN      [Held by provider] heparin (porcine) injection 5,000 Units  5,000 Units SubCUTAneous Q8H 5,000 Units at 12/20/22 0400    nicotine (NICODERM CQ) 21 mg/24 hr patch 1 Patch  1 Patch TransDERmal DAILY 1 Patch at 12/20/22 0850    0.9% sodium chloride infusion  75 mL/hr IntraVENous CONTINUOUS 75 mL/hr at 12/20/22 0719    [Held by provider] amLODIPine (NORVASC) tablet 2.5 mg  2.5 mg Oral DAILY      atorvastatin (LIPITOR) tablet 40 mg  40 mg Oral QHS      [Held by provider] clopidogreL (PLAVIX) tablet 75 mg  75 mg Oral DAILY      [Held by provider] lisinopriL (PRINIVIL, ZESTRIL) tablet 10 mg  10 mg Oral DAILY      pregabalin (LYRICA) capsule 150 mg  150 mg Oral  mg at 12/20/22 0848    pantoprazole (PROTONIX) tablet 40 mg  40 mg Oral ACB 40 mg at 12/20/22 0848    levothyroxine (SYNTHROID) tablet 200 mcg  200 mcg Oral  mcg at 12/20/22 0850    fenofibrate (LOFIBRA) tablet 160 mg  160 mg Oral DAILY 160 mg at 12/20/22 0850 traZODone (DESYREL) tablet 50 mg  50 mg Oral QHS PRN      clonazePAM (KlonoPIN) tablet 1 mg  1 mg Oral TID 1 mg at 12/20/22 0848    glucose chewable tablet 16 g  4 Tablet Oral PRN      glucagon (GLUCAGEN) injection 1 mg  1 mg IntraMUSCular PRN      dextrose 10 % infusion 0-250 mL  0-250 mL IntraVENous PRN      insulin lispro (HUMALOG) injection   SubCUTAneous AC&HS      methadone (DOLOPHINE) 5 mg/5 mL oral solution 160 mg  160 mg Oral DAILY 160 mg at 12/20/22 1110     Current Outpatient Medications   Medication Sig Dispense    methadone (DOLOPHINE) 10 mg tablet Take 160 mg by mouth daily. Jardiance 10 mg tablet Take 10 mg by mouth daily. furosemide (LASIX) 40 mg tablet Take 40 mg by mouth daily. rosuvastatin (CRESTOR) 40 mg tablet Take 40 mg by mouth daily. levothyroxine (SYNTHROID) 200 mcg tablet Take 200 mcg by mouth daily. metFORMIN ER (GLUCOPHAGE XR) 500 mg tablet Take 1,000 mg by mouth two (2) times a day. pregabalin (LYRICA) 200 mg capsule Take 200 mg by mouth four (4) times daily. atorvastatin (LIPITOR) 40 mg tablet Take 1 Tablet by mouth nightly. 90 Tablet    aspirin 81 mg chewable tablet Take 81 mg by mouth daily. (Patient not taking: Reported on 8/31/2022)     OneTouch Ultra Test strip TEST BLOOD GLUCOSE TWICE A DAY     amLODIPine (NORVASC) 2.5 mg tablet Take 1 Tablet by mouth daily. 90 Tablet    lisinopriL (PRINIVIL, ZESTRIL) 10 mg tablet TAKE 1 TABLET BY MOUTH EVERY DAY 90 Tablet    clindamycin (CLEOCIN) 300 mg capsule Take 300 mg by mouth four (4) times daily. tiZANidine (ZANAFLEX) 4 mg tablet Take 4 mg by mouth daily. clopidogreL (PLAVIX) 75 mg tab Take 1 Tab by mouth daily. 30 Tab    insulin glargine (Lantus Solostar U-100 Insulin) 100 unit/mL (3 mL) inpn 70 Units by SubCUTAneous route daily. 1 Adjustable Dose Pre-filled Pen Syringe    polyethylene glycol (Miralax) 17 gram/dose powder Take 17 g by mouth daily.  850 g    traZODone (DESYREL) 50 mg tablet Take 1 Tab by mouth nightly. 30 Tab    insulin U-500 CONCENTRATED regular (HumuLIN R U-500, Conc, Kwikpen) 500 unit/mL (3 mL) inpn subQ pen 20 Units by SubCUTAneous route three (3) times daily (with meals). 1 Adjustable Dose Pre-filled Pen Syringe    Blood-Glucose Meter monitoring kit Need glucometer, glucometer test strips. Lancets. Test up to 4 times a day 1 Kit    clonazePAM (KlonoPIN) 1 mg tablet Take 1 mg by mouth three (3) times daily. lipase-protease-amylase (CREON 24,000) 24,000-76,000 -120,000 unit capsule Take 1 Cap by mouth three (3) times daily (with meals). omeprazole (PRILOSEC) 40 mg capsule Take 40 mg by mouth daily. albuterol (PROVENTIL HFA, VENTOLIN HFA, PROAIR HFA) 90 mcg/actuation inhaler Take 2 Puffs by inhalation every four (4) hours as needed for Wheezing. 1 Inhaler    fenofibrate (LOFIBRA) 160 mg tablet Take 160 mg by mouth daily. Indications: HYPERCHOLESTEROLEMIA        ROS (besides HPI):    General: No fever. +weight changes  ENT: No hearing loss or visual changes  Cardiovascular: No Chest pain  Pulmonary: No SOB  GI: No abdominal pain. No Nausea/Vomiting/Diarrhea. No blood in stool  : No blood in urine. No foamy or cloudy urine  Musculoskeletal: No joint swelling or redness. No morning stiffness  Endocrine: no cold or heat intolerance  Psych: denies anxiety or depression  Neuro: No light headedness or dizziness    Objective   Visit Vitals  /79   Pulse (!) 104   Temp 99.6 °F (37.6 °C)   Resp 14   Ht 5' 7\" (1.702 m)   Wt 81.6 kg (179 lb 14.3 oz)   SpO2 92%   BMI 28.18 kg/m²       Physical Exam:    Gen: NAD    HEENT: AT/NC, EOMI, moist mucous membrane, no scleral icterus    Neck: no JVD, no cervical lymphadenopathy, no carotid bruit    Lungs/Chest wall: Breath sounds normal. Symmetrical chest wall expansion. No accessory muscle use. Clear to auscultation    Cardiovascular: Normal S1/S2, normal rate, regular rhythm. No pericardial rub.      Abdomen: soft, NT, ND, BS+, no HSM    Ext: no clubbing or cyanosis. R BKA. No significant edema    Skin: warm and dry. No rashes    : no CVA tenderness    CNS: alert awake. Answers appropriately. Labs/Data:    Lab Results   Component Value Date/Time    Sodium 137 12/20/2022 09:46 AM    Potassium 5.0 12/20/2022 09:46 AM    Chloride 109 (H) 12/20/2022 09:46 AM    CO2 23 12/20/2022 09:46 AM    Anion gap 5 12/20/2022 09:46 AM    Glucose 131 (H) 12/20/2022 09:46 AM    BUN 44 (H) 12/20/2022 09:46 AM    Creatinine 1.77 (H) 12/20/2022 09:46 AM    BUN/Creatinine ratio 25 (H) 12/20/2022 09:46 AM    GFR est AA >60 10/02/2020 04:17 AM    GFR est non-AA 55 (L) 10/02/2020 04:17 AM    Calcium 8.2 (L) 12/20/2022 09:46 AM       Lab Results   Component Value Date/Time    WBC 6.6 12/19/2022 09:26 PM    HGB 9.9 (L) 12/19/2022 09:26 PM    HCT 32.3 (L) 12/19/2022 09:26 PM    PLATELET 659 84/92/9676 09:26 PM    MCV 90.0 12/19/2022 09:26 PM       Urine analysis: No results found for this or any previous visit.           No components found for: SPEP, UPEP  No results found for: PUQ, PROTU2, PROTU1, BJP1, CPE1, IMEL1, MET2  No results found for: MCACR, MCA1, MCA2, MCA3, MCAU, MCAU2, MCALPOCT      Intake/Output Summary (Last 24 hours) at 12/20/2022 1519  Last data filed at 12/20/2022 0719  Gross per 24 hour   Intake 1000 ml   Output --   Net 1000 ml       Wt Readings from Last 3 Encounters:   12/20/22 81.6 kg (179 lb 14.3 oz)   08/31/22 74.8 kg (165 lb)   09/01/21 86.2 kg (190 lb)       Signed by:  Arminda Villalobos MD  Nephrology and Hypertension  [facility]

## 2022-12-20 NOTE — ED NOTES
Bedside and Verbal shift change report given to Cat Baum RN (oncoming nurse) by Bernie Hwang RN (offgoing nurse). Report included the following information SBAR, ED Summary, MAR and Recent Results.

## 2022-12-20 NOTE — H&P
[address] [facility] Adult  Hospitalist Group  History and Physical    Date of Service:  12/20/2022  Primary Care Provider: Rosa Maria Mcgregor MD  Source of information: The patient and Chart review    Chief Complaint: Referral / Consult      History of Presenting Illness:   Lida Parmar is a 50 y.o. female history of DM2 s/p R BKA, hypothyroidism, HTN, and CAD s/p stents, and nicotine dependence who presents at the request of her GI physician for elevated creatinine with hyperkalemia. She states that her PCP on routine work-up noted an elevated creatinine, and \"fluid around the patient's liver\" she also had noted a weight increase of 14 pounds in 1 week, and increasing abdominal fullness. She was sent to a gastroenterologist for further evaluation of her liver disease, and repeat labs. Repeat blood work showed elevated creatinine with elevated potassium, and she was referred to the ED for further evaluation. She denies fever, chills, night sweats, nausea, vomiting, pain, shortness of breath, or decreased urinary output. In the ED, VSS. Labs were significant for Hgb 9.9 (improving), K+ 5.4, BUN 52, creatinine 2.04, albumin 3.2, Mg 2.7, and PO4 5.4. Renal ultrasound was normal. EKG NSR. In the ED, she received 1Lns. REVIEW OF SYSTEMS:  A comprehensive review of systems was negative except for that written in the History of Present Illness. Past Medical History:   Diagnosis Date    Arthritis     Diabetes ([facility] [address]     Endocrine disease     hypothyroid    Gastrointestinal disorder     pancreatitis    Hypertension     MI (myocardial infarction) ([facility] [address]       Past Surgical History:   Procedure Laterality Date    HX BELOW KNEE AMPUTATION Right     HX GI      colonoscpy     Prior to Admission medications    Medication Sig Start Date End Date Taking? Authorizing Provider   atorvastatin (LIPITOR) 40 mg tablet Take 1 Tablet by mouth nightly.  9/1/22   Vicky Hope MD   aspirin 81 mg chewable tablet Take 81 mg by mouth daily. Patient not taking: Reported on 8/31/2022 7/22/22   Provider, Historical   OneTouch Ultra Test strip TEST BLOOD GLUCOSE TWICE A DAY 6/21/22   Provider, Historical   amLODIPine (NORVASC) 2.5 mg tablet Take 1 Tablet by mouth daily. 8/31/22   Liberty Lira MD   lisinopriL (PRINIVIL, ZESTRIL) 10 mg tablet TAKE 1 TABLET BY MOUTH EVERY DAY 6/21/22   Francisca Balderrama ANP   clindamycin (CLEOCIN) 300 mg capsule Take 300 mg by mouth four (4) times daily. 10/9/20   Provider, Historical   tiZANidine (ZANAFLEX) 4 mg tablet Take 4 mg by mouth daily. 10/12/20   Provider, Historical   pregabalin (LYRICA) 150 mg capsule Take 1 Cap by mouth two (2) times a day. Max Daily Amount: 300 mg. 10/2/20   Johan Swenson MD   clopidogreL (PLAVIX) 75 mg tab Take 1 Tab by mouth daily. 10/3/20   Johan Swenson MD   insulin glargine (Lantus Solostar U-100 Insulin) 100 unit/mL (3 mL) inpn 70 Units by SubCUTAneous route daily. 10/2/20   Johan Swenson MD   polyethylene glycol (Miralax) 17 gram/dose powder Take 17 g by mouth daily. 10/2/20   Johan Swenson MD   traZODone (DESYREL) 50 mg tablet Take 1 Tab by mouth nightly. 10/2/20   Johan Swenson MD   insulin U-500 CONCENTRATED regular (HumuLIN R U-500, Conc, Kwikpen) 500 unit/mL (3 mL) inpn subQ pen 20 Units by SubCUTAneous route three (3) times daily (with meals). 10/2/20   Johan Swenson MD   Blood-Glucose Meter monitoring kit Need glucometer, glucometer test strips. Lancets. Test up to 4 times a day 10/2/20   Johan Swenson MD   methadone (DOLOPHINE) 10 mg tablet Take 160 mg by mouth daily. Provider, Historical   clonazePAM (KlonoPIN) 1 mg tablet Take 1 mg by mouth three (3) times daily. Provider, Historical   lipase-protease-amylase (CREON 24,000) 24,000-76,000 -120,000 unit capsule Take 1 Cap by mouth three (3) times daily (with meals). Other, MD Romel   omeprazole (PRILOSEC) 40 mg capsule Take 40 mg by mouth daily.     Romel Howard MD   albuterol (PROVENTIL HFA, VENTOLIN HFA, PROAIR HFA) 90 mcg/actuation inhaler Take 2 Puffs by inhalation every four (4) hours as needed for Wheezing. 4/11/17   Christopher Yoo MD   fenofibrate (LOFIBRA) 160 mg tablet Take 160 mg by mouth daily. Indications: HYPERCHOLESTEROLEMIA    Romel Howard MD   metFORMIN (GLUCOPHAGE) 500 mg tablet Take 1,000 mg by mouth two (2) times daily (with meals). Romel Howard MD   LEVOTHYROXINE SODIUM (SYNTHROID PO) Take 200 mcg by mouth Daily (before breakfast). Romel Howard MD     No Known Allergies   Family History   Problem Relation Age of Onset    Cancer Mother         colon      Social History:  reports that she has quit smoking. Her smoking use included cigarettes. She smoked an average of .25 packs per day. She has never used smokeless tobacco. She reports that she does not currently use alcohol. She reports that she does not use drugs. Social Determinants of Health     Tobacco Use: Medium Risk    Smoking Tobacco Use: Former    Smokeless Tobacco Use: Never    Passive Exposure: Not on file   Alcohol Use: Not on file   Financial Resource Strain: Not on file   Food Insecurity: Not on file   Transportation Needs: Not on file   Physical Activity: Not on file   Stress: Not on file   Social Connections: Not on file   Intimate Partner Violence: Not on file   Depression: Not at risk    PHQ-2 Score: 0   Housing Stability: Not on file        Family and social history were personally reviewed, all pertinent and relevant details are outlined as above.     Objective:   Visit Vitals  /66   Pulse 89   Temp 97.7 °F (36.5 °C)   Resp 9   Ht 5' 7\" (1.702 m)   Wt 81.6 kg (180 lb)   SpO2 90%   BMI 28.19 kg/m²      O2 Device: None (Room air)    PHYSICAL EXAM:   General: Alert x oriented x 3, awake, no acute distress, resting in bed, pleasant female, appears to be stated age  HEENT: PEERL, EOMI, moist mucus membranes  Neck: Supple, no JVD, no meningeal signs  Chest: Clear to auscultation bilaterally CVS: RRR, S1 S2 heard, no murmurs/rubs/gallops  Abd: Soft, non-tender, distended, +bowel sounds   Ext: No clubbing, no cyanosis, no edema R bka  Neuro/Psych: Pleasant mood and affect, CN 2-12 grossly intact, sensory grossly within normal limit, Strength 5/5 in all extremities  Cap refill: Brisk, less than 3 seconds  Pulses: 2+radial pulses  Skin: Warm, dry, without rashes or lesions    Data Review: All diagnostic labs and studies have been reviewed. Abnormal Labs Reviewed   CBC WITH AUTOMATED DIFF - Abnormal; Notable for the following components:       Result Value    RBC 3.59 (*)     HGB 9.9 (*)     HCT 32.3 (*)     RDW 15.7 (*)     IMMATURE GRANULOCYTES 1 (*)     ABS. IMM. GRANS. 0.1 (*)     All other components within normal limits   METABOLIC PANEL, COMPREHENSIVE - Abnormal; Notable for the following components:    Potassium 5.4 (*)     Chloride 110 (*)     Glucose 162 (*)     BUN 52 (*)     Creatinine 2.04 (*)     BUN/Creatinine ratio 25 (*)     eGFR 29 (*)     Bilirubin, total 0.1 (*)     Albumin 3.2 (*)     Globulin 4.3 (*)     A-G Ratio 0.7 (*)     All other components within normal limits   MAGNESIUM - Abnormal; Notable for the following components:    Magnesium 2.7 (*)     All other components within normal limits   PHOSPHORUS - Abnormal; Notable for the following components:    Phosphorus 5.4 (*)     All other components within normal limits   URINALYSIS W/MICROSCOPIC - Abnormal; Notable for the following components:    Glucose 500 (*)     All other components within normal limits       All Micro Results       Procedure Component Value Units Date/Time    URINE CULTURE HOLD SAMPLE [[id number]] Collected: 12/20/22 0302    Order Status: Completed Specimen: Urine Updated: 12/20/22 3880     Urine culture hold       Urine on hold in Microbiology dept for 2 days. If unpreserved urine is submitted, it cannot be used for addtional testing after 24 hours, recollection will be required. IMAGING:   US RETROPERITONEUM COMP   Final Result   Normal renal ultrasound examination. ECG/ECHO:    Results for orders placed or performed during the hospital encounter of 12/19/22   EKG, 12 LEAD, INITIAL   Result Value Ref Range    Ventricular Rate 92 BPM    Atrial Rate 92 BPM    P-R Interval 170 ms    QRS Duration 86 ms    Q-T Interval 354 ms    QTC Calculation (Bezet) 437 ms    Calculated P Axis 63 degrees    Calculated R Axis 52 degrees    Calculated T Axis 46 degrees    Diagnosis       Normal sinus rhythm  Normal ECG  When compared with ECG of 26-SEP-2020 23:45,  Borderline criteria for Lateral infarct are no longer present  ST no longer depressed in Anterior leads          Assessment:   Given the patient's current clinical presentation, there is a high level of concern for decompensation if discharged from the emergency department. Complex decision making was performed, which includes reviewing the patient's available past medical records, laboratory results, and imaging studies. Active Problems:    Hyperkalemia, diminished renal excretion (12/20/2022)      DEEPTI (acute kidney injury) ([facility] [address] (12/20/2022)      Plan:     #Elevated Creatinine  #Hyperkalemia  -deepti versus chronic progressive CKD  -creatinine 2.04 (last 1 in 2020), BUN 52, and K+ 5.4  -lokelma, low K+ diet, IVF  -renal ultrasound negative  -check Claudis Cart  -consult nephrology    #Ascites  -abdominal ultrasound for diagnostic/therapeutic paracentesis  -up to date on colonoscopy due to mother with hx colon cancer, not up to date on gynecological screenings  -denies ETOH use    #DM II  -ISS  -continue home lyrica    #CAD  -continue plavix, lisinopril, lofibra, and lipitor    #HTN  -continue home amlodipine    #GERD  -continue PPI    #nicotine dependence  -nicotine patch    #hypothyroidism  -continue synthroid    Needs med rec    DIET: ADULT DIET Regular; 5 carb choices (75 gm/meal);  Low Fat/Low Chol/High Fiber/2 gm Na; Low Sodium (2 gm); Low Potassium (Less than 3000 mg/day); Low Phosphorus (Less than 1000 mg)   ISOLATION PRECAUTIONS: There are currently no Active Isolations  CODE STATUS: Full Code   DVT PROPHYLAXIS: Heparin  ANTICIPATED DISCHARGE: 24-48 hours. ANTICIPATED DISPOSITION: Home      Signed By: Vijay Ortiz MD     December 20, 2022         Please note that this dictation may have been completed with Dragon, the [employer] voice recognition software. Quite often unanticipated grammatical, syntax, homophones, and other interpretive errors are inadvertently transcribed by the computer software. Please disregard these errors. Please excuse any errors that have escaped final proofreading.

## 2022-12-20 NOTE — ED NOTES
Pt ambulatory to restroom with unsteady gait. Patient returned to stretcher safely and placed on monitor x3. Darinel Mauricio MD at bedside to assess patient.

## 2022-12-20 NOTE — ED NOTES
Patient noted to have SpO2 sats of 88% on RA. Patient placed on 2L O2 via NC. Patient also states she needs her morning dose of methadone. Provider notified.

## 2022-12-21 LAB
ALBUMIN SERPL-MCNC: 3.1 G/DL (ref 3.5–5)
ANION GAP SERPL CALC-SCNC: 6 MMOL/L (ref 5–15)
BNP SERPL-MCNC: 1880 PG/ML
BUN SERPL-MCNC: 42 MG/DL (ref 6–20)
BUN/CREAT SERPL: 18 (ref 12–20)
CALCIUM SERPL-MCNC: 8.9 MG/DL (ref 8.5–10.1)
CHLORIDE SERPL-SCNC: 108 MMOL/L (ref 97–108)
CO2 SERPL-SCNC: 22 MMOL/L (ref 21–32)
CREAT SERPL-MCNC: 2.31 MG/DL (ref 0.55–1.02)
CRP SERPL-MCNC: 4.11 MG/DL (ref 0–0.6)
ERYTHROCYTE [DISTWIDTH] IN BLOOD BY AUTOMATED COUNT: 16.3 % (ref 11.5–14.5)
ERYTHROCYTE [SEDIMENTATION RATE] IN BLOOD: 106 MM/HR (ref 0–20)
GLUCOSE BLD STRIP.AUTO-MCNC: 147 MG/DL (ref 65–117)
GLUCOSE BLD STRIP.AUTO-MCNC: 162 MG/DL (ref 65–117)
GLUCOSE BLD STRIP.AUTO-MCNC: 181 MG/DL (ref 65–117)
GLUCOSE BLD STRIP.AUTO-MCNC: 260 MG/DL (ref 65–117)
GLUCOSE SERPL-MCNC: 128 MG/DL (ref 65–100)
HCT VFR BLD AUTO: 28.6 % (ref 35–47)
HGB BLD-MCNC: 8.7 G/DL (ref 11.5–16)
MAGNESIUM SERPL-MCNC: 2.4 MG/DL (ref 1.6–2.4)
MCH RBC QN AUTO: 27.2 PG (ref 26–34)
MCHC RBC AUTO-ENTMCNC: 30.4 G/DL (ref 30–36.5)
MCV RBC AUTO: 89.4 FL (ref 80–99)
NRBC # BLD: 0 K/UL (ref 0–0.01)
NRBC BLD-RTO: 0 PER 100 WBC
PHOSPHATE SERPL-MCNC: 5.2 MG/DL (ref 2.6–4.7)
PLATELET # BLD AUTO: 157 K/UL (ref 150–400)
PMV BLD AUTO: 11.3 FL (ref 8.9–12.9)
POTASSIUM SERPL-SCNC: 5.1 MMOL/L (ref 3.5–5.1)
RBC # BLD AUTO: 3.2 M/UL (ref 3.8–5.2)
SERVICE CMNT-IMP: ABNORMAL
SODIUM SERPL-SCNC: 136 MMOL/L (ref 136–145)
WBC # BLD AUTO: 4.6 K/UL (ref 3.6–11)

## 2022-12-21 PROCEDURE — 65270000046 HC RM TELEMETRY

## 2022-12-21 PROCEDURE — 74011000250 HC RX REV CODE- 250: Performed by: FAMILY MEDICINE

## 2022-12-21 PROCEDURE — 86140 C-REACTIVE PROTEIN: CPT

## 2022-12-21 PROCEDURE — 83880 ASSAY OF NATRIURETIC PEPTIDE: CPT

## 2022-12-21 PROCEDURE — 83735 ASSAY OF MAGNESIUM: CPT

## 2022-12-21 PROCEDURE — 74011636637 HC RX REV CODE- 636/637: Performed by: FAMILY MEDICINE

## 2022-12-21 PROCEDURE — 74011250637 HC RX REV CODE- 250/637: Performed by: NURSE PRACTITIONER

## 2022-12-21 PROCEDURE — 85027 COMPLETE CBC AUTOMATED: CPT

## 2022-12-21 PROCEDURE — 85652 RBC SED RATE AUTOMATED: CPT

## 2022-12-21 PROCEDURE — 74011000258 HC RX REV CODE- 258: Performed by: FAMILY MEDICINE

## 2022-12-21 PROCEDURE — 74011250637 HC RX REV CODE- 250/637: Performed by: FAMILY MEDICINE

## 2022-12-21 PROCEDURE — 74011250636 HC RX REV CODE- 250/636: Performed by: FAMILY MEDICINE

## 2022-12-21 PROCEDURE — 82962 GLUCOSE BLOOD TEST: CPT

## 2022-12-21 PROCEDURE — 36415 COLL VENOUS BLD VENIPUNCTURE: CPT

## 2022-12-21 PROCEDURE — 80069 RENAL FUNCTION PANEL: CPT

## 2022-12-21 RX ORDER — SODIUM CHLORIDE 9 MG/ML
50 INJECTION, SOLUTION INTRAVENOUS CONTINUOUS
Status: DISCONTINUED | OUTPATIENT
Start: 2022-12-21 | End: 2022-12-24

## 2022-12-21 RX ADMIN — CLONAZEPAM 1 MG: 1 TABLET ORAL at 09:03

## 2022-12-21 RX ADMIN — OSELTAMIVIR PHOSPHATE 75 MG: 75 CAPSULE ORAL at 00:34

## 2022-12-21 RX ADMIN — PREGABALIN 150 MG: 100 CAPSULE ORAL at 22:01

## 2022-12-21 RX ADMIN — SODIUM CHLORIDE, PRESERVATIVE FREE 10 ML: 5 INJECTION INTRAVENOUS at 14:00

## 2022-12-21 RX ADMIN — ATORVASTATIN CALCIUM 40 MG: 40 TABLET, FILM COATED ORAL at 22:01

## 2022-12-21 RX ADMIN — SODIUM CHLORIDE 75 ML/HR: 9 INJECTION, SOLUTION INTRAVENOUS at 09:24

## 2022-12-21 RX ADMIN — GUAIFENESIN 100 MG: 200 SOLUTION ORAL at 00:34

## 2022-12-21 RX ADMIN — OSELTAMIVIR PHOSPHATE 30 MG: 30 CAPSULE ORAL at 17:20

## 2022-12-21 RX ADMIN — SODIUM CHLORIDE, PRESERVATIVE FREE 10 ML: 5 INJECTION INTRAVENOUS at 22:02

## 2022-12-21 RX ADMIN — METHADONE HYDROCHLORIDE 5 MG: 5 SOLUTION ORAL at 09:25

## 2022-12-21 RX ADMIN — PIPERACILLIN AND TAZOBACTAM 3.38 G: 3; .375 INJECTION, POWDER, FOR SOLUTION INTRAVENOUS at 02:11

## 2022-12-21 RX ADMIN — WHITE PETROLATUM: 1.75 OINTMENT TOPICAL at 23:02

## 2022-12-21 RX ADMIN — CLONAZEPAM 1 MG: 1 TABLET ORAL at 17:18

## 2022-12-21 RX ADMIN — FENOFIBRATE 160 MG: 160 TABLET ORAL at 09:25

## 2022-12-21 RX ADMIN — ACETAMINOPHEN 650 MG: 325 TABLET ORAL at 22:01

## 2022-12-21 RX ADMIN — WHITE PETROLATUM: 1.75 OINTMENT TOPICAL at 15:35

## 2022-12-21 RX ADMIN — Medication 3 UNITS: at 17:18

## 2022-12-21 RX ADMIN — ACETAMINOPHEN 650 MG: 325 TABLET ORAL at 09:03

## 2022-12-21 RX ADMIN — GUAIFENESIN 100 MG: 200 SOLUTION ORAL at 09:04

## 2022-12-21 RX ADMIN — PIPERACILLIN AND TAZOBACTAM 3.38 G: 3; .375 INJECTION, POWDER, FOR SOLUTION INTRAVENOUS at 09:04

## 2022-12-21 RX ADMIN — CLONAZEPAM 1 MG: 1 TABLET ORAL at 22:01

## 2022-12-21 RX ADMIN — OSELTAMIVIR PHOSPHATE 30 MG: 30 CAPSULE ORAL at 09:04

## 2022-12-21 RX ADMIN — PREGABALIN 150 MG: 100 CAPSULE ORAL at 09:03

## 2022-12-21 RX ADMIN — PIPERACILLIN AND TAZOBACTAM 3.38 G: 3; .375 INJECTION, POWDER, FOR SOLUTION INTRAVENOUS at 17:18

## 2022-12-21 RX ADMIN — CLOPIDOGREL BISULFATE 75 MG: 75 TABLET ORAL at 09:04

## 2022-12-21 NOTE — PROGRESS NOTES
\"We just got an 89/52 on her, repeated for 99/59, but that is still a drop from 136/72 at 0830 and 110/58 at 1015. Trending down. HR is better though at 99. \"     MD notified via perfect serve.

## 2022-12-21 NOTE — PROGRESS NOTES
Patient name: Apolinar Dandy  MRN: [id number]    Nephrology Progress note:    Assessment:  DEEPTI: Serum Cr 2mg/dl-> 1.7->2.3mgdl today. Received a dose of Ibuprofen yesterday-> with borderline hemodynamics. Suspected intravascular depletion on admission-> in the setting of poorly controlled DM2. Prior lab work from 10/2/20 showed a serum Cr 1.0mg/dl. At risk for CKD given uncontrolled DM2. Hyperkalemia: borderline     HTN:borderline hypotensive    Influenza A     DM2: Uncontrolled. HgbA1c 11     Abd distension: not ascites     PVD w prior R BKA    Plan/Recommendations:  IV NS at 75cc/hr  Holding Metformin/Jardiance  Hold Lisinopril  K restricted diet  Strict I/Os  Avoid nephrotoxins  Am labs        Subjective:  Ongoing fevers/chills. +Generalized weakness. + N/V this morning.  and RN at bedside. ROS:   No chest pain, no shortness of breath    Exam:  Visit Vitals  BP (!) 110/58 (BP 1 Location: Left arm, BP Patient Position: At rest)   Pulse (!) 118   Temp (!) 100.7 °F (38.2 °C)   Resp 21   Ht 5' 7\" (1.702 m)   Wt 81.8 kg (180 lb 4.8 oz)   SpO2 94%   BMI 28.24 kg/m²     Wt Readings from Last 3 Encounters:   12/21/22 81.8 kg (180 lb 4.8 oz)   08/31/22 74.8 kg (165 lb)   09/01/21 86.2 kg (190 lb)     No intake or output data in the 24 hours ending 12/21/22 1120    Gen: NAD  HEENT: AT/NC  Lungs/Chest wall: Clear. No accessory muscle use. Cardiovascular: Regular rate, normal rhythm. Abdomen/: Soft, NT, ND, BS+.  No palpable organomegaly  Ext: R BKA, No peripheral edema  CNS: alert and awake.  Answers appropriately      Current Facility-Administered Medications   Medication Dose Route Frequency Last Admin    0.9% sodium chloride infusion  75 mL/hr IntraVENous CONTINUOUS 75 mL/hr at 12/21/22 0924    sodium chloride (NS) flush 5-40 mL  5-40 mL IntraVENous Q8H 10 mL at 12/20/22 2112    sodium chloride (NS) flush 5-40 mL  5-40 mL IntraVENous PRN      acetaminophen (TYLENOL) tablet 650 mg  650 mg Oral Q6H  mg at 12/21/22 6989    Or    acetaminophen (TYLENOL) suppository 650 mg  650 mg Rectal Q6H PRN      polyethylene glycol (MIRALAX) packet 17 g  17 g Oral DAILY PRN      ondansetron (ZOFRAN ODT) tablet 4 mg  4 mg Oral Q8H PRN      Or    ondansetron (ZOFRAN) injection 4 mg  4 mg IntraVENous Q6H PRN      [Held by provider] heparin (porcine) injection 5,000 Units  5,000 Units SubCUTAneous Q8H 5,000 Units at 12/20/22 0400    nicotine (NICODERM CQ) 21 mg/24 hr patch 1 Patch  1 Patch TransDERmal DAILY 1 Patch at 12/21/22 0903    [Held by provider] amLODIPine (NORVASC) tablet 2.5 mg  2.5 mg Oral DAILY      atorvastatin (LIPITOR) tablet 40 mg  40 mg Oral QHS 40 mg at 12/20/22 2109    [Held by provider] lisinopriL (PRINIVIL, ZESTRIL) tablet 10 mg  10 mg Oral DAILY      pregabalin (LYRICA) capsule 150 mg  150 mg Oral  mg at 12/21/22 0903    pantoprazole (PROTONIX) tablet 40 mg  40 mg Oral ACB 40 mg at 12/20/22 0848    levothyroxine (SYNTHROID) tablet 200 mcg  200 mcg Oral  mcg at 12/20/22 0850    fenofibrate (LOFIBRA) tablet 160 mg  160 mg Oral DAILY 160 mg at 12/21/22 0925    traZODone (DESYREL) tablet 50 mg  50 mg Oral QHS PRN      clonazePAM (KlonoPIN) tablet 1 mg  1 mg Oral TID 1 mg at 12/21/22 0903    glucose chewable tablet 16 g  4 Tablet Oral PRN      glucagon (GLUCAGEN) injection 1 mg  1 mg IntraMUSCular PRN      dextrose 10 % infusion 0-250 mL  0-250 mL IntraVENous PRN      insulin lispro (HUMALOG) injection   SubCUTAneous AC&HS      methadone (DOLOPHINE) 5 mg/5 mL oral solution 160 mg  160 mg Oral DAILY 160 mg at 12/21/22 0925    lidocaine 4 % patch 1 Patch  1 Patch TransDERmal Q24H 1 Patch at 12/20/22 1734    clopidogreL (PLAVIX) tablet 75 mg  75 mg Oral DAILY 75 mg at 12/21/22 0904    piperacillin-tazobactam (ZOSYN) 3.375 g in 0.9% sodium chloride (MBP/ADV) 100 mL MBP  3.375 g IntraVENous Q8H 3.375 g at 12/21/22 0904    oseltamivir (TAMIFLU) capsule 30 mg  30 mg Oral BID 30 mg at 12/21/22 0904    guaiFENesin (ROBITUSSIN) 100 mg/5 mL oral liquid 100 mg  100 mg Oral Q4H  mg at 12/21/22 5921       Labs/Data:    Lab Results   Component Value Date/Time    WBC 4.6 12/21/2022 02:26 AM    HGB 8.7 (L) 12/21/2022 02:26 AM    HCT 28.6 (L) 12/21/2022 02:26 AM    PLATELET 706 03/47/7288 02:26 AM    MCV 89.4 12/21/2022 02:26 AM       Lab Results   Component Value Date/Time    Sodium 136 12/21/2022 02:26 AM    Potassium 5.1 12/21/2022 02:26 AM    Chloride 108 12/21/2022 02:26 AM    CO2 22 12/21/2022 02:26 AM    Anion gap 6 12/21/2022 02:26 AM    Glucose 128 (H) 12/21/2022 02:26 AM    BUN 42 (H) 12/21/2022 02:26 AM    Creatinine 2.31 (H) 12/21/2022 02:26 AM    BUN/Creatinine ratio 18 12/21/2022 02:26 AM    GFR est AA >60 10/02/2020 04:17 AM    GFR est non-AA 55 (L) 10/02/2020 04:17 AM    Calcium 8.9 12/21/2022 02:26 AM       Patient seen and examined. Chart reviewed. Labs, data and other pertinent notes reviewed in last 24 hrs.     Discussed with patient/ and RN    Signed by:  Becky Rhodes MD  [id number] [employer]

## 2022-12-21 NOTE — PROGRESS NOTES
OMI-Dc to home with significant other. Reason for Admission: DEEPTI                     RUR Score:     12%                Plan for utilizing home health:      TBBD    PCP: First and Last name:  Sunni Nogueira MD     Name of Practice:    Are you a current patient: Yes/No:    Approximate date of last visit:    Can you participate in a virtual visit with your PCP:                     Current Advanced Directive/Advance Care Plan: Full Code      Healthcare Decision Maker:   Click here to complete [address] including selection of the [address] Relationship (ie \"Primary\")             Primary Decision Maker: Star, [address] - [phone]                  Transition of Care Plan:     CM went to meet with this pt, but she was asleep. CM spoke with pt's significant other, Tamiko Scales ([phone]) to introduce him to the role of CM and transition of care. This pt lives with him in a one story home with 5-6 steps to enter. This pt usually uses her prosthesis for ambulation, but at times relies on her wheelchair and/or walker. She has a wheelchair, walker, and tub bench at home. She currently does not have any home health or personal care set up. Pet her significant other, she is fairly independent at home. She does not drive, but depends on him to transport her or uses her Medicaid transportation. [id number] Management Interventions  PCP Verified by CM:  Yes  Palliative Care Criteria Met (RRAT>21 & CHF Dx)?: No  Transition of Care Consult (CM Consult): Discharge Planning  MyChart Signup: No  Discharge Durable Medical Equipment: No  Physical Therapy Consult: No  Occupational Therapy Consult: No  Speech Therapy Consult: No  Support Systems: Spouse/Significant Other  The Patient and/or Patient Representative was Provided with a Choice of Provider and Agrees with the Discharge Plan?: Yes  Freedom of Choice List was Provided with Basic Dialogue that Supports the Patient's Individualized Plan of Care/Goals, Treatment Preferences and Shares the Quality Data Associated with the Providers?: Yes  [place] Resource Information Provided?: No  Discharge Location  Patient Expects to be Discharged to[de-identified] Home

## 2022-12-21 NOTE — WOUND CARE
Wound Care Note:     New consult placed by nurse request for wounds on fingers    Chart shows:  Admitted for hyperkalemia  Past Medical History:   Diagnosis Date    Arthritis     Diabetes [facility])     Endocrine disease     hypothyroid    Gastrointestinal disorder     pancreatitis    Hypertension     MI (myocardial infarction) ([facility] [address]      WBC = 4.6 on 12/21/22  Admitted from     Assessment:   Patient was very groggy, kept falling asleep, moving hand/legs around making it difficult to assess wounds, some communication, continent with no assistance needed in repositioning. [facility]  Diet: Adult diet regular; 5 carb choices; low fat/low chol/high fiber/2 gm NA; low sodium; low potassium; low phosphorus  Patient reports no pain. Left heel, buttocks, and sacral skin were not assessed. 1. POA right third finger with what appears to be a recent amputation of the end of the finger, wound measures 1.8 cm x 1.8 cm x 0.1 cm, wound bed is about 50% pink and 50% white, wound edges are open, marilynn-wound intact. Puracol AG and gauze applied. 2.  POA several other fingers on right hand with crusted wounds, these do not appear to be deep:   Right thumb- linear line 1.5 cm   Right 5th finger near end   Right second finger tip and near knuckle    3. POA left hand with several wounds that are deeper 0.2 and 0.3 cm that are dry, appear to be cracking with movement of fingers, no drainage, no erythema. Want to order Aquaphor for this hand to moisten skin and then we may switch to [address] [place] [address] as well. Thumb 1.7 cm x 0.5 cm x 0.2   Left third finger 2.5 cm x 2 cm x 0.3 cm   Left second finger 0.5 cm x 1.5 cm x 0.2 cm    Spoke with Dr. Suzette Pedro, wound care orders obtained. Patient sitting on side of bed eating lunch.      Recommendations:    Right third finger- Every other day cleanse with normal saline, wipe wound bed clean and dry, apply a piece of Puracol AG (located on 5E back par room) to wound bed and add a few drops of normal saline, cover with 4 x 4 and secure with tape. Left hand - Every 12 hours apply Aquaphor ointment and rub into hand. Skin Care & Pressure Prevention:  Minimize layers of linen/pads under patient to optimize support surface. Turn/reposition approximately every 2 hours and offload heels.   Manage incontinence / promote continence   Nourishing Skin Cream to dry skin, minimize use of briefs when able    Discussed above plan with patient & Destinee Busby RN    Transition of Care: Plan to follow as needed while admitted to hospital.    Wendy Ramon, BSN, RN, [facility]  Certified Wound and Ostomy Nurse  office [phone]  Best way to contact me is through [address] [place] [address]

## 2022-12-21 NOTE — PROGRESS NOTES
Patients MEWS score now a 5. Dr. Mariola Harvey made aware via [email or web address]. \"Hello, patient is now a MEWS of 5. I am still concerned that we will not be able to monitor her appropriately on this floor.  Charge RN aware as well\"

## 2022-12-21 NOTE — PROGRESS NOTES
MEWS Core of 3-4. Dr. Olga Mendoza notified of concerns for patient. \"Patients MEWs score 3-4 this morning. Patient vomiting and desaturation now requiring 6L NC.  HR now sustained 120s NSR, temp 100.4. CTN and BNP elevated from yesterday am labs. RN requesting higher level of care for patient.   Anthony Howard, RN\"

## 2022-12-21 NOTE — ED NOTES
Has a final transfer review been performed? Yes    Reason for Admission: worsening liver/kidney function  Patient comes from: home  Mental Status: Alert and oriented  ADL:partial assistance  Ambulation:wears a prosthetic  Pertinent Info/Safety Concerns: flu +, has a prosthetic. Had a fever of 103 that did not break with tylenol, she got a one time dose of Motrin d/t creatinine levels. , is wearing 3L of oxygen to maintain saturations above 93% after having received Methadone for previous shift today. COVID Status: Negative  MEWS Score: 0     Vitals:    12/20/22 1752 12/20/22 1922 12/20/22 1927 12/20/22 1952   BP: 108/74 127/62 (!) 114/44 (!) 101/58   Pulse:  (!) 108 (!) 105 100   Resp:  17 14 13   Temp:  (!) 103.2 °F (39.6 °C)  98.3 °F (36.8 °C)   SpO2:  96%  94%   Weight:       Height:         Lines:   Peripheral IV 12/19/22 Left Forearm (Active)   Site Assessment Clean, dry, & intact 12/19/22 2122   Phlebitis Assessment 0 12/19/22 2122   Infiltration Assessment 0 12/19/22 2122   Dressing Status Clean, dry, & intact 12/19/22 2122   Dressing Type Transparent 12/19/22 2122   Hub Color/Line Status Pink;Patent; Flushed 12/19/22 2122   Action Taken Blood drawn 12/19/22 2122   Alcohol Cap Used No 12/19/22 2122       Peripheral IV 12/20/22 Right Arm (Active)   Site Assessment Clean, dry, & intact 12/20/22 1824   Phlebitis Assessment 0 12/20/22 1824   Infiltration Assessment 0 12/20/22 1824   Dressing Status Clean, dry, & intact 12/20/22 1824   Dressing Type Transparent 12/20/22 1824   Hub Color/Line Status Pink 12/20/22 1824   Action Taken Blood drawn 12/20/22 1824   Alcohol Cap Used No 12/20/22 1824      Transport mode:ED stretcher    Peg Shaper  being transferred to (unit) for routine progression of care     \"Notification of etransfer note given to (Name) Jose F Desouza

## 2022-12-22 ENCOUNTER — APPOINTMENT (OUTPATIENT)
Dept: GENERAL RADIOLOGY | Age: 47
DRG: 113 | End: 2022-12-22
Attending: INTERNAL MEDICINE
Payer: COMMERCIAL

## 2022-12-22 LAB
ALBUMIN SERPL-MCNC: 2.6 G/DL (ref 3.5–5)
ALBUMIN/GLOB SERPL: 0.7 {RATIO} (ref 1.1–2.2)
ALP SERPL-CCNC: 53 U/L (ref 45–117)
ALT SERPL-CCNC: 29 U/L (ref 12–78)
ANION GAP SERPL CALC-SCNC: 4 MMOL/L (ref 5–15)
AST SERPL-CCNC: 43 U/L (ref 15–37)
BILIRUB SERPL-MCNC: 0.2 MG/DL (ref 0.2–1)
BUN SERPL-MCNC: 37 MG/DL (ref 6–20)
BUN/CREAT SERPL: 22 (ref 12–20)
CALCIUM SERPL-MCNC: 8.2 MG/DL (ref 8.5–10.1)
CHLORIDE SERPL-SCNC: 114 MMOL/L (ref 97–108)
CO2 SERPL-SCNC: 23 MMOL/L (ref 21–32)
CREAT SERPL-MCNC: 1.66 MG/DL (ref 0.55–1.02)
ERYTHROCYTE [DISTWIDTH] IN BLOOD BY AUTOMATED COUNT: 16.4 % (ref 11.5–14.5)
GLOBULIN SER CALC-MCNC: 3.9 G/DL (ref 2–4)
GLUCOSE BLD STRIP.AUTO-MCNC: 203 MG/DL (ref 65–117)
GLUCOSE BLD STRIP.AUTO-MCNC: 248 MG/DL (ref 65–117)
GLUCOSE BLD STRIP.AUTO-MCNC: 273 MG/DL (ref 65–117)
GLUCOSE BLD STRIP.AUTO-MCNC: 325 MG/DL (ref 65–117)
GLUCOSE SERPL-MCNC: 198 MG/DL (ref 65–100)
HCT VFR BLD AUTO: 28.4 % (ref 35–47)
HGB BLD-MCNC: 8.2 G/DL (ref 11.5–16)
MAGNESIUM SERPL-MCNC: 2.7 MG/DL (ref 1.6–2.4)
MCH RBC QN AUTO: 27.2 PG (ref 26–34)
MCHC RBC AUTO-ENTMCNC: 28.9 G/DL (ref 30–36.5)
MCV RBC AUTO: 94 FL (ref 80–99)
NRBC # BLD: 0 K/UL (ref 0–0.01)
NRBC BLD-RTO: 0 PER 100 WBC
PHOSPHATE SERPL-MCNC: 4.3 MG/DL (ref 2.6–4.7)
PLATELET # BLD AUTO: 152 K/UL (ref 150–400)
PMV BLD AUTO: 11.3 FL (ref 8.9–12.9)
POTASSIUM SERPL-SCNC: 4.9 MMOL/L (ref 3.5–5.1)
PROT SERPL-MCNC: 6.5 G/DL (ref 6.4–8.2)
RBC # BLD AUTO: 3.02 M/UL (ref 3.8–5.2)
SERVICE CMNT-IMP: ABNORMAL
SODIUM SERPL-SCNC: 141 MMOL/L (ref 136–145)
WBC # BLD AUTO: 8.6 K/UL (ref 3.6–11)

## 2022-12-22 PROCEDURE — 65270000029 HC RM PRIVATE

## 2022-12-22 PROCEDURE — 74011250636 HC RX REV CODE- 250/636: Performed by: INTERNAL MEDICINE

## 2022-12-22 PROCEDURE — 74011250637 HC RX REV CODE- 250/637: Performed by: FAMILY MEDICINE

## 2022-12-22 PROCEDURE — 74011000250 HC RX REV CODE- 250: Performed by: FAMILY MEDICINE

## 2022-12-22 PROCEDURE — 74011000258 HC RX REV CODE- 258: Performed by: FAMILY MEDICINE

## 2022-12-22 PROCEDURE — P9047 ALBUMIN (HUMAN), 25%, 50ML: HCPCS | Performed by: INTERNAL MEDICINE

## 2022-12-22 PROCEDURE — 77010033678 HC OXYGEN DAILY

## 2022-12-22 PROCEDURE — 84100 ASSAY OF PHOSPHORUS: CPT

## 2022-12-22 PROCEDURE — 71045 X-RAY EXAM CHEST 1 VIEW: CPT

## 2022-12-22 PROCEDURE — 74011636637 HC RX REV CODE- 636/637: Performed by: FAMILY MEDICINE

## 2022-12-22 PROCEDURE — 80053 COMPREHEN METABOLIC PANEL: CPT

## 2022-12-22 PROCEDURE — 83735 ASSAY OF MAGNESIUM: CPT

## 2022-12-22 PROCEDURE — 85027 COMPLETE CBC AUTOMATED: CPT

## 2022-12-22 PROCEDURE — 94760 N-INVAS EAR/PLS OXIMETRY 1: CPT

## 2022-12-22 PROCEDURE — 82962 GLUCOSE BLOOD TEST: CPT

## 2022-12-22 PROCEDURE — 74011250636 HC RX REV CODE- 250/636: Performed by: FAMILY MEDICINE

## 2022-12-22 PROCEDURE — 36415 COLL VENOUS BLD VENIPUNCTURE: CPT

## 2022-12-22 RX ORDER — ALBUMIN HUMAN 250 G/1000ML
12.5 SOLUTION INTRAVENOUS ONCE
Status: COMPLETED | OUTPATIENT
Start: 2022-12-22 | End: 2022-12-23

## 2022-12-22 RX ADMIN — CLONAZEPAM 1 MG: 1 TABLET ORAL at 21:20

## 2022-12-22 RX ADMIN — FENOFIBRATE 160 MG: 160 TABLET ORAL at 09:37

## 2022-12-22 RX ADMIN — PANTOPRAZOLE SODIUM 40 MG: 40 TABLET, DELAYED RELEASE ORAL at 08:10

## 2022-12-22 RX ADMIN — PIPERACILLIN AND TAZOBACTAM 3.38 G: 3; .375 INJECTION, POWDER, FOR SOLUTION INTRAVENOUS at 02:19

## 2022-12-22 RX ADMIN — WHITE PETROLATUM: 1.75 OINTMENT TOPICAL at 21:21

## 2022-12-22 RX ADMIN — PIPERACILLIN AND TAZOBACTAM 3.38 G: 3; .375 INJECTION, POWDER, FOR SOLUTION INTRAVENOUS at 09:33

## 2022-12-22 RX ADMIN — ALBUMIN (HUMAN) 12.5 G: 0.25 INJECTION, SOLUTION INTRAVENOUS at 12:18

## 2022-12-22 RX ADMIN — CLONAZEPAM 1 MG: 1 TABLET ORAL at 09:33

## 2022-12-22 RX ADMIN — ATORVASTATIN CALCIUM 40 MG: 40 TABLET, FILM COATED ORAL at 21:20

## 2022-12-22 RX ADMIN — Medication 4 UNITS: at 16:40

## 2022-12-22 RX ADMIN — CLOPIDOGREL BISULFATE 75 MG: 75 TABLET ORAL at 09:34

## 2022-12-22 RX ADMIN — PREGABALIN 150 MG: 100 CAPSULE ORAL at 21:20

## 2022-12-22 RX ADMIN — OSELTAMIVIR PHOSPHATE 30 MG: 30 CAPSULE ORAL at 17:00

## 2022-12-22 RX ADMIN — CLONAZEPAM 1 MG: 1 TABLET ORAL at 16:40

## 2022-12-22 RX ADMIN — PREGABALIN 150 MG: 100 CAPSULE ORAL at 09:33

## 2022-12-22 RX ADMIN — WHITE PETROLATUM: 1.75 OINTMENT TOPICAL at 09:34

## 2022-12-22 RX ADMIN — Medication 2 UNITS: at 08:04

## 2022-12-22 RX ADMIN — ONDANSETRON HYDROCHLORIDE 4 MG: 2 SOLUTION INTRAMUSCULAR; INTRAVENOUS at 23:16

## 2022-12-22 RX ADMIN — Medication 3 UNITS: at 12:17

## 2022-12-22 RX ADMIN — SODIUM CHLORIDE, PRESERVATIVE FREE 10 ML: 5 INJECTION INTRAVENOUS at 21:21

## 2022-12-22 RX ADMIN — LEVOTHYROXINE SODIUM 200 MCG: 0.1 TABLET ORAL at 08:10

## 2022-12-22 RX ADMIN — Medication 1 UNITS: at 21:27

## 2022-12-22 RX ADMIN — ACETAMINOPHEN 650 MG: 325 TABLET ORAL at 23:35

## 2022-12-22 RX ADMIN — OSELTAMIVIR PHOSPHATE 30 MG: 30 CAPSULE ORAL at 09:35

## 2022-12-22 RX ADMIN — ACETAMINOPHEN 650 MG: 325 TABLET ORAL at 11:21

## 2022-12-22 RX ADMIN — PIPERACILLIN AND TAZOBACTAM 3.38 G: 3; .375 INJECTION, POWDER, FOR SOLUTION INTRAVENOUS at 17:00

## 2022-12-22 RX ADMIN — SODIUM CHLORIDE 100 ML/HR: 9 INJECTION, SOLUTION INTRAVENOUS at 04:20

## 2022-12-22 NOTE — PROGRESS NOTES
Patient name: Ebenezer Eddy  MRN: [id number]    Nephrology Progress note:    Assessment:  DEEPTI: Serum Cr improving to 1.6mg/dl today (peak 2.3mg/dl) Received a dose of Ibuprofen 12/20-> with borderline hemodynamics. Suspected intravascular depletion on admission-> in the setting of poorly controlled DM2. Prior lab work from 10/2/20 showed a serum Cr 1.0mg/dl. At risk for CKD given uncontrolled DM2. Hyperkalemia: borderline-> better     HTN:borderline hypotensive    Influenza A     DM2: Uncontrolled. HgbA1c 11     Abd distension: not ascites     PVD w prior R BKA    Plan/Recommendations:  IV NS   Trial of IV Albumin 12.5gm x1 dose  Holding Metformin/Jardiance  Hold Lisinopril  K restricted diet  Strict I/Os  Avoid nephrotoxins  Am labs        Subjective:  Ongoing low grade fevers/chills. +Generalized weakness. No more N/V.     ROS:   No chest pain, no shortness of breath    Exam:  Visit Vitals  BP 99/62 (BP 1 Location: Left upper arm, BP Patient Position: At rest)   Pulse 92   Temp 99.7 °F (37.6 °C)   Resp 18   Ht 5' 7\" (1.702 m)   Wt 83.6 kg (184 lb 4.8 oz)   SpO2 100%   BMI 28.87 kg/m²     Wt Readings from Last 3 Encounters:   12/22/22 83.6 kg (184 lb 4.8 oz)   08/31/22 74.8 kg (165 lb)   09/01/21 86.2 kg (190 lb)     No intake or output data in the 24 hours ending 12/22/22 1110    Gen: NAD  HEENT: AT/NC  Lungs/Chest wall: Clear. No accessory muscle use. Cardiovascular: Regular rate, normal rhythm. Abdomen/: Soft, NT, ND, BS+.  No palpable organomegaly  Ext: R BKA, No peripheral edema  CNS: alert and awake.  Answers appropriately      Current Facility-Administered Medications   Medication Dose Route Frequency Last Admin    0.9% sodium chloride infusion  100 mL/hr IntraVENous CONTINUOUS 100 mL/hr at 12/22/22 0420    pantothenic ac-min oil-pet,hyd (AQUAPHOR) 41 % ointment   Topical Q12H Given at 12/22/22 0934    sodium chloride (NS) flush 5-40 mL  5-40 mL IntraVENous Q8H 10 mL at 12/21/22 2202    sodium chloride (NS) flush 5-40 mL  5-40 mL IntraVENous PRN      acetaminophen (TYLENOL) tablet 650 mg  650 mg Oral Q6H  mg at 12/21/22 2201    Or    acetaminophen (TYLENOL) suppository 650 mg  650 mg Rectal Q6H PRN      polyethylene glycol (MIRALAX) packet 17 g  17 g Oral DAILY PRN      ondansetron (ZOFRAN ODT) tablet 4 mg  4 mg Oral Q8H PRN      Or    ondansetron (ZOFRAN) injection 4 mg  4 mg IntraVENous Q6H PRN      [Held by provider] heparin (porcine) injection 5,000 Units  5,000 Units SubCUTAneous Q8H 5,000 Units at 12/20/22 0400    nicotine (NICODERM CQ) 21 mg/24 hr patch 1 Patch  1 Patch TransDERmal DAILY 1 Patch at 12/22/22 0933    atorvastatin (LIPITOR) tablet 40 mg  40 mg Oral QHS 40 mg at 12/21/22 2201    pregabalin (LYRICA) capsule 150 mg  150 mg Oral  mg at 12/22/22 0933    pantoprazole (PROTONIX) tablet 40 mg  40 mg Oral ACB 40 mg at 12/22/22 0810    levothyroxine (SYNTHROID) tablet 200 mcg  200 mcg Oral  mcg at 12/22/22 0810    fenofibrate (LOFIBRA) tablet 160 mg  160 mg Oral DAILY 160 mg at 12/22/22 0937    traZODone (DESYREL) tablet 50 mg  50 mg Oral QHS PRN      clonazePAM (KlonoPIN) tablet 1 mg  1 mg Oral TID 1 mg at 12/22/22 0933    glucose chewable tablet 16 g  4 Tablet Oral PRN      glucagon (GLUCAGEN) injection 1 mg  1 mg IntraMUSCular PRN      dextrose 10 % infusion 0-250 mL  0-250 mL IntraVENous PRN      insulin lispro (HUMALOG) injection   SubCUTAneous AC&HS 2 Units at 12/22/22 0804    methadone (DOLOPHINE) 5 mg/5 mL oral solution 160 mg  160 mg Oral DAILY 5 mg at 12/21/22 0925    lidocaine 4 % patch 1 Patch  1 Patch TransDERmal Q24H 1 Patch at 12/20/22 1734    clopidogreL (PLAVIX) tablet 75 mg  75 mg Oral DAILY 75 mg at 12/22/22 0934    piperacillin-tazobactam (ZOSYN) 3.375 g in 0.9% sodium chloride (MBP/ADV) 100 mL MBP  3.375 g IntraVENous Q8H 3.375 g at 12/22/22 7342    oseltamivir (TAMIFLU) capsule 30 mg  30 mg Oral BID 30 mg at 12/22/22 0935    guaiFENesin (ROBITUSSIN) 100 mg/5 mL oral liquid 100 mg  100 mg Oral Q4H  mg at 12/21/22 9115       Labs/Data:    Lab Results   Component Value Date/Time    WBC 8.6 12/22/2022 02:21 AM    HGB 8.2 (L) 12/22/2022 02:21 AM    HCT 28.4 (L) 12/22/2022 02:21 AM    PLATELET 978 79/49/9055 02:21 AM    MCV 94.0 12/22/2022 02:21 AM       Lab Results   Component Value Date/Time    Sodium 141 12/22/2022 02:21 AM    Potassium 4.9 12/22/2022 02:21 AM    Chloride 114 (H) 12/22/2022 02:21 AM    CO2 23 12/22/2022 02:21 AM    Anion gap 4 (L) 12/22/2022 02:21 AM    Glucose 198 (H) 12/22/2022 02:21 AM    BUN 37 (H) 12/22/2022 02:21 AM    Creatinine 1.66 (H) 12/22/2022 02:21 AM    BUN/Creatinine ratio 22 (H) 12/22/2022 02:21 AM    GFR est AA >60 10/02/2020 04:17 AM    GFR est non-AA 55 (L) 10/02/2020 04:17 AM    Calcium 8.2 (L) 12/22/2022 02:21 AM       Patient seen and examined. Chart reviewed. Labs, data and other pertinent notes reviewed in last 24 hrs.     Discussed with patient    Signed by:  Gabriele Ortiz MD  [id number]

## 2022-12-22 NOTE — PROGRESS NOTES
Patient continuously non-compliant with keeping supplemental oxygen on.  RN has found patient multiple times in the room with NC on the floor next to her. Patient saturations on room air are in the 70s. Patient educated on importance of keeping oxygen on. Patient is orientated x4 and states understanding. Unable to wean patient because of this up until this time. Of note, RN requested multiple times yesterday for patient to be placed on a higher level of care for closer O2 monitoring. MD has not placed orders.

## 2022-12-22 NOTE — PROGRESS NOTES
[facility] [facility]  Hospitalist Group                                                                                          Hospitalist Progress Note  Camila Negro MD  Answering service: [phone] OR [phone] from in house phone        Date of Service:  2022  NAME:  Jennifer Stanley  :  1975  MRN:  [id number]      Admission Summary:   Jennifer Stanley is a 50 y.o. female history of DM2 s/p R BKA, hypothyroidism, HTN, and CAD s/p stents, and nicotine dependence who presents at the request of her GI physician for elevated creatinine with hyperkalemia. She states that her PCP on routine work-up noted an elevated creatinine, and \"fluid around the patient's liver\" she also had noted a weight increase of 14 pounds in 1 week, and increasing abdominal fullness. She was sent to a gastroenterologist for further evaluation of her liver disease, and repeat labs. Repeat blood work showed elevated creatinine with elevated potassium, and she was referred to the ED for further evaluation. She denies fever, chills, night sweats, nausea, vomiting, pain, shortness of breath, or decreased urinary output. In the ED, VSS. Labs were significant for Hgb 9.9 (improving), K+ 5.4, BUN 52, creatinine 2.04, albumin 3.2, Mg 2.7, and PO4 5.4. Renal ultrasound was normal. EKG NSR. In the ED, she received 1Lns       Interval history / Subjective:   Patient seen and examined at bedside this AM. Still febrile and coughing. Hypoxia worsened overnight. Encouraged to use IS.   Discussed with nursing and cm      Assessment & Plan:     # Acute hypoxemic respiratory failure - worsened  Influenza A infection  -  still febrile  - cont tamiflu  - CXR: No acute process  - saturating on 6l NC, try to wean down   - c/w IV zosyn for possible superimposed bacterial infection - procalcitonin ordered     #DEEPTI - cr improving   Hyperkalemia - better   -creatinine 2.04 (last 1 in 2020), BUN 52, and K+ 5.4  - s/p lokelma, low K+ diet  - c/w IVF  -renal ultrasound negative  -appreciate nephrology input   - hold lisinopril  - will monitor renal function      #Abd distension - not ascites   -abdominal ultrasound for diagnostic/therapeutic paracentesis  -up to date on colonoscopy due to mother with hx colon cancer, not up to date on gynecological screenings  -denies ETOH use     #DM II with hyperglycemia  -A1c 11. c/w ISS    # Chronic pain syndrome  - cont methadone and lyrica     #CAD  -continue plavix, lofibra, and lipitor     #HTN  -holding lisinopril as above. #GERD  -continue PPI     #nicotine dependence  -nicotine patch     #Hypothyroidism  -continue synthroid    Hx PVD w prior R BKA     Code status: Full  Prophylaxis: SCDs  Care Plan discussed with: patient, RN, CM  Anticipated Disposition: [facility] Problems  Date Reviewed: 9/7/2022            Codes Class Noted POA    Hyperkalemia, diminished renal excretion ICD-10-CM: E87.5  ICD-9-CM: 276.7  12/20/2022 Unknown        DEEPTI (acute kidney injury) [facility]) ICD-10-CM: N17.9  ICD-9-CM: 584.9  12/20/2022 Unknown           Review of Systems:   A comprehensive review of systems was negative except for that written in the HPI. Vital Signs:    Last 24hrs VS reviewed since prior progress note.  Most recent are:  Visit Vitals  BP (!) 94/52 (BP 1 Location: Right upper arm, BP Patient Position: At rest)   Pulse 90   Temp 97.4 °F (36.3 °C)   Resp 18   Ht 5' 7\" (1.702 m)   Wt 83.6 kg (184 lb 4.8 oz)   SpO2 96%   BMI 28.87 kg/m²     Patient Vitals for the past 24 hrs:   Temp Pulse Resp BP SpO2   12/22/22 1445 97.4 °F (36.3 °C) 90 18 (!) 94/52 96 %   12/22/22 1206 -- 92 -- -- --   12/22/22 1004 -- 92 -- -- --   12/22/22 0910 99.7 °F (37.6 °C) 94 18 99/62 100 %   12/22/22 0545 -- 90 -- -- --   12/22/22 0347 -- 90 -- -- --   12/22/22 0219 99.6 °F (37.6 °C) 92 18 99/63 99 %   12/22/22 0148 -- 88 -- -- --   12/21/22 2200 -- (!) 102 -- -- --   12/21/22 2107 100.3 °F (37.9 °C) (!) 101 16 108/64 93 %   12/21/22 1946 -- 100 -- -- --   12/21/22 1819 -- 80 -- -- --   12/21/22 1455 (!) 100.9 °F (38.3 °C) 99 18 (!) 99/53 99 %          No intake or output data in the 24 hours ending 12/22/22 1449     Physical Examination:     I had a face to face encounter with this patient and independently examined them on 12/22/2022 as outlined below:          Constitutional:  In mild distress, ill appearing, coughing   HEENT:  Oral mucosa dry, O2 via NC, EOMI    Resp:  Decreased breath sounds bilaterally. some wheezing/rhonchi/rales. Some accessory muscle use. CV:  Regular rhythm, normal rate, no murmurs, gallops, rubs    GI:  Soft, non distended, non tender. normoactive bowel sounds,      Musculoskeletal:  Right AKA, warm, no wounds on stump    Neurologic:  Moves all extremities. encephalopathic CN II-XII reviewed            Data Review:    Review and/or order of clinical lab test  Review and/or order of tests in the radiology section of CPT  Review and/or order of tests in the medicine section of CPT      Labs:     Recent Labs     12/22/22 0221 12/21/22 0226   WBC 8.6 4.6   HGB 8.2* 8.7*   HCT 28.4* 28.6*    157       Recent Labs     12/22/22 0221 12/21/22 0226 12/20/22  0946    136 137   K 4.9 5.1 5.0   * 108 109*   CO2 23 22 23   BUN 37* 42* 44*   CREA 1.66* 2.31* 1.77*   * 128* 131*   CA 8.2* 8.9 8.2*   MG 2.7* 2.4 2.4   PHOS 4.3 5.2* 3.7       Recent Labs     12/22/22 0221 12/21/22 0226 12/20/22  0946 12/19/22 2126   ALT 29  --   --  29   AP 53  --   --  72   TBILI 0.2  --   --  0.1*   TP 6.5  --   --  7.5   ALB 2.6* 3.1* 3.0* 3.2*   GLOB 3.9  --   --  4.3*       No results for input(s): INR, PTP, APTT, INREXT, INREXT in the last 72 hours. Recent Labs     12/20/22  0946   TIBC 460*   PSAT 10*   FERR 80        Lab Results   Component Value Date/Time    Folate 22.8 (H) 12/20/2022 09:46 AM        No results for input(s): PH, PCO2, PO2 in the last 72 hours.   No results for input(s): CPK, CKNDX, TROIQ in the last 72 hours.     No lab exists for component: CPKMB  Lab Results   Component Value Date/Time    Cholesterol, total 104 09/30/2020 03:35 AM    HDL Cholesterol 17 09/30/2020 03:35 AM    LDL, calculated 19 09/30/2020 03:35 AM    Triglyceride 340 (H) 09/30/2020 03:35 AM    CHOL/HDL Ratio 6.1 (H) 09/30/2020 03:35 AM     Lab Results   Component Value Date/Time    Glucose (POC) 273 (H) 12/22/2022 11:18 AM    Glucose (POC) 203 (H) 12/22/2022 06:24 AM    Glucose (POC) 181 (H) 12/21/2022 09:06 PM    Glucose (POC) 260 (H) 12/21/2022 04:43 PM    Glucose (POC) 162 (H) 12/21/2022 11:16 AM     Lab Results   Component Value Date/Time    Color YELLOW/STRAW 12/20/2022 11:11 AM    Appearance CLEAR 12/20/2022 11:11 AM    Specific gravity 1.015 12/20/2022 11:11 AM    pH (UA) 5.5 12/20/2022 11:11 AM    Protein Negative 12/20/2022 11:11 AM    Glucose >1,000 (A) 12/20/2022 11:11 AM    Ketone Negative 12/20/2022 11:11 AM    Bilirubin Negative 12/20/2022 11:11 AM    Urobilinogen 0.2 12/20/2022 11:11 AM    Nitrites Negative 12/20/2022 11:11 AM    Leukocyte Esterase Negative 12/20/2022 11:11 AM    Epithelial cells FEW 12/20/2022 11:11 AM    Bacteria Negative 12/20/2022 11:11 AM    WBC 0-4 12/20/2022 11:11 AM    RBC 0-5 12/20/2022 11:11 AM         Medications Reviewed:     Current Facility-Administered Medications   Medication Dose Route Frequency    0.9% sodium chloride infusion  100 mL/hr IntraVENous CONTINUOUS    pantothenic ac-min oil-pet,hyd (AQUAPHOR) 41 % ointment   Topical Q12H    sodium chloride (NS) flush 5-40 mL  5-40 mL IntraVENous Q8H    sodium chloride (NS) flush 5-40 mL  5-40 mL IntraVENous PRN    acetaminophen (TYLENOL) tablet 650 mg  650 mg Oral Q6H PRN    Or    acetaminophen (TYLENOL) suppository 650 mg  650 mg Rectal Q6H PRN    polyethylene glycol (MIRALAX) packet 17 g  17 g Oral DAILY PRN    ondansetron (ZOFRAN ODT) tablet 4 mg  4 mg Oral Q8H PRN    Or    ondansetron (ZOFRAN) injection 4 mg  4 mg IntraVENous Q6H PRN    [Held by provider] heparin (porcine) injection 5,000 Units  5,000 Units SubCUTAneous Q8H    nicotine (NICODERM CQ) 21 mg/24 hr patch 1 Patch  1 Patch TransDERmal DAILY    atorvastatin (LIPITOR) tablet 40 mg  40 mg Oral QHS    pregabalin (LYRICA) capsule 150 mg  150 mg Oral BID    pantoprazole (PROTONIX) tablet 40 mg  40 mg Oral ACB    levothyroxine (SYNTHROID) tablet 200 mcg  200 mcg Oral ACB    fenofibrate (LOFIBRA) tablet 160 mg  160 mg Oral DAILY    traZODone (DESYREL) tablet 50 mg  50 mg Oral QHS PRN    clonazePAM (KlonoPIN) tablet 1 mg  1 mg Oral TID    glucose chewable tablet 16 g  4 Tablet Oral PRN    glucagon (GLUCAGEN) injection 1 mg  1 mg IntraMUSCular PRN    dextrose 10 % infusion 0-250 mL  0-250 mL IntraVENous PRN    insulin lispro (HUMALOG) injection   SubCUTAneous AC&HS    methadone (DOLOPHINE) 5 mg/5 mL oral solution 160 mg  160 mg Oral DAILY    lidocaine 4 % patch 1 Patch  1 Patch TransDERmal Q24H    clopidogreL (PLAVIX) tablet 75 mg  75 mg Oral DAILY    piperacillin-tazobactam (ZOSYN) 3.375 g in 0.9% sodium chloride (MBP/ADV) 100 mL MBP  3.375 g IntraVENous Q8H    oseltamivir (TAMIFLU) capsule 30 mg  30 mg Oral BID    guaiFENesin (ROBITUSSIN) 100 mg/5 mL oral liquid 100 mg  100 mg Oral Q4H PRN     ______________________________________________________________________  EXPECTED LENGTH OF STAY: 2d 21h  ACTUAL LENGTH OF STAY:          2                 Stefania Cheek MD

## 2022-12-22 NOTE — PROGRESS NOTES
[facility] [facility] Adult  Hospitalist Group                                                                                          Hospitalist Progress Note  Antonio Jim MD  Answering service: [phone] OR [phone] from in house phone        Date of Service:  2022  NAME:  Viry Hines  :  1975  MRN:  [id number]      Admission Summary:   Viry Hines is a 50 y.o. female history of DM2 s/p R BKA, hypothyroidism, HTN, and CAD s/p stents, and nicotine dependence who presents at the request of her GI physician for elevated creatinine with hyperkalemia. She states that her PCP on routine work-up noted an elevated creatinine, and \"fluid around the patient's liver\" she also had noted a weight increase of 14 pounds in 1 week, and increasing abdominal fullness. She was sent to a gastroenterologist for further evaluation of her liver disease, and repeat labs. Repeat blood work showed elevated creatinine with elevated potassium, and she was referred to the ED for further evaluation. She denies fever, chills, night sweats, nausea, vomiting, pain, shortness of breath, or decreased urinary output. In the ED, VSS. Labs were significant for Hgb 9.9 (improving), K+ 5.4, BUN 52, creatinine 2.04, albumin 3.2, Mg 2.7, and PO4 5.4. Renal ultrasound was normal. EKG NSR.              In the ED, she received 1Lns       Interval history / Subjective:   Patient seen and examined- low BP, feels terrible  Reviewed her vitals, labs, scans, and careplan  Droplet precautions     Assessment & Plan:      Acute influenza A infection- hypoxemia, fevers- cont tamiflu, oxygen, supportive care    #Elevated Creatinine/ Hyperkalemia  -shai versus chronic progressive CKD  -creatinine 2.04 (last 1 in 2020), BUN 52, and K+ 5.4  -lokelma, low K+ diet, IVF  -renal ultrasound negative  -consult nephrology     #Ascites  -abdominal ultrasound for diagnostic/therapeutic paracentesis  -up to date on colonoscopy due to mother with hx colon cancer, not up to date on gynecological screenings  -denies ETOH use     #DM II  -ISS    Chronic pain syndrome  - cont methadone and -continue home lyrica     #CAD  -continue plavix, lisinopril, lofibra, and lipitor     #HTN  -holding home meds due to hypotension     #GERD  -continue PPI     #nicotine dependence  -nicotine patch     #hypothyroidism  -continue synthroid     Code status: Full  Prophylaxis: SCDs  Care Plan discussed with: patient and significant other  Anticipated Disposition: [place] [facility] Problems  Date Reviewed: 9/7/2022            Codes Class Noted POA    Hyperkalemia, diminished renal excretion ICD-10-CM: E87.5  ICD-9-CM: 276.7  12/20/2022 Unknown        DEEPTI (acute kidney injury) ([facility] [address] ICD-10-CM: N17.9  ICD-9-CM: 584.9  12/20/2022 Unknown             Review of Systems:   A comprehensive review of systems was negative except for that written in the HPI. Vital Signs:    Last 24hrs VS reviewed since prior progress note.  Most recent are:  Visit Vitals  /64 (BP 1 Location: Left upper arm, BP Patient Position: At rest)   Pulse (!) 102   Temp 100.3 °F (37.9 °C)   Resp 16   Ht 5' 7\" (1.702 m)   Wt 81.8 kg (180 lb 4.8 oz)   SpO2 93%   BMI 28.24 kg/m²     Patient Vitals for the past 24 hrs:   Temp Pulse Resp BP SpO2   12/21/22 2200 -- (!) 102 -- -- --   12/21/22 2107 100.3 °F (37.9 °C) (!) 101 16 108/64 93 %   12/21/22 1946 -- 100 -- -- --   12/21/22 1819 -- 80 -- -- --   12/21/22 1455 (!) 100.9 °F (38.3 °C) 99 18 (!) 99/53 99 %   12/21/22 1400 -- (!) 105 -- -- --   12/21/22 1014 (!) 100.7 °F (38.2 °C) (!) 118 21 (!) 110/58 94 %   12/21/22 1000 -- (!) 117 -- -- --   12/21/22 0830 100.4 °F (38 °C) (!) 120 20 136/72 96 %   12/21/22 0600 -- 99 -- -- --   12/21/22 0521 -- 94 -- 104/62 97 %   12/21/22 0358 99.9 °F (37.7 °C) 94 22 (!) 95/59 95 %   12/21/22 0300 -- 97 -- -- --   12/21/22 0200 -- 99 -- -- --        No intake or output data in the 24 hours ending 12/21/22 1438 Physical Examination:     I had a face to face encounter with this patient and independently examined them on 12/21/2022 as outlined below:          Constitutional:  In mild distress, ill appearing, coughing   HEENT:  Oral mucosa dry, O2 via NC, EOMI    Resp:  Decreased breath sounds bilaterally. some wheezing/rhonchi/rales. Some accessory muscle use. CV:  Regular rhythm, normal rate, no murmurs, gallops, rubs    GI:  Soft, non distended, non tender. normoactive bowel sounds,      Musculoskeletal:  Right AKA, warm, no wounds on stump    Neurologic:  Moves all extremities. encephalopathic CN II-XII reviewed            Data Review:    Review and/or order of clinical lab test  Review and/or order of tests in the radiology section of CPT  Review and/or order of tests in the medicine section of CPT      Labs:     Recent Labs     12/21/22 0226 12/19/22 2126   WBC 4.6 6.6   HGB 8.7* 9.9*   HCT 28.6* 32.3*    221     Recent Labs     12/21/22 0226 12/20/22 0946 12/19/22 2126    137 140   K 5.1 5.0 5.4*    109* 110*   CO2 22 23 25   BUN 42* 44* 52*   CREA 2.31* 1.77* 2.04*   * 131* 162*   CA 8.9 8.2* 9.3   MG 2.4 2.4 2.7*   PHOS 5.2* 3.7 5.4*     Recent Labs     12/21/22 0226 12/20/22 0946 12/19/22 2126   ALT  --   --  29   AP  --   --  72   TBILI  --   --  0.1*   TP  --   --  7.5   ALB 3.1* 3.0* 3.2*   GLOB  --   --  4.3*     No results for input(s): INR, PTP, APTT, INREXT in the last 72 hours. Recent Labs     12/20/22 0946   TIBC 460*   PSAT 10*   FERR 80      Lab Results   Component Value Date/Time    Folate 22.8 (H) 12/20/2022 09:46 AM      No results for input(s): PH, PCO2, PO2 in the last 72 hours. No results for input(s): CPK, CKNDX, TROIQ in the last 72 hours.     No lab exists for component: CPKMB  Lab Results   Component Value Date/Time    Cholesterol, total 104 09/30/2020 03:35 AM    HDL Cholesterol 17 09/30/2020 03:35 AM    LDL, calculated 19 09/30/2020 03:35 AM Triglyceride 340 (H) 09/30/2020 03:35 AM    CHOL/HDL Ratio 6.1 (H) 09/30/2020 03:35 AM     Lab Results   Component Value Date/Time    Glucose (POC) 181 (H) 12/21/2022 09:06 PM    Glucose (POC) 260 (H) 12/21/2022 04:43 PM    Glucose (POC) 162 (H) 12/21/2022 11:16 AM    Glucose (POC) 147 (H) 12/21/2022 06:51 AM    Glucose (POC) 162 (H) 12/20/2022 09:10 PM     Lab Results   Component Value Date/Time    Color YELLOW/STRAW 12/20/2022 11:11 AM    Appearance CLEAR 12/20/2022 11:11 AM    Specific gravity 1.015 12/20/2022 11:11 AM    pH (UA) 5.5 12/20/2022 11:11 AM    Protein Negative 12/20/2022 11:11 AM    Glucose >1,000 (A) 12/20/2022 11:11 AM    Ketone Negative 12/20/2022 11:11 AM    Bilirubin Negative 12/20/2022 11:11 AM    Urobilinogen 0.2 12/20/2022 11:11 AM    Nitrites Negative 12/20/2022 11:11 AM    Leukocyte Esterase Negative 12/20/2022 11:11 AM    Epithelial cells FEW 12/20/2022 11:11 AM    Bacteria Negative 12/20/2022 11:11 AM    WBC 0-4 12/20/2022 11:11 AM    RBC 0-5 12/20/2022 11:11 AM         Medications Reviewed:     Current Facility-Administered Medications   Medication Dose Route Frequency    0.9% sodium chloride infusion  100 mL/hr IntraVENous CONTINUOUS    pantothenic ac-min oil-pet,hyd (AQUAPHOR) 41 % ointment   Topical Q12H    sodium chloride (NS) flush 5-40 mL  5-40 mL IntraVENous Q8H    sodium chloride (NS) flush 5-40 mL  5-40 mL IntraVENous PRN    acetaminophen (TYLENOL) tablet 650 mg  650 mg Oral Q6H PRN    Or    acetaminophen (TYLENOL) suppository 650 mg  650 mg Rectal Q6H PRN    polyethylene glycol (MIRALAX) packet 17 g  17 g Oral DAILY PRN    ondansetron (ZOFRAN ODT) tablet 4 mg  4 mg Oral Q8H PRN    Or    ondansetron (ZOFRAN) injection 4 mg  4 mg IntraVENous Q6H PRN    [Held by provider] heparin (porcine) injection 5,000 Units  5,000 Units SubCUTAneous Q8H    nicotine (NICODERM CQ) 21 mg/24 hr patch 1 Patch  1 Patch TransDERmal DAILY    atorvastatin (LIPITOR) tablet 40 mg  40 mg Oral QHS pregabalin (LYRICA) capsule 150 mg  150 mg Oral BID    pantoprazole (PROTONIX) tablet 40 mg  40 mg Oral ACB    levothyroxine (SYNTHROID) tablet 200 mcg  200 mcg Oral ACB    fenofibrate (LOFIBRA) tablet 160 mg  160 mg Oral DAILY    traZODone (DESYREL) tablet 50 mg  50 mg Oral QHS PRN    clonazePAM (KlonoPIN) tablet 1 mg  1 mg Oral TID    glucose chewable tablet 16 g  4 Tablet Oral PRN    glucagon (GLUCAGEN) injection 1 mg  1 mg IntraMUSCular PRN    dextrose 10 % infusion 0-250 mL  0-250 mL IntraVENous PRN    insulin lispro (HUMALOG) injection   SubCUTAneous AC&HS    methadone (DOLOPHINE) 5 mg/5 mL oral solution 160 mg  160 mg Oral DAILY    lidocaine 4 % patch 1 Patch  1 Patch TransDERmal Q24H    clopidogreL (PLAVIX) tablet 75 mg  75 mg Oral DAILY    piperacillin-tazobactam (ZOSYN) 3.375 g in 0.9% sodium chloride (MBP/ADV) 100 mL MBP  3.375 g IntraVENous Q8H    oseltamivir (TAMIFLU) capsule 30 mg  30 mg Oral BID    guaiFENesin (ROBITUSSIN) 100 mg/5 mL oral liquid 100 mg  100 mg Oral Q4H PRN     ______________________________________________________________________  EXPECTED LENGTH OF STAY: 2d 4h  ACTUAL LENGTH OF STAY:          1                 Lesleigh Cockayne, MD

## 2022-12-23 LAB
ALBUMIN SERPL-MCNC: 2.7 G/DL (ref 3.5–5)
ANION GAP SERPL CALC-SCNC: 3 MMOL/L (ref 5–15)
BUN SERPL-MCNC: 29 MG/DL (ref 6–20)
BUN/CREAT SERPL: 23 (ref 12–20)
CALCIUM SERPL-MCNC: 8.2 MG/DL (ref 8.5–10.1)
CHLORIDE SERPL-SCNC: 114 MMOL/L (ref 97–108)
CO2 SERPL-SCNC: 25 MMOL/L (ref 21–32)
CREAT SERPL-MCNC: 1.28 MG/DL (ref 0.55–1.02)
ERYTHROCYTE [DISTWIDTH] IN BLOOD BY AUTOMATED COUNT: 16.2 % (ref 11.5–14.5)
GLUCOSE BLD STRIP.AUTO-MCNC: 168 MG/DL (ref 65–117)
GLUCOSE BLD STRIP.AUTO-MCNC: 194 MG/DL (ref 65–117)
GLUCOSE BLD STRIP.AUTO-MCNC: 199 MG/DL (ref 65–117)
GLUCOSE SERPL-MCNC: 239 MG/DL (ref 65–100)
HCT VFR BLD AUTO: 26.1 % (ref 35–47)
HGB BLD-MCNC: 7.7 G/DL (ref 11.5–16)
MAGNESIUM SERPL-MCNC: 2.5 MG/DL (ref 1.6–2.4)
MCH RBC QN AUTO: 26.7 PG (ref 26–34)
MCHC RBC AUTO-ENTMCNC: 29.5 G/DL (ref 30–36.5)
MCV RBC AUTO: 90.6 FL (ref 80–99)
NRBC # BLD: 0 K/UL (ref 0–0.01)
NRBC BLD-RTO: 0 PER 100 WBC
PHOSPHATE SERPL-MCNC: 2.4 MG/DL (ref 2.6–4.7)
PLATELET # BLD AUTO: 144 K/UL (ref 150–400)
PMV BLD AUTO: 11.1 FL (ref 8.9–12.9)
POTASSIUM SERPL-SCNC: 5 MMOL/L (ref 3.5–5.1)
RBC # BLD AUTO: 2.88 M/UL (ref 3.8–5.2)
SERVICE CMNT-IMP: ABNORMAL
SODIUM SERPL-SCNC: 142 MMOL/L (ref 136–145)
WBC # BLD AUTO: 7 K/UL (ref 3.6–11)

## 2022-12-23 PROCEDURE — 74011000250 HC RX REV CODE- 250: Performed by: FAMILY MEDICINE

## 2022-12-23 PROCEDURE — 74011250637 HC RX REV CODE- 250/637: Performed by: INTERNAL MEDICINE

## 2022-12-23 PROCEDURE — 74011250636 HC RX REV CODE- 250/636: Performed by: FAMILY MEDICINE

## 2022-12-23 PROCEDURE — 83735 ASSAY OF MAGNESIUM: CPT

## 2022-12-23 PROCEDURE — 74011250637 HC RX REV CODE- 250/637: Performed by: FAMILY MEDICINE

## 2022-12-23 PROCEDURE — 85027 COMPLETE CBC AUTOMATED: CPT

## 2022-12-23 PROCEDURE — 74011000258 HC RX REV CODE- 258: Performed by: FAMILY MEDICINE

## 2022-12-23 PROCEDURE — 80069 RENAL FUNCTION PANEL: CPT

## 2022-12-23 PROCEDURE — 82962 GLUCOSE BLOOD TEST: CPT

## 2022-12-23 PROCEDURE — 36415 COLL VENOUS BLD VENIPUNCTURE: CPT

## 2022-12-23 PROCEDURE — 65270000029 HC RM PRIVATE

## 2022-12-23 RX ORDER — METHADONE HYDROCHLORIDE 10 MG/1
160 TABLET ORAL ONCE
Status: COMPLETED | OUTPATIENT
Start: 2022-12-23 | End: 2022-12-23

## 2022-12-23 RX ADMIN — PIPERACILLIN AND TAZOBACTAM 3.38 G: 3; .375 INJECTION, POWDER, FOR SOLUTION INTRAVENOUS at 17:00

## 2022-12-23 RX ADMIN — SODIUM CHLORIDE, PRESERVATIVE FREE 10 ML: 5 INJECTION INTRAVENOUS at 14:19

## 2022-12-23 RX ADMIN — SODIUM CHLORIDE 100 ML/HR: 9 INJECTION, SOLUTION INTRAVENOUS at 02:46

## 2022-12-23 RX ADMIN — PIPERACILLIN AND TAZOBACTAM 3.38 G: 3; .375 INJECTION, POWDER, FOR SOLUTION INTRAVENOUS at 04:04

## 2022-12-23 RX ADMIN — CLONAZEPAM 1 MG: 1 TABLET ORAL at 09:38

## 2022-12-23 RX ADMIN — PREGABALIN 150 MG: 100 CAPSULE ORAL at 23:19

## 2022-12-23 RX ADMIN — METHADONE HYDROCHLORIDE 160 MG: 10 TABLET ORAL at 14:09

## 2022-12-23 RX ADMIN — FENOFIBRATE 160 MG: 160 TABLET ORAL at 09:53

## 2022-12-23 RX ADMIN — SODIUM CHLORIDE, PRESERVATIVE FREE 10 ML: 5 INJECTION INTRAVENOUS at 23:20

## 2022-12-23 RX ADMIN — CLONAZEPAM 1 MG: 1 TABLET ORAL at 23:19

## 2022-12-23 RX ADMIN — PANTOPRAZOLE SODIUM 40 MG: 40 TABLET, DELAYED RELEASE ORAL at 06:57

## 2022-12-23 RX ADMIN — PIPERACILLIN AND TAZOBACTAM 3.38 G: 3; .375 INJECTION, POWDER, FOR SOLUTION INTRAVENOUS at 12:12

## 2022-12-23 RX ADMIN — SODIUM CHLORIDE 100 ML/HR: 9 INJECTION, SOLUTION INTRAVENOUS at 14:23

## 2022-12-23 RX ADMIN — ONDANSETRON HYDROCHLORIDE 4 MG: 2 SOLUTION INTRAMUSCULAR; INTRAVENOUS at 09:44

## 2022-12-23 RX ADMIN — PREGABALIN 150 MG: 100 CAPSULE ORAL at 09:38

## 2022-12-23 RX ADMIN — ATORVASTATIN CALCIUM 40 MG: 40 TABLET, FILM COATED ORAL at 23:19

## 2022-12-23 RX ADMIN — WHITE PETROLATUM: 1.75 OINTMENT TOPICAL at 23:22

## 2022-12-23 RX ADMIN — WHITE PETROLATUM: 1.75 OINTMENT TOPICAL at 09:48

## 2022-12-23 RX ADMIN — METHADONE HYDROCHLORIDE 160 MG: 5 SOLUTION ORAL at 09:38

## 2022-12-23 RX ADMIN — CLOPIDOGREL BISULFATE 75 MG: 75 TABLET ORAL at 09:38

## 2022-12-23 RX ADMIN — OSELTAMIVIR PHOSPHATE 30 MG: 30 CAPSULE ORAL at 09:53

## 2022-12-23 RX ADMIN — SODIUM CHLORIDE, PRESERVATIVE FREE 10 ML: 5 INJECTION INTRAVENOUS at 07:11

## 2022-12-23 RX ADMIN — LEVOTHYROXINE SODIUM 200 MCG: 0.1 TABLET ORAL at 06:57

## 2022-12-23 NOTE — PROGRESS NOTES
Patient name: Mac Frankel  MRN: [id number]    Nephrology Progress note:    Assessment:  DEEPTI: Serum Cr improving to 1.2mg/dl today (peak 2.3mg/dl) Received a dose of Ibuprofen 12/20-> with borderline hemodynamics. Suspected intravascular depletion on admission-> in the setting of poorly controlled DM2. Prior lab work from 10/2/20 showed a serum Cr 1.0mg/dl. At risk for CKD given uncontrolled DM2. Hyperkalemia: borderline-> better     HTN:borderline hypotensive    Influenza A     DM2: Uncontrolled. HgbA1c 11     Abd distension: not ascites     PVD w prior R BKA    Plan/Recommendations:  IV NS -> drop rate to 50cc/hr  Holding Metformin/Jardiance  Hold Lisinopril  K restricted diet  Strict I/Os  Avoid nephrotoxins  Am labs        Subjective:  Fever curve much better. Ct to have generalized weakness. No more N/V.     ROS:   No chest pain, no shortness of breath    Exam:  Visit Vitals  /68 (BP 1 Location: Right arm, BP Patient Position: At rest)   Pulse 81   Temp 98.6 °F (37 °C)   Resp 18   Ht 5' 7\" (1.702 m)   Wt 83.6 kg (184 lb 4.8 oz)   SpO2 96%   BMI 28.87 kg/m²     Wt Readings from Last 3 Encounters:   12/22/22 83.6 kg (184 lb 4.8 oz)   08/31/22 74.8 kg (165 lb)   09/01/21 86.2 kg (190 lb)     No intake or output data in the 24 hours ending 12/23/22 1646    Gen: NAD  HEENT: AT/NC  Lungs/Chest wall: Clear. No accessory muscle use. Cardiovascular: Regular rate, normal rhythm. Abdomen/: Soft, NT, ND, BS+.  No palpable organomegaly  Ext: R BKA, No peripheral edema  CNS: alert and awake.  Answers appropriately      Current Facility-Administered Medications   Medication Dose Route Frequency Last Admin    0.9% sodium chloride infusion  50 mL/hr IntraVENous CONTINUOUS 100 mL/hr at 12/23/22 1423    pantothenic ac-min oil-pet,hyd (AQUAPHOR) 41 % ointment   Topical Q12H Given at 12/23/22 0948    sodium chloride (NS) flush 5-40 mL  5-40 mL IntraVENous Q8H 10 mL at 12/23/22 1419    sodium chloride (NS) flush 5-40 mL  5-40 mL IntraVENous PRN      acetaminophen (TYLENOL) tablet 650 mg  650 mg Oral Q6H  mg at 12/22/22 2335    Or    acetaminophen (TYLENOL) suppository 650 mg  650 mg Rectal Q6H PRN      polyethylene glycol (MIRALAX) packet 17 g  17 g Oral DAILY PRN      ondansetron (ZOFRAN ODT) tablet 4 mg  4 mg Oral Q8H PRN      Or    ondansetron (ZOFRAN) injection 4 mg  4 mg IntraVENous Q6H PRN 4 mg at 12/23/22 0944    [Held by provider] heparin (porcine) injection 5,000 Units  5,000 Units SubCUTAneous Q8H 5,000 Units at 12/20/22 0400    nicotine (NICODERM CQ) 21 mg/24 hr patch 1 Patch  1 Patch TransDERmal DAILY 1 Patch at 12/23/22 0938    atorvastatin (LIPITOR) tablet 40 mg  40 mg Oral QHS 40 mg at 12/22/22 2120    pregabalin (LYRICA) capsule 150 mg  150 mg Oral  mg at 12/23/22 0938    pantoprazole (PROTONIX) tablet 40 mg  40 mg Oral ACB 40 mg at 12/23/22 0657    levothyroxine (SYNTHROID) tablet 200 mcg  200 mcg Oral  mcg at 12/23/22 0657    fenofibrate (LOFIBRA) tablet 160 mg  160 mg Oral DAILY 160 mg at 12/23/22 0953    traZODone (DESYREL) tablet 50 mg  50 mg Oral QHS PRN      clonazePAM (KlonoPIN) tablet 1 mg  1 mg Oral TID 1 mg at 12/23/22 0938    glucose chewable tablet 16 g  4 Tablet Oral PRN      glucagon (GLUCAGEN) injection 1 mg  1 mg IntraMUSCular PRN      dextrose 10 % infusion 0-250 mL  0-250 mL IntraVENous PRN      insulin lispro (HUMALOG) injection   SubCUTAneous AC&HS 1 Units at 12/22/22 2127    methadone (DOLOPHINE) 5 mg/5 mL oral solution 160 mg  160 mg Oral DAILY 160 mg at 12/23/22 0938    lidocaine 4 % patch 1 Patch  1 Patch TransDERmal Q24H 1 Patch at 12/22/22 1700    clopidogreL (PLAVIX) tablet 75 mg  75 mg Oral DAILY 75 mg at 12/23/22 0938    piperacillin-tazobactam (ZOSYN) 3.375 g in 0.9% sodium chloride (MBP/ADV) 100 mL MBP  3.375 g IntraVENous Q8H 3.375 g at 12/23/22 1212    oseltamivir (TAMIFLU) capsule 30 mg  30 mg Oral BID 30 mg at 12/23/22 0953    guaiFENesin (ROBITUSSIN) 100 mg/5 mL oral liquid 100 mg  100 mg Oral Q4H  mg at 12/21/22 2284       Labs/Data:    Lab Results   Component Value Date/Time    WBC 7.0 12/23/2022 02:25 AM    HGB 7.7 (L) 12/23/2022 02:25 AM    HCT 26.1 (L) 12/23/2022 02:25 AM    PLATELET 099 (L) 08/52/4067 02:25 AM    MCV 90.6 12/23/2022 02:25 AM       Lab Results   Component Value Date/Time    Sodium 142 12/23/2022 02:25 AM    Potassium 5.0 12/23/2022 02:25 AM    Chloride 114 (H) 12/23/2022 02:25 AM    CO2 25 12/23/2022 02:25 AM    Anion gap 3 (L) 12/23/2022 02:25 AM    Glucose 239 (H) 12/23/2022 02:25 AM    BUN 29 (H) 12/23/2022 02:25 AM    Creatinine 1.28 (H) 12/23/2022 02:25 AM    BUN/Creatinine ratio 23 (H) 12/23/2022 02:25 AM    GFR est AA >60 10/02/2020 04:17 AM    GFR est non-AA 55 (L) 10/02/2020 04:17 AM    Calcium 8.2 (L) 12/23/2022 02:25 AM       Patient seen and examined. Chart reviewed. Labs, data and other pertinent notes reviewed in last 24 hrs.     Discussed with patient    Signed by:  Fátima Shukla MD  [id number]

## 2022-12-23 NOTE — PROGRESS NOTES
Transition of Care Plan  RUR 14%    Disposition  Home with significant other --pending medical progress and recommendations. Transportation   Significant other    Home Health   will arrange if ordered and patient agrees    [facility] are in network with insurance    Medical follow up  PCP and specialist    Contact  Spouse/ significant other  Jennifer Rashid  [phone]    CM continues to follow for transition of care planning . Prior to admission, patient lived with her significant other in a one level home with 5-6 steps to enter. She has a right leg BKA and has a prothesis. Uses a wheelchair or RW for mobility. She does not drive-- uses medicaid transport or significant other for transport.

## 2022-12-23 NOTE — PROGRESS NOTES
[address] [facility] Adult  Hospitalist Group                                                                                          Hospitalist Progress Note  Karin Choudhary MD  Answering service: [phone] OR [phone] from in house phone        Date of Service:  2022  NAME:  Cindy Mason  :  1975  MRN:  [id number]      Admission Summary:   Cindy Mason is a 50 y.o. female history of DM2 s/p R BKA, hypothyroidism, HTN, and CAD s/p stents, and nicotine dependence who presents at the request of her GI physician for elevated creatinine with hyperkalemia. She states that her PCP on routine work-up noted an elevated creatinine, and \"fluid around the patient's liver\" she also had noted a weight increase of 14 pounds in 1 week, and increasing abdominal fullness. She was sent to a gastroenterologist for further evaluation of her liver disease, and repeat labs. Repeat blood work showed elevated creatinine with elevated potassium, and she was referred to the ED for further evaluation. She denies fever, chills, night sweats, nausea, vomiting, pain, shortness of breath, or decreased urinary output. In the ED, VSS. Labs were significant for Hgb 9.9 (improving), K+ 5.4, BUN 52, creatinine 2.04, albumin 3.2, Mg 2.7, and PO4 5.4. Renal ultrasound was normal. EKG NSR. In the ED, she received 1Lns       Interval history / Subjective:   Patient seen and examined at bedside this AM. Sleepy. No fevers yesterday. Hypoxia improved. Discussed with nursing - N/V, did not receive methadone yesterday due to lethargy, concern for opioid withdrawals. Tried to reach , left voicemail.       Assessment & Plan:     # Acute hypoxemic respiratory failure - improving   Influenza A infection  -  still febrile  - cont tamiflu  - CXR: No acute process  - saturating on 1-2l NC, try to wean down   - c/w IV zosyn for possible superimposed bacterial infection - procalcitonin ordered but not done #DEEPTI - cr improving   Hyperkalemia - better   -creatinine 2.04 (last 1 in 2020), BUN 52, and K+ 5.4  - s/p lokelma, low K+ diet  - c/w IVF  -renal ultrasound negative  -appreciate nephrology input   - hold lisinopril  - will monitor renal function      #Abd distension - not ascites   -abdominal ultrasound for diagnostic/therapeutic paracentesis  -up to date on colonoscopy due to mother with hx colon cancer, not up to date on gynecological screenings  -denies ETOH use     #DM II with hyperglycemia  -A1c 11. c/w ISS    # Chronic pain syndrome  - cont methadone and lyrica     #CAD  -continue plavix, lofibra, and lipitor     #HTN  -holding lisinopril as above. #GERD  -continue PPI     #nicotine dependence  -nicotine patch     #Hypothyroidism  -continue synthroid    Hx PVD w prior R BKA     Code status: Full  Prophylaxis: SCDs  Care Plan discussed with: patient, RN, CM  Anticipated Disposition: home in 1-2 days. Hospital Problems  Date Reviewed: 9/7/2022            Codes Class Noted POA    Hyperkalemia, diminished renal excretion ICD-10-CM: E87.5  ICD-9-CM: 276.7  12/20/2022 Unknown        DEEPTI (acute kidney injury) [facility]) ICD-10-CM: N17.9  ICD-9-CM: 584.9  12/20/2022 Unknown         Review of Systems:   A comprehensive review of systems was negative except for that written in the HPI. Vital Signs:    Last 24hrs VS reviewed since prior progress note.  Most recent are:  Visit Vitals  /63 (BP 1 Location: Right arm, BP Patient Position: At rest)   Pulse 83   Temp 98.7 °F (37.1 °C)   Resp 17   Ht 5' 7\" (1.702 m)   Wt 83.6 kg (184 lb 4.8 oz)   SpO2 96%   BMI 28.87 kg/m²     Patient Vitals for the past 24 hrs:   Temp Pulse Resp BP SpO2   12/23/22 0900 98.7 °F (37.1 °C) 83 17 126/63 96 %   12/23/22 0250 98.4 °F (36.9 °C) 88 18 (!) 112/58 96 %   12/22/22 1935 98.8 °F (37.1 °C) 85 18 100/64 93 %   12/22/22 1445 97.4 °F (36.3 °C) 90 18 (!) 94/52 96 %          No intake or output data in the 24 hours ending 12/23/22 1235     Physical Examination:     I had a face to face encounter with this patient and independently examined them on 12/23/2022 as outlined below:          Constitutional:  mild distress, ill appearing   HEENT:  Oral mucosa dry, O2 via NC, EOMI    Resp:  Decreased breath sounds bilaterally. some wheezing/rhonchi/rales. Some accessory muscle use. CV:  Regular rhythm, normal rate, no murmurs, gallops, rubs    GI:  Soft, non distended, non tender. normoactive bowel sounds,      Musculoskeletal:  Right AKA, warm, no wounds on stump    Neurologic:  Moves all extremities. Sleepy             Data Review:    Review and/or order of clinical lab test  Review and/or order of tests in the radiology section of CPT  Review and/or order of tests in the medicine section of CPT      Labs:     Recent Labs     12/23/22 0225 12/22/22 0221   WBC 7.0 8.6   HGB 7.7* 8.2*   HCT 26.1* 28.4*   * 152       Recent Labs     12/23/22 0225 12/22/22 0221 12/21/22 0226    141 136   K 5.0 4.9 5.1   * 114* 108   CO2 25 23 22   BUN 29* 37* 42*   CREA 1.28* 1.66* 2.31*   * 198* 128*   CA 8.2* 8.2* 8.9   MG 2.5* 2.7* 2.4   PHOS 2.4* 4.3 5.2*       Recent Labs     12/23/22 0225 12/22/22 0221 12/21/22 0226   ALT  --  29  --    AP  --  53  --    TBILI  --  0.2  --    TP  --  6.5  --    ALB 2.7* 2.6* 3.1*   GLOB  --  3.9  --        No results for input(s): INR, PTP, APTT, INREXT, INREXT in the last 72 hours. No results for input(s): FE, TIBC, PSAT, FERR in the last 72 hours. Lab Results   Component Value Date/Time    Folate 22.8 (H) 12/20/2022 09:46 AM        No results for input(s): PH, PCO2, PO2 in the last 72 hours. No results for input(s): CPK, CKNDX, TROIQ in the last 72 hours.     No lab exists for component: CPKMB  Lab Results   Component Value Date/Time    Cholesterol, total 104 09/30/2020 03:35 AM    HDL Cholesterol 17 09/30/2020 03:35 AM    LDL, calculated 19 09/30/2020 03:35 AM Triglyceride 340 (H) 09/30/2020 03:35 AM    CHOL/HDL Ratio 6.1 (H) 09/30/2020 03:35 AM     Lab Results   Component Value Date/Time    Glucose (POC) 194 (H) 12/23/2022 11:13 AM    Glucose (POC) 168 (H) 12/23/2022 07:03 AM    Glucose (POC) 248 (H) 12/22/2022 09:19 PM    Glucose (POC) 325 (H) 12/22/2022 04:28 PM    Glucose (POC) 273 (H) 12/22/2022 11:18 AM     Lab Results   Component Value Date/Time    Color YELLOW/STRAW 12/20/2022 11:11 AM    Appearance CLEAR 12/20/2022 11:11 AM    Specific gravity 1.015 12/20/2022 11:11 AM    pH (UA) 5.5 12/20/2022 11:11 AM    Protein Negative 12/20/2022 11:11 AM    Glucose >1,000 (A) 12/20/2022 11:11 AM    Ketone Negative 12/20/2022 11:11 AM    Bilirubin Negative 12/20/2022 11:11 AM    Urobilinogen 0.2 12/20/2022 11:11 AM    Nitrites Negative 12/20/2022 11:11 AM    Leukocyte Esterase Negative 12/20/2022 11:11 AM    Epithelial cells FEW 12/20/2022 11:11 AM    Bacteria Negative 12/20/2022 11:11 AM    WBC 0-4 12/20/2022 11:11 AM    RBC 0-5 12/20/2022 11:11 AM         Medications Reviewed:     Current Facility-Administered Medications   Medication Dose Route Frequency    methadone (DOLOPHINE) tablet 160 mg  160 mg Oral ONCE    0.9% sodium chloride infusion  100 mL/hr IntraVENous CONTINUOUS    pantothenic ac-min oil-pet,hyd (AQUAPHOR) 41 % ointment   Topical Q12H    sodium chloride (NS) flush 5-40 mL  5-40 mL IntraVENous Q8H    sodium chloride (NS) flush 5-40 mL  5-40 mL IntraVENous PRN    acetaminophen (TYLENOL) tablet 650 mg  650 mg Oral Q6H PRN    Or    acetaminophen (TYLENOL) suppository 650 mg  650 mg Rectal Q6H PRN    polyethylene glycol (MIRALAX) packet 17 g  17 g Oral DAILY PRN    ondansetron (ZOFRAN ODT) tablet 4 mg  4 mg Oral Q8H PRN    Or    ondansetron (ZOFRAN) injection 4 mg  4 mg IntraVENous Q6H PRN    [Held by provider] heparin (porcine) injection 5,000 Units  5,000 Units SubCUTAneous Q8H    nicotine (NICODERM CQ) 21 mg/24 hr patch 1 Patch  1 Patch TransDERmal DAILY atorvastatin (LIPITOR) tablet 40 mg  40 mg Oral QHS    pregabalin (LYRICA) capsule 150 mg  150 mg Oral BID    pantoprazole (PROTONIX) tablet 40 mg  40 mg Oral ACB    levothyroxine (SYNTHROID) tablet 200 mcg  200 mcg Oral ACB    fenofibrate (LOFIBRA) tablet 160 mg  160 mg Oral DAILY    traZODone (DESYREL) tablet 50 mg  50 mg Oral QHS PRN    clonazePAM (KlonoPIN) tablet 1 mg  1 mg Oral TID    glucose chewable tablet 16 g  4 Tablet Oral PRN    glucagon (GLUCAGEN) injection 1 mg  1 mg IntraMUSCular PRN    dextrose 10 % infusion 0-250 mL  0-250 mL IntraVENous PRN    insulin lispro (HUMALOG) injection   SubCUTAneous AC&HS    methadone (DOLOPHINE) 5 mg/5 mL oral solution 160 mg  160 mg Oral DAILY    lidocaine 4 % patch 1 Patch  1 Patch TransDERmal Q24H    clopidogreL (PLAVIX) tablet 75 mg  75 mg Oral DAILY    piperacillin-tazobactam (ZOSYN) 3.375 g in 0.9% sodium chloride (MBP/ADV) 100 mL MBP  3.375 g IntraVENous Q8H    oseltamivir (TAMIFLU) capsule 30 mg  30 mg Oral BID    guaiFENesin (ROBITUSSIN) 100 mg/5 mL oral liquid 100 mg  100 mg Oral Q4H PRN     ______________________________________________________________________  EXPECTED LENGTH OF STAY: 2d 21h  ACTUAL LENGTH OF STAY:          3                 Nguyen Mendoza MD

## 2022-12-24 ENCOUNTER — APPOINTMENT (OUTPATIENT)
Dept: GENERAL RADIOLOGY | Age: 47
DRG: 113 | End: 2022-12-24
Attending: INTERNAL MEDICINE
Payer: COMMERCIAL

## 2022-12-24 LAB
GLUCOSE BLD STRIP.AUTO-MCNC: 204 MG/DL (ref 65–117)
GLUCOSE BLD STRIP.AUTO-MCNC: 208 MG/DL (ref 65–117)
SERVICE CMNT-IMP: ABNORMAL
SERVICE CMNT-IMP: ABNORMAL

## 2022-12-24 PROCEDURE — 74011000250 HC RX REV CODE- 250: Performed by: FAMILY MEDICINE

## 2022-12-24 PROCEDURE — 65270000029 HC RM PRIVATE

## 2022-12-24 PROCEDURE — 82962 GLUCOSE BLOOD TEST: CPT

## 2022-12-24 PROCEDURE — 94640 AIRWAY INHALATION TREATMENT: CPT

## 2022-12-24 PROCEDURE — 74011250637 HC RX REV CODE- 250/637: Performed by: FAMILY MEDICINE

## 2022-12-24 PROCEDURE — 71045 X-RAY EXAM CHEST 1 VIEW: CPT

## 2022-12-24 PROCEDURE — 74011250636 HC RX REV CODE- 250/636: Performed by: FAMILY MEDICINE

## 2022-12-24 PROCEDURE — 74011636637 HC RX REV CODE- 636/637: Performed by: FAMILY MEDICINE

## 2022-12-24 PROCEDURE — 74011000250 HC RX REV CODE- 250: Performed by: INTERNAL MEDICINE

## 2022-12-24 PROCEDURE — 74011250637 HC RX REV CODE- 250/637: Performed by: INTERNAL MEDICINE

## 2022-12-24 PROCEDURE — 74011000258 HC RX REV CODE- 258: Performed by: FAMILY MEDICINE

## 2022-12-24 RX ORDER — BUMETANIDE 0.25 MG/ML
1 INJECTION INTRAMUSCULAR; INTRAVENOUS ONCE
Status: COMPLETED | OUTPATIENT
Start: 2022-12-24 | End: 2022-12-24

## 2022-12-24 RX ORDER — IPRATROPIUM BROMIDE AND ALBUTEROL SULFATE 2.5; .5 MG/3ML; MG/3ML
3 SOLUTION RESPIRATORY (INHALATION)
Status: DISCONTINUED | OUTPATIENT
Start: 2022-12-24 | End: 2022-12-25

## 2022-12-24 RX ORDER — GUAIFENESIN 600 MG/1
600 TABLET, EXTENDED RELEASE ORAL EVERY 12 HOURS
Status: DISCONTINUED | OUTPATIENT
Start: 2022-12-24 | End: 2022-12-28 | Stop reason: HOSPADM

## 2022-12-24 RX ADMIN — SODIUM CHLORIDE, PRESERVATIVE FREE 10 ML: 5 INJECTION INTRAVENOUS at 15:19

## 2022-12-24 RX ADMIN — ATORVASTATIN CALCIUM 40 MG: 40 TABLET, FILM COATED ORAL at 21:52

## 2022-12-24 RX ADMIN — Medication 2 UNITS: at 17:37

## 2022-12-24 RX ADMIN — CLONAZEPAM 1 MG: 1 TABLET ORAL at 21:52

## 2022-12-24 RX ADMIN — WHITE PETROLATUM: 1.75 OINTMENT TOPICAL at 21:53

## 2022-12-24 RX ADMIN — TRAZODONE HYDROCHLORIDE 50 MG: 50 TABLET ORAL at 22:00

## 2022-12-24 RX ADMIN — OSELTAMIVIR PHOSPHATE 30 MG: 30 CAPSULE ORAL at 17:37

## 2022-12-24 RX ADMIN — BUMETANIDE 1 MG: 0.25 INJECTION INTRAMUSCULAR; INTRAVENOUS at 15:15

## 2022-12-24 RX ADMIN — SODIUM CHLORIDE, PRESERVATIVE FREE 10 ML: 5 INJECTION INTRAVENOUS at 21:53

## 2022-12-24 RX ADMIN — PIPERACILLIN AND TAZOBACTAM 3.38 G: 3; .375 INJECTION, POWDER, FOR SOLUTION INTRAVENOUS at 17:38

## 2022-12-24 RX ADMIN — WHITE PETROLATUM: 1.75 OINTMENT TOPICAL at 11:40

## 2022-12-24 RX ADMIN — IPRATROPIUM BROMIDE AND ALBUTEROL SULFATE 3 ML: .5; 3 SOLUTION RESPIRATORY (INHALATION) at 15:36

## 2022-12-24 RX ADMIN — CLONAZEPAM 1 MG: 1 TABLET ORAL at 15:18

## 2022-12-24 RX ADMIN — ONDANSETRON HYDROCHLORIDE 4 MG: 2 SOLUTION INTRAMUSCULAR; INTRAVENOUS at 15:15

## 2022-12-24 RX ADMIN — GUAIFENESIN 600 MG: 600 TABLET, EXTENDED RELEASE ORAL at 15:18

## 2022-12-24 RX ADMIN — PIPERACILLIN AND TAZOBACTAM 3.38 G: 3; .375 INJECTION, POWDER, FOR SOLUTION INTRAVENOUS at 03:47

## 2022-12-24 RX ADMIN — GUAIFENESIN 600 MG: 600 TABLET, EXTENDED RELEASE ORAL at 22:00

## 2022-12-24 RX ADMIN — PIPERACILLIN AND TAZOBACTAM 3.38 G: 3; .375 INJECTION, POWDER, FOR SOLUTION INTRAVENOUS at 11:33

## 2022-12-24 RX ADMIN — Medication 1 UNITS: at 22:02

## 2022-12-24 RX ADMIN — PREGABALIN 150 MG: 100 CAPSULE ORAL at 21:52

## 2022-12-24 NOTE — PROGRESS NOTES
Patient name: Micah Bustos  MRN: [id number]    Nephrology Progress note:    Assessment:  DEEPTI: Serum Cr improving to 1.2mg/dl yesterday (peak 2.3mg/dl) Received a dose of Ibuprofen 12/20-> with borderline hemodynamics. Suspected intravascular depletion on admission-> in the setting of poorly controlled DM2. Prior lab work from 10/2/20 showed a serum Cr 1.0mg/dl. At risk for CKD given uncontrolled DM2. Hyperkalemia: borderline-> better     HTN:borderline hypotensive    Influenza A: complicated by PNA     DM2: Uncontrolled. HgbA1c 11     Abd distension: not ascites     PVD w prior R BKA    Plan/Recommendations:  No new labs today  Agree with holding IVF  IV Bumex 1mg x1 dose today  Holding Metformin/Jardiance  Holding Lisinopril  Tamiflu  K restricted diet  Strict I/Os  Avoid nephrotoxins  Am labs        Subjective:  Fever curve much better. Periods of confusion. Reports some SOB. Ct to have generalized weakness.  Some n/v reported by     ROS:   No chest pain    Exam:  Visit Vitals  /79   Pulse 86   Temp 98.4 °F (36.9 °C)   Resp 18   Ht 5' 7\" (1.702 m)   Wt 83.5 kg (184 lb)   SpO2 90% Comment: 5L   BMI 28.82 kg/m²     Wt Readings from Last 3 Encounters:   12/23/22 83.5 kg (184 lb)   08/31/22 74.8 kg (165 lb)   09/01/21 86.2 kg (190 lb)       Intake/Output Summary (Last 24 hours) at 12/24/2022 1534  Last data filed at 12/24/2022 0115  Gross per 24 hour   Intake 500 ml   Output --   Net 500 ml       Gen: NAD  HEENT: AT/NC  Lungs/Chest wall: Coarse B/l  Cardiovascular: Regular rate, normal rhythm. Abdomen/: Soft, NT, ND, BS+. No palpable organomegaly  Ext: R BKA, No peripheral edema  CNS: alert and awake.  Answers appropriately      Current Facility-Administered Medications   Medication Dose Route Frequency Last Admin    guaiFENesin ER (MUCINEX) tablet 600 mg  600 mg Oral Q12H 600 mg at 12/24/22 1518    albuterol-ipratropium (DUO-NEB) 2.5 MG-0.5 MG/3 ML  3 mL Nebulization Q4H RT      pantothenic ac-min oil-pet,hyd (AQUAPHOR) 41 % ointment   Topical Q12H Given at 12/24/22 1140    sodium chloride (NS) flush 5-40 mL  5-40 mL IntraVENous Q8H 10 mL at 12/24/22 1519    sodium chloride (NS) flush 5-40 mL  5-40 mL IntraVENous PRN      acetaminophen (TYLENOL) tablet 650 mg  650 mg Oral Q6H  mg at 12/22/22 2335    Or    acetaminophen (TYLENOL) suppository 650 mg  650 mg Rectal Q6H PRN      polyethylene glycol (MIRALAX) packet 17 g  17 g Oral DAILY PRN      ondansetron (ZOFRAN ODT) tablet 4 mg  4 mg Oral Q8H PRN      Or    ondansetron (ZOFRAN) injection 4 mg  4 mg IntraVENous Q6H PRN 4 mg at 12/24/22 1515    [Held by provider] heparin (porcine) injection 5,000 Units  5,000 Units SubCUTAneous Q8H 5,000 Units at 12/20/22 0400    nicotine (NICODERM CQ) 21 mg/24 hr patch 1 Patch  1 Patch TransDERmal DAILY 1 Patch at 12/24/22 1135    atorvastatin (LIPITOR) tablet 40 mg  40 mg Oral QHS 40 mg at 12/23/22 2319    pregabalin (LYRICA) capsule 150 mg  150 mg Oral  mg at 12/23/22 2319    pantoprazole (PROTONIX) tablet 40 mg  40 mg Oral ACB 40 mg at 12/23/22 0657    levothyroxine (SYNTHROID) tablet 200 mcg  200 mcg Oral  mcg at 12/23/22 0657    fenofibrate (LOFIBRA) tablet 160 mg  160 mg Oral DAILY 160 mg at 12/23/22 0953    traZODone (DESYREL) tablet 50 mg  50 mg Oral QHS PRN      clonazePAM (KlonoPIN) tablet 1 mg  1 mg Oral TID 1 mg at 12/24/22 1518    glucose chewable tablet 16 g  4 Tablet Oral PRN      glucagon (GLUCAGEN) injection 1 mg  1 mg IntraMUSCular PRN      dextrose 10 % infusion 0-250 mL  0-250 mL IntraVENous PRN      insulin lispro (HUMALOG) injection   SubCUTAneous AC&HS 1 Units at 12/22/22 2127    methadone (DOLOPHINE) 5 mg/5 mL oral solution 160 mg  160 mg Oral DAILY 160 mg at 12/23/22 0938    lidocaine 4 % patch 1 Patch  1 Patch TransDERmal Q24H 1 Patch at 12/23/22 1700    clopidogreL (PLAVIX) tablet 75 mg  75 mg Oral DAILY 75 mg at 12/23/22 0938    piperacillin-tazobactam (ZOSYN) 3.375 g in 0.9% sodium chloride (MBP/ADV) 100 mL MBP  3.375 g IntraVENous Q8H 3.375 g at 12/24/22 1133    oseltamivir (TAMIFLU) capsule 30 mg  30 mg Oral BID 30 mg at 12/23/22 0953    guaiFENesin (ROBITUSSIN) 100 mg/5 mL oral liquid 100 mg  100 mg Oral Q4H  mg at 12/21/22 2791       Labs/Data:    Lab Results   Component Value Date/Time    WBC 7.0 12/23/2022 02:25 AM    HGB 7.7 (L) 12/23/2022 02:25 AM    HCT 26.1 (L) 12/23/2022 02:25 AM    PLATELET 874 (L) 43/91/2835 02:25 AM    MCV 90.6 12/23/2022 02:25 AM       Lab Results   Component Value Date/Time    Sodium 142 12/23/2022 02:25 AM    Potassium 5.0 12/23/2022 02:25 AM    Chloride 114 (H) 12/23/2022 02:25 AM    CO2 25 12/23/2022 02:25 AM    Anion gap 3 (L) 12/23/2022 02:25 AM    Glucose 239 (H) 12/23/2022 02:25 AM    BUN 29 (H) 12/23/2022 02:25 AM    Creatinine 1.28 (H) 12/23/2022 02:25 AM    BUN/Creatinine ratio 23 (H) 12/23/2022 02:25 AM    GFR est AA >60 10/02/2020 04:17 AM    GFR est non-AA 55 (L) 10/02/2020 04:17 AM    Calcium 8.2 (L) 12/23/2022 02:25 AM       Patient seen and examined. Chart reviewed. Labs, data and other pertinent notes reviewed in last 24 hrs.     Discussed with patient/ and RN    Signed by:  Emma Steward MD  [facility]

## 2022-12-24 NOTE — PROGRESS NOTES
Shift Summary:  Assumed care for this patient from 21 . Assisted pt to bedside commode, medications given. Pt tearful that she will not be home for Randy. No distress noted. Pt on 5L humidified oxygen via NC    1930 Verbal shift change report given to Isrrael Crews RN (oncoming nurse) by John Baker RN (offgoing nurse). Report included the following information SBAR, Kardex, Intake/Output, MAR, and Recent Results.

## 2022-12-24 NOTE — PROGRESS NOTES
[address] [facility] Adult  Hospitalist Group                                                                                          Hospitalist Progress Note  Carlos Chapa MD  Answering service: [phone] OR [phone] from in house phone        Date of Service:  2022  NAME:  Brittany Varghese  :  1975  MRN:  [id number]      Admission Summary:   Brittany Varghese is a 50 y.o. female history of DM2 s/p R BKA, hypothyroidism, HTN, and CAD s/p stents, and nicotine dependence who presents at the request of her GI physician for elevated creatinine with hyperkalemia. She states that her PCP on routine work-up noted an elevated creatinine, and \"fluid around the patient's liver\" she also had noted a weight increase of 14 pounds in 1 week, and increasing abdominal fullness. She was sent to a gastroenterologist for further evaluation of her liver disease, and repeat labs. Repeat blood work showed elevated creatinine with elevated potassium, and she was referred to the ED for further evaluation. She denies fever, chills, night sweats, nausea, vomiting, pain, shortness of breath, or decreased urinary output. In the ED, VSS. Labs were significant for Hgb 9.9 (improving), K+ 5.4, BUN 52, creatinine 2.04, albumin 3.2, Mg 2.7, and PO4 5.4. Renal ultrasound was normal. EKG NSR. In the ED, she received 1Lns       Interval history / Subjective:   Patient seen and examined at bedside this AM. Sleepy but woke up. She does not feel good. Still coughing. Last night events noted - hypoxia worsened - cxr ordered.    Discussed with nursing - labs not done today      Assessment & Plan:     # Acute hypoxemic respiratory failure - worsened    Influenza A infection  -  still febrile  - cont tamiflu  - CXR: No acute process  - c/w IV zosyn for possible superimposed bacterial infection - procalcitonin ordered but not done   - saturating on 5l NC- CXR showed     #DEEPTI - cr improving   Hyperkalemia - better -creatinine 2.04 (last 1 in 2020), BUN 52, and K+ 5.4  - s/p lokelma, low K+ diet  - dced IVF  -renal ultrasound negative  -appreciate nephrology input   - hold lisinopril  - will monitor renal function      #Abd distension - not ascites   -abdominal ultrasound for diagnostic/therapeutic paracentesis  -up to date on colonoscopy due to mother with hx colon cancer, not up to date on gynecological screenings  -denies ETOH use     #DM II with hyperglycemia  -A1c 11. c/w ISS    # Chronic pain syndrome  - cont methadone and lyrica     #CAD  -continue plavix, lofibra, and lipitor     #HTN  -holding lisinopril as above. #GERD  -continue PPI     #Nicotine dependence  -nicotine patch     #Hypothyroidism  -continue synthroid    Hx PVD w prior R BKA     Code status: Full  Prophylaxis: SCDs  Care Plan discussed with: patient, RN, CM  Anticipated Disposition: home in 1-2 days. Hospital Problems  Date Reviewed: 9/7/2022            Codes Class Noted POA    Hyperkalemia, diminished renal excretion ICD-10-CM: E87.5  ICD-9-CM: 276.7  12/20/2022 Unknown        DEETPI (acute kidney injury) [facility]) ICD-10-CM: N17.9  ICD-9-CM: 584.9  12/20/2022 Unknown         Review of Systems:   A comprehensive review of systems was negative except for that written in the HPI. Vital Signs:    Last 24hrs VS reviewed since prior progress note.  Most recent are:  Visit Vitals  /79   Pulse 86   Temp 98.4 °F (36.9 °C)   Resp 18   Ht 5' 7\" (1.702 m)   Wt 83.5 kg (184 lb)   SpO2 90%   BMI 28.82 kg/m²     Patient Vitals for the past 24 hrs:   Temp Pulse Resp BP SpO2   12/24/22 1129 -- 86 -- 130/79 90 %   12/24/22 1128 -- 90 -- -- (!) 65 %   12/24/22 0424 -- -- -- -- 93 %   12/24/22 0422 -- -- -- -- 92 %   12/24/22 0420 -- -- -- -- 92 %   12/24/22 0412 -- -- 18 119/64 (!) 68 %   12/23/22 1946 98.4 °F (36.9 °C) 78 17 111/74 95 %   12/23/22 1445 98.6 °F (37 °C) 81 18 116/68 96 %            Intake/Output Summary (Last 24 hours) at 12/24/2022 1213  Last data filed at 12/24/2022 0115  Gross per 24 hour   Intake 500 ml   Output --   Net 500 ml          Physical Examination:     I had a face to face encounter with this patient and independently examined them on 12/24/2022 as outlined below:          Constitutional:  mild distress, ill appearing   HEENT:  Oral mucosa dry, O2 via NC, EOMI    Resp:  Decreased breath sounds bilaterally. some wheezing/rhonchi/rales. Some accessory muscle use. CV:  Regular rhythm, normal rate, no murmurs, gallops, rubs    GI:  Soft, non distended, non tender. normoactive bowel sounds,      Musculoskeletal:  Right AKA, warm, no wounds on stump    Neurologic:  Moves all extremities. Data Review:    Review and/or order of clinical lab test  Review and/or order of tests in the radiology section of CPT  Review and/or order of tests in the medicine section of CPT      Labs:     Recent Labs     12/23/22 0225 12/22/22 0221   WBC 7.0 8.6   HGB 7.7* 8.2*   HCT 26.1* 28.4*   * 152       Recent Labs     12/23/22 0225 12/22/22 0221    141   K 5.0 4.9   * 114*   CO2 25 23   BUN 29* 37*   CREA 1.28* 1.66*   * 198*   CA 8.2* 8.2*   MG 2.5* 2.7*   PHOS 2.4* 4.3       Recent Labs     12/23/22 0225 12/22/22 0221   ALT  --  29   AP  --  53   TBILI  --  0.2   TP  --  6.5   ALB 2.7* 2.6*   GLOB  --  3.9       No results for input(s): INR, PTP, APTT, INREXT, INREXT in the last 72 hours. No results for input(s): FE, TIBC, PSAT, FERR in the last 72 hours. Lab Results   Component Value Date/Time    Folate 22.8 (H) 12/20/2022 09:46 AM        No results for input(s): PH, PCO2, PO2 in the last 72 hours. No results for input(s): CPK, CKNDX, TROIQ in the last 72 hours.     No lab exists for component: CPKMB  Lab Results   Component Value Date/Time    Cholesterol, total 104 09/30/2020 03:35 AM    HDL Cholesterol 17 09/30/2020 03:35 AM    LDL, calculated 19 09/30/2020 03:35 AM    Triglyceride 340 (H) 09/30/2020 03:35 AM    CHOL/HDL Ratio 6.1 (H) 09/30/2020 03:35 AM     Lab Results   Component Value Date/Time    Glucose (POC) 199 (H) 12/23/2022 04:28 PM    Glucose (POC) 194 (H) 12/23/2022 11:13 AM    Glucose (POC) 168 (H) 12/23/2022 07:03 AM    Glucose (POC) 248 (H) 12/22/2022 09:19 PM    Glucose (POC) 325 (H) 12/22/2022 04:28 PM     Lab Results   Component Value Date/Time    Color YELLOW/STRAW 12/20/2022 11:11 AM    Appearance CLEAR 12/20/2022 11:11 AM    Specific gravity 1.015 12/20/2022 11:11 AM    pH (UA) 5.5 12/20/2022 11:11 AM    Protein Negative 12/20/2022 11:11 AM    Glucose >1,000 (A) 12/20/2022 11:11 AM    Ketone Negative 12/20/2022 11:11 AM    Bilirubin Negative 12/20/2022 11:11 AM    Urobilinogen 0.2 12/20/2022 11:11 AM    Nitrites Negative 12/20/2022 11:11 AM    Leukocyte Esterase Negative 12/20/2022 11:11 AM    Epithelial cells FEW 12/20/2022 11:11 AM    Bacteria Negative 12/20/2022 11:11 AM    WBC 0-4 12/20/2022 11:11 AM    RBC 0-5 12/20/2022 11:11 AM         Medications Reviewed:     Current Facility-Administered Medications   Medication Dose Route Frequency    0.9% sodium chloride infusion  50 mL/hr IntraVENous CONTINUOUS    pantothenic ac-min oil-pet,hyd (AQUAPHOR) 41 % ointment   Topical Q12H    sodium chloride (NS) flush 5-40 mL  5-40 mL IntraVENous Q8H    sodium chloride (NS) flush 5-40 mL  5-40 mL IntraVENous PRN    acetaminophen (TYLENOL) tablet 650 mg  650 mg Oral Q6H PRN    Or    acetaminophen (TYLENOL) suppository 650 mg  650 mg Rectal Q6H PRN    polyethylene glycol (MIRALAX) packet 17 g  17 g Oral DAILY PRN    ondansetron (ZOFRAN ODT) tablet 4 mg  4 mg Oral Q8H PRN    Or    ondansetron (ZOFRAN) injection 4 mg  4 mg IntraVENous Q6H PRN    [Held by provider] heparin (porcine) injection 5,000 Units  5,000 Units SubCUTAneous Q8H    nicotine (NICODERM CQ) 21 mg/24 hr patch 1 Patch  1 Patch TransDERmal DAILY    atorvastatin (LIPITOR) tablet 40 mg  40 mg Oral QHS    pregabalin (LYRICA) capsule 150 mg  150 mg Oral BID    pantoprazole (PROTONIX) tablet 40 mg  40 mg Oral ACB    levothyroxine (SYNTHROID) tablet 200 mcg  200 mcg Oral ACB    fenofibrate (LOFIBRA) tablet 160 mg  160 mg Oral DAILY    traZODone (DESYREL) tablet 50 mg  50 mg Oral QHS PRN    clonazePAM (KlonoPIN) tablet 1 mg  1 mg Oral TID    glucose chewable tablet 16 g  4 Tablet Oral PRN    glucagon (GLUCAGEN) injection 1 mg  1 mg IntraMUSCular PRN    dextrose 10 % infusion 0-250 mL  0-250 mL IntraVENous PRN    insulin lispro (HUMALOG) injection   SubCUTAneous AC&HS    methadone (DOLOPHINE) 5 mg/5 mL oral solution 160 mg  160 mg Oral DAILY    lidocaine 4 % patch 1 Patch  1 Patch TransDERmal Q24H    clopidogreL (PLAVIX) tablet 75 mg  75 mg Oral DAILY    piperacillin-tazobactam (ZOSYN) 3.375 g in 0.9% sodium chloride (MBP/ADV) 100 mL MBP  3.375 g IntraVENous Q8H    oseltamivir (TAMIFLU) capsule 30 mg  30 mg Oral BID    guaiFENesin (ROBITUSSIN) 100 mg/5 mL oral liquid 100 mg  100 mg Oral Q4H PRN     ______________________________________________________________________  EXPECTED LENGTH OF STAY: 2d 21h  ACTUAL LENGTH OF STAY:          [address], [place]

## 2022-12-24 NOTE — PROGRESS NOTES
Problem: Diabetes Self-Management  Goal: *Disease process and treatment process  Description: Define diabetes and identify own type of diabetes; list 3 options for treating diabetes. Outcome: Progressing Towards Goal  Goal: *Incorporating nutritional management into lifestyle  Description: Describe effect of type, amount and timing of food on blood glucose; list 3 methods for planning meals. Outcome: Progressing Towards Goal  Goal: *Incorporating physical activity into lifestyle  Description: State effect of exercise on blood glucose levels. Outcome: Progressing Towards Goal  Goal: *Developing strategies to promote health/change behavior  Description: Define the ABC's of diabetes; identify appropriate screenings, schedule and personal plan for screenings. Outcome: Progressing Towards Goal  Goal: *Using medications safely  Description: State effect of diabetes medications on diabetes; name diabetes medication taking, action and side effects. Outcome: Progressing Towards Goal  Goal: *Monitoring blood glucose, interpreting and using results  Description: Identify recommended blood glucose targets  and personal targets. Outcome: Progressing Towards Goal  Goal: *Prevention, detection, treatment of acute complications  Description: List symptoms of hyper- and hypoglycemia; describe how to treat low blood sugar and actions for lowering  high blood glucose level. Outcome: Progressing Towards Goal  Goal: *Prevention, detection and treatment of chronic complications  Description: Define the natural course of diabetes and describe the relationship of blood glucose levels to long term complications of diabetes.   Outcome: Progressing Towards Goal  Goal: *Developing strategies to address psychosocial issues  Description: Describe feelings about living with diabetes; identify support needed and support network  Outcome: Progressing Towards Goal  Goal: *Insulin pump training  Outcome: Progressing Towards Goal  Goal: *Sick day guidelines  Outcome: Progressing Towards Goal  Goal: *Patient Specific Goal (EDIT GOAL, INSERT TEXT)  Outcome: Progressing Towards Goal     Problem: Patient Education: Go to Patient Education Activity  Goal: Patient/Family Education  Outcome: Progressing Towards Goal     Problem: Risk for Spread of Infection  Goal: Prevent transmission of infectious organism to others  Description: Prevent the transmission of infectious organisms to other patients, staff members, and visitors. Outcome: Progressing Towards Goal     Problem: Patient Education:  Go to Education Activity  Goal: Patient/Family Education  Outcome: Progressing Towards Goal     Problem: Falls - Risk of  Goal: *Absence of Falls  Description: Document Jaki Israel Fall Risk and appropriate interventions in the flowsheet. Outcome: Progressing Towards Goal  Note: Fall Risk Interventions:       Mentation Interventions: Bed/chair exit alarm, More frequent rounding, Reorient patient                        Problem: Patient Education: Go to Patient Education Activity  Goal: Patient/Family Education  Outcome: Progressing Towards Goal     Problem: Pressure Injury - Risk of  Goal: *Prevention of pressure injury  Description: Document Brendon Scale and appropriate interventions in the flowsheet. Outcome: Progressing Towards Goal  Note: Pressure Injury Interventions:  Sensory Interventions: Assess changes in LOC, Avoid rigorous massage over bony prominences, Check visual cues for pain, Discuss PT/OT consult with provider, Float heels, Keep linens dry and wrinkle-free         Activity Interventions: Increase time out of bed, Chair cushion, PT/OT evaluation    Mobility Interventions: Assess need for specialty bed, Float heels, Chair cushion, Turn and reposition approx.  every two hours(pillow and wedges), PT/OT evaluation    Nutrition Interventions: Offer support with meals,snacks and hydration, Discuss nutritional consult with provider, Document food/fluid/supplement intake    Friction and Shear Interventions: Apply protective barrier, creams and emollients, Foam dressings/transparent film/skin sealants, Lift sheet, Minimize layers, Transfer aides:transfer board/Jaqueline lift/ceiling lift                Problem: Patient Education: Go to Patient Education Activity  Goal: Patient/Family Education  Outcome: Progressing Towards Goal

## 2022-12-25 LAB
ALBUMIN SERPL-MCNC: 2.5 G/DL (ref 3.5–5)
ALBUMIN/GLOB SERPL: 0.5 {RATIO} (ref 1.1–2.2)
ALP SERPL-CCNC: 52 U/L (ref 45–117)
ALT SERPL-CCNC: 22 U/L (ref 12–78)
ANION GAP SERPL CALC-SCNC: 6 MMOL/L (ref 5–15)
AST SERPL-CCNC: 18 U/L (ref 15–37)
BACTERIA SPEC CULT: NORMAL
BASOPHILS # BLD: 0 K/UL (ref 0–0.1)
BASOPHILS NFR BLD: 0 % (ref 0–1)
BILIRUB SERPL-MCNC: 0.3 MG/DL (ref 0.2–1)
BUN SERPL-MCNC: 29 MG/DL (ref 6–20)
BUN/CREAT SERPL: 25 (ref 12–20)
CALCIUM SERPL-MCNC: 9 MG/DL (ref 8.5–10.1)
CHLORIDE SERPL-SCNC: 110 MMOL/L (ref 97–108)
CO2 SERPL-SCNC: 29 MMOL/L (ref 21–32)
CREAT SERPL-MCNC: 1.17 MG/DL (ref 0.55–1.02)
DIFFERENTIAL METHOD BLD: ABNORMAL
EOSINOPHIL # BLD: 0 K/UL (ref 0–0.4)
EOSINOPHIL NFR BLD: 0 % (ref 0–7)
ERYTHROCYTE [DISTWIDTH] IN BLOOD BY AUTOMATED COUNT: 16.5 % (ref 11.5–14.5)
GLOBULIN SER CALC-MCNC: 4.6 G/DL (ref 2–4)
GLUCOSE BLD STRIP.AUTO-MCNC: 171 MG/DL (ref 65–117)
GLUCOSE BLD STRIP.AUTO-MCNC: 177 MG/DL (ref 65–117)
GLUCOSE BLD STRIP.AUTO-MCNC: 190 MG/DL (ref 65–117)
GLUCOSE BLD STRIP.AUTO-MCNC: 199 MG/DL (ref 65–117)
GLUCOSE SERPL-MCNC: 190 MG/DL (ref 65–100)
HCT VFR BLD AUTO: 27.9 % (ref 35–47)
HGB BLD-MCNC: 8.1 G/DL (ref 11.5–16)
IMM GRANULOCYTES # BLD AUTO: 0.2 K/UL (ref 0–0.04)
IMM GRANULOCYTES NFR BLD AUTO: 2 % (ref 0–0.5)
LYMPHOCYTES # BLD: 1.7 K/UL (ref 0.8–3.5)
LYMPHOCYTES NFR BLD: 22 % (ref 12–49)
MAGNESIUM SERPL-MCNC: 2.6 MG/DL (ref 1.6–2.4)
MCH RBC QN AUTO: 27 PG (ref 26–34)
MCHC RBC AUTO-ENTMCNC: 29 G/DL (ref 30–36.5)
MCV RBC AUTO: 93 FL (ref 80–99)
MONOCYTES # BLD: 0.6 K/UL (ref 0–1)
MONOCYTES NFR BLD: 8 % (ref 5–13)
NEUTS SEG # BLD: 5.2 K/UL (ref 1.8–8)
NEUTS SEG NFR BLD: 68 % (ref 32–75)
NRBC # BLD: 0.04 K/UL (ref 0–0.01)
NRBC BLD-RTO: 0.5 PER 100 WBC
PHOSPHATE SERPL-MCNC: 2.3 MG/DL (ref 2.6–4.7)
PLATELET # BLD AUTO: 172 K/UL (ref 150–400)
PMV BLD AUTO: 11 FL (ref 8.9–12.9)
POTASSIUM SERPL-SCNC: 4.4 MMOL/L (ref 3.5–5.1)
PROT SERPL-MCNC: 7.1 G/DL (ref 6.4–8.2)
RBC # BLD AUTO: 3 M/UL (ref 3.8–5.2)
RBC MORPH BLD: ABNORMAL
RBC MORPH BLD: ABNORMAL
SERVICE CMNT-IMP: ABNORMAL
SERVICE CMNT-IMP: NORMAL
SODIUM SERPL-SCNC: 145 MMOL/L (ref 136–145)
WBC # BLD AUTO: 7.7 K/UL (ref 3.6–11)

## 2022-12-25 PROCEDURE — 94640 AIRWAY INHALATION TREATMENT: CPT

## 2022-12-25 PROCEDURE — 80053 COMPREHEN METABOLIC PANEL: CPT

## 2022-12-25 PROCEDURE — 74011250637 HC RX REV CODE- 250/637: Performed by: FAMILY MEDICINE

## 2022-12-25 PROCEDURE — 82962 GLUCOSE BLOOD TEST: CPT

## 2022-12-25 PROCEDURE — 74011000250 HC RX REV CODE- 250: Performed by: INTERNAL MEDICINE

## 2022-12-25 PROCEDURE — 74011250636 HC RX REV CODE- 250/636: Performed by: INTERNAL MEDICINE

## 2022-12-25 PROCEDURE — 83735 ASSAY OF MAGNESIUM: CPT

## 2022-12-25 PROCEDURE — 74011000250 HC RX REV CODE- 250: Performed by: FAMILY MEDICINE

## 2022-12-25 PROCEDURE — 85025 COMPLETE CBC W/AUTO DIFF WBC: CPT

## 2022-12-25 PROCEDURE — 84100 ASSAY OF PHOSPHORUS: CPT

## 2022-12-25 PROCEDURE — 74011000258 HC RX REV CODE- 258: Performed by: FAMILY MEDICINE

## 2022-12-25 PROCEDURE — 74011250636 HC RX REV CODE- 250/636: Performed by: FAMILY MEDICINE

## 2022-12-25 PROCEDURE — 65270000029 HC RM PRIVATE

## 2022-12-25 PROCEDURE — 74011000258 HC RX REV CODE- 258: Performed by: INTERNAL MEDICINE

## 2022-12-25 PROCEDURE — 74011250637 HC RX REV CODE- 250/637: Performed by: INTERNAL MEDICINE

## 2022-12-25 PROCEDURE — 36415 COLL VENOUS BLD VENIPUNCTURE: CPT

## 2022-12-25 RX ORDER — IPRATROPIUM BROMIDE AND ALBUTEROL SULFATE 2.5; .5 MG/3ML; MG/3ML
3 SOLUTION RESPIRATORY (INHALATION)
Status: DISCONTINUED | OUTPATIENT
Start: 2022-12-25 | End: 2022-12-27

## 2022-12-25 RX ORDER — OSELTAMIVIR PHOSPHATE 75 MG/1
75 CAPSULE ORAL 2 TIMES DAILY
Status: COMPLETED | OUTPATIENT
Start: 2022-12-25 | End: 2022-12-25

## 2022-12-25 RX ORDER — BUMETANIDE 0.25 MG/ML
1 INJECTION INTRAMUSCULAR; INTRAVENOUS ONCE
Status: COMPLETED | OUTPATIENT
Start: 2022-12-25 | End: 2022-12-25

## 2022-12-25 RX ORDER — METHADONE HYDROCHLORIDE 10 MG/1
160 TABLET ORAL DAILY
Status: DISCONTINUED | OUTPATIENT
Start: 2022-12-25 | End: 2022-12-28 | Stop reason: HOSPADM

## 2022-12-25 RX ADMIN — PIPERACILLIN AND TAZOBACTAM 3.38 G: 3; .375 INJECTION, POWDER, FOR SOLUTION INTRAVENOUS at 03:43

## 2022-12-25 RX ADMIN — ACETAMINOPHEN 650 MG: 325 TABLET ORAL at 19:35

## 2022-12-25 RX ADMIN — IPRATROPIUM BROMIDE AND ALBUTEROL SULFATE 3 ML: .5; 3 SOLUTION RESPIRATORY (INHALATION) at 09:23

## 2022-12-25 RX ADMIN — WHITE PETROLATUM: 1.75 OINTMENT TOPICAL at 10:00

## 2022-12-25 RX ADMIN — ATORVASTATIN CALCIUM 40 MG: 40 TABLET, FILM COATED ORAL at 22:34

## 2022-12-25 RX ADMIN — FENOFIBRATE 160 MG: 160 TABLET ORAL at 11:44

## 2022-12-25 RX ADMIN — CLONAZEPAM 1 MG: 1 TABLET ORAL at 15:26

## 2022-12-25 RX ADMIN — PIPERACILLIN AND TAZOBACTAM 3.38 G: 3; .375 INJECTION, POWDER, FOR SOLUTION INTRAVENOUS at 09:11

## 2022-12-25 RX ADMIN — PREGABALIN 150 MG: 100 CAPSULE ORAL at 22:33

## 2022-12-25 RX ADMIN — CLONAZEPAM 1 MG: 1 TABLET ORAL at 22:34

## 2022-12-25 RX ADMIN — CLOPIDOGREL BISULFATE 75 MG: 75 TABLET ORAL at 09:14

## 2022-12-25 RX ADMIN — LEVOTHYROXINE SODIUM 200 MCG: 0.1 TABLET ORAL at 07:03

## 2022-12-25 RX ADMIN — GUAIFENESIN 600 MG: 600 TABLET, EXTENDED RELEASE ORAL at 09:14

## 2022-12-25 RX ADMIN — PIPERACILLIN AND TAZOBACTAM 3.38 G: 3; .375 INJECTION, POWDER, FOR SOLUTION INTRAVENOUS at 18:11

## 2022-12-25 RX ADMIN — TRAZODONE HYDROCHLORIDE 50 MG: 50 TABLET ORAL at 22:40

## 2022-12-25 RX ADMIN — GUAIFENESIN 600 MG: 600 TABLET, EXTENDED RELEASE ORAL at 22:34

## 2022-12-25 RX ADMIN — CLONAZEPAM 1 MG: 1 TABLET ORAL at 09:14

## 2022-12-25 RX ADMIN — SODIUM CHLORIDE, PRESERVATIVE FREE 10 ML: 5 INJECTION INTRAVENOUS at 22:37

## 2022-12-25 RX ADMIN — PANTOPRAZOLE SODIUM 40 MG: 40 TABLET, DELAYED RELEASE ORAL at 07:03

## 2022-12-25 RX ADMIN — IPRATROPIUM BROMIDE AND ALBUTEROL SULFATE 3 ML: .5; 3 SOLUTION RESPIRATORY (INHALATION) at 20:59

## 2022-12-25 RX ADMIN — BUMETANIDE 1 MG: 0.25 INJECTION INTRAMUSCULAR; INTRAVENOUS at 09:07

## 2022-12-25 RX ADMIN — WHITE PETROLATUM: 1.75 OINTMENT TOPICAL at 22:36

## 2022-12-25 RX ADMIN — OSELTAMIVIR PHOSPHATE 75 MG: 75 CAPSULE ORAL at 18:11

## 2022-12-25 RX ADMIN — SODIUM CHLORIDE, PRESERVATIVE FREE 10 ML: 5 INJECTION INTRAVENOUS at 09:05

## 2022-12-25 RX ADMIN — METHADONE HYDROCHLORIDE 160 MG: 10 TABLET ORAL at 11:44

## 2022-12-25 RX ADMIN — SODIUM CHLORIDE, PRESERVATIVE FREE 10 ML: 5 INJECTION INTRAVENOUS at 07:04

## 2022-12-25 RX ADMIN — PREGABALIN 150 MG: 100 CAPSULE ORAL at 09:14

## 2022-12-25 RX ADMIN — OSELTAMIVIR PHOSPHATE 75 MG: 75 CAPSULE ORAL at 11:45

## 2022-12-25 NOTE — PROGRESS NOTES
[id number] [facility]  Hospitalist Group                                                                                          Hospitalist Progress Note  Camila Negro MD  Answering service: [phone] from in house phone        Date of Service:  2022  NAME:  Jennifer Stanley  :  1975  MRN:  [id number]      Admission Summary:   Jennifer Stanley is a 50 y.o. female history of DM2 s/p R BKA, hypothyroidism, HTN, and CAD s/p stents, and nicotine dependence who presents at the request of her GI physician for elevated creatinine with hyperkalemia. She states that her PCP on routine work-up noted an elevated creatinine, and \"fluid around the patient's liver\" she also had noted a weight increase of 14 pounds in 1 week, and increasing abdominal fullness. She was sent to a gastroenterologist for further evaluation of her liver disease, and repeat labs. Repeat blood work showed elevated creatinine with elevated potassium, and she was referred to the ED for further evaluation. She denies fever, chills, night sweats, nausea, vomiting, pain, shortness of breath, or decreased urinary output. In the ED, VSS. Labs were significant for Hgb 9.9 (improving), K+ 5.4, BUN 52, creatinine 2.04, albumin 3.2, Mg 2.7, and PO4 5.4. Renal ultrasound was normal. EKG NSR. In the ED, she received 1Lns       Interval history / Subjective:   Patient seen and examined at bedside this AM. She feels weak and tired. Educated on the importance of compliance to use oxygen through NC. ( She desats to 64% when she removes O2).    Discussed with nursing      Assessment & Plan:     # Acute hypoxemic respiratory failure - not improving     Influenza A infection  - cont tamiflu - last dose   - CXR: No acute process  - c/w IV zosyn for possible superimposed bacterial infection - procalcitonin ordered but not done   - saturating on 5l NC, try to wean down   - CXR : Increased bilateral pulmonary infiltrates. - c/w mucinex,  duoneb, IS     #DEEPTI - cr improved  Hyperkalemia - better   -creatinine 2.04 (last 1 in 2020), BUN 52, and K+ 5.4  - s/p lokelma, low K+ diet  - dced IVF  -renal ultrasound negative  -appreciate nephrology input   - hold lisinopril  - will monitor renal function      #Abd distension - not ascites   -abdominal ultrasound for diagnostic/therapeutic paracentesis  -up to date on colonoscopy due to mother with hx colon cancer, not up to date on gynecological screenings  -denies ETOH use     #DM II with hyperglycemia  -A1c 11. c/w ISS    # Chronic pain syndrome  - cont methadone and lyrica     #CAD  -continue plavix, lofibra, and lipitor     #HTN  -holding lisinopril as above. #GERD  -continue PPI     #Nicotine dependence  -nicotine patch     #Hypothyroidism  -continue synthroid    Hx PVD w prior R BKA     Code status: Full  Prophylaxis: SCDs  Care Plan discussed with: patient, RN, CM  Anticipated Disposition: home when weaned off oxygen      Hospital Problems  Date Reviewed: 9/7/2022            Codes Class Noted POA    Hyperkalemia, diminished renal excretion ICD-10-CM: E87.5  ICD-9-CM: 276.7  12/20/2022 Unknown        DEEPTI (acute kidney injury) [facility]) ICD-10-CM: N17.9  ICD-9-CM: 584.9  12/20/2022 Unknown       Review of Systems:   A comprehensive review of systems was negative except for that written in the HPI. Vital Signs:    Last 24hrs VS reviewed since prior progress note.  Most recent are:  Visit Vitals  /74 (BP 1 Location: Right arm, BP Patient Position: At rest)   Pulse 81   Temp 98.3 °F (36.8 °C)   Resp 17   Ht 5' 7\" (1.702 m)   Wt 83.5 kg (184 lb)   SpO2 97%   BMI 28.82 kg/m²     Patient Vitals for the past 24 hrs:   Temp Pulse Resp BP SpO2   12/25/22 0934 98.3 °F (36.8 °C) 81 17 132/74 97 %   12/25/22 0245 98.1 °F (36.7 °C) 71 16 105/64 99 %          No intake or output data in the 24 hours ending 12/25/22 1251       Physical Examination:     I had a face to face encounter with this patient and independently examined them on 12/25/2022 as outlined below:          Constitutional:  mild distress, ill appearing   HEENT:  Oral mucosa dry, O2 via NC, EOMI    Resp:  Decreased breath sounds bilaterally. some wheezing/rhonchi/rales. Some accessory muscle use. CV:  Regular rhythm, normal rate, no murmurs, gallops, rubs    GI:  Soft, non distended, non tender. normoactive bowel sounds,      Musculoskeletal:  Right AKA, warm, no wounds on stump    Neurologic:  Moves all extremities. Data Review:    Review and/or order of clinical lab test  Review and/or order of tests in the radiology section of CPT  Review and/or order of tests in the medicine section of CPT      Labs:     Recent Labs     12/25/22 0126 12/23/22 0225   WBC 7.7 7.0   HGB 8.1* 7.7*   HCT 27.9* 26.1*    144*       Recent Labs     12/25/22 0126 12/23/22 0225    142   K 4.4 5.0   * 114*   CO2 29 25   BUN 29* 29*   CREA 1.17* 1.28*   * 239*   CA 9.0 8.2*   MG 2.6* 2.5*   PHOS 2.3* 2.4*       Recent Labs     12/25/22 0126 12/23/22 0225   ALT 22  --    AP 52  --    TBILI 0.3  --    TP 7.1  --    ALB 2.5* 2.7*   GLOB 4.6*  --        No results for input(s): INR, PTP, APTT, INREXT, INREXT in the last 72 hours. No results for input(s): FE, TIBC, PSAT, FERR in the last 72 hours. Lab Results   Component Value Date/Time    Folate 22.8 (H) 12/20/2022 09:46 AM        No results for input(s): PH, PCO2, PO2 in the last 72 hours. No results for input(s): CPK, CKNDX, TROIQ in the last 72 hours.     No lab exists for component: CPKMB  Lab Results   Component Value Date/Time    Cholesterol, total 104 09/30/2020 03:35 AM    HDL Cholesterol 17 09/30/2020 03:35 AM    LDL, calculated 19 09/30/2020 03:35 AM    Triglyceride 340 (H) 09/30/2020 03:35 AM    CHOL/HDL Ratio 6.1 (H) 09/30/2020 03:35 AM     Lab Results   Component Value Date/Time    Glucose (POC) 199 (H) 12/25/2022 11:40 AM Glucose (POC) 177 (H) 12/25/2022 06:29 AM    Glucose (POC) 204 (H) 12/24/2022 09:51 PM    Glucose (POC) 208 (H) 12/24/2022 04:22 PM    Glucose (POC) 199 (H) 12/23/2022 04:28 PM     Lab Results   Component Value Date/Time    Color YELLOW/STRAW 12/20/2022 11:11 AM    Appearance CLEAR 12/20/2022 11:11 AM    Specific gravity 1.015 12/20/2022 11:11 AM    pH (UA) 5.5 12/20/2022 11:11 AM    Protein Negative 12/20/2022 11:11 AM    Glucose >1,000 (A) 12/20/2022 11:11 AM    Ketone Negative 12/20/2022 11:11 AM    Bilirubin Negative 12/20/2022 11:11 AM    Urobilinogen 0.2 12/20/2022 11:11 AM    Nitrites Negative 12/20/2022 11:11 AM    Leukocyte Esterase Negative 12/20/2022 11:11 AM    Epithelial cells FEW 12/20/2022 11:11 AM    Bacteria Negative 12/20/2022 11:11 AM    WBC 0-4 12/20/2022 11:11 AM    RBC 0-5 12/20/2022 11:11 AM         Medications Reviewed:     Current Facility-Administered Medications   Medication Dose Route Frequency    methadone (DOLOPHINE) tablet 160 mg  160 mg Oral DAILY    oseltamivir (TAMIFLU) capsule 75 mg  75 mg Oral BID    albuterol-ipratropium (DUO-NEB) 2.5 MG-0.5 MG/3 ML  3 mL Nebulization Q6H RT    guaiFENesin ER (MUCINEX) tablet 600 mg  600 mg Oral Q12H    pantothenic ac-min oil-pet,hyd (AQUAPHOR) 41 % ointment   Topical Q12H    sodium chloride (NS) flush 5-40 mL  5-40 mL IntraVENous Q8H    sodium chloride (NS) flush 5-40 mL  5-40 mL IntraVENous PRN    acetaminophen (TYLENOL) tablet 650 mg  650 mg Oral Q6H PRN    Or    acetaminophen (TYLENOL) suppository 650 mg  650 mg Rectal Q6H PRN    polyethylene glycol (MIRALAX) packet 17 g  17 g Oral DAILY PRN    ondansetron (ZOFRAN ODT) tablet 4 mg  4 mg Oral Q8H PRN    Or    ondansetron (ZOFRAN) injection 4 mg  4 mg IntraVENous Q6H PRN    [Held by provider] heparin (porcine) injection 5,000 Units  5,000 Units SubCUTAneous Q8H    nicotine (NICODERM CQ) 21 mg/24 hr patch 1 Patch  1 Patch TransDERmal DAILY    atorvastatin (LIPITOR) tablet 40 mg  40 mg Oral QHS pregabalin (LYRICA) capsule 150 mg  150 mg Oral BID    pantoprazole (PROTONIX) tablet 40 mg  40 mg Oral ACB    levothyroxine (SYNTHROID) tablet 200 mcg  200 mcg Oral ACB    fenofibrate (LOFIBRA) tablet 160 mg  160 mg Oral DAILY    traZODone (DESYREL) tablet 50 mg  50 mg Oral QHS PRN    clonazePAM (KlonoPIN) tablet 1 mg  1 mg Oral TID    glucose chewable tablet 16 g  4 Tablet Oral PRN    glucagon (GLUCAGEN) injection 1 mg  1 mg IntraMUSCular PRN    dextrose 10 % infusion 0-250 mL  0-250 mL IntraVENous PRN    insulin lispro (HUMALOG) injection   SubCUTAneous AC&HS    lidocaine 4 % patch 1 Patch  1 Patch TransDERmal Q24H    clopidogreL (PLAVIX) tablet 75 mg  75 mg Oral DAILY    piperacillin-tazobactam (ZOSYN) 3.375 g in 0.9% sodium chloride (MBP/ADV) 100 mL MBP  3.375 g IntraVENous Q8H    guaiFENesin (ROBITUSSIN) 100 mg/5 mL oral liquid 100 mg  100 mg Oral Q4H PRN     ______________________________________________________________________  EXPECTED LENGTH OF STAY: 2d 21h  ACTUAL LENGTH OF STAY:          Tobin Rodney MD

## 2022-12-25 NOTE — PROGRESS NOTES
Patient name: Kaitlin Rosenthal  MRN: [id number]    Nephrology Progress note:    Assessment:  DEEPTI: Serum Cr improving to 1.1mg/dl (peak 2.3mg/dl) Received a dose of Ibuprofen 12/20-> with borderline hemodynamics. Suspected intravascular depletion on admission-> in the setting of poorly controlled DM2. Prior lab work from 10/2/20 showed a serum Cr 1.0mg/dl. At risk for CKD given uncontrolled DM2. Hyperkalemia: borderline-> better     HTN:borderline hypotensive    Influenza A: complicated by PNA    SOB: mostly due to PNA but may have component of pulm edema. Gave dose of IV Bumex 12/24/22     DM2: Uncontrolled. HgbA1c 11     Abd distension: not ascites     PVD w prior R BKA    Plan/Recommendations:  IV Bumex 1mg x1 dose again today  Needs to be diligent with neb treatments  Tamiflu  IV Abx  Holding Metformin/Jardiance  Holding Lisinopril  K restricted diet  Strict I/Os  Avoid nephrotoxins  Am labs        Subjective:  Sleeping. Case discussed with overnight RN. Patient reportedly refusing respiratory treatments overnight.      ROS:   No chest pain    Exam:  Visit Vitals  /64 (BP 1 Location: Right arm, BP Patient Position: Lying)   Pulse 71   Temp 98.1 °F (36.7 °C)   Resp 16   Ht 5' 7\" (1.702 m)   Wt 83.5 kg (184 lb)   SpO2 99%   BMI 28.82 kg/m²     Wt Readings from Last 3 Encounters:   12/23/22 83.5 kg (184 lb)   08/31/22 74.8 kg (165 lb)   09/01/21 86.2 kg (190 lb)     No intake or output data in the 24 hours ending 12/25/22 0720      Gen: NAD  HEENT: AT/NC  Lungs/Chest wall: Coarse B/l  Cardiovascular: Regular rate, normal rhythm. Abdomen/: Soft, NT, ND, BS+. No palpable organomegaly  Ext: R BKA, No peripheral edema  CNS: alert and awake.  Answers appropriately      Current Facility-Administered Medications   Medication Dose Route Frequency Last Admin    guaiFENesin ER (MUCINEX) tablet 600 mg  600 mg Oral Q12H 600 mg at 12/24/22 2200    albuterol-ipratropium (DUO-NEB) 2.5 MG-0.5 MG/3 ML  3 mL Nebulization Q4H RT 3 mL at 12/24/22 1536    pantothenic ac-min oil-pet,hyd (AQUAPHOR) 41 % ointment   Topical Q12H Given at 12/24/22 2153    sodium chloride (NS) flush 5-40 mL  5-40 mL IntraVENous Q8H 10 mL at 12/25/22 0704    sodium chloride (NS) flush 5-40 mL  5-40 mL IntraVENous PRN      acetaminophen (TYLENOL) tablet 650 mg  650 mg Oral Q6H  mg at 12/22/22 2335    Or    acetaminophen (TYLENOL) suppository 650 mg  650 mg Rectal Q6H PRN      polyethylene glycol (MIRALAX) packet 17 g  17 g Oral DAILY PRN      ondansetron (ZOFRAN ODT) tablet 4 mg  4 mg Oral Q8H PRN      Or    ondansetron (ZOFRAN) injection 4 mg  4 mg IntraVENous Q6H PRN 4 mg at 12/24/22 1515    [Held by provider] heparin (porcine) injection 5,000 Units  5,000 Units SubCUTAneous Q8H 5,000 Units at 12/20/22 0400    nicotine (NICODERM CQ) 21 mg/24 hr patch 1 Patch  1 Patch TransDERmal DAILY 1 Patch at 12/24/22 1135    atorvastatin (LIPITOR) tablet 40 mg  40 mg Oral QHS 40 mg at 12/24/22 2152    pregabalin (LYRICA) capsule 150 mg  150 mg Oral  mg at 12/24/22 2152    pantoprazole (PROTONIX) tablet 40 mg  40 mg Oral ACB 40 mg at 12/25/22 0703    levothyroxine (SYNTHROID) tablet 200 mcg  200 mcg Oral  mcg at 12/25/22 0703    fenofibrate (LOFIBRA) tablet 160 mg  160 mg Oral DAILY 160 mg at 12/23/22 0953    traZODone (DESYREL) tablet 50 mg  50 mg Oral QHS PRN 50 mg at 12/24/22 2200    clonazePAM (KlonoPIN) tablet 1 mg  1 mg Oral TID 1 mg at 12/24/22 2152    glucose chewable tablet 16 g  4 Tablet Oral PRN glucagon (GLUCAGEN) injection 1 mg  1 mg IntraMUSCular PRN      dextrose 10 % infusion 0-250 mL  0-250 mL IntraVENous PRN      insulin lispro (HUMALOG) injection   SubCUTAneous AC&HS 1 Units at 12/24/22 2202    methadone (DOLOPHINE) 5 mg/5 mL oral solution 160 mg  160 mg Oral DAILY 160 mg at 12/23/22 0938    lidocaine 4 % patch 1 Patch  1 Patch TransDERmal Q24H 1 Patch at 12/24/22 1738    clopidogreL (PLAVIX) tablet 75 mg  75 mg Oral DAILY 75 mg at 12/23/22 0938    piperacillin-tazobactam (ZOSYN) 3.375 g in 0.9% sodium chloride (MBP/ADV) 100 mL MBP  3.375 g IntraVENous Q8H 3.375 g at 12/25/22 0343    oseltamivir (TAMIFLU) capsule 30 mg  30 mg Oral BID 30 mg at 12/24/22 1737    guaiFENesin (ROBITUSSIN) 100 mg/5 mL oral liquid 100 mg  100 mg Oral Q4H  mg at 12/21/22 4990       Labs/Data:    Lab Results   Component Value Date/Time    WBC 7.7 12/25/2022 01:26 AM    HGB 8.1 (L) 12/25/2022 01:26 AM    HCT 27.9 (L) 12/25/2022 01:26 AM    PLATELET 549 28/22/0629 01:26 AM    MCV 93.0 12/25/2022 01:26 AM       Lab Results   Component Value Date/Time    Sodium 145 12/25/2022 01:26 AM    Potassium 4.4 12/25/2022 01:26 AM    Chloride 110 (H) 12/25/2022 01:26 AM    CO2 29 12/25/2022 01:26 AM    Anion gap 6 12/25/2022 01:26 AM    Glucose 190 (H) 12/25/2022 01:26 AM    BUN 29 (H) 12/25/2022 01:26 AM    Creatinine 1.17 (H) 12/25/2022 01:26 AM    BUN/Creatinine ratio 25 (H) 12/25/2022 01:26 AM    GFR est AA >60 10/02/2020 04:17 AM    GFR est non-AA 55 (L) 10/02/2020 04:17 AM    Calcium 9.0 12/25/2022 01:26 AM       Patient seen and examined. Chart reviewed. Labs, data and other pertinent notes reviewed in last 24 hrs.     Discussed with patient/ and RN    Signed by:  Arminda Villalobos MD  [id number] [facility]

## 2022-12-26 LAB
ALBUMIN SERPL-MCNC: 2.6 G/DL (ref 3.5–5)
ALBUMIN/GLOB SERPL: 0.6 {RATIO} (ref 1.1–2.2)
ALP SERPL-CCNC: 53 U/L (ref 45–117)
ALT SERPL-CCNC: 19 U/L (ref 12–78)
ANION GAP SERPL CALC-SCNC: 4 MMOL/L (ref 5–15)
AST SERPL-CCNC: 21 U/L (ref 15–37)
BASOPHILS # BLD: 0.1 K/UL (ref 0–0.1)
BASOPHILS NFR BLD: 1 % (ref 0–1)
BILIRUB SERPL-MCNC: 0.3 MG/DL (ref 0.2–1)
BUN SERPL-MCNC: 28 MG/DL (ref 6–20)
BUN/CREAT SERPL: 27 (ref 12–20)
CALCIUM SERPL-MCNC: 9 MG/DL (ref 8.5–10.1)
CHLORIDE SERPL-SCNC: 108 MMOL/L (ref 97–108)
CO2 SERPL-SCNC: 30 MMOL/L (ref 21–32)
CREAT SERPL-MCNC: 1.05 MG/DL (ref 0.55–1.02)
DIFFERENTIAL METHOD BLD: ABNORMAL
EOSINOPHIL # BLD: 0.1 K/UL (ref 0–0.4)
EOSINOPHIL NFR BLD: 1 % (ref 0–7)
ERYTHROCYTE [DISTWIDTH] IN BLOOD BY AUTOMATED COUNT: 15.9 % (ref 11.5–14.5)
GLOBULIN SER CALC-MCNC: 4.7 G/DL (ref 2–4)
GLUCOSE BLD STRIP.AUTO-MCNC: 212 MG/DL (ref 65–117)
GLUCOSE BLD STRIP.AUTO-MCNC: 214 MG/DL (ref 65–117)
GLUCOSE BLD STRIP.AUTO-MCNC: 222 MG/DL (ref 65–117)
GLUCOSE BLD STRIP.AUTO-MCNC: 234 MG/DL (ref 65–117)
GLUCOSE SERPL-MCNC: 223 MG/DL (ref 65–100)
HCT VFR BLD AUTO: 26.9 % (ref 35–47)
HGB BLD-MCNC: 8.1 G/DL (ref 11.5–16)
IMM GRANULOCYTES # BLD AUTO: 0 K/UL
IMM GRANULOCYTES NFR BLD AUTO: 0 %
LYMPHOCYTES # BLD: 1.8 K/UL (ref 0.8–3.5)
LYMPHOCYTES NFR BLD: 33 % (ref 12–49)
MAGNESIUM SERPL-MCNC: 2.4 MG/DL (ref 1.6–2.4)
MAGNESIUM SERPL-MCNC: 2.5 MG/DL (ref 1.6–2.4)
MCH RBC QN AUTO: 26.6 PG (ref 26–34)
MCHC RBC AUTO-ENTMCNC: 30.1 G/DL (ref 30–36.5)
MCV RBC AUTO: 88.5 FL (ref 80–99)
METAMYELOCYTES NFR BLD MANUAL: 3 %
MONOCYTES # BLD: 0.4 K/UL (ref 0–1)
MONOCYTES NFR BLD: 7 % (ref 5–13)
MYELOCYTES NFR BLD MANUAL: 1 %
NEUTS SEG # BLD: 2.9 K/UL (ref 1.8–8)
NEUTS SEG NFR BLD: 54 % (ref 32–75)
NRBC # BLD: 0.07 K/UL (ref 0–0.01)
NRBC BLD-RTO: 1.3 PER 100 WBC
PHOSPHATE SERPL-MCNC: 2.3 MG/DL (ref 2.6–4.7)
PLATELET # BLD AUTO: 224 K/UL (ref 150–400)
PLATELET COMMENTS,PCOM: ABNORMAL
PMV BLD AUTO: 10.7 FL (ref 8.9–12.9)
POTASSIUM SERPL-SCNC: 4.4 MMOL/L (ref 3.5–5.1)
PROT SERPL-MCNC: 7.3 G/DL (ref 6.4–8.2)
RBC # BLD AUTO: 3.04 M/UL (ref 3.8–5.2)
RBC MORPH BLD: ABNORMAL
RBC MORPH BLD: ABNORMAL
SERVICE CMNT-IMP: ABNORMAL
SODIUM SERPL-SCNC: 142 MMOL/L (ref 136–145)
WBC # BLD AUTO: 5.4 K/UL (ref 3.6–11)

## 2022-12-26 PROCEDURE — 74011250636 HC RX REV CODE- 250/636: Performed by: INTERNAL MEDICINE

## 2022-12-26 PROCEDURE — 65270000029 HC RM PRIVATE

## 2022-12-26 PROCEDURE — 82962 GLUCOSE BLOOD TEST: CPT

## 2022-12-26 PROCEDURE — 83735 ASSAY OF MAGNESIUM: CPT

## 2022-12-26 PROCEDURE — 74011000250 HC RX REV CODE- 250: Performed by: FAMILY MEDICINE

## 2022-12-26 PROCEDURE — 74011250637 HC RX REV CODE- 250/637: Performed by: FAMILY MEDICINE

## 2022-12-26 PROCEDURE — 74011000258 HC RX REV CODE- 258: Performed by: INTERNAL MEDICINE

## 2022-12-26 PROCEDURE — 85025 COMPLETE CBC W/AUTO DIFF WBC: CPT

## 2022-12-26 PROCEDURE — 74011250637 HC RX REV CODE- 250/637: Performed by: INTERNAL MEDICINE

## 2022-12-26 PROCEDURE — 80053 COMPREHEN METABOLIC PANEL: CPT

## 2022-12-26 PROCEDURE — 36415 COLL VENOUS BLD VENIPUNCTURE: CPT

## 2022-12-26 PROCEDURE — 74011636637 HC RX REV CODE- 636/637: Performed by: FAMILY MEDICINE

## 2022-12-26 PROCEDURE — 84100 ASSAY OF PHOSPHORUS: CPT

## 2022-12-26 PROCEDURE — 94640 AIRWAY INHALATION TREATMENT: CPT

## 2022-12-26 PROCEDURE — 74011000250 HC RX REV CODE- 250: Performed by: INTERNAL MEDICINE

## 2022-12-26 RX ADMIN — ACETAMINOPHEN 650 MG: 325 TABLET ORAL at 23:09

## 2022-12-26 RX ADMIN — PANTOPRAZOLE SODIUM 40 MG: 40 TABLET, DELAYED RELEASE ORAL at 07:12

## 2022-12-26 RX ADMIN — CLOPIDOGREL BISULFATE 75 MG: 75 TABLET ORAL at 09:38

## 2022-12-26 RX ADMIN — Medication 2 UNITS: at 17:02

## 2022-12-26 RX ADMIN — Medication 1 UNITS: at 23:02

## 2022-12-26 RX ADMIN — Medication 2 UNITS: at 07:20

## 2022-12-26 RX ADMIN — LEVOTHYROXINE SODIUM 200 MCG: 0.1 TABLET ORAL at 07:13

## 2022-12-26 RX ADMIN — ATORVASTATIN CALCIUM 40 MG: 40 TABLET, FILM COATED ORAL at 23:02

## 2022-12-26 RX ADMIN — Medication 2 UNITS: at 11:41

## 2022-12-26 RX ADMIN — PIPERACILLIN AND TAZOBACTAM 3.38 G: 3; .375 INJECTION, POWDER, FOR SOLUTION INTRAVENOUS at 02:44

## 2022-12-26 RX ADMIN — SODIUM CHLORIDE, PRESERVATIVE FREE 10 ML: 5 INJECTION INTRAVENOUS at 14:21

## 2022-12-26 RX ADMIN — GUAIFENESIN 600 MG: 600 TABLET, EXTENDED RELEASE ORAL at 09:38

## 2022-12-26 RX ADMIN — GUAIFENESIN 600 MG: 600 TABLET, EXTENDED RELEASE ORAL at 23:02

## 2022-12-26 RX ADMIN — CLONAZEPAM 1 MG: 1 TABLET ORAL at 23:02

## 2022-12-26 RX ADMIN — FENOFIBRATE 160 MG: 160 TABLET ORAL at 09:40

## 2022-12-26 RX ADMIN — SODIUM CHLORIDE, PRESERVATIVE FREE 10 ML: 5 INJECTION INTRAVENOUS at 23:04

## 2022-12-26 RX ADMIN — PREGABALIN 150 MG: 100 CAPSULE ORAL at 23:02

## 2022-12-26 RX ADMIN — WHITE PETROLATUM: 1.75 OINTMENT TOPICAL at 09:40

## 2022-12-26 RX ADMIN — CLONAZEPAM 1 MG: 1 TABLET ORAL at 09:38

## 2022-12-26 RX ADMIN — PREGABALIN 150 MG: 100 CAPSULE ORAL at 09:38

## 2022-12-26 RX ADMIN — PIPERACILLIN AND TAZOBACTAM 3.38 G: 3; .375 INJECTION, POWDER, FOR SOLUTION INTRAVENOUS at 09:34

## 2022-12-26 RX ADMIN — IPRATROPIUM BROMIDE AND ALBUTEROL SULFATE 3 ML: .5; 3 SOLUTION RESPIRATORY (INHALATION) at 07:44

## 2022-12-26 RX ADMIN — CLONAZEPAM 1 MG: 1 TABLET ORAL at 17:00

## 2022-12-26 RX ADMIN — PIPERACILLIN AND TAZOBACTAM 3.38 G: 3; .375 INJECTION, POWDER, FOR SOLUTION INTRAVENOUS at 17:05

## 2022-12-26 RX ADMIN — IPRATROPIUM BROMIDE AND ALBUTEROL SULFATE 3 ML: .5; 3 SOLUTION RESPIRATORY (INHALATION) at 20:32

## 2022-12-26 RX ADMIN — METHADONE HYDROCHLORIDE 160 MG: 10 TABLET ORAL at 09:36

## 2022-12-26 RX ADMIN — WHITE PETROLATUM: 1.75 OINTMENT TOPICAL at 23:04

## 2022-12-26 NOTE — PROGRESS NOTES
Problem: Diabetes Self-Management  Goal: *Disease process and treatment process  Description: Define diabetes and identify own type of diabetes; list 3 options for treating diabetes. Outcome: Progressing Towards Goal  Goal: *Incorporating nutritional management into lifestyle  Description: Describe effect of type, amount and timing of food on blood glucose; list 3 methods for planning meals. Outcome: Progressing Towards Goal  Goal: *Incorporating physical activity into lifestyle  Description: State effect of exercise on blood glucose levels. Outcome: Progressing Towards Goal  Goal: *Developing strategies to promote health/change behavior  Description: Define the ABC's of diabetes; identify appropriate screenings, schedule and personal plan for screenings. Outcome: Progressing Towards Goal  Goal: *Using medications safely  Description: State effect of diabetes medications on diabetes; name diabetes medication taking, action and side effects. Outcome: Progressing Towards Goal  Goal: *Monitoring blood glucose, interpreting and using results  Description: Identify recommended blood glucose targets  and personal targets. Outcome: Progressing Towards Goal  Goal: *Prevention, detection, treatment of acute complications  Description: List symptoms of hyper- and hypoglycemia; describe how to treat low blood sugar and actions for lowering  high blood glucose level. Outcome: Progressing Towards Goal  Goal: *Prevention, detection and treatment of chronic complications  Description: Define the natural course of diabetes and describe the relationship of blood glucose levels to long term complications of diabetes.   Outcome: Progressing Towards Goal  Goal: *Developing strategies to address psychosocial issues  Description: Describe feelings about living with diabetes; identify support needed and support network  Outcome: Progressing Towards Goal  Goal: *Insulin pump training  Outcome: Progressing Towards Goal  Goal: *Sick day guidelines  Outcome: Progressing Towards Goal  Goal: *Patient Specific Goal (EDIT GOAL, INSERT TEXT)  Outcome: Progressing Towards Goal     Problem: Patient Education: Go to Patient Education Activity  Goal: Patient/Family Education  Outcome: Progressing Towards Goal     Problem: Risk for Spread of Infection  Goal: Prevent transmission of infectious organism to others  Description: Prevent the transmission of infectious organisms to other patients, staff members, and visitors. Outcome: Progressing Towards Goal     Problem: Patient Education:  Go to Education Activity  Goal: Patient/Family Education  Outcome: Progressing Towards Goal     Problem: Falls - Risk of  Goal: *Absence of Falls  Description: Document Pau Gwen Fall Risk and appropriate interventions in the flowsheet. Outcome: Progressing Towards Goal  Note: Fall Risk Interventions:  Mobility Interventions: Utilize walker, cane, or other assistive device    Mentation Interventions: Family/sitter at bedside    Medication Interventions: Teach patient to arise slowly                   Problem: Patient Education: Go to Patient Education Activity  Goal: Patient/Family Education  Outcome: Progressing Towards Goal     Problem: Pressure Injury - Risk of  Goal: *Prevention of pressure injury  Description: Document Brendon Scale and appropriate interventions in the flowsheet.   Outcome: Progressing Towards Goal  Note: Pressure Injury Interventions:  Sensory Interventions: Assess changes in LOC         Activity Interventions: Increase time out of bed    Mobility Interventions: Assess need for specialty bed    Nutrition Interventions: Document food/fluid/supplement intake, Offer support with meals,snacks and hydration    Friction and Shear Interventions: Apply protective barrier, creams and emollients                Problem: Patient Education: Go to Patient Education Activity  Goal: Patient/Family Education  Outcome: Progressing Towards Goal

## 2022-12-26 NOTE — PROGRESS NOTES
OMI:    RUR 15%    Disposition  Home with significant other --pending medical progress and recommendations.        Transportation   Significant other     Home Health   will arrange if ordered and patient agrees    [facility] are in network with insurance     Medical follow up  PCP and specialist     Contact  Spouse/ significant other  Laraine Collet  [phone]    Adrienne Hendrickson RN/CRM  [phone]

## 2022-12-26 NOTE — PROGRESS NOTES
[address] [facility]  Hospitalist Group                                                                                          Hospitalist Progress Note  Ruth Husain MD  Answering service: [phone] OR [phone] from in house phone        Date of Service:  2022  NAME:  Ursula Dominguez  :  1975  MRN:  [id number]      Admission Summary:   Ursula Dominguez is a 50 y.o. female history of DM2 s/p R BKA, hypothyroidism, HTN, and CAD s/p stents, and nicotine dependence who presents at the request of her GI physician for elevated creatinine with hyperkalemia. She states that her PCP on routine work-up noted an elevated creatinine, and \"fluid around the patient's liver\" she also had noted a weight increase of 14 pounds in 1 week, and increasing abdominal fullness. She was sent to a gastroenterologist for further evaluation of her liver disease, and repeat labs. Repeat blood work showed elevated creatinine with elevated potassium, and she was referred to the ED for further evaluation. She denies fever, chills, night sweats, nausea, vomiting, pain, shortness of breath, or decreased urinary output. In the ED, VSS. Labs were significant for Hgb 9.9 (improving), K+ 5.4, BUN 52, creatinine 2.04, albumin 3.2, Mg 2.7, and PO4 5.4. Renal ultrasound was normal. EKG NSR. In the ED, she received 1Lns       Interval history / Subjective:   Patient seen and examined this afternoon. Lying in bed comfortably. No complaints, denied any complaints when asked. Assessment & Plan:     # Acute hypoxemic respiratory failure secondary to influenza A infection with superimposed bacterial infection. CXR showed increased bilateral pulm infiltrates.  -Completed Tamiflu- last dose   -Continue IV antibiotics. -Supportive, bronchodilators, mucolytic's  -Oxygen requirement down to 3 L/min, continue to wean, goal SPO2> 94%    Mild DEEPTI, improved.   Hyperkalemia -corrected following Lokelma.  -Renal ultrasound without evidence of obstruction     #Abd distension - not ascites     -up to date on colonoscopy due to mother with hx colon cancer, not up to date on gynecological screenings  -denies ETOH use     #DM II with hyperglycemia  -A1c 11. c/w ISS    # Chronic pain syndrome  - cont methadone and lyrica     #CAD  -continue plavix, lofibra, and lipitor     #HTN  -holding lisinopril as above. #GERD  -continue PPI     #Nicotine dependence  -nicotine patch     #Hypothyroidism  -continue synthroid    Hx PVD w prior R BKA     Code status: Full  Prophylaxis: SCDs  Care Plan discussed with: patient, RN, CM  Anticipated Disposition: home when weaned off oxygen      Hospital Problems  Date Reviewed: 9/7/2022            Codes Class Noted POA    Hyperkalemia, diminished renal excretion ICD-10-CM: E87.5  ICD-9-CM: 276.7  12/20/2022 Unknown        DEEPTI (acute kidney injury) [facility]) ICD-10-CM: N17.9  ICD-9-CM: 584.9  12/20/2022 Unknown       Review of Systems:   A comprehensive review of systems was negative except for that written in the HPI. Vital Signs:    Last 24hrs VS reviewed since prior progress note. Most recent are:  Visit Vitals  BP (!) 165/85 (BP 1 Location: Right upper arm, BP Patient Position: Sitting)   Pulse 63   Temp 98.6 °F (37 °C)   Resp 18   Ht 5' 7\" (1.702 m)   Wt 83.5 kg (184 lb)   SpO2 98%   BMI 28.82 kg/m²     Patient Vitals for the past 24 hrs:   Temp Pulse Resp BP SpO2   12/26/22 1425 98.6 °F (37 °C) 63 18 (!) 165/85 98 %   12/26/22 0930 98.5 °F (36.9 °C) 71 18 (!) 151/82 97 %   12/26/22 0243 98.7 °F (37.1 °C) 71 18 133/85 96 %   12/25/22 2053 98.7 °F (37.1 °C) 68 19 119/78 --          No intake or output data in the 24 hours ending 12/26/22 1800       Physical Examination:     I had a face to face encounter with this patient and independently examined them on 12/26/2022 as outlined below:          Constitutional: On oxygen 3 L/min.   Not in distress   HEENT:  Oral mucosa dry, O2 via NC, EOMI    Resp: On oxygen at 3 L/min. Crepitations heard on the right lower lung field. CV:  Regular rhythm, normal rate, no murmurs, gallops, rubs    GI:  Soft, non distended, non tender. normoactive bowel sounds,      Musculoskeletal:  Right AKA, warm, no wounds on stump    Neurologic: Alert and oriented x3. Motor exam nonfocal.            Data Review:    Review and/or order of clinical lab test  Review and/or order of tests in the radiology section of CPT  Review and/or order of tests in the medicine section of CPT      Labs:     Recent Labs     12/26/22  0513 12/25/22 0126   WBC 5.4 7.7   HGB 8.1* 8.1*   HCT 26.9* 27.9*    172       Recent Labs     12/26/22  0921 12/26/22 0511 12/25/22 0126     --  145   K 4.4  --  4.4     --  110*   CO2 30  --  29   BUN 28*  --  29*   CREA 1.05*  --  1.17*   *  --  190*   CA 9.0  --  9.0   MG 2.4 2.5* 2.6*   PHOS 2.3*  --  2.3*       Recent Labs     12/26/22  0921 12/25/22 0126   ALT 19 22   AP 53 52   TBILI 0.3 0.3   TP 7.3 7.1   ALB 2.6* 2.5*   GLOB 4.7* 4.6*       No results for input(s): INR, PTP, APTT, INREXT, INREXT in the last 72 hours. No results for input(s): FE, TIBC, PSAT, FERR in the last 72 hours. Lab Results   Component Value Date/Time    Folate 22.8 (H) 12/20/2022 09:46 AM        No results for input(s): PH, PCO2, PO2 in the last 72 hours. No results for input(s): CPK, CKNDX, TROIQ in the last 72 hours.     No lab exists for component: CPKMB  Lab Results   Component Value Date/Time    Cholesterol, total 104 09/30/2020 03:35 AM    HDL Cholesterol 17 09/30/2020 03:35 AM    LDL, calculated 19 09/30/2020 03:35 AM    Triglyceride 340 (H) 09/30/2020 03:35 AM    CHOL/HDL Ratio 6.1 (H) 09/30/2020 03:35 AM     Lab Results   Component Value Date/Time    Glucose (POC) 212 (H) 12/26/2022 04:42 PM    Glucose (POC) 222 (H) 12/26/2022 11:02 AM    Glucose (POC) 234 (H) 12/26/2022 07:12 AM    Glucose (POC) 171 (H) 12/25/2022 10:26 PM    Glucose (POC) 190 (H) 12/25/2022 04:16 PM     Lab Results   Component Value Date/Time    Color YELLOW/STRAW 12/20/2022 11:11 AM    Appearance CLEAR 12/20/2022 11:11 AM    Specific gravity 1.015 12/20/2022 11:11 AM    pH (UA) 5.5 12/20/2022 11:11 AM    Protein Negative 12/20/2022 11:11 AM    Glucose >1,000 (A) 12/20/2022 11:11 AM    Ketone Negative 12/20/2022 11:11 AM    Bilirubin Negative 12/20/2022 11:11 AM    Urobilinogen 0.2 12/20/2022 11:11 AM    Nitrites Negative 12/20/2022 11:11 AM    Leukocyte Esterase Negative 12/20/2022 11:11 AM    Epithelial cells FEW 12/20/2022 11:11 AM    Bacteria Negative 12/20/2022 11:11 AM    WBC 0-4 12/20/2022 11:11 AM    RBC 0-5 12/20/2022 11:11 AM         Medications Reviewed:     Current Facility-Administered Medications   Medication Dose Route Frequency    methadone (DOLOPHINE) tablet 160 mg  160 mg Oral DAILY    albuterol-ipratropium (DUO-NEB) 2.5 MG-0.5 MG/3 ML  3 mL Nebulization Q6H RT    guaiFENesin ER (MUCINEX) tablet 600 mg  600 mg Oral Q12H    pantothenic ac-min oil-pet,hyd (AQUAPHOR) 41 % ointment   Topical Q12H    sodium chloride (NS) flush 5-40 mL  5-40 mL IntraVENous Q8H    sodium chloride (NS) flush 5-40 mL  5-40 mL IntraVENous PRN    acetaminophen (TYLENOL) tablet 650 mg  650 mg Oral Q6H PRN    Or    acetaminophen (TYLENOL) suppository 650 mg  650 mg Rectal Q6H PRN    polyethylene glycol (MIRALAX) packet 17 g  17 g Oral DAILY PRN    ondansetron (ZOFRAN ODT) tablet 4 mg  4 mg Oral Q8H PRN    Or    ondansetron (ZOFRAN) injection 4 mg  4 mg IntraVENous Q6H PRN    [Held by provider] heparin (porcine) injection 5,000 Units  5,000 Units SubCUTAneous Q8H    nicotine (NICODERM CQ) 21 mg/24 hr patch 1 Patch  1 Patch TransDERmal DAILY    atorvastatin (LIPITOR) tablet 40 mg  40 mg Oral QHS    pregabalin (LYRICA) capsule 150 mg  150 mg Oral BID    pantoprazole (PROTONIX) tablet 40 mg  40 mg Oral ACB    levothyroxine (SYNTHROID) tablet 200 mcg  200 mcg Oral ACB fenofibrate (LOFIBRA) tablet 160 mg  160 mg Oral DAILY    traZODone (DESYREL) tablet 50 mg  50 mg Oral QHS PRN    clonazePAM (KlonoPIN) tablet 1 mg  1 mg Oral TID    glucose chewable tablet 16 g  4 Tablet Oral PRN    glucagon (GLUCAGEN) injection 1 mg  1 mg IntraMUSCular PRN    dextrose 10 % infusion 0-250 mL  0-250 mL IntraVENous PRN    insulin lispro (HUMALOG) injection   SubCUTAneous AC&HS    lidocaine 4 % patch 1 Patch  1 Patch TransDERmal Q24H    clopidogreL (PLAVIX) tablet 75 mg  75 mg Oral DAILY    piperacillin-tazobactam (ZOSYN) 3.375 g in 0.9% sodium chloride (MBP/ADV) 100 mL MBP  3.375 g IntraVENous Q8H    guaiFENesin (ROBITUSSIN) 100 mg/5 mL oral liquid 100 mg  100 mg Oral Q4H PRN     ______________________________________________________________________  EXPECTED LENGTH OF STAY: 2d 21h  ACTUAL LENGTH OF STAY:          6                 Augusta Murray MD

## 2022-12-27 ENCOUNTER — APPOINTMENT (OUTPATIENT)
Dept: GENERAL RADIOLOGY | Age: 47
DRG: 113 | End: 2022-12-27
Attending: HOSPITALIST
Payer: COMMERCIAL

## 2022-12-27 LAB
ANION GAP SERPL CALC-SCNC: 4 MMOL/L (ref 5–15)
BUN SERPL-MCNC: 23 MG/DL (ref 6–20)
BUN/CREAT SERPL: 24 (ref 12–20)
CALCIUM SERPL-MCNC: 9.1 MG/DL (ref 8.5–10.1)
CHLORIDE SERPL-SCNC: 108 MMOL/L (ref 97–108)
CO2 SERPL-SCNC: 32 MMOL/L (ref 21–32)
CREAT SERPL-MCNC: 0.96 MG/DL (ref 0.55–1.02)
GLUCOSE BLD STRIP.AUTO-MCNC: 165 MG/DL (ref 65–117)
GLUCOSE BLD STRIP.AUTO-MCNC: 167 MG/DL (ref 65–117)
GLUCOSE BLD STRIP.AUTO-MCNC: 183 MG/DL (ref 65–117)
GLUCOSE BLD STRIP.AUTO-MCNC: 225 MG/DL (ref 65–117)
GLUCOSE SERPL-MCNC: 186 MG/DL (ref 65–100)
POTASSIUM SERPL-SCNC: 3.7 MMOL/L (ref 3.5–5.1)
SERVICE CMNT-IMP: ABNORMAL
SODIUM SERPL-SCNC: 144 MMOL/L (ref 136–145)

## 2022-12-27 PROCEDURE — 74011250637 HC RX REV CODE- 250/637: Performed by: INTERNAL MEDICINE

## 2022-12-27 PROCEDURE — 74011000258 HC RX REV CODE- 258: Performed by: INTERNAL MEDICINE

## 2022-12-27 PROCEDURE — 74011250636 HC RX REV CODE- 250/636: Performed by: INTERNAL MEDICINE

## 2022-12-27 PROCEDURE — 74011250637 HC RX REV CODE- 250/637: Performed by: FAMILY MEDICINE

## 2022-12-27 PROCEDURE — 74011636637 HC RX REV CODE- 636/637: Performed by: FAMILY MEDICINE

## 2022-12-27 PROCEDURE — 71045 X-RAY EXAM CHEST 1 VIEW: CPT

## 2022-12-27 PROCEDURE — 80048 BASIC METABOLIC PNL TOTAL CA: CPT

## 2022-12-27 PROCEDURE — 82962 GLUCOSE BLOOD TEST: CPT

## 2022-12-27 PROCEDURE — 65270000029 HC RM PRIVATE

## 2022-12-27 PROCEDURE — 77010033678 HC OXYGEN DAILY

## 2022-12-27 PROCEDURE — 36415 COLL VENOUS BLD VENIPUNCTURE: CPT

## 2022-12-27 PROCEDURE — 74011000250 HC RX REV CODE- 250: Performed by: FAMILY MEDICINE

## 2022-12-27 RX ORDER — IPRATROPIUM BROMIDE AND ALBUTEROL SULFATE 2.5; .5 MG/3ML; MG/3ML
3 SOLUTION RESPIRATORY (INHALATION)
Status: DISCONTINUED | OUTPATIENT
Start: 2022-12-27 | End: 2022-12-28 | Stop reason: HOSPADM

## 2022-12-27 RX ADMIN — METHADONE HYDROCHLORIDE 160 MG: 10 TABLET ORAL at 10:14

## 2022-12-27 RX ADMIN — GUAIFENESIN 600 MG: 600 TABLET, EXTENDED RELEASE ORAL at 21:37

## 2022-12-27 RX ADMIN — DIBASIC SODIUM PHOSPHATE, MONOBASIC POTASSIUM PHOSPHATE AND MONOBASIC SODIUM PHOSPHATE 1 TABLET: 852; 155; 130 TABLET ORAL at 17:25

## 2022-12-27 RX ADMIN — SODIUM CHLORIDE, PRESERVATIVE FREE 10 ML: 5 INJECTION INTRAVENOUS at 16:22

## 2022-12-27 RX ADMIN — SODIUM CHLORIDE, PRESERVATIVE FREE 10 ML: 5 INJECTION INTRAVENOUS at 07:11

## 2022-12-27 RX ADMIN — ACETAMINOPHEN 650 MG: 325 TABLET ORAL at 21:37

## 2022-12-27 RX ADMIN — PANTOPRAZOLE SODIUM 40 MG: 40 TABLET, DELAYED RELEASE ORAL at 07:11

## 2022-12-27 RX ADMIN — DIBASIC SODIUM PHOSPHATE, MONOBASIC POTASSIUM PHOSPHATE AND MONOBASIC SODIUM PHOSPHATE 1 TABLET: 852; 155; 130 TABLET ORAL at 10:16

## 2022-12-27 RX ADMIN — CLOPIDOGREL BISULFATE 75 MG: 75 TABLET ORAL at 09:03

## 2022-12-27 RX ADMIN — PIPERACILLIN AND TAZOBACTAM 3.38 G: 3; .375 INJECTION, POWDER, FOR SOLUTION INTRAVENOUS at 01:58

## 2022-12-27 RX ADMIN — GUAIFENESIN 600 MG: 600 TABLET, EXTENDED RELEASE ORAL at 09:03

## 2022-12-27 RX ADMIN — PREGABALIN 150 MG: 100 CAPSULE ORAL at 21:37

## 2022-12-27 RX ADMIN — LEVOTHYROXINE SODIUM 200 MCG: 0.1 TABLET ORAL at 07:11

## 2022-12-27 RX ADMIN — PIPERACILLIN AND TAZOBACTAM 3.38 G: 3; .375 INJECTION, POWDER, FOR SOLUTION INTRAVENOUS at 17:00

## 2022-12-27 RX ADMIN — CLONAZEPAM 1 MG: 1 TABLET ORAL at 09:03

## 2022-12-27 RX ADMIN — SODIUM CHLORIDE, PRESERVATIVE FREE 10 ML: 5 INJECTION INTRAVENOUS at 21:40

## 2022-12-27 RX ADMIN — PIPERACILLIN AND TAZOBACTAM 3.38 G: 3; .375 INJECTION, POWDER, FOR SOLUTION INTRAVENOUS at 09:52

## 2022-12-27 RX ADMIN — Medication 2 UNITS: at 16:58

## 2022-12-27 RX ADMIN — FENOFIBRATE 160 MG: 160 TABLET ORAL at 10:17

## 2022-12-27 RX ADMIN — PREGABALIN 150 MG: 100 CAPSULE ORAL at 09:04

## 2022-12-27 RX ADMIN — ATORVASTATIN CALCIUM 40 MG: 40 TABLET, FILM COATED ORAL at 21:37

## 2022-12-27 RX ADMIN — CLONAZEPAM 1 MG: 1 TABLET ORAL at 16:25

## 2022-12-27 RX ADMIN — CLONAZEPAM 1 MG: 1 TABLET ORAL at 21:37

## 2022-12-28 VITALS
HEIGHT: 67 IN | DIASTOLIC BLOOD PRESSURE: 86 MMHG | OXYGEN SATURATION: 89 % | TEMPERATURE: 98 F | SYSTOLIC BLOOD PRESSURE: 167 MMHG | WEIGHT: 175.5 LBS | RESPIRATION RATE: 16 BRPM | BODY MASS INDEX: 27.55 KG/M2 | HEART RATE: 89 BPM

## 2022-12-28 LAB
GLUCOSE BLD STRIP.AUTO-MCNC: 172 MG/DL (ref 65–117)
GLUCOSE BLD STRIP.AUTO-MCNC: 214 MG/DL (ref 65–117)
GLUCOSE BLD STRIP.AUTO-MCNC: 241 MG/DL (ref 65–117)
SERVICE CMNT-IMP: ABNORMAL

## 2022-12-28 PROCEDURE — 74011250637 HC RX REV CODE- 250/637: Performed by: FAMILY MEDICINE

## 2022-12-28 PROCEDURE — 74011000258 HC RX REV CODE- 258: Performed by: INTERNAL MEDICINE

## 2022-12-28 PROCEDURE — 74011250636 HC RX REV CODE- 250/636: Performed by: INTERNAL MEDICINE

## 2022-12-28 PROCEDURE — 74011000250 HC RX REV CODE- 250: Performed by: FAMILY MEDICINE

## 2022-12-28 PROCEDURE — 74011250637 HC RX REV CODE- 250/637: Performed by: INTERNAL MEDICINE

## 2022-12-28 PROCEDURE — 82962 GLUCOSE BLOOD TEST: CPT

## 2022-12-28 PROCEDURE — 74011636637 HC RX REV CODE- 636/637: Performed by: FAMILY MEDICINE

## 2022-12-28 RX ORDER — ALBUTEROL SULFATE 90 UG/1
2 AEROSOL, METERED RESPIRATORY (INHALATION)
Qty: 120 EACH | Refills: 0 | Status: SHIPPED | OUTPATIENT
Start: 2022-12-28 | End: 2023-01-27

## 2022-12-28 RX ADMIN — FENOFIBRATE 160 MG: 160 TABLET ORAL at 10:12

## 2022-12-28 RX ADMIN — DIBASIC SODIUM PHOSPHATE, MONOBASIC POTASSIUM PHOSPHATE AND MONOBASIC SODIUM PHOSPHATE 1 TABLET: 852; 155; 130 TABLET ORAL at 10:12

## 2022-12-28 RX ADMIN — SODIUM CHLORIDE, PRESERVATIVE FREE 10 ML: 5 INJECTION INTRAVENOUS at 10:23

## 2022-12-28 RX ADMIN — GUAIFENESIN 600 MG: 600 TABLET, EXTENDED RELEASE ORAL at 10:12

## 2022-12-28 RX ADMIN — PIPERACILLIN AND TAZOBACTAM 3.38 G: 3; .375 INJECTION, POWDER, FOR SOLUTION INTRAVENOUS at 02:44

## 2022-12-28 RX ADMIN — CLOPIDOGREL BISULFATE 75 MG: 75 TABLET ORAL at 10:12

## 2022-12-28 RX ADMIN — SODIUM CHLORIDE, PRESERVATIVE FREE 10 ML: 5 INJECTION INTRAVENOUS at 08:42

## 2022-12-28 RX ADMIN — PREGABALIN 150 MG: 100 CAPSULE ORAL at 10:12

## 2022-12-28 RX ADMIN — CLONAZEPAM 1 MG: 1 TABLET ORAL at 16:37

## 2022-12-28 RX ADMIN — Medication 2 UNITS: at 16:37

## 2022-12-28 RX ADMIN — METHADONE HYDROCHLORIDE 160 MG: 10 TABLET ORAL at 10:12

## 2022-12-28 RX ADMIN — PIPERACILLIN AND TAZOBACTAM 3.38 G: 3; .375 INJECTION, POWDER, FOR SOLUTION INTRAVENOUS at 10:22

## 2022-12-28 RX ADMIN — PANTOPRAZOLE SODIUM 40 MG: 40 TABLET, DELAYED RELEASE ORAL at 08:42

## 2022-12-28 RX ADMIN — LEVOTHYROXINE SODIUM 200 MCG: 0.1 TABLET ORAL at 08:42

## 2022-12-28 RX ADMIN — WHITE PETROLATUM: 1.75 OINTMENT TOPICAL at 10:23

## 2022-12-28 RX ADMIN — CLONAZEPAM 1 MG: 1 TABLET ORAL at 10:12

## 2022-12-28 NOTE — WOUND CARE
Wound Care Note:     Follow-up visit for fingers    Chart shows:  Admitted for hyperkalemia and DEEPTI  Past Medical History:   Diagnosis Date    Arthritis     Diabetes ([address]     Endocrine disease     hypothyroid    Gastrointestinal disorder     pancreatitis    Hypertension     MI (myocardial infarction) ([address]      - R BKA    WBC = 5.4 on 12/26/22    Assessment:   Patient is alert and talking, continent with no assistance needed in repositioning. [facility]  Diet:  Adult diet regular; 5 carb choices; low sodium  Patient reports no pain. Left heel skin intact and without erythema. Sacrum and bilateral buttocks were not assessed. 1. POA right third finger wound with mostly pink wound bed, about 20% white, slightly moist, wound edges are open, marilynn-wound intact, wound appears to be the same size, was open to air earlier today; band-aid applied since. Puracol AG and gauze applied. 2.  POA right hand with several crusted wounds, look the same, no drainage, no erythema. Aquaphor ointment applied. 3.  POA left hand wounds look the same, dry, crusted. Aquaphor ointment applied. Recommended patient apply Aquaphor ointment at least nightly and cover with gloves. Patient repositioned supine. Recommendations:    Continue with current wound care. Right third finger- Every other day cleanse with normal saline, wipe wound bed clean and dry, apply a piece of Puracol AG (located on 5E back par room) to wound bed and add a few drops of normal saline, cover with 4 x 4 and secure with tape. Left hand - Every 12 hours apply Aquaphor ointment and rub into hand. Skin Care & Pressure Prevention:  Minimize layers of linen/pads under patient to optimize support surface. Turn/reposition approximately every 2 hours and offload heels.   Manage incontinence / promote continence   Nourishing Skin Cream to dry skin, minimize use of briefs when able    Discussed above plan with patient & Kerwin Brito RN    Transition of Care: Plan to follow as needed while admitted to hospital.    Brenna Blankenship BSN, RN, [facility]  Certified Wound and Ostomy Nurse  office [phone]  Best way to contact me is through [address] [place] [address]

## 2022-12-28 NOTE — PROGRESS NOTES
Problem: Diabetes Self-Management  Goal: *Disease process and treatment process  Description: Define diabetes and identify own type of diabetes; list 3 options for treating diabetes. 12/28/2022 1820 by Franklyn Alcaraz RN  Outcome: Progressing Towards Goal  12/28/2022 1820 by Franklyn Alcaraz RN  Outcome: Progressing Towards Goal  Goal: *Incorporating nutritional management into lifestyle  Description: Describe effect of type, amount and timing of food on blood glucose; list 3 methods for planning meals. 12/28/2022 1820 by Franklyn Alcaraz RN  Outcome: Progressing Towards Goal  12/28/2022 1820 by Franklyn Alcaraz RN  Outcome: Progressing Towards Goal  Goal: *Incorporating physical activity into lifestyle  Description: State effect of exercise on blood glucose levels. 12/28/2022 1820 by Franklyn Alcaraz RN  Outcome: Progressing Towards Goal  12/28/2022 1820 by Franklyn Alcaraz RN  Outcome: Progressing Towards Goal  Goal: *Developing strategies to promote health/change behavior  Description: Define the ABC's of diabetes; identify appropriate screenings, schedule and personal plan for screenings. 12/28/2022 1820 by Franklyn Alcaraz RN  Outcome: Progressing Towards Goal  12/28/2022 1820 by Franklyn Alcaraz RN  Outcome: Progressing Towards Goal  Goal: *Using medications safely  Description: State effect of diabetes medications on diabetes; name diabetes medication taking, action and side effects. 12/28/2022 1820 by Franklyn Alcaraz RN  Outcome: Progressing Towards Goal  12/28/2022 1820 by Franklyn Alcaraz RN  Outcome: Progressing Towards Goal  Goal: *Monitoring blood glucose, interpreting and using results  Description: Identify recommended blood glucose targets  and personal targets.   12/28/2022 1820 by Franklyn Alcaraz RN  Outcome: Progressing Towards Goal  12/28/2022 1820 by Franklyn Alcaraz RN  Outcome: Progressing Towards Goal  Goal: *Prevention, detection, treatment of acute complications  Description: List symptoms of hyper- and hypoglycemia; describe how to treat low blood sugar and actions for lowering  high blood glucose level. 12/28/2022 1820 by Rupesh Headley RN  Outcome: Progressing Towards Goal  12/28/2022 1820 by Rupesh Headley RN  Outcome: Progressing Towards Goal  Goal: *Prevention, detection and treatment of chronic complications  Description: Define the natural course of diabetes and describe the relationship of blood glucose levels to long term complications of diabetes.   12/28/2022 1820 by Rupesh Headley RN  Outcome: Progressing Towards Goal  12/28/2022 1820 by Rupesh Headley RN  Outcome: Progressing Towards Goal  Goal: *Developing strategies to address psychosocial issues  Description: Describe feelings about living with diabetes; identify support needed and support network  12/28/2022 1820 by Rupesh Headley RN  Outcome: Progressing Towards Goal  12/28/2022 1820 by Rupesh Headley RN  Outcome: Progressing Towards Goal  Goal: *Insulin pump training  12/28/2022 1820 by Rupesh Headley RN  Outcome: Progressing Towards Goal  12/28/2022 1820 by Rupesh Headley RN  Outcome: Progressing Towards Goal  Goal: *Sick day guidelines  12/28/2022 1820 by Rpuesh Headley RN  Outcome: Progressing Towards Goal  12/28/2022 1820 by Rupesh Headley RN  Outcome: Progressing Towards Goal  Goal: *Patient Specific Goal (EDIT GOAL, INSERT TEXT)  12/28/2022 1820 by Rupesh Headley RN  Outcome: Progressing Towards Goal  12/28/2022 1820 by Rupesh Headley RN  Outcome: Progressing Towards Goal     Problem: Patient Education: Go to Patient Education Activity  Goal: Patient/Family Education  12/28/2022 1820 by Rupesh Headley RN  Outcome: Progressing Towards Goal  12/28/2022 1820 by Rupesh Headley RN  Outcome: Progressing Towards Goal     Problem: Risk for Spread of Infection  Goal: Prevent transmission of infectious organism to others  Description: Prevent the transmission of infectious organisms to other patients, staff members, and visitors. 12/28/2022 1820 by Johnny Najera RN  Outcome: Progressing Towards Goal  12/28/2022 1820 by Johnny Najera RN  Outcome: Progressing Towards Goal     Problem: Patient Education:  Go to Education Activity  Goal: Patient/Family Education  12/28/2022 1820 by Johnny Najera RN  Outcome: Progressing Towards Goal  12/28/2022 1820 by Johnny Najera RN  Outcome: Progressing Towards Goal     Problem: Falls - Risk of  Goal: *Absence of Falls  Description: Document Valdemar Mackey Fall Risk and appropriate interventions in the flowsheet. 12/28/2022 1820 by Johnny Najera RN  Outcome: Progressing Towards Goal  Note: Fall Risk Interventions:  Mobility Interventions: Utilize walker, cane, or other assistive device    Mentation Interventions: Adequate sleep, hydration, pain control    Medication Interventions: Patient to call before getting OOB                12/28/2022 1820 by Johnny Najera RN  Outcome: Progressing Towards Goal  Note: Fall Risk Interventions:  Mobility Interventions: Utilize walker, cane, or other assistive device    Mentation Interventions: Adequate sleep, hydration, pain control    Medication Interventions: Patient to call before getting OOB                   Problem: Patient Education: Go to Patient Education Activity  Goal: Patient/Family Education  Outcome: Progressing Towards Goal     Problem: Pressure Injury - Risk of  Goal: *Prevention of pressure injury  Description: Document Brendon Scale and appropriate interventions in the flowsheet. Outcome: Progressing Towards Goal  Note: Pressure Injury Interventions:  Sensory Interventions: Assess changes in LOC, Avoid rigorous massage over bony prominences, Check visual cues for pain, Float heels, Keep linens dry and wrinkle-free, Minimize linen layers, Pressure redistribution bed/mattress (bed type), Turn and reposition approx.  every two hours (pillows and wedges if needed)    Moisture Interventions: Absorbent underpads, Assess need for specialty bed, Apply protective barrier, creams and emollients, Limit adult briefs, Maintain skin hydration (lotion/cream), Minimize layers    Activity Interventions: Assess need for specialty bed, Increase time out of bed, PT/OT evaluation    Mobility Interventions: Turn and reposition approx.  every two hours(pillow and wedges), PT/OT evaluation, HOB 30 degrees or less, Float heels, Chair cushion, Assess need for specialty bed    Nutrition Interventions: Offer support with meals,snacks and hydration, Discuss nutritional consult with provider, Document food/fluid/supplement intake    Friction and Shear Interventions: Apply protective barrier, creams and emollients, Foam dressings/transparent film/skin sealants, Lift sheet, Minimize layers, Transfer aides:transfer board/Jaqueline lift/ceiling lift                Problem: Patient Education: Go to Patient Education Activity  Goal: Patient/Family Education  Outcome: Progressing Towards Goal

## 2022-12-28 NOTE — PROGRESS NOTES
Patient feeling well and really wants to go. She does not have any respiratory sxs. Spo2 98% on 2 l/min. Taken off to room air,spo2 remained between 92-94% coming up to 97% with incentive spirometry. Discussed with patient for outpatient pulmonary follow up for PFT,she likely has undiagnosed COPD.

## 2022-12-28 NOTE — DISCHARGE INSTRUCTIONS
Discharge Instructions       PATIENT ID: Mendez Car  MRN: [id number]   YOB: 1975    DATE OF ADMISSION: 12/19/2022    DATE OF DISCHARGE: 12/28/2022      DISCHARGING PROVIDER: Edward Dennison MD    To contact this individual call [phone] and ask the  to page. If unavailable ask to be transferred the Adult Hospitalist Department. DISCHARGE DIAGNOSES and CARE RECOMMENDATIONS:   Acute respiratory failure secondary to influenza A infection with superimposed bacterial infection. CXR showed increased bilateral pulm infiltrates. -You have completed Tamiflu(antiviral for the flu) and antibiotics. -You may have undiagnosed COPD. Please discuss with your PCP for referral to [facility] for pulmonary function test and evaluation for COPD. You need to quit smoking.  -A prescription for bronchodilators(breathing treatment ) is sent to your pharmacy. We recommend follow up with your PCP within a week of discharge for posthospitalization check. DIET:   Regular Diet and Diabetic Diet      ACTIVITY:   Activity as tolerated    WOUND CARE:   Right third finger- Every other day cleanse with normal saline, wipe wound bed clean and dry, apply a piece of Puracol AG  to wound bed and add a few drops of normal saline, cover with 4 x 4 and secure with tape. Left hand - Every 12 hours apply Aquaphor ointment and rub into hand. When to call your doctor:shortness of breath,wheezing unrelieved by the breathing treatment. If you feel the symptoms are severe,call 911 and go to the Emergency Room. DISCHARGE MEDICATIONS:     My Medications        START taking these medications        Instructions Each Dose to Equal Morning Noon Evening Bedtime   phosphorus 250 mg tablet  Commonly known as: K PHOS NEUTRAL    Your last dose was: Your next dose is: Take 1 Tablet by mouth two (2) times a day for 10 doses.    1 Tablet                        CHANGE how you take these medications        Instructions Each Dose to Equal Morning Noon Evening Bedtime   albuterol 90 mcg/actuation inhaler  Commonly known as: PROVENTIL HFA, VENTOLIN HFA, PROAIR HFA  What changed: when to take this    Your last dose was: Your next dose is: Take 2 Puffs by inhalation every six (6) hours as needed for Wheezing for up to 30 days. 2 Puff                        CONTINUE taking these medications        Instructions Each Dose to Equal Morning Noon Evening Bedtime   amLODIPine 2.5 mg tablet  Commonly known as: NORVASC    Your last dose was: Your next dose is: Take 1 Tablet by mouth daily. 2.5 mg                 atorvastatin 40 mg tablet  Commonly known as: LIPITOR    Your last dose was: Your next dose is: Take 1 Tablet by mouth nightly. 40 mg                 Blood-Glucose Meter monitoring kit    Your last dose was: Your next dose is:         Need glucometer, glucometer test strips. Lancets. Test up to 4 times a day                  clonazePAM 1 mg tablet  Commonly known as: KlonoPIN    Your last dose was: Your next dose is: Take 1 mg by mouth three (3) times daily. 1 mg                 clopidogreL 75 mg Tab  Commonly known as: PLAVIX    Your last dose was: Your next dose is: Take 1 Tab by mouth daily. 75 mg                 fenofibrate 160 mg tablet  Commonly known as: LOFIBRA    Your last dose was: Your next dose is: Take 160 mg by mouth daily. Indications: HYPERCHOLESTEROLEMIA   160 mg                 furosemide 40 mg tablet  Commonly known as: LASIX    Your last dose was: Your next dose is: Take 40 mg by mouth daily. 40 mg                 HumuLIN R U-500 (Conc) Kwikpen 500 unit/mL (3 mL) Inpn subQ pen  Generic drug: insulin U-500 CONCENTRATED regular    Your last dose was: Your next dose is:         20 Units by SubCUTAneous route three (3) times daily (with meals).    20 Units Jardiance 10 mg tablet  Generic drug: empagliflozin    Your last dose was: Your next dose is: Take 10 mg by mouth daily. 10 mg                 Lantus Solostar U-100 Insulin 100 unit/mL (3 mL) Inpn  Generic drug: insulin glargine    Your last dose was: Your next dose is:         70 Units by SubCUTAneous route daily. 70 Units                 levothyroxine 200 mcg tablet  Commonly known as: SYNTHROID    Your last dose was: Your next dose is: Take 200 mcg by mouth daily. 200 mcg                 lipase-protease-amylase 24,000-76,000 -120,000 unit capsule  Commonly known as: CREON 24,000    Your last dose was: Your next dose is: Take 1 Cap by mouth three (3) times daily (with meals). 1 Capsule                 lisinopriL 10 mg tablet  Commonly known as: Wenceslao Double    Your last dose was: Your next dose is:         TAKE 1 TABLET BY MOUTH EVERY DAY                  metFORMIN  mg tablet  Commonly known as: GLUCOPHAGE XR    Your last dose was: Your next dose is: Take 1,000 mg by mouth two (2) times a day. 1,000 mg                 methadone 10 mg tablet  Commonly known as: DOLOPHINE    Your last dose was: Your next dose is: Take 160 mg by mouth daily. 160 mg                 omeprazole 40 mg capsule  Commonly known as: PRILOSEC    Your last dose was: Your next dose is: Take 40 mg by mouth daily. 40 mg                 OneTouch Ultra Test strip  Generic drug: glucose blood VI test strips    Your last dose was: Your next dose is:         TEST BLOOD GLUCOSE TWICE A DAY                  polyethylene glycol 17 gram/dose powder  Commonly known as: Miralax    Your last dose was: Your next dose is: Take 17 g by mouth daily. 17 g                 pregabalin 200 mg capsule  Commonly known as: LYRICA    Your last dose was: Your next dose is: Take 200 mg by mouth four (4) times daily.    200 mg rosuvastatin 40 mg tablet  Commonly known as: CRESTOR    Your last dose was: Your next dose is: Take 40 mg by mouth daily. 40 mg                 tiZANidine 4 mg tablet  Commonly known as: Sanchez Dues    Your last dose was: Your next dose is: Take 4 mg by mouth daily. 4 mg                 traZODone 50 mg tablet  Commonly known as: DESYREL    Your last dose was: Your next dose is: Take 1 Tab by mouth nightly. 50 mg                        STOP taking these medications      clindamycin 300 mg capsule  Commonly known as: CLEOCIN               ASK your doctor about these medications        Instructions Each Dose to Equal Morning Noon Evening Bedtime   aspirin 81 mg chewable tablet    Your last dose was: Your next dose is: Take 81 mg by mouth daily. 81 mg                           Where to Get Your Medications        These medications were sent to [address] [address], [address]      Phone: [phone]   albuterol 90 mcg/actuation inhaler  phosphorus 250 mg tablet             It is important that you take the medication exactly as they are prescribed. Keep your medication in the bottles provided by the pharmacist and keep a list of the medication names, dosages, and times to be taken in your wallet. Do not take other medications without consulting your doctor. Signed:    Di Queen MD  2022  3:10 PM

## 2022-12-28 NOTE — PROGRESS NOTES
[facility] Adult  Hospitalist Group                                                                                          Hospitalist Progress Note  Rebeca Masters MD  Answering service: [phone] OR [phone] from in house phone        Date of Service:  2022  NAME:  Viry Hines  :  1975  MRN:  [id number]      Admission Summary:   Viry Hines is a 50 y.o. female history of DM2 s/p R BKA, hypothyroidism, HTN, and CAD s/p stents, and nicotine dependence who presents at the request of her GI physician for elevated creatinine with hyperkalemia. She states that her PCP on routine work-up noted an elevated creatinine, and \"fluid around the patient's liver\" she also had noted a weight increase of 14 pounds in 1 week, and increasing abdominal fullness. She was sent to a gastroenterologist for further evaluation of her liver disease, and repeat labs. Repeat blood work showed elevated creatinine with elevated potassium, and she was referred to the ED for further evaluation. She denies fever, chills, night sweats, nausea, vomiting, pain, shortness of breath, or decreased urinary output. In the ED, VSS. Labs were significant for Hgb 9.9 (improving), K+ 5.4, BUN 52, creatinine 2.04, albumin 3.2, Mg 2.7, and PO4 5.4. Renal ultrasound was normal. EKG NSR. In the ED, she received 1Lns       Interval history / Subjective:   Patient feeling better however she still remains hypoxic. During my exam, SPO2 went down to low 80s on room air and she has to be placed back on 2 L of oxygen. We will try to wean off in the next day or so in preparation for discharge, she does not have a chronic condition to qualify her for home oxygen     Assessment & Plan:     # Acute hypoxemic respiratory failure secondary to influenza A infection with superimposed bacterial infection.   CXR showed increased bilateral pulm infiltrates.  -Completed Tamiflu- last dose   -Continue IV antibiotics. -Supportive, bronchodilators, mucolytic's  - Still requiring oxygen 3 L/min. Continue to try to wean off oxygen. Mild DEEPTI, improved. Hyperkalemia -corrected following Lokelma.  -Renal ultrasound without evidence of obstruction     #Abd distension - not ascites     -up to date on colonoscopy due to mother with hx colon cancer, not up to date on gynecological screenings  -denies ETOH use     #DM II with hyperglycemia  -A1c 11. c/w ISS    # Chronic pain syndrome  - cont methadone and lyrica     #CAD  -continue plavix, lofibra, and lipitor     #HTN  -holding lisinopril as above. #GERD  -continue PPI     #Nicotine dependence  -nicotine patch     #Hypothyroidism  -continue synthroid    Hx PVD w prior R BKA     Code status: Full  Prophylaxis: SCDs  Care Plan discussed with: patient, RN, CM  Anticipated Disposition: home when weaned off oxygen      Hospital Problems  Date Reviewed: 9/7/2022            Codes Class Noted POA    Hyperkalemia, diminished renal excretion ICD-10-CM: E87.5  ICD-9-CM: 276.7  12/20/2022 Unknown        DEEPTI (acute kidney injury) [facility]) ICD-10-CM: N17.9  ICD-9-CM: 584.9  12/20/2022 Unknown       Review of Systems:   A comprehensive review of systems was negative except for that written in the HPI. Vital Signs:    Last 24hrs VS reviewed since prior progress note.  Most recent are:  Visit Vitals  /78 (BP 1 Location: Left upper arm, BP Patient Position: Sitting)   Pulse (!) 59   Temp 99 °F (37.2 °C)   Resp 18   Ht 5' 7\" (1.702 m)   Wt 83.5 kg (184 lb)   SpO2 93%   BMI 28.82 kg/m²     Patient Vitals for the past 24 hrs:   Temp Pulse Resp BP SpO2   12/27/22 1410 99 °F (37.2 °C) (!) 59 18 138/78 93 %   12/27/22 1214 -- -- -- -- (!) 88 %   12/27/22 1210 -- -- -- -- (!) 86 %   12/27/22 0801 98.1 °F (36.7 °C) 60 20 (!) 152/72 93 %   12/27/22 0124 98.8 °F (37.1 °C) (!) 58 18 129/75 96 %   12/26/22 1958 98.9 °F (37.2 °C) 65 18 (!) 151/78 96 %          No intake or output data in the 24 hours ending 12/27/22 1907       Physical Examination:     I had a face to face encounter with this patient and independently examined them on 12/27/2022 as outlined below:          Constitutional: On oxygen 3 L/min. Not in distress   HEENT:  Oral mucosa dry, O2 via NC, EOMI    Resp: On oxygen at 3 L/min. Crepitations heard on the right lower lung field. CV:  Regular rhythm, normal rate, no murmurs, gallops, rubs    GI:  Soft, non distended, non tender. normoactive bowel sounds,      Musculoskeletal:  Right AKA, warm, no wounds on stump    Neurologic: Alert and oriented x3. Motor exam nonfocal.            Data Review:    Review and/or order of clinical lab test  Review and/or order of tests in the radiology section of CPT  Review and/or order of tests in the medicine section of CPT      Labs:     Recent Labs     12/26/22  0513 12/25/22  0126   WBC 5.4 7.7   HGB 8.1* 8.1*   HCT 26.9* 27.9*    172       Recent Labs     12/27/22  0203 12/26/22  0921 12/26/22  0511 12/25/22  0126    142  --  145   K 3.7 4.4  --  4.4    108  --  110*   CO2 32 30  --  29   BUN 23* 28*  --  29*   CREA 0.96 1.05*  --  1.17*   * 223*  --  190*   CA 9.1 9.0  --  9.0   MG  --  2.4 2.5* 2.6*   PHOS  --  2.3*  --  2.3*       Recent Labs     12/26/22  0921 12/25/22  0126   ALT 19 22   AP 53 52   TBILI 0.3 0.3   TP 7.3 7.1   ALB 2.6* 2.5*   GLOB 4.7* 4.6*       No results for input(s): INR, PTP, APTT, INREXT, INREXT in the last 72 hours. No results for input(s): FE, TIBC, PSAT, FERR in the last 72 hours. Lab Results   Component Value Date/Time    Folate 22.8 (H) 12/20/2022 09:46 AM        No results for input(s): PH, PCO2, PO2 in the last 72 hours. No results for input(s): CPK, CKNDX, TROIQ in the last 72 hours.     No lab exists for component: CPB  Lab Results   Component Value Date/Time    Cholesterol, total 104 09/30/2020 03:35 AM    HDL Cholesterol 17 09/30/2020 03:35 AM    LDL, calculated 19 09/30/2020 03:35 AM    Triglyceride 340 (H) 09/30/2020 03:35 AM    CHOL/HDL Ratio 6.1 (H) 09/30/2020 03:35 AM     Lab Results   Component Value Date/Time    Glucose (POC) 225 (H) 12/27/2022 04:46 PM    Glucose (POC) 165 (H) 12/27/2022 11:18 AM    Glucose (POC) 167 (H) 12/27/2022 07:02 AM    Glucose (POC) 214 (H) 12/26/2022 09:22 PM    Glucose (POC) 212 (H) 12/26/2022 04:42 PM     Lab Results   Component Value Date/Time    Color YELLOW/STRAW 12/20/2022 11:11 AM    Appearance CLEAR 12/20/2022 11:11 AM    Specific gravity 1.015 12/20/2022 11:11 AM    pH (UA) 5.5 12/20/2022 11:11 AM    Protein Negative 12/20/2022 11:11 AM    Glucose >1,000 (A) 12/20/2022 11:11 AM    Ketone Negative 12/20/2022 11:11 AM    Bilirubin Negative 12/20/2022 11:11 AM    Urobilinogen 0.2 12/20/2022 11:11 AM    Nitrites Negative 12/20/2022 11:11 AM    Leukocyte Esterase Negative 12/20/2022 11:11 AM    Epithelial cells FEW 12/20/2022 11:11 AM    Bacteria Negative 12/20/2022 11:11 AM    WBC 0-4 12/20/2022 11:11 AM    RBC 0-5 12/20/2022 11:11 AM         Medications Reviewed:     Current Facility-Administered Medications   Medication Dose Route Frequency    albuterol-ipratropium (DUO-NEB) 2.5 MG-0.5 MG/3 ML  3 mL Nebulization Q4H PRN    phosphorus (K PHOS NEUTRAL) 250 mg tablet 1 Tablet  1 Tablet Oral BID    methadone (DOLOPHINE) tablet 160 mg  160 mg Oral DAILY    guaiFENesin ER (MUCINEX) tablet 600 mg  600 mg Oral Q12H    pantothenic ac-min oil-pet,hyd (AQUAPHOR) 41 % ointment   Topical Q12H    sodium chloride (NS) flush 5-40 mL  5-40 mL IntraVENous Q8H    sodium chloride (NS) flush 5-40 mL  5-40 mL IntraVENous PRN    acetaminophen (TYLENOL) tablet 650 mg  650 mg Oral Q6H PRN    Or    acetaminophen (TYLENOL) suppository 650 mg  650 mg Rectal Q6H PRN    polyethylene glycol (MIRALAX) packet 17 g  17 g Oral DAILY PRN    ondansetron (ZOFRAN ODT) tablet 4 mg  4 mg Oral Q8H PRN    Or    ondansetron (ZOFRAN) injection 4 mg  4 mg IntraVENous Q6H PRN [Held by provider] heparin (porcine) injection 5,000 Units  5,000 Units SubCUTAneous Q8H    nicotine (NICODERM CQ) 21 mg/24 hr patch 1 Patch  1 Patch TransDERmal DAILY    atorvastatin (LIPITOR) tablet 40 mg  40 mg Oral QHS    pregabalin (LYRICA) capsule 150 mg  150 mg Oral BID    pantoprazole (PROTONIX) tablet 40 mg  40 mg Oral ACB    levothyroxine (SYNTHROID) tablet 200 mcg  200 mcg Oral ACB    fenofibrate (LOFIBRA) tablet 160 mg  160 mg Oral DAILY    traZODone (DESYREL) tablet 50 mg  50 mg Oral QHS PRN    clonazePAM (KlonoPIN) tablet 1 mg  1 mg Oral TID    glucose chewable tablet 16 g  4 Tablet Oral PRN    glucagon (GLUCAGEN) injection 1 mg  1 mg IntraMUSCular PRN    dextrose 10 % infusion 0-250 mL  0-250 mL IntraVENous PRN    insulin lispro (HUMALOG) injection   SubCUTAneous AC&HS    lidocaine 4 % patch 1 Patch  1 Patch TransDERmal Q24H    clopidogreL (PLAVIX) tablet 75 mg  75 mg Oral DAILY    piperacillin-tazobactam (ZOSYN) 3.375 g in 0.9% sodium chloride (MBP/ADV) 100 mL MBP  3.375 g IntraVENous Q8H    guaiFENesin (ROBITUSSIN) 100 mg/5 mL oral liquid 100 mg  100 mg Oral Q4H PRN     ______________________________________________________________________  EXPECTED LENGTH OF STAY: 2d 21h  ACTUAL LENGTH OF STAY:          7                 Aislinn Contreras MD

## 2022-12-28 NOTE — PROGRESS NOTES
NUTRITION  Reason for Assessment: Initial      Recommendations/Interventions/Plan:   Modified diet- 4 carb choice, 3-4 g Na only. K restriction removed- WNL. Phos restriction removed- phos has been low. Ensure HP BID    Will rescreen per policy       Past Medical History:   Diagnosis Date    Arthritis     Diabetes ([facility] [address]     Endocrine disease     hypothyroid    Gastrointestinal disorder     pancreatitis    Hypertension     MI (myocardial infarction) ([facility] [address]        Pt screened for LOS. Chart/labs/meds reviewed. Admitted with DEEPTI (acute kidney injury) ([facility] [address] [N17.9]  Hyperkalemia, diminished renal excretion [E87.5]. Pt currently on 2L O2- weaning off in prep for d/c. Last dose of tamiflu 12/25, abx therapy continues. Wt history variable, pt unsure of UBW. BG has been elevated- adjusted diet order to 4 carb choice. Current diet order extremely restrictive. Removed K and phos restrictions d/t labs. No recorded edema, no ascites- liberalized Na in diet as most current evidence supports 3-4 g Na diet for heart disease. No c/o n/v/c/d. Pt agreeable to ensure HP BID for when hospital food unappetizing. Poor appetite typical re flu. Recorded meal intakes variable. Pt receiving neutraphos BID for low phos. Nutrition Related Findings:   Edema: No data recorded    Last BM: 12/28/22, Formed, Soft    Skin: WNL      Current Nutrition Therapies:  Diet: regular 5 carb choice cardiac 2 g Na, low K, low phos  Supplements: none  Meal intake: Patient Vitals for the past 168 hrs:   % Diet Eaten   12/28/22 1023 76 - 100%   12/27/22 1214 0%   12/23/22 0940 1 - 25%     Supplement intake: No data found.       Weight Hx:  Wt Readings from Last 10 Encounters:   12/28/22 79.6 kg (175 lb 8 oz)   08/31/22 74.8 kg (165 lb)   09/01/21 86.2 kg (190 lb)   10/20/20 80.3 kg (177 lb)   10/02/20 87 kg (191 lb 12.8 oz)   04/11/17 75.8 kg (167 lb)   10/05/15 80.1 kg (176 lb 9.4 oz)   01/11/12 85.7 kg (189 lb)         Estimated Nutrition Needs: Energy: 1525 (25 kcal/kg IBW)  Wt used: Ideal  Protein: 80 (1 g/kg)  Wt used: Current   Fluid: 1 ml/kcal       Recent Labs     12/27/22  0203 12/26/22  0921 12/26/22  0511   * 223*  --    BUN 23* 28*  --    CREA 0.96 1.05*  --     142  --    K 3.7 4.4  --     108  --    CO2 32 30  --    CA 9.1 9.0  --    PHOS  --  2.3*  --    MG  --  2.4 2.5*       Recent Labs     12/28/22  1146 12/28/22  0642 12/27/22  2111 12/27/22  1646 12/27/22  1118 12/27/22  0702 12/26/22  2122 12/26/22  1642 12/26/22  1102 12/26/22  0712 12/25/22  2226 12/25/22  1616   GLUCPOC 214* 172* 183* 225* 165* 167* 214* 212* 222* 234* 171* 190*       Lab Results   Component Value Date/Time    Hemoglobin A1c 11.0 (H) 12/20/2022 09:46 AM    Hemoglobin A1c 9.8 (H) 09/24/2020 11:28 AM         Claude Pay, RD  Available via [employer]

## 2022-12-29 NOTE — DISCHARGE SUMMARY
Physician Discharge Summary     Patient ID:    Viry Hines  [id number]  50 y.o.  1975    Admit date: 2022    Discharge date and time: 2022    Hospital Diagnoses and Treatment Rendered:   NAME:  Viry Hines  :  1975  MRN:  [id number]        Admission Summary:   Viry Hines is a 50 y.o. female history of DM2 s/p R BKA, hypothyroidism, HTN, and CAD s/p stents, and nicotine dependence who presents at the request of her GI physician for elevated creatinine with hyperkalemia. She states that her PCP on routine work-up noted an elevated creatinine, and \"fluid around the patient's liver\" she also had noted a weight increase of 14 pounds in 1 week, and increasing abdominal fullness. She was sent to a gastroenterologist for further evaluation of her liver disease, and repeat labs. Repeat blood work showed elevated creatinine with elevated potassium, and she was referred to the ED for further evaluation. She denies fever, chills, night sweats, nausea, vomiting, pain, shortness of breath, or decreased urinary output. In the ED, VSS. Labs were significant for Hgb 9.9 (improving), K+ 5.4, BUN 52, creatinine 2.04, albumin 3.2, Mg 2.7, and PO4 5.4. Renal ultrasound was normal. EKG NSR. In the ED, she received 1Lns     # Acute hypoxemic respiratory failure secondary to influenza A infection with superimposed bacterial infection. CXR showed increased bilateral pulm infiltrates.  -Completed Tamiflu- last dose   -Completed IV Zosyn.  -Patient weaned off oxygen. Patient likely has undiagnosed COPD. I have discussed with her to talk with her PCP for referral to pulmonology for PFT and COPD evaluation. Mild DEEPTI, improved.   Hyperkalemia -corrected following Lokelma.  -Renal ultrasound without evidence of obstruction     #Abd distension - not ascites      -up to date on colonoscopy due to mother with hx colon cancer, not up to date on gynecological screenings  -denies ETOH use     #DM II with hyperglycemia  -A1c 11. c/w ISS     # Chronic pain syndrome  - cont methadone and lyrica     #CAD  -continue plavix, lofibra, and lipitor     #HTN  -holding lisinopril as above. #GERD  -continue PPI     #Nicotine dependence  -nicotine patch     #Hypothyroidism  -continue synthroid     Hx PVD w prior R BKA    Patient discharged home in stable condition. Chronic Diagnoses:    Problem List as of 12/28/2022 Date Reviewed: 9/7/2022            Codes Class Noted - Resolved    Hyperkalemia, diminished renal excretion ICD-10-CM: E87.5  ICD-9-CM: 276.7  12/20/2022 - Present        DEEPTI (acute kidney injury) ([facility] [address] ICD-10-CM: N17.9  ICD-9-CM: 584.9  12/20/2022 - Present        RESOLVED: Osteomyelitis of foot ([facility] [address] ICD-10-CM: M86.9  ICD-9-CM: 730.27  9/24/2020 - 10/2/2020           Discharge Medications:   Discharge Medication List as of 12/28/2022  4:42 PM        START taking these medications    Details   phosphorus (K PHOS NEUTRAL) 250 mg tablet Take 1 Tablet by mouth two (2) times a day for 10 doses. , Normal, Disp-10 Tablet, R-0           CONTINUE these medications which have CHANGED    Details   albuterol (PROVENTIL HFA, VENTOLIN HFA, PROAIR HFA) 90 mcg/actuation inhaler Take 2 Puffs by inhalation every six (6) hours as needed for Wheezing for up to 30 days. , Normal, Disp-120 Each, R-0           CONTINUE these medications which have NOT CHANGED    Details   Jardiance 10 mg tablet Take 10 mg by mouth daily. , Historical Med, GLADYS      furosemide (LASIX) 40 mg tablet Take 40 mg by mouth daily. , Historical Med      rosuvastatin (CRESTOR) 40 mg tablet Take 40 mg by mouth daily. , Historical Med      levothyroxine (SYNTHROID) 200 mcg tablet Take 200 mcg by mouth daily. , Historical Med      metFORMIN ER (GLUCOPHAGE XR) 500 mg tablet Take 1,000 mg by mouth two (2) times a day., Historical Med      pregabalin (LYRICA) 200 mg capsule Take 200 mg by mouth four (4) times daily., Historical Med      atorvastatin (LIPITOR) 40 mg tablet Take 1 Tablet by mouth nightly., Normal, Disp-90 Tablet, R-1      aspirin 81 mg chewable tablet Take 81 mg by mouth daily. , Historical Med      OneTouch Ultra Test strip TEST BLOOD GLUCOSE TWICE A DAY, Historical Med, GLADYS      amLODIPine (NORVASC) 2.5 mg tablet Take 1 Tablet by mouth daily. , Normal, Disp-90 Tablet, R-3May want 30 days to start. lisinopriL (PRINIVIL, ZESTRIL) 10 mg tablet TAKE 1 TABLET BY MOUTH EVERY DAY, Normal, Disp-90 Tablet, R-1      tiZANidine (ZANAFLEX) 4 mg tablet Take 4 mg by mouth daily. , Historical Med      clopidogreL (PLAVIX) 75 mg tab Take 1 Tab by mouth daily. , Print, Disp-30 Tab,R-0      insulin glargine (Lantus Solostar U-100 Insulin) 100 unit/mL (3 mL) inpn 70 Units by SubCUTAneous route daily. , Print, Disp-1 Adjustable Dose Pre-filled Pen Syringe,R-0      polyethylene glycol (Miralax) 17 gram/dose powder Take 17 g by mouth daily. , Print, Disp-850 g,R-0      traZODone (DESYREL) 50 mg tablet Take 1 Tab by mouth nightly. , Print, Disp-30 Tab,R-0      insulin U-500 CONCENTRATED regular (HumuLIN R U-500, Conc, Kwikpen) 500 unit/mL (3 mL) inpn subQ pen 20 Units by SubCUTAneous route three (3) times daily (with meals). , Print, Disp-1 Adjustable Dose Pre-filled Pen Syringe,R-0      Blood-Glucose Meter monitoring kit Need glucometer, glucometer test strips. Lancets. Test up to 4 times a day, Print, Disp-1 Kit,R-0      methadone (DOLOPHINE) 10 mg tablet Take 160 mg by mouth daily. , Historical Med      clonazePAM (KlonoPIN) 1 mg tablet Take 1 mg by mouth three (3) times daily. , Historical Med      lipase-protease-amylase (CREON 24,000) 24,000-76,000 -120,000 unit capsule Take 1 Cap by mouth three (3) times daily (with meals). , Historical Med      omeprazole (PRILOSEC) 40 mg capsule Take 40 mg by mouth daily. , Historical Med      fenofibrate (LOFIBRA) 160 mg tablet Take 160 mg by mouth daily.  Indications: HYPERCHOLESTEROLEMIA, Historical Med           STOP taking these medications       clindamycin (CLEOCIN) 300 mg capsule Comments:   Reason for Stopping: Follow up Care: Follow-up Information       Follow up With Specialties Details Why Contact Info    Vivian Rodríguez MD Internal Medicine Physician   [address] [place] [address] [phone]            1. Vivian Rodríguez MD in 1-2 weeks. Please call to set up an appointment shortly after discharge. Diet:  Regular Diet and Diabetic Diet    Disposition:  Home. PATIENT CONDITION AT DISCHARGE:     Functional status:  Independent x   Deconditioned    Poor            Cognition  Lucid x   Forgetful    Dementia           Catheters/lines (plus indication)   No lines/catheters/drains x   PICC    PEG    Painter             Advanced Directive:   Code status   Full x   DNR      Discharge Exam:                       Constitutional: Patient remained off of oxygen today, eager to go home. No evidence of distress. HEENT:  Oral mucosa dry, O2 via NC, EOMI    Resp: On room air today. Crepitations heard on the right lower lung field. CV:  Regular rhythm, normal rate, no murmurs, gallops, rubs    GI:  Soft, non distended, non tender. normoactive bowel sounds,      Musculoskeletal:  Right AKA, warm, no wounds on stump    Neurologic: Alert and oriented x3. Motor exam nonfocal.                                  CONSULTATIONS: IP CONSULT TO NEPHROLOGY    Significant Diagnostic Studies:   No results for input(s): WBC, HGB, HCT, PLT, HGBEXT, HCTEXT, PLTEXT in the last 72 hours. Recent Labs     12/27/22  0203      K 3.7      CO2 32   BUN 23*   CREA 0.96   *   CA 9.1     No results for input(s): AP, TBIL, TP, ALB, GLOB, GGT, AML, LPSE in the last 72 hours. No lab exists for component: SGOT, GPT, AMYP, HLPSE  No results for input(s): INR, PTP, APTT, INREXT in the last 72 hours.    No results for input(s): FE, TIBC, PSAT, FERR in the last 72 hours. No results for input(s): PH, PCO2, PO2 in the last 72 hours. No results for input(s): CPK, CKMB in the last 72 hours. No lab exists for component: TROPONINI  No components found for: [place]      Greater than 30 minutes were spent with the patient on counseling and coordination of care      Signed:   Lonie Sandifer, MD  12/29/2022  4:05 PM

## 2023-02-01 ENCOUNTER — HOSPITAL ENCOUNTER (EMERGENCY)
Age: 48
Discharge: STILL ADMITTED OR ADMINISTRATIVE | End: 2023-02-01

## 2023-02-08 ENCOUNTER — TELEPHONE (OUTPATIENT)
Dept: CARDIOLOGY CLINIC | Age: 48
End: 2023-02-08

## 2023-02-08 NOTE — TELEPHONE ENCOUNTER
Spoke with patient. 2 patient identifiers used. Patient states she was told to stop Plavix and take low dose aspirin only when discharged from hospital ([facility]) after finger amputations. States she is not stopping plavix until cleared with Dr. Josue Casiano. Let patient know I would discuss with Dr. Josue Casiano. Patient verbalized understanding.

## 2023-02-08 NOTE — TELEPHONE ENCOUNTER
Pt stated while in the hospital recently and was instructed to stop taking the plavix and take the low dose aspirin, pt wanted to make sure the doctor is ok with this, pt still on the plavix because she wants to clear this with Dr. Vance Manzanares, please advise        Pt# [phone]

## 2023-02-08 NOTE — TELEPHONE ENCOUNTER
Discussed patient with Dr. Gerard Lamar and per MD it is okay for patient to stop Plavix and take baby aspirin as directed from discharge at [facility]. Spoke with patient. 2 patient identifiers used. Notified patient of above. Patient verbalized understanding and was appreciative of call.

## 2023-05-23 RX ORDER — CLOPIDOGREL BISULFATE 75 MG/1
75 TABLET ORAL DAILY
COMMUNITY
Start: 2020-10-03

## 2023-05-23 RX ORDER — TIZANIDINE 4 MG/1
4 TABLET ORAL DAILY
COMMUNITY
Start: 2020-10-12

## 2023-05-23 RX ORDER — ATORVASTATIN CALCIUM 40 MG/1
1 TABLET, FILM COATED ORAL NIGHTLY
COMMUNITY
Start: 2022-09-01

## 2023-05-23 RX ORDER — CLONAZEPAM 1 MG/1
1 TABLET ORAL 3 TIMES DAILY
COMMUNITY

## 2023-05-23 RX ORDER — METHADONE HYDROCHLORIDE 10 MG/1
160 TABLET ORAL DAILY
COMMUNITY

## 2023-05-23 RX ORDER — TRAZODONE HYDROCHLORIDE 50 MG/1
50 TABLET ORAL
COMMUNITY
Start: 2020-10-02

## 2023-05-23 RX ORDER — LEVOTHYROXINE SODIUM 0.2 MG/1
200 TABLET ORAL DAILY
COMMUNITY
Start: 2022-09-29

## 2023-05-23 RX ORDER — INSULIN GLARGINE 100 [IU]/ML
70 INJECTION, SOLUTION SUBCUTANEOUS DAILY
COMMUNITY
Start: 2020-10-02

## 2023-05-23 RX ORDER — METFORMIN HYDROCHLORIDE 500 MG/1
1000 TABLET, EXTENDED RELEASE ORAL 2 TIMES DAILY
COMMUNITY
Start: 2022-12-09

## 2023-05-23 RX ORDER — LISINOPRIL 10 MG/1
1 TABLET ORAL DAILY
COMMUNITY
Start: 2023-01-16

## 2023-05-23 RX ORDER — AMLODIPINE BESYLATE 2.5 MG/1
2.5 TABLET ORAL DAILY
COMMUNITY
Start: 2022-08-31

## 2023-05-23 RX ORDER — ASPIRIN 81 MG/1
81 TABLET, CHEWABLE ORAL DAILY
COMMUNITY
Start: 2022-07-22

## 2023-05-23 RX ORDER — OMEPRAZOLE 40 MG/1
40 CAPSULE, DELAYED RELEASE ORAL DAILY
COMMUNITY

## 2023-05-23 RX ORDER — ROSUVASTATIN CALCIUM 40 MG/1
40 TABLET, COATED ORAL DAILY
COMMUNITY
Start: 2022-11-18

## 2023-05-23 RX ORDER — PREGABALIN 200 MG/1
200 CAPSULE ORAL 4 TIMES DAILY
COMMUNITY
Start: 2022-11-20

## 2023-05-23 RX ORDER — POLYETHYLENE GLYCOL 3350 17 G/17G
17 POWDER, FOR SOLUTION ORAL DAILY
COMMUNITY
Start: 2020-10-02

## 2023-05-23 RX ORDER — FUROSEMIDE 40 MG/1
40 TABLET ORAL DAILY
COMMUNITY
Start: 2022-11-21

## 2023-05-23 RX ORDER — INSULIN HUMAN 500 [IU]/ML
20 INJECTION, SOLUTION SUBCUTANEOUS
COMMUNITY
Start: 2020-10-02

## 2023-05-23 RX ORDER — FENOFIBRATE 160 MG/1
160 TABLET ORAL DAILY
COMMUNITY

## 2023-07-10 NOTE — Clinical Note
Lesion: Located in the Proximal LAD. Stent inserted. Stent deployed. Multiple inflations used. First inflation pressure = 20 daniel; inflation time: 30 sec. Second inflation pressure: 20 daniel; inflation time: 8 sec. Ivermectin Counseling:  Patient instructed to take medication on an empty stomach with a full glass of water.  Patient informed of potential adverse effects including but not limited to nausea, diarrhea, dizziness, itching, and swelling of the extremities or lymph nodes.  The patient verbalized understanding of the proper use and possible adverse effects of ivermectin.  All of the patient's questions and concerns were addressed.

## 2023-09-05 RX ORDER — AMLODIPINE BESYLATE 2.5 MG/1
2.5 TABLET ORAL DAILY
Qty: 30 TABLET | Refills: 0 | Status: SHIPPED | OUTPATIENT
Start: 2023-09-05

## 2023-09-05 NOTE — TELEPHONE ENCOUNTER
Requested Prescriptions     Signed Prescriptions Disp Refills    amLODIPine (NORVASC) 2.5 MG tablet 30 tablet 0     Sig: Take 1 tablet by mouth daily MAKE FOLLOW UP APPOINTMENT FOR FURTHER REFILLS     Authorizing Provider: Chey Carver     Ordering User: Tawana WELCH per MD     No future appointments.

## 2023-09-28 ENCOUNTER — TELEPHONE (OUTPATIENT)
Age: 48
End: 2023-09-28

## 2023-09-28 RX ORDER — AMLODIPINE BESYLATE 2.5 MG/1
2.5 TABLET ORAL DAILY
Qty: 30 TABLET | Refills: 0 | OUTPATIENT
Start: 2023-09-28

## 2023-09-28 NOTE — TELEPHONE ENCOUNTER
Left message at home and mobile number for patient to call and schedule appointment with Dr Desmond Fabian.     Kasia Hooper

## 2023-10-12 ENCOUNTER — TELEPHONE (OUTPATIENT)
Age: 48
End: 2023-10-12

## 2023-10-12 NOTE — TELEPHONE ENCOUNTER
Spoke with patient. 2 patient identifiers used. Patient wanted an earlier appointment. Scheduled for next week. Patient states she thinks she has enough supply of amlodipine to last until appointment but she will call back.      Future Appointments   Date Time Provider [address] [place] [address]   10/18/2023  3:40 PM MD BRETT Melvin AMB

## 2023-10-12 NOTE — TELEPHONE ENCOUNTER
Pt is calling because she needed to schedule an apt with the doctor. Pt was schedule for 12/6/23 and would like to know if she can have a sooner apt.     [phone]

## 2023-12-13 ENCOUNTER — OFFICE VISIT (OUTPATIENT)
Age: 48
End: 2023-12-13
Payer: COMMERCIAL

## 2023-12-13 VITALS
SYSTOLIC BLOOD PRESSURE: 108 MMHG | BODY MASS INDEX: 27.44 KG/M2 | HEART RATE: 98 BPM | WEIGHT: 174.8 LBS | OXYGEN SATURATION: 99 % | HEIGHT: 67 IN | DIASTOLIC BLOOD PRESSURE: 76 MMHG

## 2023-12-13 DIAGNOSIS — I35.0 NONRHEUMATIC AORTIC (VALVE) STENOSIS: Primary | ICD-10-CM

## 2023-12-13 DIAGNOSIS — I25.10 ATHEROSCLEROSIS OF NATIVE CORONARY ARTERY OF NATIVE HEART WITHOUT ANGINA PECTORIS: ICD-10-CM

## 2023-12-13 DIAGNOSIS — I10 ESSENTIAL (PRIMARY) HYPERTENSION: ICD-10-CM

## 2023-12-13 PROCEDURE — 3078F DIAST BP <80 MM HG: CPT | Performed by: INTERNAL MEDICINE

## 2023-12-13 PROCEDURE — 99214 OFFICE O/P EST MOD 30 MIN: CPT | Performed by: INTERNAL MEDICINE

## 2023-12-13 PROCEDURE — 93005 ELECTROCARDIOGRAM TRACING: CPT | Performed by: INTERNAL MEDICINE

## 2023-12-13 PROCEDURE — 3074F SYST BP LT 130 MM HG: CPT | Performed by: INTERNAL MEDICINE

## 2023-12-13 PROCEDURE — 93010 ELECTROCARDIOGRAM REPORT: CPT | Performed by: INTERNAL MEDICINE

## 2023-12-13 RX ORDER — ATORVASTATIN CALCIUM 40 MG/1
40 TABLET, FILM COATED ORAL NIGHTLY
Qty: 90 TABLET | Refills: 3 | Status: SHIPPED | OUTPATIENT
Start: 2023-12-13

## 2023-12-13 RX ORDER — AMLODIPINE BESYLATE 2.5 MG/1
2.5 TABLET ORAL DAILY
Qty: 90 TABLET | Refills: 3 | Status: SHIPPED | OUTPATIENT
Start: 2023-12-13

## 2023-12-13 RX ORDER — ASPIRIN 81 MG/1
81 TABLET, CHEWABLE ORAL DAILY
Qty: 90 TABLET | Refills: 3 | Status: SHIPPED | OUTPATIENT
Start: 2023-12-13

## 2023-12-13 NOTE — PROGRESS NOTES
Cardiology Progress Note            Primary Care physician: Dr. Jeremiah Castleman   Reason for visit: Coronary artery disease           Assessment and PLAN        1. CAD manifested in the form of acute coronary syndrome in October 2020 now status post PCI to LAD and circumflex   3. Hx of HTN:  BP low normal  continue lisinopril 10 mg. Metoprolol discontinued since patient has normal LV function and no further angina. 4. Dyslipidemia:    5. DM   6. Right foot cellulitis/osteo   7. Anemia   8. Hypothyroidism    9. Chronic back pain   10. Fingertip ulcerations with focal gangrene         50 y.o. female with history of coronary disease manifesting the form of  NSTEMI/CAD and underwent PCI/stent to LCX and LAD in October 2020. No further chest pain on exertion or typical angina and Echo with preserved EF. Long extensive stenting in circumflex as well as LAD. Plavix has been discontinued. Biggest issue over the past 1 year has been recurrent finger amputation because of wet gangrene/osteomyelitis due to underlying diabetes and/or medial calcinosis/upper extremity Buerger's disease from smoking. She still intermittently smokes. No beta blockers due to marginal BP. On Amlodipine. NO further lisinopril due to marginal BP. Discussed about smoking cessation again. Smoking cessation has been discussed again. Will see her back in 1 year. All other antihypertensives have been discontinued except for amlodipine which will be continued in conjunction with aspirin and high intensity statin therapy. She is following with endocrine and her PCP. Had significant anxiety for which she is on Klonopin. She wanted refills but I would suggest that she gets that from Dr. Brittny Phelan her PCP. Due to atypical chest pain associated with anxiety, EKG was performed today which shows normal sinus rhythm and no ischemia.       []    High complexity decision making was performed    []    Patient is at

## 2023-12-13 NOTE — PROGRESS NOTES
Requested Prescriptions     Signed Prescriptions Disp Refills    amLODIPine (NORVASC) 2.5 MG tablet 90 tablet 3     Sig: Take 1 tablet by mouth daily    atorvastatin (LIPITOR) 40 MG tablet 90 tablet 3     Sig: Take 1 tablet by mouth nightly    aspirin 81 MG chewable tablet 90 tablet 3     Sig: Take 1 tablet by mouth daily     Verbal order per Dr. Cuevas.    Future Appointments   Date Time Provider Department Center   3/18/2024  2:50 PM Emanuel Strickland MD [facility]  [facility] AMB   12/18/2024  1:00 PM Mookie Cuevas MD [facility]

## 2024-09-04 ENCOUNTER — OFFICE VISIT (OUTPATIENT)
Age: 49
End: 2024-09-04
Payer: MEDICARE

## 2024-09-04 VITALS
HEART RATE: 82 BPM | WEIGHT: 184 LBS | HEIGHT: 67 IN | BODY MASS INDEX: 28.88 KG/M2 | SYSTOLIC BLOOD PRESSURE: 128 MMHG | OXYGEN SATURATION: 98 % | DIASTOLIC BLOOD PRESSURE: 62 MMHG

## 2024-09-04 DIAGNOSIS — I25.10 ATHEROSCLEROSIS OF NATIVE CORONARY ARTERY OF NATIVE HEART WITHOUT ANGINA PECTORIS: ICD-10-CM

## 2024-09-04 DIAGNOSIS — I35.0 NONRHEUMATIC AORTIC (VALVE) STENOSIS: Primary | ICD-10-CM

## 2024-09-04 DIAGNOSIS — I10 ESSENTIAL (PRIMARY) HYPERTENSION: ICD-10-CM

## 2024-09-04 PROCEDURE — 4004F PT TOBACCO SCREEN RCVD TLK: CPT | Performed by: INTERNAL MEDICINE

## 2024-09-04 PROCEDURE — G8419 CALC BMI OUT NRM PARAM NOF/U: HCPCS | Performed by: INTERNAL MEDICINE

## 2024-09-04 PROCEDURE — 3017F COLORECTAL CA SCREEN DOC REV: CPT | Performed by: INTERNAL MEDICINE

## 2024-09-04 PROCEDURE — 3078F DIAST BP <80 MM HG: CPT | Performed by: INTERNAL MEDICINE

## 2024-09-04 PROCEDURE — G8427 DOCREV CUR MEDS BY ELIG CLIN: HCPCS | Performed by: INTERNAL MEDICINE

## 2024-09-04 PROCEDURE — 99214 OFFICE O/P EST MOD 30 MIN: CPT | Performed by: INTERNAL MEDICINE

## 2024-09-04 PROCEDURE — 93010 ELECTROCARDIOGRAM REPORT: CPT | Performed by: INTERNAL MEDICINE

## 2024-09-04 PROCEDURE — 3074F SYST BP LT 130 MM HG: CPT | Performed by: INTERNAL MEDICINE

## 2024-09-04 PROCEDURE — 93005 ELECTROCARDIOGRAM TRACING: CPT | Performed by: INTERNAL MEDICINE

## 2024-09-04 RX ORDER — BLOOD PRESSURE TEST KIT
1 KIT MISCELLANEOUS DAILY
Qty: 1 KIT | Refills: 0 | Status: SHIPPED | OUTPATIENT
Start: 2024-09-04

## 2024-09-04 RX ORDER — AMLODIPINE BESYLATE 2.5 MG/1
2.5 TABLET ORAL DAILY
Qty: 90 TABLET | Refills: 3 | Status: SHIPPED | OUTPATIENT
Start: 2024-09-04

## 2024-09-04 ASSESSMENT — PATIENT HEALTH QUESTIONNAIRE - PHQ9
SUM OF ALL RESPONSES TO PHQ QUESTIONS 1-9: 0
SUM OF ALL RESPONSES TO PHQ QUESTIONS 1-9: 0
1. LITTLE INTEREST OR PLEASURE IN DOING THINGS: NOT AT ALL
2. FEELING DOWN, DEPRESSED OR HOPELESS: NOT AT ALL
SUM OF ALL RESPONSES TO PHQ QUESTIONS 1-9: 0
SUM OF ALL RESPONSES TO PHQ9 QUESTIONS 1 & 2: 0
SUM OF ALL RESPONSES TO PHQ QUESTIONS 1-9: 0

## 2024-09-04 NOTE — PROGRESS NOTES
file   Housing Stability: Low Risk  (9/1/2023)    Received from Replaced by [facility]    Housing Stability Vital Sign     Unable to Pay for Housing in the Last Year: No     Number of Places Lived in the Last Year: 1     In the last 12 months, was there a time when you did not have a steady place to sleep or slept in a shelter (including now)?: No       Physical Exam  Vitals:    09/04/24 1403   BP: 128/62   Pulse: 82   SpO2: 98%       General:    Alert, cooperative, no distress.   Psychiatric:    Normal Mood and affect    Eye/ENT:      Pupils equal, No asymmetry, Conjunctival pink. Able to hear voice at normal amplitude   Lungs:      Visibly symmetric chest expansion, No palpable tenderness. Clear to auscultation bilaterally.    Heart::    Regular rate and rhythm, S1, S2 normal, no murmur, click, rub or gallop.No JVD, Normal palpable peripheral pulses. No cyanosis   Abdomen:     Soft, non-tender. Bowel sounds normal. No masses,  No      organomegaly.   Extremities:   Extremities normal, atraumatic, no edema.   Neurologic:   CN II-XII grossly intact. No gross focal deficits           Mookie Cuevas MD   8/31/2022    9:03 AM       [facility] Office:   [place] Office:   [address] [place] [address]   [address]     [address]   [place] [address]    [place] [address]   P: [phone]    P: [phone]   F: [phone]    F: [phone]

## 2024-09-04 NOTE — PATIENT INSTRUCTIONS
Restart amlodipine 2.5mg    Take atorvastatin (lipitor) and no not take rosuvastatin (crestor)    Do not take Jardiance    If blood pressure kit does not go through your insurance, you can get an affordable one at [facility] or [facility]

## 2024-12-08 NOTE — TELEPHONE ENCOUNTER
10/20/2020 Cardiac Rehab: Called Ms. Margeret Collet  to discuss participation in the Cardiac Rehab Program  Left voicemail message. Zoey Phelps RN      10/13/2020 Cardiac Rehab: Called Ms. Margeret Collet  to discuss participation in the Cardiac Rehab Program . She is done with PT at home but unable to bear weight as of yet. Agreed to call on 10/16 . Margeret Collet will follow up with podiatry tomorrow 10/14 and ask when she might be able to enroll. Zoey Phelps RN                 10/9/2020 Cardiac Rehab: Called Ms. Vivian Viveros to discuss participation in the Cardiac Rehab Program following NSTEMI and stent 9/24/2020. Family that answered said pt is at the foot doctor.  Will follow up call next week. Joan Reno RN
No

## 2025-01-27 RX ORDER — ASPIRIN 81 MG
81 TABLET,CHEWABLE ORAL DAILY
Qty: 90 TABLET | Refills: 3 | Status: SHIPPED | OUTPATIENT
Start: 2025-01-27

## 2025-01-27 RX ORDER — ATORVASTATIN CALCIUM 40 MG/1
40 TABLET, FILM COATED ORAL NIGHTLY
Qty: 90 TABLET | Refills: 3 | Status: SHIPPED | OUTPATIENT
Start: 2025-01-27

## 2025-01-27 NOTE — TELEPHONE ENCOUNTER
Requested Prescriptions     Signed Prescriptions Disp Refills    atorvastatin (LIPITOR) 40 MG tablet 90 tablet 3     Sig: TAKE 1 TABLET BY MOUTH EVERY DAY AT NIGHT     Authorizing Provider: JACQUELINE CUEVAS     Ordering User: MISSAEL DANIELSON    aspirin (ASPIRIN LOW DOSE) 81 MG chewable tablet 90 tablet 3     Sig: TAKE 1 TABLET BY MOUTH EVERY DAY     Authorizing Provider: JACQUELINE CUEVAS     Ordering User: MISSAEL DANIELSON     VO per MD    Future Appointments   Date Time Provider Department Center   1/29/2025  3:20 PM Jacqueline Cuevas MD [facility]   9/17/2025  1:40 PM Jacqueline Cuevas MD [facility]

## 2025-03-11 NOTE — BRIEF OP NOTE
Brief Postoperative Note    Patient: Romeo Dietz  YOB: 1975  MRN: [id number]    Date of Procedure: 9/30/2020     Pre-Op Diagnosis: OSTEOMYELITIS, CELLULITIS, ULCER WITH NECROTIC BONE    Post-Op Diagnosis: SAME     Procedure(s):  DELAYED PRIMARY CLOSURE RIGHT FOOT     Surgeon(s):  Patsy Tinoco DPM    Surgical Assistant: None    Anesthesia: MAC     Estimated Blood Loss (mL): 06TX    Complications: NONE    Specimens: * No specimens in log *     Implants: * No implants in log *    Drains:   [REMOVED] Orogastric Tube 09/25/20 (Removed)   Site Assessment Clean, dry, & intact 09/26/20 0800   Securement Device Tape 09/26/20 0800   G Port Status Intermittent Suction 09/26/20 0800   External Insertion Ricco (cms) 65 cms 09/26/20 0800   Action Taken Placement verified (comment) 09/26/20 0800   Drainage Description Clear 09/26/20 0800   Medication Volume 120 ml 09/26/20 0800       Findings: VIABLE SOFT TISSUE AND BONE ENVELOPE, NO NOTED PURULENCE    Electronically Signed by Johnny Hahn DPM on 9/30/2020 at 6:33 PM Provider spoke with pt early

## (undated) DEVICE — GOWN,SIRUS,NONRNF,SETINSLV,2XL,18/CS: Brand: MEDLINE

## (undated) DEVICE — BNDG ELAS HK LOOP 4X5YD NS -- MATRIX

## (undated) DEVICE — LOOP,VESSEL,MAXI,BLUE,2/PK,STERILE: Brand: MEDLINE

## (undated) DEVICE — SYR 10ML LUER LOK 1/5ML GRAD --

## (undated) DEVICE — Device: Brand: ASAHI SION BLUE

## (undated) DEVICE — SUTURE VCRL SZ 3-0 L27IN ABSRB UD L26MM SH 1/2 CIR J416H

## (undated) DEVICE — NEEDLE HYPO 22GA L1.5IN BLK S STL HUB POLYPR SHLD REG BVL

## (undated) DEVICE — TREK CORONARY DILATATION CATHETER 2.50 MM X 15 MM / RAPID-EXCHANGE: Brand: TREK

## (undated) DEVICE — PADDING CST 4IN STERILE --

## (undated) DEVICE — SUTURE ETHLN SZ 2-0 L20IN NONABSORBABLE BLK LR L75MM 3/8 470G

## (undated) DEVICE — DRAPE,EXTREMITY,89X128,STERILE: Brand: MEDLINE

## (undated) DEVICE — HANDLE LT SNAP ON ULT DURABLE LENS FOR TRUMPF ALC DISPOSABLE

## (undated) DEVICE — ROCKER SWITCH PENCIL BLADE ELECTRODE, HOLSTER: Brand: EDGE

## (undated) DEVICE — CATH ANGI BLLN DIL 4.0X15MM -- NC EUPHORA

## (undated) DEVICE — HI-TORQUE WHISPER MS GUIDE WIRE .014 STRAIGHT TIP 3.0 CM X 190 CM: Brand: HI-TORQUE WHISPER

## (undated) DEVICE — ANGIOGRAPHIC CATHETER: Brand: IMPULSE™

## (undated) DEVICE — PAD,ABDOMINAL,5"X9",ST,LF,25/BX: Brand: MEDLINE INDUSTRIES, INC.

## (undated) DEVICE — SOLUTION IV 1000ML 0.9% SOD CHL

## (undated) DEVICE — Z DISCONTINUED USE 2425483 (LOW STOCK PER MEDLINE) TAPE UMB L18IN DIA1/8IN WHT COT NONABSORBABLE W/O NDL FOR

## (undated) DEVICE — SPECIAL PROCEDURE DRAPE 32" X 34": Brand: SPECIAL PROCEDURE DRAPE

## (undated) DEVICE — CATH ANGI BLLN DIL 3.5X15MM -- NC EUPHORA

## (undated) DEVICE — KIT MFLD ISOLATN NACL CNTRST PRT TBNG SPIK W/ PRSS TRNSDUC

## (undated) DEVICE — REM POLYHESIVE ADULT PATIENT RETURN ELECTRODE: Brand: VALLEYLAB

## (undated) DEVICE — STERILE POLYISOPRENE POWDER-FREE SURGICAL GLOVES WITH EMOLLIENT COATING: Brand: PROTEXIS

## (undated) DEVICE — Device: Brand: PADPRO

## (undated) DEVICE — TR BAND RADIAL ARTERY COMPRESSION DEVICE: Brand: TR BAND

## (undated) DEVICE — NC TREK CORONARY DILATATION CATHETER 3.5 MM X 20 MM / RAPID-EXCHANGE: Brand: NC TREK

## (undated) DEVICE — CONTAINER,SPECIMEN,4OZ,OR STRL: Brand: MEDLINE

## (undated) DEVICE — SPONGE GZ W4XL4IN COT 12 PLY TYP VII WVN C FLD DSGN

## (undated) DEVICE — Device

## (undated) DEVICE — SWAB CULT DBL W/O CHAR RAYON TIP AMIES GEL CLMN FOR COLL

## (undated) DEVICE — STRAP,POSITIONING,KNEE/BODY,FOAM,4X60": Brand: MEDLINE

## (undated) DEVICE — NEEDLE HYPO 25GA L1.5IN BVL ORIENTED ECLIPSE

## (undated) DEVICE — BONE MARROW KIT ASPIR 11 GA

## (undated) DEVICE — PACK,BASIC,SIRUS,V: Brand: MEDLINE

## (undated) DEVICE — PREP SKN CHLRAPRP APL 26ML STR --

## (undated) DEVICE — GAUZE,SPONGE,FLUFF,6"X6.75",STRL,5/TRAY: Brand: MEDLINE

## (undated) DEVICE — PACK PROCEDURE SURG HRT CATH

## (undated) DEVICE — PRESSURE MONITORING SET: Brand: TRUWAVE

## (undated) DEVICE — TUBING SUCT 10FR MAL ALUM SHFT FN CAP VENT UNIV CONN W/ OBT

## (undated) DEVICE — DRESSING,GAUZE,XEROFORM,CURAD,1"X8",ST: Brand: CURAD

## (undated) DEVICE — DRAPE C-ARMOUR C-ARM KIT --

## (undated) DEVICE — WASTE KIT - ST MARY: Brand: MEDLINE INDUSTRIES, INC.

## (undated) DEVICE — CATH GUID COR EB35 6FR 100CM -- LAUNCHER

## (undated) DEVICE — BANDAGE,ELASTIC,ESMARK,STERILE,4"X9',LF: Brand: MEDLINE

## (undated) DEVICE — TOWEL SURG W17XL27IN STD BLU COT NONFENESTRATED PREWASHED

## (undated) DEVICE — SURGICAL PROCEDURE PACK BASIN MAJ SET CUST NO CAUT

## (undated) DEVICE — DRAPE,REIN 53X77,STERILE: Brand: MEDLINE

## (undated) DEVICE — INFECTION CONTROL KIT SYS

## (undated) DEVICE — Device: Brand: EAGLE EYE PLATINUM RX DIGITAL IVUS CATHETER

## (undated) DEVICE — SUT ETHLN 2-0 18IN FS BLK --

## (undated) DEVICE — GLIDESHEATH SLENDER STAINLESS STEEL KIT: Brand: GLIDESHEATH SLENDER

## (undated) DEVICE — INTENDED FOR TISSUE SEPARATION, AND OTHER PROCEDURES THAT REQUIRE A SHARP SURGICAL BLADE TO PUNCTURE OR CUT.: Brand: BARD-PARKER ® CARBON RIB-BACK BLADES

## (undated) DEVICE — HANDPIECE SET WITH BONE CLEANING TIP AND SUCTION TUBE: Brand: INTERPULSE

## (undated) DEVICE — KIT MED IMAG CNTRST AGNT W/ IOPAMIDOL REUSE

## (undated) DEVICE — PAD,NON-ADHERENT,3X8,STERILE,LF,1/PK: Brand: MEDLINE

## (undated) DEVICE — BANDAGE,GAUZE,BULKEE II,4.5"X4.1YD,STRL: Brand: MEDLINE

## (undated) DEVICE — KIT HND CTRL 3 W STPCOCK ROT END 54IN PREM HI PRSS TBNG AT

## (undated) DEVICE — COPILOT BLEEDBACK CONTROL VALVE: Brand: COPILOT

## (undated) DEVICE — CATH ANGI BLLN DIL 4.0X06MM -- NC EUPHORA

## (undated) DEVICE — CUSTOM KT PTCA INFL DEV K05 00052M

## (undated) DEVICE — SYRINGE,TOOMEY,IRRIGATION,70CC,STERILE: Brand: MEDLINE